# Patient Record
Sex: FEMALE | Race: WHITE | NOT HISPANIC OR LATINO | Employment: OTHER | URBAN - METROPOLITAN AREA
[De-identification: names, ages, dates, MRNs, and addresses within clinical notes are randomized per-mention and may not be internally consistent; named-entity substitution may affect disease eponyms.]

---

## 2017-09-09 ENCOUNTER — HOSPITAL ENCOUNTER (INPATIENT)
Facility: HOSPITAL | Age: 62
LOS: 6 days | Discharge: NURSING, REHAB OR LONG-TERM CARE | End: 2017-09-15
Attending: INTERNAL MEDICINE | Admitting: INTERNAL MEDICINE

## 2017-09-09 ENCOUNTER — APPOINTMENT (OUTPATIENT)
Dept: CT IMAGING | Facility: HOSPITAL | Age: 62
End: 2017-09-09

## 2017-09-09 DIAGNOSIS — R26.2 DIFFICULTY WALKING: Primary | ICD-10-CM

## 2017-09-09 PROBLEM — I62.9 INTRACRANIAL BLEED: Status: ACTIVE | Noted: 2017-09-09

## 2017-09-09 LAB
ANION GAP SERPL CALCULATED.3IONS-SCNC: 15.5 MMOL/L
BUN BLD-MCNC: 11 MG/DL (ref 8–23)
BUN/CREAT SERPL: 14.7 (ref 7–25)
CALCIUM SPEC-SCNC: 8.9 MG/DL (ref 8.6–10.5)
CHLORIDE SERPL-SCNC: 97 MMOL/L (ref 98–107)
CO2 SERPL-SCNC: 26.5 MMOL/L (ref 22–29)
CREAT BLD-MCNC: 0.75 MG/DL (ref 0.57–1)
DEPRECATED RDW RBC AUTO: 42.1 FL (ref 37–54)
ERYTHROCYTE [DISTWIDTH] IN BLOOD BY AUTOMATED COUNT: 12.6 % (ref 11.7–13)
GFR SERPL CREATININE-BSD FRML MDRD: 79 ML/MIN/1.73
GLUCOSE BLD-MCNC: 157 MG/DL (ref 65–99)
GLUCOSE BLDC GLUCOMTR-MCNC: 120 MG/DL (ref 70–130)
GLUCOSE BLDC GLUCOMTR-MCNC: 144 MG/DL (ref 70–130)
HCT VFR BLD AUTO: 43.6 % (ref 35.6–45.5)
HGB BLD-MCNC: 15.3 G/DL (ref 11.9–15.5)
INR PPP: 1.03 (ref 0.9–1.1)
MCH RBC QN AUTO: 31.9 PG (ref 26.9–32)
MCHC RBC AUTO-ENTMCNC: 35.1 G/DL (ref 32.4–36.3)
MCV RBC AUTO: 91 FL (ref 80.5–98.2)
PLATELET # BLD AUTO: 192 10*3/MM3 (ref 140–500)
PMV BLD AUTO: 12.4 FL (ref 6–12)
POTASSIUM BLD-SCNC: 2.5 MMOL/L (ref 3.5–5.2)
PROTHROMBIN TIME: 13.1 SECONDS (ref 11.7–14.2)
RBC # BLD AUTO: 4.79 10*6/MM3 (ref 3.9–5.2)
SODIUM BLD-SCNC: 139 MMOL/L (ref 136–145)
WBC NRBC COR # BLD: 11.19 10*3/MM3 (ref 4.5–10.7)

## 2017-09-09 PROCEDURE — 82962 GLUCOSE BLOOD TEST: CPT

## 2017-09-09 PROCEDURE — 85027 COMPLETE CBC AUTOMATED: CPT | Performed by: NEUROLOGICAL SURGERY

## 2017-09-09 PROCEDURE — 70450 CT HEAD/BRAIN W/O DYE: CPT

## 2017-09-09 PROCEDURE — 80048 BASIC METABOLIC PNL TOTAL CA: CPT | Performed by: INTERNAL MEDICINE

## 2017-09-09 PROCEDURE — 25010000003 POTASSIUM CHLORIDE 10 MEQ/100ML SOLUTION: Performed by: INTERNAL MEDICINE

## 2017-09-09 PROCEDURE — 85610 PROTHROMBIN TIME: CPT | Performed by: NEUROLOGICAL SURGERY

## 2017-09-09 RX ORDER — SODIUM CHLORIDE 0.9 % (FLUSH) 0.9 %
1-10 SYRINGE (ML) INJECTION AS NEEDED
Status: DISCONTINUED | OUTPATIENT
Start: 2017-09-09 | End: 2017-09-15 | Stop reason: HOSPADM

## 2017-09-09 RX ORDER — POTASSIUM CHLORIDE 750 MG/1
40 CAPSULE, EXTENDED RELEASE ORAL AS NEEDED
Status: DISCONTINUED | OUTPATIENT
Start: 2017-09-09 | End: 2017-09-15 | Stop reason: HOSPADM

## 2017-09-09 RX ORDER — POTASSIUM CHLORIDE 1.5 G/1.77G
40 POWDER, FOR SOLUTION ORAL AS NEEDED
Status: DISCONTINUED | OUTPATIENT
Start: 2017-09-09 | End: 2017-09-15 | Stop reason: HOSPADM

## 2017-09-09 RX ORDER — LABETALOL HYDROCHLORIDE 5 MG/ML
20 INJECTION, SOLUTION INTRAVENOUS ONCE
Status: COMPLETED | OUTPATIENT
Start: 2017-09-09 | End: 2017-09-09

## 2017-09-09 RX ORDER — LABETALOL HYDROCHLORIDE 5 MG/ML
INJECTION, SOLUTION INTRAVENOUS
Status: COMPLETED
Start: 2017-09-09 | End: 2017-09-09

## 2017-09-09 RX ORDER — LABETALOL HYDROCHLORIDE 5 MG/ML
20 INJECTION, SOLUTION INTRAVENOUS EVERY 4 HOURS PRN
Status: DISCONTINUED | OUTPATIENT
Start: 2017-09-09 | End: 2017-09-10

## 2017-09-09 RX ORDER — POTASSIUM CHLORIDE 7.45 MG/ML
10 INJECTION INTRAVENOUS
Status: DISCONTINUED | OUTPATIENT
Start: 2017-09-09 | End: 2017-09-15 | Stop reason: HOSPADM

## 2017-09-09 RX ADMIN — LABETALOL HYDROCHLORIDE 20 MG: 5 INJECTION, SOLUTION INTRAVENOUS at 21:51

## 2017-09-09 RX ADMIN — POTASSIUM CHLORIDE 10 MEQ: 7.46 INJECTION, SOLUTION INTRAVENOUS at 23:26

## 2017-09-09 RX ADMIN — LABETALOL HYDROCHLORIDE 20 MG: 5 INJECTION, SOLUTION INTRAVENOUS at 21:52

## 2017-09-09 RX ADMIN — NICARDIPINE HYDROCHLORIDE 15 MG/HR: 25 INJECTION INTRAVENOUS at 23:40

## 2017-09-09 RX ADMIN — NICARDIPINE HYDROCHLORIDE 10 MG/HR: 25 INJECTION INTRAVENOUS at 20:12

## 2017-09-09 RX ADMIN — NICARDIPINE HYDROCHLORIDE 15 MG/HR: 25 INJECTION INTRAVENOUS at 21:51

## 2017-09-09 RX ADMIN — LABETALOL HYDROCHLORIDE 20 MG: 5 INJECTION, SOLUTION INTRAVENOUS at 23:20

## 2017-09-10 ENCOUNTER — APPOINTMENT (OUTPATIENT)
Dept: CT IMAGING | Facility: HOSPITAL | Age: 62
End: 2017-09-10

## 2017-09-10 LAB
CHOLEST SERPL-MCNC: 127 MG/DL (ref 0–200)
GLUCOSE BLDC GLUCOMTR-MCNC: 103 MG/DL (ref 70–130)
GLUCOSE BLDC GLUCOMTR-MCNC: 132 MG/DL (ref 70–130)
GLUCOSE BLDC GLUCOMTR-MCNC: 136 MG/DL (ref 70–130)
HDLC SERPL-MCNC: 39 MG/DL (ref 40–60)
LDLC SERPL CALC-MCNC: 55 MG/DL (ref 0–100)
LDLC/HDLC SERPL: 1.4 {RATIO}
POTASSIUM BLD-SCNC: 2.8 MMOL/L (ref 3.5–5.2)
TRIGL SERPL-MCNC: 167 MG/DL (ref 0–150)
VLDLC SERPL-MCNC: 33.4 MG/DL (ref 5–40)

## 2017-09-10 PROCEDURE — 70450 CT HEAD/BRAIN W/O DYE: CPT

## 2017-09-10 PROCEDURE — 84132 ASSAY OF SERUM POTASSIUM: CPT | Performed by: INTERNAL MEDICINE

## 2017-09-10 PROCEDURE — 80061 LIPID PANEL: CPT | Performed by: INTERNAL MEDICINE

## 2017-09-10 PROCEDURE — 82962 GLUCOSE BLOOD TEST: CPT

## 2017-09-10 PROCEDURE — 99222 1ST HOSP IP/OBS MODERATE 55: CPT | Performed by: NEUROLOGICAL SURGERY

## 2017-09-10 RX ORDER — HYDROCODONE BITARTRATE AND ACETAMINOPHEN 10; 325 MG/1; MG/1
1 TABLET ORAL EVERY 6 HOURS PRN
COMMUNITY
End: 2017-09-15 | Stop reason: HOSPADM

## 2017-09-10 RX ORDER — LABETALOL 100 MG/1
100 TABLET, FILM COATED ORAL EVERY 12 HOURS SCHEDULED
Status: DISCONTINUED | OUTPATIENT
Start: 2017-09-10 | End: 2017-09-11

## 2017-09-10 RX ORDER — LABETALOL HYDROCHLORIDE 5 MG/ML
20 INJECTION, SOLUTION INTRAVENOUS EVERY 4 HOURS PRN
Status: DISCONTINUED | OUTPATIENT
Start: 2017-09-10 | End: 2017-09-15 | Stop reason: HOSPADM

## 2017-09-10 RX ORDER — SODIUM CHLORIDE 0.9 % (FLUSH) 0.9 %
1-10 SYRINGE (ML) INJECTION AS NEEDED
Status: DISCONTINUED | OUTPATIENT
Start: 2017-09-10 | End: 2017-09-12

## 2017-09-10 RX ORDER — AMLODIPINE BESYLATE 10 MG/1
10 TABLET ORAL
Status: DISCONTINUED | OUTPATIENT
Start: 2017-09-10 | End: 2017-09-15 | Stop reason: HOSPADM

## 2017-09-10 RX ORDER — GABAPENTIN 600 MG/1
600 TABLET ORAL 3 TIMES DAILY
COMMUNITY
End: 2017-09-15 | Stop reason: HOSPADM

## 2017-09-10 RX ORDER — NIFEDIPINE 60 MG/1
60 TABLET, EXTENDED RELEASE ORAL DAILY
COMMUNITY
End: 2017-09-15 | Stop reason: HOSPADM

## 2017-09-10 RX ORDER — ALPRAZOLAM 0.5 MG/1
0.5 TABLET ORAL 3 TIMES DAILY
COMMUNITY
End: 2017-09-15 | Stop reason: HOSPADM

## 2017-09-10 RX ORDER — POTASSIUM CHLORIDE 600 MG/1
20 TABLET, FILM COATED, EXTENDED RELEASE ORAL DAILY PRN
COMMUNITY
End: 2023-01-10

## 2017-09-10 RX ORDER — LABETALOL HYDROCHLORIDE 5 MG/ML
20 INJECTION, SOLUTION INTRAVENOUS EVERY 4 HOURS PRN
Status: DISCONTINUED | OUTPATIENT
Start: 2017-09-10 | End: 2017-09-10

## 2017-09-10 RX ORDER — LEVOTHYROXINE SODIUM 200 UG/1
200 CAPSULE ORAL DAILY
COMMUNITY
End: 2023-01-13

## 2017-09-10 RX ORDER — ATORVASTATIN CALCIUM 20 MG/1
20 TABLET, FILM COATED ORAL DAILY
COMMUNITY
End: 2021-08-11

## 2017-09-10 RX ORDER — HYDROCODONE BITARTRATE AND ACETAMINOPHEN 5; 325 MG/1; MG/1
1 TABLET ORAL EVERY 4 HOURS PRN
Status: DISCONTINUED | OUTPATIENT
Start: 2017-09-10 | End: 2017-09-15 | Stop reason: HOSPADM

## 2017-09-10 RX ADMIN — AMLODIPINE BESYLATE 10 MG: 10 TABLET ORAL at 13:35

## 2017-09-10 RX ADMIN — NICARDIPINE HYDROCHLORIDE 12.5 MG/HR: 25 INJECTION INTRAVENOUS at 02:27

## 2017-09-10 RX ADMIN — HYDROCODONE BITARTRATE AND ACETAMINOPHEN 1 TABLET: 5; 325 TABLET ORAL at 02:27

## 2017-09-10 RX ADMIN — HYDROCODONE BITARTRATE AND ACETAMINOPHEN 1 TABLET: 5; 325 TABLET ORAL at 07:07

## 2017-09-10 RX ADMIN — NICARDIPINE HYDROCHLORIDE 7.5 MG/HR: 25 INJECTION INTRAVENOUS at 07:06

## 2017-09-10 RX ADMIN — LABETALOL HYDROCHLORIDE 0.5 MG/MIN: 5 INJECTION, SOLUTION INTRAVENOUS at 01:18

## 2017-09-10 RX ADMIN — POTASSIUM CHLORIDE 40 MEQ: 750 CAPSULE, EXTENDED RELEASE ORAL at 10:51

## 2017-09-10 RX ADMIN — NICARDIPINE HYDROCHLORIDE 15 MG/HR: 25 INJECTION INTRAVENOUS at 00:58

## 2017-09-10 RX ADMIN — NICARDIPINE HYDROCHLORIDE 5 MG/HR: 25 INJECTION INTRAVENOUS at 21:28

## 2017-09-10 RX ADMIN — POTASSIUM CHLORIDE 40 MEQ: 750 CAPSULE, EXTENDED RELEASE ORAL at 19:18

## 2017-09-10 RX ADMIN — LABETALOL HCL 100 MG: 100 TABLET, FILM COATED ORAL at 20:21

## 2017-09-10 RX ADMIN — POTASSIUM CHLORIDE 40 MEQ: 1.5 POWDER, FOR SOLUTION ORAL at 04:32

## 2017-09-10 RX ADMIN — POTASSIUM CHLORIDE 40 MEQ: 1.5 POWDER, FOR SOLUTION ORAL at 23:44

## 2017-09-10 RX ADMIN — NICARDIPINE HYDROCHLORIDE 5 MG/HR: 25 INJECTION INTRAVENOUS at 11:47

## 2017-09-10 RX ADMIN — NICARDIPINE HYDROCHLORIDE 7.5 MG/HR: 25 INJECTION INTRAVENOUS at 05:56

## 2017-09-10 RX ADMIN — NICARDIPINE HYDROCHLORIDE 5 MG/HR: 25 INJECTION INTRAVENOUS at 16:56

## 2017-09-10 RX ADMIN — POTASSIUM CHLORIDE 40 MEQ: 1.5 POWDER, FOR SOLUTION ORAL at 00:02

## 2017-09-10 NOTE — CONSULTS
Referring Provider: janneth  Reason for Consultation: thalamic ich    Patient Care Team:  Rosenberg Acosta Reyes, MD as PCP - General (Family Medicine)    Chief complaint dysarthria and paresis    Subjective .     History of present illness:  63 yo female with acute onset of dysarthria and RUE paresis around 5 pm yesterday.  No ictal headache.  Has moderate stable headache.  No nausea or vomiting.  NO change to vision.  SPeech has improved since admission to [facility].  Slight progression of ICH on 2nd scan but stable on 3rd study.  No seizure.  No sensory complaints.  Patient is mildly confused so ROS is rudimentary and dubious.  No chest pain or SOB.    Review of Systems  As above, further ROS is unable to be obtained.    History  Past Surgical History:   Procedure Laterality Date   • TONSILLECTOMY     , No family history on file., Social History   Substance Use Topics   • Smoking status: Former Smoker     Quit date: 9/9/1997   • Smokeless tobacco: Not on file   • Alcohol use Yes      Comment: occaisonally   , No prescriptions prior to admission.   , Scheduled Meds:   , Continuous Infusions:    labetalol 0.5-2 mg/min Last Rate: Stopped (09/10/17 0430)   niCARdipine 5-15 mg/hr Last Rate: 7.5 mg/hr (09/10/17 0706)   , PRN Meds:  •  HYDROcodone-acetaminophen  •  labetalol  •  potassium chloride **OR** potassium chloride **OR** potassium chloride  •  sodium chloride and Allergies:  Review of patient's allergies indicates no known allergies.    Objective     Vital Signs   Temp:  [97.9 °F (36.6 °C)-98.6 °F (37 °C)] 98.1 °F (36.7 °C)  Heart Rate:  [64-89] 70  Resp:  [12] 12  BP: ()/() 114/66    Physical Exam:     General Appearance:    Alert, cooperative, in no acute distress, morbid obesity   Head:    Normocephalic, without obvious abnormality, atraumatic   Eyes:            Lids and lashes normal, conjunctivae and sclerae normal, no   icterus, no pallor, corneas clear, PERRLA   Ears:    Ears appear  intact with no abnormalities noted   Throat:   No oral lesions, no thrush, oral mucosa moist, poor dentition   Neck:   No adenopathy, supple, trachea midline, no thyromegaly, no   carotid bruit, no JVD       Lungs:     respirations regular, even and                  unlabored    Heart:    Regular rhythm and normal rate   Chest Wall:    No abnormalities observed   Abdomen:     No masses, no organomegaly, soft        non-tender, non-distended, no guarding, no rebound                tenderness   Rectal:     Deferred   Extremities:   Moves all extremities well, no edema, no cyanosis, no             redness   Pulses:   Pulses palpable and equal bilaterally   Skin:   No bleeding, bruising or rash   Lymph nodes:   No palpable adenopathy   Neurologic:   Mental status is awake and alert, disoriented to  place and time    Follows 3 step commands  CN: pupils reactive, EOMI, VF full to confrontation, unable to visualize fundi, v1-3 intact, Face symmetric, Hearing intact to finger rub, Palate raises Sym, Gag deferred, Shrug intact, Tongue protrudes in midline  Str: 5/5 deltoid/biceps/triceps/wrist extensors/iliopsoas/ quadriceps/hamstrings/dorsiflexors/extensor hallucis longus/plantarflexors on left, RUE 3/5 strength diffusely  Sensory: intact to light touch/pinprick/proprioception in all extremities  Gait: not able to ambulate  Reflexes: 2+ throughout UE and LE, no alcantar's or clonus           Results Review:   I reviewed the patient's new clinical results.  I reviewed the patient's new imaging results and agree with the interpretation.   Stable left thalamic ich.  Likely etiology is hypertension in this location.      LABS:  Lab Results   Component Value Date    CALCIUM 8.9 09/09/2017     Results from last 7 days  Lab Units 09/09/17  2151   SODIUM mmol/L 139   POTASSIUM mmol/L 2.5*   CHLORIDE mmol/L 97*   CO2 mmol/L 26.5   BUN mg/dL 11   CREATININE mg/dL 0.75   GLUCOSE mg/dL 157*   CALCIUM mg/dL 8.9   WBC 10*3/mm3 11.19*    HEMOGLOBIN g/dL 15.3   PLATELETS 10*3/mm3 192     No results found for: CKTOTAL, CKMB, CKMBINDEX, TROPONINI, TROPONINT  Estimated Creatinine Clearance: 117.5 mL/min (by C-G formula based on Cr of 0.75).    Assessment/Plan     Active Problems:    Intracranial bleed      ICH  Recommend implementation of hemorrhagic stroke clinical pathway.  I discussed the patients findings and my recommendations with patient and nursing staff   Patient may ttf once BP control is established.  Will require rehab and outpatient MRI of brain in 6 weeks to evaluate for underlying lesion.  Will sign off, call for ?'s    Quang Penaloza IV, MD  09/10/17  9:20 AM    Time: More than 50% of time spent in counseling and coordination of care:  Total face-to-face/floor time 40 min.  Time spent in counseling 25 min. Counseling included the following topics: stroke, histpory, reasons to seek further evaluation, need for [id number]

## 2017-09-10 NOTE — PROGRESS NOTES
LOS: 1 day   Patient Care Team:  Rosenberg Acosta Reyes, MD as PCP - General (Family Medicine)    Chief Complaint:  Intracranial hemorrhage    Subjective   Patient denies headache or nausea.  She is aware that she has some trouble getting out words and expressing herself.  She is also aware that she has problems moving her right side.  Interval History:     Chart reviewed and discussed with Dr. Penaloza    Review of Systems:   Patient has trouble answering       Objective     Vital Signs  Temp:  [97.9 °F (36.6 °C)-98.6 °F (37 °C)] 98.1 °F (36.7 °C)  Heart Rate:  [64-89] 70  Resp:  [12] 12  BP: ()/() 114/66     Ventilator/Non-Invasive Ventilation Settings     None                       Body mass index is 39.05 kg/(m^2).  I/O last 3 completed shifts:  In: 1978.9 [P.O.:550; I.V.:1328.9; IV Piggyback:100]  Out: 300 [Urine:300]           Physical Exam:  General Appearance: Well-developed morbidly obese white female resting comfortably in bed she does not appear in acute distress  Eyes: Conjunctiva are clear and anicteric pupils are equal reactive to light extraocular muscles are intact and visual fields are intact to confrontation  ENT: No facial symmetry oral mucous membranes are little dry no erythema or exudates Mallampati type II airway nasal septum midline  Neck: Trachea is midline no adenopathy or thyromegaly no jugular venous distention  Lungs: Clear nonlabored symmetric expansion  Cardiac: Regular rate and rhythm no murmur or gallop  Abdomen: Obese soft no palpable organomegaly or masses  : Not examined  Musc/Skel: Grossly normal  Skin: No jaundice no petechiae no rashes noted  Neuro: Patient is alert she's oriented to person she knows she is at a hospital in [place].  Asking her the date she gave me 19 1520 and could not correct.  She is clearly having expressive aphasia and she is aware.  She clearly has marked weakness on the right side she can lift her right arm weakly off the bed but  she cannot maintain it the same with her right leg she can lift the healed barely off the bed but cannot maintain dorsiflexion plantar flexion with the right is weaker than the left.  She has good strength on the left side.  Both upper and lower extremities her  is markedly reduced on the right side  Extremities/P Vascular: No clubbing cyanosis or edema she has palpable radial dorsalis pedis pulses  MSE: She seems depressed       Labs:  WBC No results found for: WBCS   HGB Hemoglobin   Date Value Ref Range Status   09/09/2017 15.3 11.9 - 15.5 g/dL Final      HCT Hematocrit   Date Value Ref Range Status   09/09/2017 43.6 35.6 - 45.5 % Final      Platlets No results found for: LABPLAT     PT/INR:    Protime   Date Value Ref Range Status   09/09/2017 13.1 11.7 - 14.2 Seconds Final   /  INR   Date Value Ref Range Status   09/09/2017 1.03 0.90 - 1.10 Final       Sodium Sodium   Date Value Ref Range Status   09/09/2017 139 136 - 145 mmol/L Final      Potassium Potassium   Date Value Ref Range Status   09/09/2017 2.5 (L) 3.5 - 5.2 mmol/L Final      Chloride Chloride   Date Value Ref Range Status   09/09/2017 97 (L) 98 - 107 mmol/L Final      Bicarbonate No results found for: PLASMABICARB   BUN BUN   Date Value Ref Range Status   09/09/2017 11 8 - 23 mg/dL Final      Creatinine Creatinine   Date Value Ref Range Status   09/09/2017 0.75 0.57 - 1.00 mg/dL Final      Calcium Calcium   Date Value Ref Range Status   09/09/2017 8.9 8.6 - 10.5 mg/dL Final      Magnesium No results found for: MG         pH No results found for: PHART   pO2 No results found for: PO2ART   pCO2 No results found for: HZU6PLA   HCO3 No results found for: REF1UNY       amLODIPine 10 mg Oral Q24H       labetalol 0.5-2 mg/min Last Rate: Stopped (09/10/17 8410)   niCARdipine 5-15 mg/hr Last Rate: 7.5 mg/hr (09/10/17 0706)       Diagnostics:  Imaging Results (last 24 hours)     Procedure Component Value Units Date/Time    CT Head Without Contrast  [[id number]] Collected:  09/09/17 2239     Updated:  09/09/17 2239    Narrative:       CT SCAN OF THE BRAIN WITHOUT CONTRAST.     TECHNIQUE: Multiple axial images of the brain were obtained from the  vertex to the base of the brain.     HISTORY:  Intracranial hemorrhage.     COMPARISON:  Today's examination, 5:58 PM.     FINDINGS:   Midline structures are within normal limits, there is no hydrocephalus.  Gray-white matter differentiation is maintained.      In the left thalamus extending into the left basal ganglia there is a  2.5 x 2.2 cm intra-axial hemorrhage which previously measured 1.9 x 1.2  cm. Mild surrounding vasogenic edema and mass effect.     Orbits are within normal limits. No significant mucosal disease of the  para-nasal sinuses. Mastoid air cells are well aerated.             Impression:       Acute hemorrhage of the left thalamus extending into the left basal  ganglia has increased in size from 1.9 x 1.2 cm to 2.5 x 2.2 cm.   Findings were called to patient's nurse Ronna, 10:36 PM.             CT Head Without Contrast [[id number]] Collected:  09/10/17 0950     Updated:  09/10/17 1020    Narrative:       CT SCAN OF THE BRAIN WITHOUT CONTRAST AT 8:49 AM     HISTORY: Follow-up of intraparenchymal hemorrhage.     FINDINGS:  The CT scan was performed through the brain without contrast  and compared to yesterday's exam and again demonstrates a well-defined  area of high density acute hemorrhage in the left basal ganglia  measuring 2.1 cm in AP diameter, 1.7 cm in transverse diameter, and 2.4  cm in height. There is no significant change. There is mild localized  mass effect. The remainder of the CT scan is unremarkable and no new  abnormality is seen.     Radiation dose reduction techniques were utilized, including automated  exposure control and exposure modulation based on body size.      This report was finalized on 9/10/2017 10:17 AM by Dr. Peter Torres MD.             CT scans  reviewed      Assessment/Plan     Patient Active Problem List   Diagnosis Code   • Intracranial bleed I62.9       1. Left basal ganglia intracranial hemorrhage.  Etiology not clear but likely hypertensive.  She will need follow-up scanning as noted by neurosurgery.  Thankfully it looks like her bleed has stabilized.  2. Right hemiparesis with expressive aphasia she is going to need probably rehabilitation start with PT, OT and speech therapy  3. Hypertensive emergency she is on a Cardene drip I will add oral amlodipine so we can see if we can wean her Cardene off.  Apparently she is on blood pressure medications at home but she does not know what her medications are  4. Obstructive sleep apnea her family has been instructed to bring in her home Pap machine if not will use an auto BiPAP here we should treat her sleep apnea this should help with blood pressure control as well  5. Morbid obesity obviously she needs long-term weight loss which will require diet and exercise I'm sure  6. Hypokalemia being replaced per protocol  7. COPD    Plan for disposition:    Laron Escobar MD  09/10/17  10:53 AM    Time: 40 minutes part of this time the staff was discussing with the patient and her daughter disease state and treatments and her future needs with regards to therapy etc.

## 2017-09-10 NOTE — H&P
Patient Care Team:  Rosenberg Acosta Reyes, MD as PCP - General (Family Medicine)    Chief complaint:  Aphasia and right-sided weakness.    History of present illness:  Patient is aphasic and does not recall what exactly happened.  Therefore, most of the information were obtained from the daughter.    This is a 62-year-old female patient, former smoker, with history of COPD, HTN and sleep apnea who lives by herself.  She was in her usual state of health until this afternoon, around 5 PM, when she noted difficulties fixing her daughter.  Her daughter said that she was receiving texts with letters.  So she called her and was not able to understand her speech.  Therefore, she realized that it's best to call EMS.  Patient was transferred to [facility].  She had an urgent CT of the head which showed 1.7 cm x 1.2 cm left thalamic hemorrhage with vasogenic edema.  There was no other records from [facility] other than the report of the CT of the brain.  Patient was then transferred to [facility] for further care.  It's unclear whether her symptoms improved or worsens during the transfer.  Her family were not present with her at [facility] and they just got here to [facility].  We do not have detailed records either.  She had an EKG over there which I personally reviewed and it's revealed left axis deviation.    According to the daughter, the patient takes her blood pressure medications regularly.  However her blood pressure remains elevated.  She has obstructive sleep apnea and uses CPAP regularly according to the family.  No history of seizure or incontinence.      Review of Systems:  Cannot obtain a detailed review of systems due to patient's aphasia.  According to the family, patient was in her usual state of health prior to this.    History  Past Medical History:   Diagnosis Date   • COPD (chronic obstructive pulmonary disease)    • Hypertension    • Sleep apnea       Past Surgical History:   Procedure Laterality Date   • TONSILLECTOMY       No family history on file.  Social History   Substance Use Topics   • Smoking status: Former Smoker     Quit date: 9/9/1997   • Smokeless tobacco: Not on file   • Alcohol use Yes      Comment: occaisonally     No prescriptions prior to admission.     Allergies:  Review of patient's allergies indicates no known allergies.    Vital Signs  Temp:  [98.3 °F (36.8 °C)] 98.3 °F (36.8 °C)  Heart Rate:  [82-86] 86  Resp:  [12] 12  BP: ()/(61-92) 171/84    Physical Exam:  Constitutional: Not in acute distress.  Eyes: Injected conjunctiva, EOMI.  ENMT: Anthony 3. No oral thrush.   Heart: RRR, no murmur  Lungs: Good and equal air entry bilaterally.  No crackles or wheezing      Abdomen: Obese. Soft. No tenderness or dullness.  Extremities: No cyanosis, clubbing or pitting edema. Moves all extremities.  Neuro: Conscious, alert, oriented x3.  Aphasia with on comprehensible speech at times.  Decreased sensitivity on the right face compared to the left.  Strength is 4+/ 5 in the right arm, otherwise 5/5.  Finger to nose test is normal bilaterally.  Psych: Appropriate mood and affect.    Integumentary: No rash  Lymphatic: No palpable cervical or supraclavicular lymph nodes.    Assessment and Plan:    1) Intracranial hemorrhage, left thalamus 1.7 x 1.2 cm with surrounding vasogenic edema:   Likely hypertensive bleed.  She had left axis deviation on her EKG consistent with left ventricular hypertrophy secondary to chronic uncontrolled hypertension.   - Admit to ICU   - Stat CT head   - BP control. Keep SBP<140   - Neuro Checks   - SCD proph    - Consult neurosurgery    2) Hypertensive emergency:    Was treated with nicardipine drip and Garcia.  Her blood pressure remains elevated.     - Add labetalol    3) Right hemiparesis and aphasia, 2ary to #1:   The weakness is minimal at this point.  She mainly has expressive aphasia and now.    4) PATRICK: Not  clear whether she was compliant with therapy.   - Will ask the family to bring her CPAP so we can examine it    5) COPD, no exacerbation:  - Start as needed DuoNeb    6) Morbid obesity            I discussed the patients findings and my recommendations with patient, family and nursing staff.     Andrew Eng MD  09/09/17  9:51 PM            EMR Dragon/Transcription disclaimer:   Much of this encounter note is an electronic transcription/translation of spoken language to printed text. The electronic translation of spoken language may permit erroneous, or at times, nonsensical words or phrases to be inadvertently transcribed; Although I have reviewed the note for such errors, some may still exist.

## 2017-09-10 NOTE — SIGNIFICANT NOTE
09/10/17 1307   Rehab Treatment   Discipline physical therapist   Rehab Evaluation   Evaluation Not Performed other (see comments)  (Pt on strict bed rest orders as of 0932 on 9/10/17. Spoke with JOHAN Nuno; PT will f/u tomorrow. )

## 2017-09-11 ENCOUNTER — TELEPHONE (OUTPATIENT)
Dept: NEUROSURGERY | Facility: CLINIC | Age: 62
End: 2017-09-11

## 2017-09-11 DIAGNOSIS — I61.9 NONTRAUMATIC INTRACEREBRAL HEMORRHAGE, UNSPECIFIED CEREBRAL LOCATION, UNSPECIFIED LATERALITY (HCC): Primary | ICD-10-CM

## 2017-09-11 LAB
ANION GAP SERPL CALCULATED.3IONS-SCNC: 12.2 MMOL/L
BUN BLD-MCNC: 10 MG/DL (ref 8–23)
BUN/CREAT SERPL: 12.3 (ref 7–25)
CALCIUM SPEC-SCNC: 8.4 MG/DL (ref 8.6–10.5)
CHLORIDE SERPL-SCNC: 104 MMOL/L (ref 98–107)
CO2 SERPL-SCNC: 25.8 MMOL/L (ref 22–29)
CREAT BLD-MCNC: 0.81 MG/DL (ref 0.57–1)
GFR SERPL CREATININE-BSD FRML MDRD: 72 ML/MIN/1.73
GLUCOSE BLD-MCNC: 121 MG/DL (ref 65–99)
GLUCOSE BLDC GLUCOMTR-MCNC: 108 MG/DL (ref 70–130)
GLUCOSE BLDC GLUCOMTR-MCNC: 151 MG/DL (ref 70–130)
GLUCOSE BLDC GLUCOMTR-MCNC: 215 MG/DL (ref 70–130)
HBA1C MFR BLD: 5.54 % (ref 4.8–5.6)
POTASSIUM BLD-SCNC: 3.5 MMOL/L (ref 3.5–5.2)
SODIUM BLD-SCNC: 142 MMOL/L (ref 136–145)

## 2017-09-11 PROCEDURE — 92610 EVALUATE SWALLOWING FUNCTION: CPT

## 2017-09-11 PROCEDURE — 97162 PT EVAL MOD COMPLEX 30 MIN: CPT

## 2017-09-11 PROCEDURE — 25010000003 POTASSIUM CHLORIDE 10 MEQ/100ML SOLUTION: Performed by: INTERNAL MEDICINE

## 2017-09-11 PROCEDURE — 82962 GLUCOSE BLOOD TEST: CPT

## 2017-09-11 PROCEDURE — 97166 OT EVAL MOD COMPLEX 45 MIN: CPT | Performed by: OCCUPATIONAL THERAPIST

## 2017-09-11 PROCEDURE — 80048 BASIC METABOLIC PNL TOTAL CA: CPT | Performed by: INTERNAL MEDICINE

## 2017-09-11 PROCEDURE — 83036 HEMOGLOBIN GLYCOSYLATED A1C: CPT | Performed by: NEUROLOGICAL SURGERY

## 2017-09-11 PROCEDURE — 97110 THERAPEUTIC EXERCISES: CPT

## 2017-09-11 RX ORDER — LABETALOL 300 MG/1
300 TABLET, FILM COATED ORAL EVERY 12 HOURS SCHEDULED
Status: DISCONTINUED | OUTPATIENT
Start: 2017-09-11 | End: 2017-09-15 | Stop reason: HOSPADM

## 2017-09-11 RX ORDER — LISINOPRIL 5 MG/1
5 TABLET ORAL
Status: DISCONTINUED | OUTPATIENT
Start: 2017-09-11 | End: 2017-09-12

## 2017-09-11 RX ADMIN — LABETALOL HCL 100 MG: 100 TABLET, FILM COATED ORAL at 08:34

## 2017-09-11 RX ADMIN — NICARDIPINE HYDROCHLORIDE 5 MG/HR: 25 INJECTION INTRAVENOUS at 11:31

## 2017-09-11 RX ADMIN — NICARDIPINE HYDROCHLORIDE 2.5 MG/HR: 25 INJECTION INTRAVENOUS at 04:55

## 2017-09-11 RX ADMIN — POTASSIUM CHLORIDE 40 MEQ: 1.5 POWDER, FOR SOLUTION ORAL at 03:46

## 2017-09-11 RX ADMIN — POTASSIUM CHLORIDE 40 MEQ: 750 CAPSULE, EXTENDED RELEASE ORAL at 22:39

## 2017-09-11 RX ADMIN — HYDROCODONE BITARTRATE AND ACETAMINOPHEN 1 TABLET: 5; 325 TABLET ORAL at 15:56

## 2017-09-11 RX ADMIN — LISINOPRIL 5 MG: 5 TABLET ORAL at 13:45

## 2017-09-11 RX ADMIN — HYDROCODONE BITARTRATE AND ACETAMINOPHEN 1 TABLET: 5; 325 TABLET ORAL at 21:20

## 2017-09-11 RX ADMIN — AMLODIPINE BESYLATE 10 MG: 10 TABLET ORAL at 08:34

## 2017-09-11 RX ADMIN — NICARDIPINE HYDROCHLORIDE 15 MG/HR: 25 INJECTION INTRAVENOUS at 17:47

## 2017-09-11 RX ADMIN — LABETALOL HYDROCHLORIDE 300 MG: 300 TABLET, FILM COATED ORAL at 21:00

## 2017-09-11 RX ADMIN — NICARDIPINE HYDROCHLORIDE 15 MG/HR: 25 INJECTION INTRAVENOUS at 19:50

## 2017-09-11 RX ADMIN — NICARDIPINE HYDROCHLORIDE 10 MG/HR: 25 INJECTION INTRAVENOUS at 15:56

## 2017-09-11 RX ADMIN — NICARDIPINE HYDROCHLORIDE 10 MG/HR: 25 INJECTION INTRAVENOUS at 14:32

## 2017-09-11 RX ADMIN — LABETALOL HYDROCHLORIDE 20 MG: 5 INJECTION, SOLUTION INTRAVENOUS at 16:50

## 2017-09-11 RX ADMIN — NICARDIPINE HYDROCHLORIDE 15 MG/HR: 25 INJECTION INTRAVENOUS at 21:30

## 2017-09-11 RX ADMIN — POTASSIUM CHLORIDE 10 MEQ: 7.46 INJECTION, SOLUTION INTRAVENOUS at 20:45

## 2017-09-11 RX ADMIN — HYDROCODONE BITARTRATE AND ACETAMINOPHEN 1 TABLET: 5; 325 TABLET ORAL at 11:32

## 2017-09-11 RX ADMIN — NICARDIPINE HYDROCHLORIDE 15 MG/HR: 25 INJECTION INTRAVENOUS at 23:37

## 2017-09-11 NOTE — THERAPY EVALUATION
Acute Care - Physical Therapy Initial Evaluation  [facility]     Patient Name: Marilin Martinez  : 1955  MRN: [id number]  Today's Date: 2017   Onset of Illness/Injury or Date of Surgery Date: 17  Date of Referral to PT: 17  Referring Physician: Tremaine      Admit Date: 2017     Visit Dx:    ICD-10-CM ICD-9-CM   1. Difficulty walking R26.2 719.7     Patient Active Problem List   Diagnosis   • Intracranial bleed     Past Medical History:   Diagnosis Date   • COPD (chronic obstructive pulmonary disease)    • Hypertension    • Sleep apnea      Past Surgical History:   Procedure Laterality Date   • TONSILLECTOMY            PT ASSESSMENT (last 72 hours)      PT Evaluation       17 1337 17 1219    Rehab Evaluation    Document Type evaluation  -PC     Subjective Information agree to therapy  -PC     Patient Effort, Rehab Treatment good  -PC     Symptoms Noted During/After Treatment none  -PC     General Information    Patient Profile Review yes  -PC     Onset of Illness/Injury or Date of Surgery Date 17  -PC     Referring Physician Tremaine  -PC     General Observations pt is in chair, no acute distress, awake and cooperative  -PC     Pertinent History Of Current Problem ICH, R side weakness and aphasia  -PC     Prior Level of Function independent:  -PC     Equipment Currently Used at Home  cane, straight;bipap/ cpap   uses CPAP from [facility]  -SI    Plans/Goals Discussed With patient;family  -PC     Living Environment    Lives With  alone  -SI    Living Arrangements  mobile home  -SI    Home Accessibility  stairs (1 railing present);stairs to enter home  -SI    Number of Stairs to Enter Home  4  -SI    Type of Financial/Environmental Concern  none  -SI    Transportation Available  family or friend will provide  -SI    Clinical Impression    Date of Referral to PT 17  -PC     Patient/Family Goals Statement did not state  -PC     Criteria for Skilled Therapeutic  Interventions Met yes;treatment indicated  -PC     Rehab Potential good, to achieve stated therapy goals  -PC     Pain Assessment    Pain Assessment No/denies pain  -PC     Cognitive Assessment/Intervention    Current Cognitive/Communication Assessment impaired  -PC     Orientation Status oriented to;person  -PC     Follows Commands/Answers Questions able to follow single-step instructions;100% of the time;needs cueing;needs increased time;needs repetition  -PC     Personal Safety decreased insight to deficits  -PC     Personal Safety Interventions gait belt;fall prevention program maintained  -PC     MMT (Manual Muscle Testing)    General MMT Assessment Detail L side 5/5, RUE 4/5, RLE hip 3-/5, knee ext 3+/5, DF 4/5  -PC     Bed Mobility, Assessment/Treatment    Bed Mobility, Comment in chair  -PC     Transfer Assessment/Treatment    Transfers, Sit-Stand Somers minimum assist (75% patient effort);moderate assist (50% patient effort);2 person assist required  -PC     Transfers, Stand-Sit Somers minimum assist (75% patient effort);moderate assist (50% patient effort);2 person assist required  -PC     Gait Assessment/Treatment    Gait, Somers Level moderate assist (50% patient effort);2 person assist required  -PC     Gait, Assistive Device rolling walker  -     Gait, Distance (Feet) --   40 ft x 2  -PC     Gait, Gait Deviations right:;decreased heel strike;step length decreased;limb motion velocity decreased;toe-to-floor clearance decreased  -     Gait, Safety Issues step length decreased;weight-shifting ability decreased  -     Gait, Impairments strength decreased;impaired balance;coordination impaired;motor control impaired  -     Positioning and Restraints    Pre-Treatment Position sitting in chair/recliner  -PC     Post Treatment Position chair  -PC     In Chair sitting;call light within reach;encouraged to call for assist  -PC       09/11/17 1132 09/10/17 1307    Rehab Evaluation     Document Type evaluation  -     Subjective Information agree to therapy  -     Evaluation Not Performed  other (see comments)   Pt on strict bed rest orders as of 0932 on 9/10/17. Spoke with JOHAN Nuno; PT will f/u tomorrow.   -DO    Symptoms Noted During/After Treatment none  -SH     Pain Assessment    Pain Assessment No/denies pain  -       User Key  (r) = Recorded By, (t) = Taken By, (c) = Cosigned By    Initials Name Provider Type    PC Marlena Forte, PT Physical Therapist    SH Lisa Baxter MS CCC-SLP Speech and Language Pathologist    TERESE Sinclair RN Case Manager    DO Milton Lopez, PT Physical Therapist          Physical Therapy Education     Title: PT OT SLP Therapies (Active)     Topic: Physical Therapy (Active)     Point: Mobility training (Active)    Learning Progress Summary    Learner Readiness Method Response Comment Documented by Status   Patient Acceptance D,E NR   09/11/17 1345 Active               Point: Home exercise program (Active)    Learning Progress Summary    Learner Readiness Method Response Comment Documented by Status   Patient Acceptance D,E NR   09/11/17 1345 Active               Point: Body mechanics (Active)    Learning Progress Summary    Learner Readiness Method Response Comment Documented by Status   Patient Acceptance D,E NR   09/11/17 1345 Active               Point: Precautions (Active)    Learning Progress Summary    Learner Readiness Method Response Comment Documented by Status   Patient Acceptance D,E NR   09/11/17 1345 Active                      User Key     Initials Effective Dates Name Provider Type [facility] 12/01/15 -  Marlena Forte, PT Physical Therapist PT                PT Recommendation and Plan  Anticipated Discharge Disposition: inpatient rehabilitation facility  Planned Therapy Interventions: gait training, bed mobility training, balance training, home exercise program, strengthening, transfer training  PT Frequency: daily  Plan of  Care Review  Plan Of Care Reviewed With: patient  Outcome Summary/Follow up Plan: pt presents with R side weakness, decreased motor planning and coordination R LE >RUE, impaired functional mobility/ She will benefit from PT to address, and rec rehab at D/C          IP PT Goals       09/11/17 1345          Bed Mobility PT LTG    Bed Mobility PT LTG, Date Established 09/11/17  -PC      Bed Mobility PT LTG, Time to Achieve 1 wk  -PC      Bed Mobility PT LTG, Activity Type supine to sit/sit to supine  -PC      Bed Mobility PT LTG, [place] Level contact guard assist  -PC      Transfer Training PT LTG    Transfer Training PT LTG, Date Established 09/11/17  -PC      Transfer Training PT LTG, Time to Achieve 1 wk  -PC      Transfer Training PT LTG, Activity Type sit to stand/stand to sit  -PC      Transfer Training PT LTG, [place] Level contact guard assist  -PC      Gait Training PT LTG    Gait Training Goal PT LTG, Date Established 09/11/17  -PC      Gait Training Goal PT LTG, Time to Achieve 1 wk  -PC      Gait Training Goal PT LTG, [place] Level minimum assist (75% patient effort);contact guard assist  -PC      Gait Training Goal PT LTG, Assist Device walker, rolling  -PC      Gait Training Goal PT LTG, Distance to Achieve 80 ft  -PC        User Key  (r) = Recorded By, (t) = Taken By, (c) = Cosigned By    Initials Name Provider Type    PC Marlena Forte, PT Physical Therapist                Outcome Measures       09/11/17 1300          How much help from another person do you currently need...    Turning from your back to your side while in flat bed without using bedrails? 2  -PC      Moving from lying on back to sitting on the side of a flat bed without bedrails? 2  -PC      Moving to and from a bed to a chair (including a wheelchair)? 2  -PC      Standing up from a chair using your arms (e.g., wheelchair, bedside chair)? 2  -PC      Climbing 3-5 steps with a railing? 1  -PC      To walk in hospital  room? 2  -PC      AM-PAC 6 Clicks Score 11  -PC      Functional Assessment    Outcome Measure Options AM-PAC 6 Clicks Basic Mobility (PT)  -PC        User Key  (r) = Recorded By, (t) = Taken By, (c) = Cosigned By    Initials Name Provider Type    PC Marlena Forte, PT Physical Therapist           Time Calculation:         PT Charges       09/11/17 1348          Time Calculation    Start Time 1312  -PC      Stop Time 1325  -PC      Time Calculation (min) 13 min  -PC      PT Received On 09/11/17  -PC      PT - Next Appointment 09/12/17  -PC      PT Goal Re-Cert Due Date 09/18/17  -PC        User Key  (r) = Recorded By, (t) = Taken By, (c) = Cosigned By    Initials Name Provider Type    PC Marlena Forte, PT Physical Therapist          Therapy Charges for Today     Code Description Service Date Service Provider Modifiers Qty    11797748929 HC PT EVAL MOD COMPLEXITY 2 9/11/2017 Marlena Forte, PT GP 1    52762330893 HC PT THER PROC EA 15 MIN 9/11/2017 Marlena Forte, PT GP 1    53342140232 HC PT THER SUPP EA 15 MIN 9/11/2017 Marlena Forte, PT GP 1          PT G-Codes  Outcome Measure Options: AM-PAC 6 Clicks Basic Mobility (PT)      Marlena Forte PT  9/11/2017

## 2017-09-11 NOTE — NURSING NOTE
Referral received through [facility] stroke order set.  Received call from [facility], stating that plans are for [facility].  Will sign off

## 2017-09-11 NOTE — TELEPHONE ENCOUNTER
----- Message from JORGE Alvarado sent at 9/11/2017  9:37 AM EDT -----  Please arrange follow up in 6 weeks with MRI brain, with and without, for follow up on ICH with MARIANNA. thanks

## 2017-09-11 NOTE — PROGRESS NOTES
LOS: 2 days   Patient Care Team:  Rosenberg Acosta Reyes, MD as PCP - General (Family Medicine)    Chief Complaint:  Intracranial hemorrhage    Subjective   Patient is sitting up in a chair she has no specific complaints today  Interval History:         Review of Systems:   Patient has trouble answering       Objective     Vital Signs  Temp:  [97.6 °F (36.4 °C)-99.1 °F (37.3 °C)] 98.6 °F (37 °C)  Heart Rate:  [60-85] 75  BP: (111-157)/() 148/88     Ventilator/Non-Invasive Ventilation Settings     None                       Body mass index is 39.05 kg/(m^2).  I/O last 3 completed shifts:  In: 3312.9 [P.O.:650; I.V.:2562.9; IV Piggyback:100]  Out: 1100 [Urine:1100]           Physical Exam:  General Appearance: Well-developed morbidly obese white female resting comfortably in bed she does not appear in acute distress  Eyes: Conjunctiva are clear and anicteric pupils are equal reactive to light extraocular muscles are intact and visual fields are intact to confrontation  ENT: No facial symmetry oral mucous membranes are little dry no erythema or exudates Mallampati type II airway nasal septum midline  Neck: Trachea is midline no adenopathy or thyromegaly no jugular venous distention  Lungs: Clear nonlabored symmetric expansion  Cardiac: Regular rate and rhythm no murmur or gallop  Abdomen: Obese soft no palpable organomegaly or masses  : Not examined  Musc/Skel: Grossly normal  Skin: No jaundice no petechiae no rashes noted  Neuro: Patient is alert she's oriented to person she knows she is at [facility].  She still cannot come up with the date she gives me 19-20-8.  She can't tell me the month or the correct time of day even roughly.  On object naming she only got 2 of 6 correct.  She has right pronator drift.  But she has almost no problem with finger to nose and her  strength is just slightly decreased on the right versus the left marked improvement from yesterday.  She does  heel-to-shin without difficulty bilaterally dorsiflexion plantar flexion and straight leg raise without difficulty bilaterally.  Patient has a clear expressive aphasia.    Extremities/P Vascular: No clubbing cyanosis or edema she has palpable radial dorsalis pedis pulses  MSE: She seems to be in a little better spirits today       Labs:  WBC No results found for: WBCS   HGB Hemoglobin   Date Value Ref Range Status   09/09/2017 15.3 11.9 - 15.5 g/dL Final      HCT Hematocrit   Date Value Ref Range Status   09/09/2017 43.6 35.6 - 45.5 % Final      Platlets No results found for: LABPLAT     PT/INR:    Protime   Date Value Ref Range Status   09/09/2017 13.1 11.7 - 14.2 Seconds Final   /  INR   Date Value Ref Range Status   09/09/2017 1.03 0.90 - 1.10 Final       Sodium Sodium   Date Value Ref Range Status   09/11/2017 142 136 - 145 mmol/L Final   09/09/2017 139 136 - 145 mmol/L Final      Potassium Potassium   Date Value Ref Range Status   09/11/2017 3.5 3.5 - 5.2 mmol/L Final   09/10/2017 2.8 (L) 3.5 - 5.2 mmol/L Final   09/09/2017 2.5 (L) 3.5 - 5.2 mmol/L Final      Chloride Chloride   Date Value Ref Range Status   09/11/2017 104 98 - 107 mmol/L Final   09/09/2017 97 (L) 98 - 107 mmol/L Final      Bicarbonate No results found for: PLASMABICARB   BUN BUN   Date Value Ref Range Status   09/11/2017 10 8 - 23 mg/dL Final   09/09/2017 11 8 - 23 mg/dL Final      Creatinine Creatinine   Date Value Ref Range Status   09/11/2017 0.81 0.57 - 1.00 mg/dL Final   09/09/2017 0.75 0.57 - 1.00 mg/dL Final      Calcium Calcium   Date Value Ref Range Status   09/11/2017 8.4 (L) 8.6 - 10.5 mg/dL Final   09/09/2017 8.9 8.6 - 10.5 mg/dL Final      Magnesium No results found for: MG         pH No results found for: PHART   pO2 No results found for: PO2ART   pCO2 No results found for: WVP4VUM   HCO3 No results found for: FDJ6TJD       amLODIPine 10 mg Oral Q24H   labetalol 100 mg Oral Q12H       labetalol 0.5-2 mg/min Last Rate: Stopped  (09/10/17 0430)   niCARdipine 5-15 mg/hr Last Rate: 5 mg/hr (09/11/17 0617)       Diagnostics:  Imaging Results (last 24 hours)     Procedure Component Value Units Date/Time    CT Head Without Contrast [[id number]] Collected:  09/09/17 2239     Updated:  09/10/17 2133    Narrative:       CT SCAN OF THE BRAIN WITHOUT CONTRAST.     TECHNIQUE: Multiple axial images of the brain were obtained from the  vertex to the base of the brain.     HISTORY:  Intracranial hemorrhage.     COMPARISON:  Today's examination, 5:58 PM.     FINDINGS:   Midline structures are within normal limits, there is no hydrocephalus.  Gray-white matter differentiation is maintained.      In the left thalamus extending into the left basal ganglia there is a  2.5 x 2.2 cm intra-axial hemorrhage which previously measured 1.9 x 1.2  cm. Mild surrounding vasogenic edema and mass effect.     Orbits are within normal limits. No significant mucosal disease of the  para-nasal sinuses. Mastoid air cells are well aerated.             Impression:       Acute hemorrhage of the left thalamus extending into the left basal  ganglia has increased in size from 1.9 x 1.2 cm to 2.5 x 2.2 cm.   Findings were called to patient's nurse Ronna, 10:36 PM.     This report was finalized on 9/10/2017 9:30 PM by Dr. La Jacobsen MD.             CT scans reviewed      Assessment/Plan     Patient Active Problem List   Diagnosis Code   • Intracranial bleed I62.9       1. Left basal ganglia intracranial hemorrhage.  Etiology not clear but likely hypertensive.  She will need follow-up scanningIn 6weeks as noted by neurosurgery.  Thankfully it looks like her bleed has stabilized.  2. Right hemiparesis with expressive aphasia she is improving thankfully she is to continue working with physical occupational and speech therapy.  3. Hypertensive emergency she is on a Cardene drip and blood pressures are still high with amlodipine and labetalol.  I will increase labetalol and add  lisinopril  4. Obstructive sleep apnea her family has been instructed to bring in her home Pap machine if not will use an auto BiPAP here we should treat her sleep apnea this should help with blood pressure control as well I did discuss this with the patient  5. Morbid obesity obviously she needs long-term weight loss which will require diet and exercise I'm sure did discuss this with the patient  6. Hypokalemia being replaced per protocol  7. COPD    Plan for disposition:    Laron Escobar MD  09/11/17  11:25 AM    Time:

## 2017-09-11 NOTE — THERAPY EVALUATION
Acute Care - Occupational Therapy Initial Evaluation  [facility]     Patient Name: Marilin Martinez  : 1955  MRN: [id number]  Today's Date: 2017  Onset of Illness/Injury or Date of Surgery Date: 17  Date of Referral to OT: 09/10/17  Referring Physician: Tremaine    Admit Date: 2017       ICD-10-CM ICD-9-CM   1. Difficulty walking R26.2 719.7     Patient Active Problem List   Diagnosis   • Intracranial bleed     Past Medical History:   Diagnosis Date   • COPD (chronic obstructive pulmonary disease)    • Hypertension    • Sleep apnea      Past Surgical History:   Procedure Laterality Date   • TONSILLECTOMY            OT ASSESSMENT FLOWSHEET (last 72 hours)      OT Evaluation       17 1430 17 1337 17 1219 17 1132 09/10/17 1307    Rehab Evaluation    Document Type evaluation  -SO evaluation  -PC  evaluation  -SH     Subjective Information no complaints;agree to therapy  -SO agree to therapy  -PC  agree to therapy  -SH     Evaluation Not Performed     other (see comments)   Pt on strict bed rest orders as of 932 on 9/10/17. Spoke with JOHAN Nuno; PT will f/u tomorrow.   -DO    Patient Effort, Rehab Treatment good  -SO good  -PC       Symptoms Noted During/After Treatment none  -SO none  -PC  none  -SH     General Information    Patient Profile Review yes  -SO yes  -PC       Onset of Illness/Injury or Date of Surgery Date  17  -PC       Referring Physician Tremaine  -SO Tremaine  -PC       General Observations Pt supine in bed, family present  -SO pt is in chair, no acute distress, awake and cooperative  -PC       Pertinent History Of Current Problem ICH  -SO ICH, R side weakness and aphasia  -PC       Precautions/Limitations fall precautions  -SO        Prior Level of Function independent:;ADL's  -SO independent:  -PC       Equipment Currently Used at Home cane, straight;bipap/ cpap  -SO  cane, straight;bipap/ cpap   uses CPAP from [employer] Pt  -SI      Plans/Goals  Discussed With patient and family;agreed upon  -SO patient;family  -PC       Living Environment    Lives With alone  -SO  alone  -SI      Living Arrangements mobile home  -SO  mobile home  -SI      Home Accessibility   stairs (1 railing present);stairs to enter home  -SI      Number of Stairs to Enter Home   4  -SI      Type of Financial/Environmental Concern   none  -SI      Transportation Available   family or friend will provide  -SI      Clinical Impression    Date of Referral to OT 09/10/17  -SO        Criteria for Skilled Therapeutic Interventions Met yes  -SO        Rehab Potential good, to achieve stated therapy goals  -SO        Therapy Frequency 3-5 times/wk  -SO        Anticipated Discharge Disposition inpatient rehabilitation facility  -SO        Functional Level Prior    Ambulation   0-->independent  -SI      Transferring   0-->independent  -SI      Toileting   0-->independent  -SI      Bathing   0-->independent  -SI      Dressing   0-->independent  -SI      Eating   0-->independent  -SI      Communication   0-->understands/communicates without difficulty  -SI      Swallowing   0-->swallows foods/liquids without difficulty  -SI      Pain Assessment    Pain Assessment No/denies pain  -SO No/denies pain  -PC  No/denies pain  -SH     Cognitive Assessment/Intervention    Current Cognitive/Communication Assessment impaired  -SO impaired  -PC       Orientation Status oriented to;person  -SO oriented to;person  -PC       Follows Commands/Answers Questions able to follow single-step instructions;needs cueing  -SO able to follow single-step instructions;100% of the time;needs cueing;needs increased time;needs repetition  -PC       Personal Safety decreased insight to deficits;decreased awareness, need for safety;decreased awareness, need for assist  -SO decreased insight to deficits  -PC       Personal Safety Interventions fall prevention program maintained;gait belt  -SO gait belt;fall prevention program  maintained  -PC       ROM (Range of Motion)    General ROM Detail BUE appear WFL  -SO        MMT (Manual Muscle Testing)    General MMT Assessment Detail RUE 4-/5, LUE 4+/5  -SO L side 5/5, RUE 4/5, RLE hip 3-/5, knee ext 3+/5, DF 4/5  -PC       Bed Mobility, Assessment/Treatment    Bed Mob, Supine to Sit, [place] minimum assist (75% patient effort);verbal cues required;nonverbal cues required (demo/gesture)  -SO        Bed Mob, Sit to Supine, [place] not tested  -SO        Bed Mobility, Comment Increased time to perform  -SO in chair  -PC       Transfer Assessment/Treatment    Transfers, Bed-Chair [place] minimum assist (75% patient effort)  -SO        Transfers, Sit-Stand [place] minimum assist (75% patient effort);verbal cues required;nonverbal cues required (demo/gesture)  -SO minimum assist (75% patient effort);moderate assist (50% patient effort);2 person assist required  -PC       Transfers, Stand-Sit [place] minimum assist (75% patient effort);verbal cues required;nonverbal cues required (demo/gesture)  -SO minimum assist (75% patient effort);moderate assist (50% patient effort);2 person assist required  -PC       Transfer, Safety Issues knees buckling  -SO        Transfer, Comment RLE buckling slightly  -SO        Functional Mobility    Functional Mobility- Comment Takes several side steps from bed to chair  -SO        Lower Body Dressing Assessment/Training    LB Dressing Assess/Train, Clothing Type donning:;slipper socks  -SO        LB Dressing Assess/Train, Position sitting;edge of bed  -SO        LB Dressing Assess/Train, [place] maximum assist (25% patient effort)  -SO        Motor Skills/Interventions    Additional Documentation Fine Motor Coordination Training (Group)  -SO        Fine Motor Coordination Training    Detail (Fine Motor Coordination Training) R hand coodination deficits, rapid alternating impaired  -SO        Positioning and Restraints    Pre-Treatment  Position in bed  -SO sitting in chair/recliner  -PC       Post Treatment Position chair  -SO chair  -PC       In Chair sitting;call light within reach;encouraged to call for assist;with family/caregiver  -SO sitting;call light within reach;encouraged to call for assist  -PC         User Key  (r) = Recorded By, (t) = Taken By, (c) = Cosigned By    Initials Name Effective Dates    SO Amara Mcdermott OTR 04/13/15 -     PC Marlena Forte, PT 12/01/15 -     SH Lisa Baxter, MS CCC-SLP 07/13/17 -     SI Vicky Sinclair, RN 02/18/16 -     DO Milton Lopez, PT 01/27/16 -            Occupational Therapy Education     Title: PT OT SLP Therapies (Active)     Topic: Occupational Therapy (Active)     Point: ADL training (Done)    Description: Instruct learner(s) on proper safety adaptation and remediation techniques during self care or transfers.   Instruct in proper use of assistive devices.    Learning Progress Summary    Learner Readiness Method Response Comment Documented by Status   Patient Acceptance E CODY Discussed OT POC and goals SO 09/11/17 1432 Done   Family Acceptance E VU Discussed OT POC and goals SO 09/11/17 1432 Done                      User Key     Initials Effective Dates Name Provider Type Discipline    SO 04/13/15 -  Amara Mcdermott, OTR Occupational Therapist OT                  OT Recommendation and Plan  Anticipated Discharge Disposition: inpatient rehabilitation facility  Therapy Frequency: 3-5 times/wk  Plan of Care Review  Plan Of Care Reviewed With: patient  Outcome Summary/Follow up Plan: Pt presents with RUE weakness, ROM and coordination deficits. Pt able to transfer to chair with min A but required max A for LBD. May benefit from OT to address deficits.          OT Goals       09/11/17 1517          Transfer Training OT LTG    Transfer Training OT LTG, Date Established 09/11/17  -SO      Transfer Training OT LTG, Time to Achieve 1 wk  -SO      Transfer Training OT LTG, Activity Type  sit to stand/stand to sit;toilet  -SO      Transfer Training OT LTG, [place] Level contact guard assist  -SO      Transfer Training OT LTG, Assist Device walker, rolling;commode, bedside  -SO      Coordination OT LTG    Coordination OT LTG, Date Established 09/11/17  -SO      Coordination OT LTG, Time to Achieve 1 wk  -SO      Coordination OT LTG, Activity Type FM written ex program;GM written ex program;FM task;GM task  -SO      Coordination OT LTG, [place] Level independent  -SO      Coordination OT LTG, Additional Goal With RUE  -SO      ADL OT LTG    ADL OT LTG, Date Established 09/11/17  -SO      ADL OT LTG, Time to Achieve 1 wk  -SO      ADL OT LTG, Activity Type ADL skills  -SO      ADL OT LTG, [place] Level contact guard;assistive device  -SO        User Key  (r) = Recorded By, (t) = Taken By, (c) = Cosigned By    Initials Name Provider Type    SO Amara Mcdermott, OTR Occupational Therapist                Outcome Measures       09/11/17 1500 09/11/17 1300       How much help from another person do you currently need...    Turning from your back to your side while in flat bed without using bedrails?  2  -PC     Moving from lying on back to sitting on the side of a flat bed without bedrails?  2  -PC     Moving to and from a bed to a chair (including a wheelchair)?  2  -PC     Standing up from a chair using your arms (e.g., wheelchair, bedside chair)?  2  -PC     Climbing 3-5 steps with a railing?  1  -PC     To walk in hospital room?  2  -PC     AM-PAC 6 Clicks Score  11  -PC     How much help from another is currently needed...    Putting on and taking off regular lower body clothing? 1  -SO      Bathing (including washing, rinsing, and drying) 2  -SO      Toileting (which includes using toilet bed pan or urinal) 2  -SO      Putting on and taking off regular upper body clothing 2  -SO      Taking care of personal grooming (such as brushing teeth) 3  -SO      Eating meals 3  -SO       Score 13  -SO      Functional Assessment    Outcome Measure Options AM-PAC 6 Clicks Daily Activity (OT)  -SO AM-PAC 6 Clicks Basic Mobility (PT)  -PC       User Key  (r) = Recorded By, (t) = Taken By, (c) = Cosigned By    Initials Name Provider Type    SO Amara Mcdermott OTR Occupational Therapist    PC Marlena Forte, PT Physical Therapist          Time Calculation:   OT Start Time: 0905  OT Stop Time: 0922  OT Time Calculation (min): 17 min    Therapy Charges for Today     Code Description Service Date Service Provider Modifiers Qty    [id number]  OT EVAL MOD COMPLEXITY 2 9/11/2017 JULISSA Sebastian GO 1               JULISSA Sebastian  9/11/2017

## 2017-09-11 NOTE — PLAN OF CARE
Problem: Patient Care Overview (Adult)  Goal: Plan of Care Review  Outcome: Ongoing (interventions implemented as appropriate)    09/11/17 1345   Coping/Psychosocial Response Interventions   Plan Of Care Reviewed With patient   Outcome Evaluation   Outcome Summary/Follow up Plan pt presents with R side weakness, decreased motor planning and coordination R LE >RUE, impaired functional mobility/ She will benefit from PT to address, and rec rehab at D/C         Problem: Inpatient Physical Therapy  Goal: Bed Mobility Goal LTG- PT  Outcome: Ongoing (interventions implemented as appropriate)    09/11/17 1345   Bed Mobility PT LTG   Bed Mobility PT LTG, Date Established 09/11/17   Bed Mobility PT LTG, Time to Achieve 1 wk   Bed Mobility PT LTG, Activity Type supine to sit/sit to supine   Bed Mobility PT LTG, [place] Level contact guard assist       Goal: Transfer Training Goal 1 LTG- PT  Outcome: Ongoing (interventions implemented as appropriate)    09/11/17 1345   Transfer Training PT LTG   Transfer Training PT LTG, Date Established 09/11/17   Transfer Training PT LTG, Time to Achieve 1 wk   Transfer Training PT LTG, Activity Type sit to stand/stand to sit   Transfer Training PT LTG, [place] Level contact guard assist       Goal: Gait Training Goal LTG- PT  Outcome: Ongoing (interventions implemented as appropriate)    09/11/17 1345   Gait Training PT LTG   Gait Training Goal PT LTG, Date Established 09/11/17   Gait Training Goal PT LTG, Time to Achieve 1 wk   Gait Training Goal PT LTG, [place] Level minimum assist (75% patient effort);contact guard assist   Gait Training Goal PT LTG, Assist Device walker, rolling   Gait Training Goal PT LTG, Distance to Achieve 80 ft

## 2017-09-11 NOTE — PLAN OF CARE
Problem: Patient Care Overview (Adult)  Goal: Plan of Care Review    09/11/17 1517   Coping/Psychosocial Response Interventions   Plan Of Care Reviewed With patient   Outcome Evaluation   Outcome Summary/Follow up Plan Pt presents with RUE weakness, ROM and coordination deficits. Pt able to transfer to chair with min A but required max A for LBD. May benefit from OT to address deficits.         Problem: Inpatient Occupational Therapy  Goal: Transfer Training Goal 1 LTG- OT    09/11/17 1517   Transfer Training OT LTG   Transfer Training OT LTG, Date Established 09/11/17   Transfer Training OT LTG, Time to Achieve 1 wk   Transfer Training OT LTG, Activity Type sit to stand/stand to sit;toilet   Transfer Training OT LTG, [place] Level contact guard assist   Transfer Training OT LTG, Assist Device walker, rolling;commode, bedside       Goal: Coordination Goal LTG- OT    09/11/17 1517   Coordination OT LTG   Coordination OT LTG, Date Established 09/11/17   Coordination OT LTG, Time to Achieve 1 wk   Coordination OT LTG, Activity Type FM written ex program;GM written ex program;FM task;GM task   Coordination OT LTG, [place] Level independent   Coordination OT LTG, Additional Goal With RUE       Goal: ADL Goal LTG- OT    09/11/17 1517   ADL OT LTG   ADL OT LTG, Date Established 09/11/17   ADL OT LTG, Time to Achieve 1 wk   ADL OT LTG, Activity Type ADL skills   ADL OT LTG, [place] Level contact guard;assistive device

## 2017-09-11 NOTE — PROGRESS NOTES
Discharge Planning Assessment  [facility]     Patient Name: Marilin Martinez  MRN: [id number]  Today's Date: 9/11/2017    Admit Date: 9/9/2017          Discharge Needs Assessment       09/11/17 1219    Living Environment    Lives With alone    Living Arrangements mobile home    Home Accessibility stairs (1 railing present);stairs to enter home    Number of Stairs to Enter Home 4    Type of Financial/Environmental Concern none    Transportation Available family or friend will provide    Living Environment    Provides Primary Care For no one, unable/limited ability to care for self    Quality Of Family Relationships supportive    Able to Return to Prior Living Arrangements no    Living Arrangement Comments Will go to rehab then home with pt's son    Discharge Needs Assessment    Concerns To Be Addressed discharge planning concerns    Readmission Within The Last 30 Days no previous admission in last 30 days    Anticipated Changes Related to Illness inability to care for self    Equipment Currently Used at Home cane, straight;bipap/ cpap   uses CPAP from [employer] Pt    Equipment Needed After Discharge --   undetermined    Discharge Facility/Level Of Care Needs acute rehab    Current Discharge Risk dependent with mobility/activities of daily living    Discharge Disposition still a patient            Discharge Plan       09/11/17 1227    Case Management/Social Work Plan    Additional Comments Spoke with pt and 3 adult children at bedside.  Introduced self and explained role.  CCP contact information provided.  Face sheet verified and IMM provided.  Updated preferred pharmacy.  Pt lives in [place] and would like a referral to [facility] in [place] for her rehab.  Call to Jennifer and gave referral.  Pt is normally independent at home and uses a cane rarely.  When rehab is completed, she will go to stay with her son Tarun who lives close by.  CCP will follow.      09/11/17 1221    Case Management/Social Work  Plan    Plan [facility] for acute rehab    Patient/Family In Agreement With Plan yes        Discharge Placement     Facility/Agency Request Status Selected? Address Phone Number Fax Number    [facility] [place] Pending - Request Sent     [address] [place] [address] [phone]        Vicky Sinclair RN 9/11/2017 12:24     left for Brie                           Demographic Summary       09/11/17 1217    Referral Information    Admission Type inpatient    Arrived From admitted as an inpatient;home or self-care    Contact Information    Comments permission granted to speak with pt's adult children Yahaira Mcneil, and Bita    Primary Care Physician Information    Name Rosenberg A Reyes MD            Functional Status       09/11/17 1219    Functional Status Prior    Ambulation 0-->independent    Transferring 0-->independent    Toileting 0-->independent    Bathing 0-->independent    Dressing 0-->independent    Eating 0-->independent    Communication 0-->understands/communicates without difficulty    Swallowing 0-->swallows foods/liquids without difficulty            Psychosocial     None            Abuse/Neglect     None            Legal     None            Substance Abuse     None            Patient Forms       09/11/17 1229    Patient Forms    Provider Choice List Delivered   Acute Rehab providers    Delivered to Patient    Method of delivery In person          Vicky Sinclair RN

## 2017-09-11 NOTE — TELEPHONE ENCOUNTER
Patient is schedule [facility] on 10/23/17 @ 11:30AM with an MRI of the brain prior. I called ICU and spoke to Leny and this appt will be added to the discharge. Letter/forms mailed.

## 2017-09-11 NOTE — DISCHARGE PLACEMENT REQUEST
"Marilin Martinez (62 y.o. Female)     Date of Birth Social Security Number Address Home Phone MRN    1955  [address]  [facility] [phone] [id number]    [facility] Status          None        Admission Date Admission Type Admitting Provider Attending Provider Department, Room/Bed    9/9/17 Urgent Howard Sears MD Esterle, Mark Edwin, MD [facility] INTENSIVE CARE, 383/1    Discharge Date Discharge Disposition Discharge Destination                      Attending Provider: Howard Sears MD     Allergies:  No Known Allergies    Isolation:  None   Infection:  None   Code Status:  FULL    Ht:  71\" (180.3 cm)   Wt:  279 lb 15.8 oz (127 kg)    Admission Cmt:  MEDICARE   Principal Problem:  None                Active Insurance as of 9/9/2017     Primary Coverage     Payor Plan Insurance Group Employer/Plan Group    ANTHEM MEDICARE REPLACEMENT ANTHEM MEDICARE ADVANTAGE [id number]     Payor Plan Address Payor Plan Phone Number Effective From Effective To    [address] [phone] 4/1/2017     [place] [address]       Subscriber Name Subscriber Birth Date Member ID       MARILIN MARTINEZ 1955 [id number]           Secondary Coverage     Payor Plan Insurance Group Employer/Plan Group    ANTHEM MEDICAID ANTHEM MEDICAID [id number]     Payor Plan Address Payor Plan Phone Number Effective From Effective To    [address] [address] [phone] 8/1/2016     [place] [address]       Subscriber Name Subscriber Birth Date Member ID       MARILIN MARTINEZ 1955 [id number]                 Emergency Contacts      (Rel.) Home Phone Work Phone Mobile Phone    Tarun Hoff (Significant Other) [phone] -- --              "

## 2017-09-11 NOTE — PLAN OF CARE
Problem: Patient Care Overview (Adult)  Goal: Plan of Care Review  Outcome: Ongoing (interventions implemented as appropriate)    09/11/17 0541   Coping/Psychosocial Response Interventions   Plan Of Care Reviewed With patient;daughter   Patient Care Overview   Progress improving   Outcome Evaluation   Outcome Summary/Follow up Plan pt's neuro status unchanged from previous shift,NIHS 5,able to wean cardene off,will monitor         Problem: Stroke (Hemorrhagic) (Adult)  Goal: Signs and Symptoms of Listed Potential Problems Will be Absent or Manageable (Stroke)  Outcome: Ongoing (interventions implemented as appropriate)    09/11/17 0541   Stroke (Hemorrhagic)   Problems Assessed (Stroke (Hemorrhagic)) acute neurologic deterioration;bladder/bowel dysfunction;communication impairment;venous thromboembolism   Problems Present (Stroke (Hemorrhagic)) acute neurologic deterioration;bladder/bowel dysfunction;communication impairment;venous thromboembolism         Problem: Fall Risk (Adult)  Goal: Identify Related Risk Factors and Signs and Symptoms  Outcome: Ongoing (interventions implemented as appropriate)    09/11/17 0541   Fall Risk   Fall Risk: Related Risk Factors bladder function altered;neuro disease/injury;environment unfamiliar   Fall Risk: Signs and Symptoms presence of risk factors       Goal: Absence of Falls  Outcome: Ongoing (interventions implemented as appropriate)    09/11/17 0541   Fall Risk (Adult)   Absence of Falls making progress toward outcome         Problem: Pressure Ulcer Risk (Edenilson Scale) (Adult,Obstetrics,Pediatric)  Goal: Identify Related Risk Factors and Signs and Symptoms  Outcome: Ongoing (interventions implemented as appropriate)    09/11/17 0541   Pressure Ulcer Risk (Edenilson Scale)   Related Risk Factors (Pressure Ulcer Risk (Edenilson Scale)) body weight extremes;mobility impaired       Goal: Skin Integrity  Outcome: Ongoing (interventions implemented as appropriate)    09/11/17 0541    Pressure Ulcer Risk (Edenilson Scale) (Adult,Obstetrics,Pediatric)   Skin Integrity making progress toward outcome         Problem: Pain, Acute (Adult)  Goal: Identify Related Risk Factors and Signs and Symptoms  Outcome: Ongoing (interventions implemented as appropriate)    09/11/17 0541   Pain, Acute   Related Risk Factors (Acute Pain) disease process   Signs and Symptoms (Acute Pain) BADLs/IADLs reluctance/inability to perform;verbalization of pain descriptors       Goal: Acceptable Pain Control/Comfort Level  Outcome: Ongoing (interventions implemented as appropriate)    09/11/17 0541   Pain, Acute (Adult)   Acceptable Pain Control/Comfort Level making progress toward outcome         Problem: Hypertensive Disease/Crisis (Arterial) (Adult)  Goal: Signs and Symptoms of Listed Potential Problems Will be Absent or Manageable (Hypertensive Disease/Crisis)  Outcome: Ongoing (interventions implemented as appropriate)    09/11/17 0541   Hypertensive Disease/Crisis (Arterial)   Problems Assessed (Hypertensive Disease/Crisis (Arterial)) neurologic deterioration   Problems Present (Hypertensive Disease/Crisis (Arterial)) neurologic deterioration;severe hypertension/hypertensive crisis;situational response

## 2017-09-11 NOTE — THERAPY EVALUATION
Acute Care - Speech Language Pathology   Swallow Initial Evaluation [facility]     Patient Name: Marilin Martinez  : 1955  MRN: [id number]  Today's Date: 2017               Admit Date: 2017    SPEECH-LANGUAGE PATHOLOGY EVALUATION - SWALLOW  Subjective: The patient was seen on this date for a Clinical Swallow evaluation.  Patient was alert and cooperative.    The patient's history is significant for L basal ganglia ICH. Pt initially with dysarthria. Now without dysarthria. OME remarkable for R facial numbness only. Pt passed SS on regular diet. Pt c/o anomia.    Objective: Textures given included thin liquid, puree consistency, mechanical soft consistency and regular consistency.  Assessment: Difficulties were noted with none of the above consistencies.  SLP Findings:  Patient presents with functional swallow, without esophageal component.   Recommendations: Diet Textures: thin liquid, regular consistency food.  Medications should be taken whole with thin liquids.   Recommended Strategies: Upright for PO. Oral care before breakfast, after all meals and PRN.  Other Recommended Evaluations: Speech-Language Evaluation    Dysphagia therapy is not recommended. Rationale: pt at baseline function.  Visit Dx:   No diagnosis found.  Patient Active Problem List   Diagnosis   • Intracranial bleed     Past Medical History:   Diagnosis Date   • COPD (chronic obstructive pulmonary disease)    • Hypertension    • Sleep apnea      Past Surgical History:   Procedure Laterality Date   • TONSILLECTOMY            SWALLOW EVALUATION (last 72 hours)      Swallow Evaluation       17 1132                Rehab Evaluation    Document Type evaluation  -SH        Subjective Information agree to therapy  -SH        Symptoms Noted During/After Treatment none  -SH        General Information    Patient Profile Review yes  -SH        Pertinent History Of Current Problem Presented with dysarthria, now only with numbness   -        Current Diet Limitations thin liquids;regular solid  -        Prior Level of Function- Swallowing no diet consistency restrictions  -        Plans/Goals Discussed With patient;family  -        Barriers to Rehab medically complex;none identified  -        Clinical Impression    Patient's Goals For Discharge patient did not state  -        SLP Swallowing Diagnosis other (see comments)   WF  -        Rehab Potential/Prognosis, Swallowing good, to achieve stated therapy goals  -        Criteria for Skilled Therapeutic Interventions Met not appropriate for intervention  -        FCM, Swallowing 7-->Level 7  -        Therapy Frequency PRN  -        Predicted Duration Therapy Interv (days) until discharge  -        SLP Diet Recommendation regular textures;thin liquids  -        Monitor For Signs Of Aspiration cough;elevated WBC count;gurgly voice;throat clearing;fever;upper respiratory infection  -        Anticipated Discharge Disposition other (see comments)   pending POC  -        Pain Assessment    Pain Assessment No/denies pain  -        Oral Motor Structure and Function    Oral Motor Anatomy and Physiology patient demonstrates anatomy and physiology that is WNL  -          User Key  (r) = Recorded By, (t) = Taken By, (c) = Cosigned By    Initials Name Effective Dates     Lisa Baxter MS [facility]-SLP 07/13/17 -         EDUCATION  The patient has been educated in the following areas:   Dysphagia (Swallowing Impairment).    SLP Recommendation and Plan  SLP Swallowing Diagnosis: other (see comments) (WFLs)  SLP Diet Recommendation: regular textures, thin liquids        Monitor For Signs Of Aspiration: cough, elevated WBC count, gurgly voice, throat clearing, fever, upper respiratory infection     Criteria for Skilled Therapeutic Interventions Met: not appropriate for intervention  Anticipated Discharge Disposition: other (see comments) (pending POC)  Rehab Potential/Prognosis,  Swallowing: good, to achieve stated therapy goals  Therapy Frequency: PRN             Plan of Care Review  Plan Of Care Reviewed With: patient  Progress: improving  Outcome Summary/Follow up Plan: Bedside swallow completed. SLP recs regular and thins. Will follow for language evaluation as indicated.          IP SLP Goals       09/11/17 1135          Safely Consume Diet    Safely Consume Diet- SLP, Date Established 09/11/17  -      Safely Consume Diet- SLP, Time to Achieve by discharge  -        User Key  (r) = Recorded By, (t) = Taken By, (c) = Cosigned By    Initials Name Provider Type    SUSANA Baxter MS CCC-SLP Speech and Language Pathologist             SLP Outcome Measures (last 72 hours)      SLP Outcome Measures       09/11/17 1100          SLP Outcome Measures    Outcome Measure Used? Adult NOMS  -      FCM Scores    FCM Chosen Swallowing  -      Swallowing FCM Score 7  -        User Key  (r) = Recorded By, (t) = Taken By, (c) = Cosigned By    Initials Name Effective Dates     Lisa Baxter MS CCC-SLP 07/13/17 -            Time Calculation:         Time Calculation- SLP       09/11/17 1136          Time Calculation- [facility]    SLP Start Time 1045  -      SLP Stop Time 1130  -      SLP Time Calculation (min) 45 min  -      SLP Received On 09/11/17  -        User Key  (r) = Recorded By, (t) = Taken By, (c) = Cosigned By    Initials Name Provider Type    SUSANA Baxter MS CCC-SLP Speech and Language Pathologist          Therapy Charges for Today     Code Description Service Date Service Provider Modifiers Qty    32129248169 HC ST EVAL ORAL PHARYNG SWALLOW 3 9/11/2017 Lisa Baxter MS CCC-SLP GN 1               Lisa Baxter MS CCC-ARASH  9/11/2017

## 2017-09-11 NOTE — PLAN OF CARE
Problem: Patient Care Overview (Adult)  Goal: Plan of Care Review    09/11/17 1135   Coping/Psychosocial Response Interventions   Plan Of Care Reviewed With patient   Patient Care Overview   Progress improving   Outcome Evaluation   Outcome Summary/Follow up Plan Bedside swallow completed. SLP recs regular and thins. Will follow for language evaluation as indicated.         Problem: Inpatient SLP  Goal: Dysphagia- Patient will safely consume diet as per recommendation with no signs/symptoms of aspiration    09/11/17 1135   Safely Consume Diet   Safely Consume Diet- SLP, Date Established 09/11/17   Safely Consume Diet- SLP, Time to Achieve by discharge

## 2017-09-12 LAB
GLUCOSE BLDC GLUCOMTR-MCNC: 107 MG/DL (ref 70–130)
GLUCOSE BLDC GLUCOMTR-MCNC: 110 MG/DL (ref 70–130)
GLUCOSE BLDC GLUCOMTR-MCNC: 112 MG/DL (ref 70–130)
GLUCOSE BLDC GLUCOMTR-MCNC: 117 MG/DL (ref 70–130)
GLUCOSE BLDC GLUCOMTR-MCNC: 150 MG/DL (ref 70–130)
GLUCOSE BLDC GLUCOMTR-MCNC: 181 MG/DL (ref 70–130)
POTASSIUM BLD-SCNC: 3.9 MMOL/L (ref 3.5–5.2)

## 2017-09-12 PROCEDURE — 97110 THERAPEUTIC EXERCISES: CPT

## 2017-09-12 PROCEDURE — 84132 ASSAY OF SERUM POTASSIUM: CPT | Performed by: INTERNAL MEDICINE

## 2017-09-12 PROCEDURE — 82962 GLUCOSE BLOOD TEST: CPT

## 2017-09-12 RX ORDER — LISINOPRIL 20 MG/1
20 TABLET ORAL
Status: DISCONTINUED | OUTPATIENT
Start: 2017-09-13 | End: 2017-09-15 | Stop reason: HOSPADM

## 2017-09-12 RX ORDER — GABAPENTIN 300 MG/1
600 CAPSULE ORAL EVERY 8 HOURS SCHEDULED
Status: DISCONTINUED | OUTPATIENT
Start: 2017-09-12 | End: 2017-09-15 | Stop reason: HOSPADM

## 2017-09-12 RX ORDER — LEVOTHYROXINE SODIUM 112 UG/1
112 TABLET ORAL
Status: DISCONTINUED | OUTPATIENT
Start: 2017-09-12 | End: 2017-09-15 | Stop reason: HOSPADM

## 2017-09-12 RX ADMIN — HYDROCODONE BITARTRATE AND ACETAMINOPHEN 1 TABLET: 5; 325 TABLET ORAL at 08:55

## 2017-09-12 RX ADMIN — NICARDIPINE HYDROCHLORIDE 15 MG/HR: 25 INJECTION INTRAVENOUS at 03:27

## 2017-09-12 RX ADMIN — HYDROCODONE BITARTRATE AND ACETAMINOPHEN 1 TABLET: 5; 325 TABLET ORAL at 18:59

## 2017-09-12 RX ADMIN — NICARDIPINE HYDROCHLORIDE 15 MG/HR: 25 INJECTION INTRAVENOUS at 08:56

## 2017-09-12 RX ADMIN — LABETALOL HYDROCHLORIDE 300 MG: 300 TABLET, FILM COATED ORAL at 08:55

## 2017-09-12 RX ADMIN — NICARDIPINE HYDROCHLORIDE 15 MG/HR: 25 INJECTION INTRAVENOUS at 01:33

## 2017-09-12 RX ADMIN — NICARDIPINE HYDROCHLORIDE 15 MG/HR: 25 INJECTION INTRAVENOUS at 05:16

## 2017-09-12 RX ADMIN — LEVOTHYROXINE SODIUM 112 MCG: 112 TABLET ORAL at 15:28

## 2017-09-12 RX ADMIN — AMLODIPINE BESYLATE 10 MG: 10 TABLET ORAL at 08:55

## 2017-09-12 RX ADMIN — NICARDIPINE HYDROCHLORIDE 15 MG/HR: 25 INJECTION INTRAVENOUS at 07:04

## 2017-09-12 RX ADMIN — LISINOPRIL 5 MG: 5 TABLET ORAL at 08:55

## 2017-09-12 RX ADMIN — GABAPENTIN 600 MG: 300 CAPSULE ORAL at 22:31

## 2017-09-12 RX ADMIN — GABAPENTIN 600 MG: 300 CAPSULE ORAL at 15:30

## 2017-09-12 RX ADMIN — HYDROCODONE BITARTRATE AND ACETAMINOPHEN 1 TABLET: 5; 325 TABLET ORAL at 14:47

## 2017-09-12 RX ADMIN — LISINOPRIL 15 MG: 10 TABLET ORAL at 15:27

## 2017-09-12 RX ADMIN — HYDROCODONE BITARTRATE AND ACETAMINOPHEN 1 TABLET: 5; 325 TABLET ORAL at 04:05

## 2017-09-12 RX ADMIN — POTASSIUM CHLORIDE 40 MEQ: 750 CAPSULE, EXTENDED RELEASE ORAL at 02:37

## 2017-09-12 NOTE — PLAN OF CARE
Problem: Patient Care Overview (Adult)  Goal: Plan of Care Review  Outcome: Ongoing (interventions implemented as appropriate)    09/12/17 1512   Coping/Psychosocial Response Interventions   Plan Of Care Reviewed With patient   Patient Care Overview   Progress improving   Outcome Evaluation   Outcome Summary/Follow up Plan Pt agreeable ot PT, continues to be limited by overall fatigue and generalized weakness. Pt tolerated ambulation into hallway, but required seated rest prior to returning back to room. Will continue to progress as tolerated.

## 2017-09-12 NOTE — PROGRESS NOTES
Continued Stay Note  [facility]     Patient Name: Marilin Martinez  MRN: [id number]  Today's Date: 9/12/2017    Admit Date: 9/9/2017          Discharge Plan       09/12/17 1719    Case Management/Social Work Plan    Plan [facility]    Additional Comments Per Dr. Escobar, pt will be ready for discharge tomorrow.  Call to Jennifer and left VM to start pre-cert.  Will f/u with her in am.  Dr. Escobar aware that pre-cert will be needed.      09/12/17 1509    Case Management/Social Work Plan    Plan [facility] in [place]    Patient/Family In Agreement With Plan yes    Additional Comments Call from Jennifer for [facility].  Pt is accepted.  Precert will be needed when appropriate to start.              Discharge Codes     None            Vicky Sinclair RN

## 2017-09-12 NOTE — SIGNIFICANT NOTE
09/12/17 1501   Rehab Treatment   Discipline occupational therapist   Treatment Not Performed other (see comments)  (Pt. with PT, OT will check on the pt. tomorrow )   Recommendation   OT - Next Appointment 09/13/17

## 2017-09-12 NOTE — PROGRESS NOTES
LOS: 3 days   Patient Care Team:  Rosenberg Acosta Reyes, MD as PCP - General (Family Medicine)    Chief Complaint:  Intracranial hemorrhage    Subjective   Patient is sitting up in a chair she has no specific complaints today  Interval History:         Review of Systems:   Patient has trouble answering       Objective     Vital Signs  Temp:  [97.8 °F (36.6 °C)-98.6 °F (37 °C)] 98.6 °F (37 °C)  Heart Rate:  [56-82] 56  Resp:  [12-17] 17  BP: ()/(50-81) 124/58     Ventilator/Non-Invasive Ventilation Settings     None                       Body mass index is 39.05 kg/(m^2).  I/O last 3 completed shifts:  In: 4496.3 [P.O.:250; I.V.:4162.3; IV Piggyback:84]  Out: 1400 [Urine:1400]  I/O this shift:  In: 56 [I.V.:56]  Out: 300 [Urine:300]        Physical Exam:  General Appearance: Well-developed morbidly obese white female resting comfortably in bed she does not appear in acute distress  Eyes: Conjunctiva are clear and anicteric pupils are equal reactive to light extraocular muscles are intact and visual fields are intact to confrontation  ENT: No facial symmetry oral mucous membranes are little dry no erythema or exudates Mallampati type II airway nasal septum midline  Neck: Trachea is midline no adenopathy or thyromegaly no jugular venous distention  Lungs: Clear nonlabored symmetric expansion  Cardiac: Regular rate and rhythm no murmur or gallop  Abdomen: Obese soft no palpable organomegaly or masses  : Not examined  Musc/Skel: Grossly normal  Skin: No jaundice no petechiae no rashes noted  Neuro: He does seem slightly better she was able to give me her name, that she was [facility] on that Vince Momin was present and she still fails on the date.  She has a very mild right pronator drift and mild weakness in  on the right side she has a little more difficulty with finger to nose on the right than the left.  And she is little weaker on straight leg raise right versus  left  Extremities/P Vascular: No clubbing cyanosis or edema she has palpable radial dorsalis pedis pulses  MSE: Seems to be in very good spirits today       Labs:  WBC No results found for: WBCS   HGB Hemoglobin   Date Value Ref Range Status   09/09/2017 15.3 11.9 - 15.5 g/dL Final      HCT Hematocrit   Date Value Ref Range Status   09/09/2017 43.6 35.6 - 45.5 % Final      Platlets No results found for: LABPLAT     PT/INR:    Protime   Date Value Ref Range Status   09/09/2017 13.1 11.7 - 14.2 Seconds Final   /  INR   Date Value Ref Range Status   09/09/2017 1.03 0.90 - 1.10 Final       Sodium Sodium   Date Value Ref Range Status   09/11/2017 142 136 - 145 mmol/L Final   09/09/2017 139 136 - 145 mmol/L Final      Potassium Potassium   Date Value Ref Range Status   09/12/2017 3.9 3.5 - 5.2 mmol/L Final   09/11/2017 3.5 3.5 - 5.2 mmol/L Final   09/10/2017 2.8 (L) 3.5 - 5.2 mmol/L Final   09/09/2017 2.5 (L) 3.5 - 5.2 mmol/L Final      Chloride Chloride   Date Value Ref Range Status   09/11/2017 104 98 - 107 mmol/L Final   09/09/2017 97 (L) 98 - 107 mmol/L Final      Bicarbonate No results found for: PLASMABICARB   BUN BUN   Date Value Ref Range Status   09/11/2017 10 8 - 23 mg/dL Final   09/09/2017 11 8 - 23 mg/dL Final      Creatinine Creatinine   Date Value Ref Range Status   09/11/2017 0.81 0.57 - 1.00 mg/dL Final   09/09/2017 0.75 0.57 - 1.00 mg/dL Final      Calcium Calcium   Date Value Ref Range Status   09/11/2017 8.4 (L) 8.6 - 10.5 mg/dL Final   09/09/2017 8.9 8.6 - 10.5 mg/dL Final      Magnesium No results found for: MG         pH No results found for: PHART   pO2 No results found for: PO2ART   pCO2 No results found for: [id number]   HCO3 No results found for: [id number]       amLODIPine 10 mg Oral Q24H   gabapentin 600 mg Oral Q8H   labetalol 300 mg Oral Q12H   levothyroxine 112 mcg Oral Q AM   [START ON 9/13/2017] lisinopril 20 mg Oral Q24H       labetalol 0.5-2 mg/min Last Rate: Stopped (09/10/17 0430)    niCARdipine 5-15 mg/hr Last Rate: Stopped (09/12/17 7815)       Diagnostics:  Imaging Results (last 24 hours)     ** No results found for the last 24 hours. **          CT scans reviewed      Assessment/Plan     Patient Active Problem List   Diagnosis Code   • Intracranial bleed I62.9       1. Left basal ganglia intracranial hemorrhage.  Etiology not clear but likely hypertensive.  She will need follow-up scanning In 6weeks as noted by neurosurgery.  Thankfully it looks like her bleed has stabilized.  2. Right hemiparesis with expressive aphasia she is improving thankfully she is to continue working with physical occupational and speech therapy.Will need rehabilitation  3. Hypertensive emergency Her blood pressure has been good off Cardene continue same  4. Obstructive sleep apnea To use her home Pap with sleep  5. Morbid obesity obviously she needs long-term weight loss which will require diet and exercise I'm sure did discuss this with the patient  6. Hypokalemia Replaced  7. COPD    Plan for disposition:Awaiting nursing home approval she is ready for discharge    Laron Escobar MD  09/12/17  4:41 PM    Time: Spent over 35 minutes on her care today

## 2017-09-12 NOTE — THERAPY TREATMENT NOTE
Acute Care - Physical Therapy Treatment Note  [facility]     Patient Name: Marilin Martinez  : 1955  MRN: [id number]  Today's Date: 2017  Onset of Illness/Injury or Date of Surgery Date: 17  Date of Referral to PT: 17  Referring Physician: Tremaine    Admit Date: 2017    Visit Dx:    ICD-10-CM ICD-9-CM   1. Difficulty walking R26.2 719.7     Patient Active Problem List   Diagnosis   • Intracranial bleed               Adult Rehabilitation Note       17 1448          Rehab Assessment/Intervention    Discipline physical therapist  -EJ      Document Type therapy note (daily note)  -EJ      Subjective Information agree to therapy;complains of;fatigue  -EJ      Patient Effort, Rehab Treatment adequate  -EJ      Symptoms Noted During/After Treatment fatigue  -EJ      Precautions/Limitations fall precautions  -EJ      Recorded by [EJ] Bree Sam, PT      Pain Assessment    Pain Assessment No/denies pain  -EJ      Recorded by [EJ] Bree Sam, PT      Bed Mobility, Assessment/Treatment    Bed Mob, Supine to Sit, [place] not tested  -EJ      Bed Mob, Sit to Supine, [place] minimum assist (75% patient effort);2 person assist required   more assist needed for B LE  -EJ      Recorded by [EJ] Bree Sam, PT      Transfer Assessment/Treatment    Transfers, Sit-Stand [place] verbal cues required;nonverbal cues required (demo/gesture);minimum assist (75% patient effort);2 person assist required  -EJ      Transfers, Stand-Sit [place] minimum assist (75% patient effort);2 person assist required  -EJ      Transfers, Sit-Stand-Sit, Assist Device rolling walker  -EJ      Recorded by [EJ] Bree Sam, PT      Gait Assessment/Treatment    Gait, [place] Level verbal cues required;contact guard assist;minimum assist (75% patient effort);2 person assist required  -EJ      Gait, Assistive Device rolling walker  -EJ      Gait, Distance (Feet) 40   x 2  -EJ       Gait, Gait Deviations ralf decreased;step length decreased  -EJ      Gait, Safety Issues step length decreased  -EJ      Gait, Impairments strength decreased  -EJ      Gait, Comment limited by overall fatigue  -EJ      Recorded by [EJ] Bree Sam, PT      Positioning and Restraints    Pre-Treatment Position sitting in chair/recliner  -EJ      Post Treatment Position bed  -EJ      In Bed notified nsg;supine;call light within reach;encouraged to call for assist  -EJ      Recorded by [EJ] Bree Sam, PT        User Key  (r) = Recorded By, (t) = Taken By, (c) = Cosigned By    Initials Name Effective Dates    EJ Bree Sam, PT 04/21/17 -                 IP PT Goals       09/11/17 1345          Bed Mobility PT LTG    Bed Mobility PT LTG, Date Established 09/11/17  -PC      Bed Mobility PT LTG, Time to Achieve 1 wk  -PC      Bed Mobility PT LTG, Activity Type supine to sit/sit to supine  -PC      Bed Mobility PT LTG, [place] Level contact guard assist  -PC      Transfer Training PT LTG    Transfer Training PT LTG, Date Established 09/11/17  -PC      Transfer Training PT LTG, Time to Achieve 1 wk  -PC      Transfer Training PT LTG, Activity Type sit to stand/stand to sit  -PC      Transfer Training PT LTG, [place] Level contact guard assist  -PC      Gait Training PT LTG    Gait Training Goal PT LTG, Date Established 09/11/17  -PC      Gait Training Goal PT LTG, Time to Achieve 1 wk  -PC      Gait Training Goal PT LTG, [place] Level minimum assist (75% patient effort);contact guard assist  -PC      Gait Training Goal PT LTG, Assist Device walker, rolling  -PC      Gait Training Goal PT LTG, Distance to Achieve 80 ft  -PC        User Key  (r) = Recorded By, (t) = Taken By, (c) = Cosigned By    Initials Name Provider Type    PC Marlena Forte PT Physical Therapist          Physical Therapy Education     Title: PT OT SLP Therapies (Active)     Topic: Physical Therapy (Active)      Point: Mobility training (Done)    Learning Progress Summary    Learner Readiness Method Response Comment Documented by Status   Patient Acceptance E VU  EJ 09/12/17 1511 Done    Acceptance D,E NR  PC 09/11/17 1345 Active               Point: Home exercise program (Active)    Learning Progress Summary    Learner Readiness Method Response Comment Documented by Status   Patient Acceptance D,E NR  PC 09/11/17 1345 Active               Point: Body mechanics (Active)    Learning Progress Summary    Learner Readiness Method Response Comment Documented by Status   Patient Acceptance D,E NR  PC 09/11/17 1345 Active               Point: Precautions (Active)    Learning Progress Summary    Learner Readiness Method Response Comment Documented by Status   Patient Acceptance D,E NR  PC 09/11/17 1345 Active                      User Key     Initials Effective Dates Name Provider Type Discipline     12/01/15 -  Marlena Forte, PT Physical Therapist PT     04/21/17 -  Bree Sam, PT Physical Therapist PT                    PT Recommendation and Plan  Anticipated Discharge Disposition: inpatient rehabilitation facility  Planned Therapy Interventions: gait training, bed mobility training, balance training, home exercise program, strengthening, transfer training  PT Frequency: daily  Plan of Care Review  Plan Of Care Reviewed With: patient  Progress: improving  Outcome Summary/Follow up Plan: Pt agreeable ot PT, continues to be limited by overall fatigue and generalized weakness. Pt tolerated ambulation into hallway, but required seated rest prior to returning back to room. Will continue to progress as tolerated.          Outcome Measures       09/12/17 1500 09/11/17 1500 09/11/17 1300    How much help from another person do you currently need...    Turning from your back to your side while in flat bed without using bedrails? 2  -EJ  2  -PC    Moving from lying on back to sitting on the side of a flat bed without bedrails?  2  -EJ  2  -PC    Moving to and from a bed to a chair (including a wheelchair)? 2  -EJ  2  -PC    Standing up from a chair using your arms (e.g., wheelchair, bedside chair)? 3  -EJ  2  -PC    Climbing 3-5 steps with a railing? 1  -EJ  1  -PC    To walk in hospital room? 3  -EJ  2  -PC    AM-PAC 6 Clicks Score 13  -EJ  11  -PC    How much help from another is currently needed...    Putting on and taking off regular lower body clothing?  1  -SO     Bathing (including washing, rinsing, and drying)  2  -SO     Toileting (which includes using toilet bed pan or urinal)  2  -SO     Putting on and taking off regular upper body clothing  2  -SO     Taking care of personal grooming (such as brushing teeth)  3  -SO     Eating meals  3  -SO     Score  13  -SO     Functional Assessment    Outcome Measure Options AM-PAC 6 Clicks Basic Mobility (PT)  -EJ AM-PAC 6 Clicks Daily Activity (OT)  -SO AM-PAC 6 Clicks Basic Mobility (PT)  -PC      User Key  (r) = Recorded By, (t) = Taken By, (c) = Cosigned By    Initials Name Provider Type    SO Amara Mcdermott, OTR Occupational Therapist    PC Marlena Forte, PT Physical Therapist    EJ Bree Sam PT Physical Therapist           Time Calculation:         PT Charges       09/12/17 1514          Time Calculation    Start Time 1448  -EJ      Stop Time 1508  -EJ      Time Calculation (min) 20 min  -EJ      PT Received On 09/12/17  -EJ      PT - Next Appointment 09/13/17  -EJ        User Key  (r) = Recorded By, (t) = Taken By, (c) = Cosigned By    Initials Name Provider Type    ROSS Sam, PT Physical Therapist          Therapy Charges for Today     Code Description Service Date Service Provider Modifiers Qty    62073207102 HC PT THER PROC EA 15 MIN 9/12/2017 Bree Sam, PT GP 1    91689147387 HC PT THER SUPP EA 15 MIN 9/12/2017 Bree Sam, PT GP 1          PT G-Codes  Outcome Measure Options: AM-PAC 6 Clicks Basic Mobility (PT)    Bree Sam,  PT  9/12/2017

## 2017-09-12 NOTE — PROGRESS NOTES
Continued Stay Note  [facility]     Patient Name: Marilin Martinez  MRN: [id number]  Today's Date: 9/12/2017    Admit Date: 9/9/2017          Discharge Plan       09/12/17 1509    Case Management/Social Work Plan    Plan [facility] in [place]    Patient/Family In Agreement With Plan yes    Additional Comments Call from [facility].  Pt is accepted.  Precert will be needed when appropriate to start.              Discharge Codes     None            Vicky Sinclair RN

## 2017-09-12 NOTE — PLAN OF CARE
Problem: Patient Care Overview (Adult)  Goal: Plan of Care Review  Outcome: Ongoing (interventions implemented as appropriate)    09/12/17 0303   Coping/Psychosocial Response Interventions   Plan Of Care Reviewed With patient   Patient Care Overview   Progress no change   Outcome Evaluation   Outcome Summary/Follow up Plan Pt continues to have very slight RUE weakness. Potassium being replaced, after a value of 3.5 resulted from the last lab draw. sleeping throughout shift very well. Pt has PTA synthroid medication. Plan to discuss with provider about continuing synthroid while inpatient. continue to monitor and provide supportive care.          Problem: Fall Risk (Adult)  Goal: Identify Related Risk Factors and Signs and Symptoms  Outcome: Ongoing (interventions implemented as appropriate)  Goal: Absence of Falls  Outcome: Ongoing (interventions implemented as appropriate)    Problem: Pressure Ulcer Risk (Edenilson Scale) (Adult,Obstetrics,Pediatric)  Goal: Identify Related Risk Factors and Signs and Symptoms  Outcome: Ongoing (interventions implemented as appropriate)  Goal: Skin Integrity  Outcome: Ongoing (interventions implemented as appropriate)    Problem: Pain, Acute (Adult)  Goal: Identify Related Risk Factors and Signs and Symptoms  Outcome: Ongoing (interventions implemented as appropriate)  Goal: Acceptable Pain Control/Comfort Level  Outcome: Ongoing (interventions implemented as appropriate)    Problem: Hypertensive Disease/Crisis (Arterial) (Adult)  Goal: Signs and Symptoms of Listed Potential Problems Will be Absent or Manageable (Hypertensive Disease/Crisis)  Outcome: Ongoing (interventions implemented as appropriate)

## 2017-09-13 LAB
ALBUMIN SERPL-MCNC: 3 G/DL (ref 3.5–5.2)
ALBUMIN/GLOB SERPL: 1 G/DL
ALP SERPL-CCNC: 42 U/L (ref 39–117)
ALT SERPL W P-5'-P-CCNC: 13 U/L (ref 1–33)
ANION GAP SERPL CALCULATED.3IONS-SCNC: 13.5 MMOL/L
AST SERPL-CCNC: 12 U/L (ref 1–32)
BILIRUB SERPL-MCNC: 0.3 MG/DL (ref 0.1–1.2)
BUN BLD-MCNC: 14 MG/DL (ref 8–23)
BUN/CREAT SERPL: 15.1 (ref 7–25)
CALCIUM SPEC-SCNC: 8.6 MG/DL (ref 8.6–10.5)
CHLORIDE SERPL-SCNC: 101 MMOL/L (ref 98–107)
CO2 SERPL-SCNC: 23.5 MMOL/L (ref 22–29)
CREAT BLD-MCNC: 0.93 MG/DL (ref 0.57–1)
DEPRECATED RDW RBC AUTO: 47.6 FL (ref 37–54)
ERYTHROCYTE [DISTWIDTH] IN BLOOD BY AUTOMATED COUNT: 13.3 % (ref 11.7–13)
GFR SERPL CREATININE-BSD FRML MDRD: 61 ML/MIN/1.73
GLOBULIN UR ELPH-MCNC: 3 GM/DL
GLUCOSE BLD-MCNC: 103 MG/DL (ref 65–99)
GLUCOSE BLDC GLUCOMTR-MCNC: 100 MG/DL (ref 70–130)
GLUCOSE BLDC GLUCOMTR-MCNC: 101 MG/DL (ref 70–130)
GLUCOSE BLDC GLUCOMTR-MCNC: 113 MG/DL (ref 70–130)
HCT VFR BLD AUTO: 38.2 % (ref 35.6–45.5)
HGB BLD-MCNC: 12.3 G/DL (ref 11.9–15.5)
MCH RBC QN AUTO: 31.5 PG (ref 26.9–32)
MCHC RBC AUTO-ENTMCNC: 32.2 G/DL (ref 32.4–36.3)
MCV RBC AUTO: 97.9 FL (ref 80.5–98.2)
PLATELET # BLD AUTO: 187 10*3/MM3 (ref 140–500)
PMV BLD AUTO: 12.3 FL (ref 6–12)
POTASSIUM BLD-SCNC: 3.9 MMOL/L (ref 3.5–5.2)
PROT SERPL-MCNC: 6 G/DL (ref 6–8.5)
RBC # BLD AUTO: 3.9 10*6/MM3 (ref 3.9–5.2)
SODIUM BLD-SCNC: 138 MMOL/L (ref 136–145)
WBC NRBC COR # BLD: 9.28 10*3/MM3 (ref 4.5–10.7)

## 2017-09-13 PROCEDURE — 97110 THERAPEUTIC EXERCISES: CPT

## 2017-09-13 PROCEDURE — 97112 NEUROMUSCULAR REEDUCATION: CPT | Performed by: OCCUPATIONAL THERAPIST

## 2017-09-13 PROCEDURE — 85027 COMPLETE CBC AUTOMATED: CPT | Performed by: INTERNAL MEDICINE

## 2017-09-13 PROCEDURE — 82962 GLUCOSE BLOOD TEST: CPT

## 2017-09-13 PROCEDURE — 80053 COMPREHEN METABOLIC PANEL: CPT | Performed by: INTERNAL MEDICINE

## 2017-09-13 RX ADMIN — LEVOTHYROXINE SODIUM 112 MCG: 112 TABLET ORAL at 08:11

## 2017-09-13 RX ADMIN — LABETALOL HYDROCHLORIDE 300 MG: 300 TABLET, FILM COATED ORAL at 20:35

## 2017-09-13 RX ADMIN — GABAPENTIN 600 MG: 300 CAPSULE ORAL at 13:33

## 2017-09-13 RX ADMIN — GABAPENTIN 600 MG: 300 CAPSULE ORAL at 06:23

## 2017-09-13 RX ADMIN — LISINOPRIL 20 MG: 20 TABLET ORAL at 08:11

## 2017-09-13 RX ADMIN — HYDROCODONE BITARTRATE AND ACETAMINOPHEN 1 TABLET: 5; 325 TABLET ORAL at 13:33

## 2017-09-13 RX ADMIN — GABAPENTIN 600 MG: 300 CAPSULE ORAL at 21:21

## 2017-09-13 RX ADMIN — HYDROCODONE BITARTRATE AND ACETAMINOPHEN 1 TABLET: 5; 325 TABLET ORAL at 20:35

## 2017-09-13 RX ADMIN — AMLODIPINE BESYLATE 10 MG: 10 TABLET ORAL at 08:11

## 2017-09-13 RX ADMIN — LABETALOL HYDROCHLORIDE 300 MG: 300 TABLET, FILM COATED ORAL at 08:11

## 2017-09-13 NOTE — PROGRESS NOTES
"           LOS: 4 days   Patient Care Team:  Rosenberg Acosta Reyes, MD as PCP - General (Family Medicine)    Chief Complaint:  Intracranial Hemorrhage.    Subjective: She seems very well today.  She was reading the newspaper when I walked into room. She stated that she was trying to \"work her brain\" and that it was very difficult.  She states that she is having trouble comprehending and remembering the words she read. She feels good and has been working with PT and OT.        Objective     Vital Signs  Temp:  [97.7 °F (36.5 °C)-98.6 °F (37 °C)] 97.7 °F (36.5 °C)  Heart Rate:  [56-80] 80  Resp:  [16] 16  BP: ()/(50-94) 157/68  Body mass index is 39.05 kg/(m^2).    Intake/Output Summary (Last 24 hours) at 09/13/17 0911  Last data filed at 09/13/17 0859   Gross per 24 hour   Intake              720 ml   Output                0 ml   Net              720 ml     I/O this shift:  In: 480 [P.O.:480]  Out: -     Physical Exam:  General Appearance: Well-developed morbidly obese white female resting comfortably up in chair at bedside. she does not appear in acute distress  Eyes: Conjunctiva are clear and anicteric pupils are equal reactive to light extraocular muscles are intact and visual fields are intact to confrontation  ENT: No facial symmetry oral mucous membranes are little dry no erythema or exudates Mallampati type II airway nasal septum midline  Neck: Trachea is midline no adenopathy or thyromegaly no jugular venous distention  Lungs: Clear nonlabored symmetric expansion  Cardiac: Regular rate and rhythm no murmur or gallop  Abdomen: Obese soft no palpable organomegaly or masses  : Not examined  Musc/Skel: Grossly normal  Skin: No jaundice no petechiae no rashes noted  Neuro: He does seem slightly better she was able to give me her name, that she was [facility] on that Vince Momin was present and she still fails on the date.  She has a very mild right pronator drift and mild weakness in  on " the right side she has a little more difficulty with finger to nose on the right than the left.  And she is little weaker on straight leg raise right versus left  Extremities/P Vascular: No clubbing cyanosis or edema she has palpable radial dorsalis pedis pulses  MSE: Seems to be in very good spirits today        Labs:  WBC No results found for: WBCS   HGB Hemoglobin   Date Value Ref Range Status   09/13/2017 12.3 11.9 - 15.5 g/dL Final      HCT Hematocrit   Date Value Ref Range Status   09/13/2017 38.2 35.6 - 45.5 % Final      Platlets No results found for: LABPLAT     PT/INR:  No results found for: PROTIME/No results found for: INR    Sodium Sodium   Date Value Ref Range Status   09/13/2017 138 136 - 145 mmol/L Final   09/11/2017 142 136 - 145 mmol/L Final      Potassium Potassium   Date Value Ref Range Status   09/13/2017 3.9 3.5 - 5.2 mmol/L Final   09/12/2017 3.9 3.5 - 5.2 mmol/L Final   09/11/2017 3.5 3.5 - 5.2 mmol/L Final   09/10/2017 2.8 (L) 3.5 - 5.2 mmol/L Final      Chloride Chloride   Date Value Ref Range Status   09/13/2017 101 98 - 107 mmol/L Final   09/11/2017 104 98 - 107 mmol/L Final      Bicarbonate No results found for: PLASMABICARB   BUN BUN   Date Value Ref Range Status   09/13/2017 14 8 - 23 mg/dL Final   09/11/2017 10 8 - 23 mg/dL Final      Creatinine Creatinine   Date Value Ref Range Status   09/13/2017 0.93 0.57 - 1.00 mg/dL Final   09/11/2017 0.81 0.57 - 1.00 mg/dL Final      Calcium Calcium   Date Value Ref Range Status   09/13/2017 8.6 8.6 - 10.5 mg/dL Final   09/11/2017 8.4 (L) 8.6 - 10.5 mg/dL Final      Magnesium No results found for: MG         pH No results found for: PHART   pO2 No results found for: PO2ART   pCO2 No results found for: HKU6VWS   HCO3 No results found for: HFN4WLV       amLODIPine 10 mg Oral Q24H   gabapentin 600 mg Oral Q8H   labetalol 300 mg Oral Q12H   levothyroxine 112 mcg Oral Q AM   lisinopril 20 mg Oral Q24H          Diagnostics:  Imaging Results (last 24  hours)     ** No results found for the last 24 hours. **                  Assessment/Plan     Patient Active Problem List   Diagnosis Code   • Intracranial bleed I62.9     1. Left basal ganglia intracranial hemorrhage.  Etiology not clear but likely secondary to hypertension.  She will need follow-up scanning In 6 weeks as noted by neurosurgery.  Thankfully it looks like her bleed has stabilized.  2. Right hemiparesis with expressive aphasia she is improving thankfully she is to continue working with physical occupational and speech therapy.Will need rehabilitation  3. Hypertensive emergency Her blood pressure has been good off Cardene continue same  4. Obstructive sleep apnea - continue PAP use with all sleep.  5. Morbid obesity obviously she needs long-term weight loss which will require diet and exercise I'm sure did discuss this with the patient  6. Hypokalemia - resolved.  7. COPD    Plan for disposition: Ready for discharge.  Awaiting Nursing Home placement/approval.    JORGE Disla  09/13/17  9:11 AM  Patient seen and examined the above reviewed and verified by myself.  Insurance is the hold up on discharge  Time:

## 2017-09-13 NOTE — THERAPY TREATMENT NOTE
Acute Care - Occupational Therapy Treatment Note  [facility]     Patient Name: Marilin Martinez  : 1955  MRN: [id number]  Today's Date: 2017  Onset of Illness/Injury or Date of Surgery Date: 17  Date of Referral to OT: 09/10/17  Referring Physician: Tremaine      Admit Date: 2017    Visit Dx:     ICD-10-CM ICD-9-CM   1. Difficulty walking R26.2 719.7     Patient Active Problem List   Diagnosis   • Intracranial bleed             Adult Rehabilitation Note       17 1225 17 1100 17 1448    Rehab Assessment/Intervention    Discipline occupational therapist  -SO physical therapy assistant  -CW physical therapist  -EJ    Document Type therapy note (daily note)  -SO therapy note (daily note)  -CW therapy note (daily note)  -EJ    Subjective Information no complaints;agree to therapy  -SO agree to therapy;complains of;weakness;fatigue  -CW agree to therapy;complains of;fatigue  -EJ    Patient Effort, Rehab Treatment good  -SO adequate  -CW adequate  -EJ    Symptoms Noted During/After Treatment none  -SO  fatigue  -EJ    Precautions/Limitations fall precautions  -SO fall precautions  -CW fall precautions  -EJ    Recorded by [SO] Amara Mcdermott, OTR [CW] Jeramie Rosas [EJ] Bree Sam, PT    Vital Signs    O2 Delivery Pre Treatment  room air  -CW     Recorded by  [CW] Jeramie Rosas     Pain Assessment    Pain Assessment No/denies pain  -SO No/denies pain  -CW No/denies pain  -EJ    Recorded by [SO] Amara Mcdermott, OTR [CW] Jeramie Rosas [EJ] Bree Sam, PT    Cognitive Assessment/Intervention    Current Cognitive/Communication Assessment impaired  -SO impaired  -CW     Orientation Status  oriented to;person;place  -CW     Follows Commands/Answers Questions needs cueing  -% of the time;able to follow single-step instructions;needs cueing  -CW     Personal Safety mild impairment  -SO mild impairment;decreased awareness, need for  assist;decreased awareness, need for safety;decreased insight to deficits  -CW     Personal Safety Interventions  fall prevention program maintained;gait belt;muscle strengthening facilitated;nonskid shoes/slippers when out of bed  -CW     Recorded by [SO] Amara Mcdermott, OTR [CW] Jeramie Rosas     Bed Mobility, Assessment/Treatment    Bed Mob, Supine to Sit, Barre   not tested  -EJ    Bed Mob, Sit to Supine, Barre  not tested  -CW minimum assist (75% patient effort);2 person assist required   more assist needed for B LE  -EJ    Bed Mobility, Comment Sitting up in chair  -SO in chair then in bathroom  -CW     Recorded by [SO] Amara Mcdermott, RONALDR [CW] Jeramie Rosas [EJ] Bree Sam, PT    Transfer Assessment/Treatment    Transfers, Sit-Stand Barre  minimum assist (75% patient effort);2 person assist required  -CW verbal cues required;nonverbal cues required (demo/gesture);minimum assist (75% patient effort);2 person assist required  -EJ    Transfers, Stand-Sit Barre  minimum assist (75% patient effort);2 person assist required  -CW minimum assist (75% patient effort);2 person assist required  -EJ    Transfers, Sit-Stand-Sit, Assist Device  rolling walker  -CW rolling walker  -EJ    Toilet Transfer, Barre  contact guard assist;2 person assist required  -CW     Toilet Transfer, Assistive Device  rolling walker  -CW     Recorded by  [CW] Jeramie Rosas [EJ] Bree Sam, PT    Gait Assessment/Treatment    Gait, Barre Level  minimum assist (75% patient effort);2 person assist required  -CW verbal cues required;contact guard assist;minimum assist (75% patient effort);2 person assist required  -EJ    Gait, Assistive Device  rolling walker  -CW rolling walker  -EJ    Gait, Distance (Feet)  60  -CW 40   x 2  -EJ    Gait, Gait Pattern Analysis  swing-to gait  -CW     Gait, Gait Deviations  ralf decreased;step length decreased  -CW ralf  decreased;step length decreased  -EJ    Gait, Safety Issues   step length decreased  -EJ    Gait, Impairments   strength decreased  -EJ    Gait, Comment   limited by overall fatigue  -EJ    Recorded by  [CW] Jeramie Rosas [EJ] Bree Sam, INNA    Therapy Exercises    Bilateral Lower Extremities  AROM:;10 reps;sitting;ankle pumps/circles;hip flexion;LAQ  -CW     Right Upper Extremity AAROM:;10 reps;sitting;elbow flexion/extension;pronation/supination;shoulder extension/flexion;hand pumps   Teaching SROM  -SO      RUE Resistance --   Green foam block 2 pt gross grasp 1x10  -SO      Recorded by [SO] Amara Mcdermott, OTR [CW] Jeramie Rosas     Fine Motor Coordination Training    Detail (Fine Motor Coordination Training) Encouraged rapid alternating with R hand to improve dexterity; attempted texting with RUE, unable to accurately use smart phone and required assist  -SO      Recorded by [SO] Amara Mcdermott OTR      Positioning and Restraints    Pre-Treatment Position sitting in chair/recliner  -SO sitting in chair/recliner  -CW sitting in chair/recliner  -EJ    Post Treatment Position chair  -SO bathroom  -CW bed  -EJ    In Bed   notified nsg;supine;call light within reach;encouraged to call for assist  -EJ    In Chair sitting;call light within reach;encouraged to call for assist;exit alarm on  -SO      Bathroom  notified nsg;sitting;call light within reach;encouraged to call for assist  -CW     Recorded by [SO] Amara Mcdermott, OTR [CW] Jeramie Rosas [EJ] Bree Sam, PT      User Key  (r) = Recorded By, (t) = Taken By, (c) = Cosigned By    Initials Name Effective Dates    SO Amara Mcdermott OTR 04/13/15 -     EJ Bree Sam, PT 04/21/17 -     CW Jeramie Rosas 12/13/16 -                 OT Goals       09/11/17 1517          Transfer Training OT LTG    Transfer Training OT LTG, Date Established 09/11/17  -SO      Transfer Training OT LTG, Time to  Achieve 1 wk  -SO      Transfer Training OT LTG, Activity Type sit to stand/stand to sit;toilet  -SO      Transfer Training OT LTG, [place] Level contact guard assist  -SO      Transfer Training OT LTG, Assist Device walker, rolling;commode, bedside  -SO      Coordination OT LTG    Coordination OT LTG, Date Established 09/11/17  -SO      Coordination OT LTG, Time to Achieve 1 wk  -SO      Coordination OT LTG, Activity Type FM written ex program;GM written ex program;FM task;GM task  -SO      Coordination OT LTG, [place] Level independent  -SO      Coordination OT LTG, Additional Goal With RUE  -SO      ADL OT LTG    ADL OT LTG, Date Established 09/11/17  -SO      ADL OT LTG, Time to Achieve 1 wk  -SO      ADL OT LTG, Activity Type ADL skills  -SO      ADL OT LTG, [place] Level contact guard;assistive device  -SO        User Key  (r) = Recorded By, (t) = Taken By, (c) = Cosigned By    Initials Name Provider Type    SO JULISSA Sebastian Occupational Therapist          Occupational Therapy Education     Title: PT OT SLP Therapies (Active)     Topic: Occupational Therapy (Active)     Point: ADL training (Done)    Description: Instruct learner(s) on proper safety adaptation and remediation techniques during self care or transfers.   Instruct in proper use of assistive devices.    Learning Progress Summary    Learner Readiness Method Response Comment Documented by Status   Patient Acceptance E CODY Discussed OT POC and goals SO 09/11/17 1432 Done   Family Acceptance E CODY Discussed OT POC and goals SO 09/11/17 1432 Done                      User Key     Initials Effective Dates Name Provider Type Discipline    SO 04/13/15 -  JULISSA Sebastian Occupational Therapist OT                  OT Recommendation and Plan  Anticipated Discharge Disposition: inpatient rehabilitation facility  Therapy Frequency: 3-5 times/wk  Plan of Care Review  Plan Of Care Reviewed With: patient  Progress:  improving  Outcome Summary/Follow up Plan: RUE AROM and strength improving, working on dexterity of R hand.        Outcome Measures       09/13/17 1200 09/13/17 1100 09/12/17 1500    How much help from another person do you currently need...    Turning from your back to your side while in flat bed without using bedrails?  2  -CW 2  -EJ    Moving from lying on back to sitting on the side of a flat bed without bedrails?  2  -CW 2  -EJ    Moving to and from a bed to a chair (including a wheelchair)?  3  -CW 2  -EJ    Standing up from a chair using your arms (e.g., wheelchair, bedside chair)?  3  -CW 3  -EJ    Climbing 3-5 steps with a railing?  1  -CW 1  -EJ    To walk in hospital room?  3  -CW 3  -EJ    AM-PAC 6 Clicks Score  14  -CW 13  -EJ    How much help from another is currently needed...    Putting on and taking off regular lower body clothing? 1  -SO      Bathing (including washing, rinsing, and drying) 2  -SO      Toileting (which includes using toilet bed pan or urinal) 2  -SO      Putting on and taking off regular upper body clothing 2  -SO      Taking care of personal grooming (such as brushing teeth) 3  -SO      Eating meals 3  -SO      Score 13  -SO      Functional Assessment    Outcome Measure Options AM-PAC 6 Clicks Daily Activity (OT)  -SO AM-PAC 6 Clicks Basic Mobility (PT)  -CW AM-PAC 6 Clicks Basic Mobility (PT)  -EJ      09/11/17 1500 09/11/17 1300       How much help from another person do you currently need...    Turning from your back to your side while in flat bed without using bedrails?  2  -PC     Moving from lying on back to sitting on the side of a flat bed without bedrails?  2  -PC     Moving to and from a bed to a chair (including a wheelchair)?  2  -PC     Standing up from a chair using your arms (e.g., wheelchair, bedside chair)?  2  -PC     Climbing 3-5 steps with a railing?  1  -PC     To walk in hospital room?  2  -PC     AM-PAC 6 Clicks Score  11  -PC     How much help from another  is currently needed...    Putting on and taking off regular lower body clothing? 1  -SO      Bathing (including washing, rinsing, and drying) 2  -SO      Toileting (which includes using toilet bed pan or urinal) 2  -SO      Putting on and taking off regular upper body clothing 2  -SO      Taking care of personal grooming (such as brushing teeth) 3  -SO      Eating meals 3  -SO      Score 13  -SO      Functional Assessment    Outcome Measure Options AM-PAC 6 Clicks Daily Activity (OT)  -SO AM-PAC 6 Clicks Basic Mobility (PT)  -PC       User Key  (r) = Recorded By, (t) = Taken By, (c) = Cosigned By    Initials Name Provider Type    SO JULISSA Sebastian Occupational Therapist    SOLOMON Forte, PT Physical Therapist    EJ Bree Sam, PT Physical Therapist    CW Jeramie Rosas Physical Therapy Assistant           Time Calculation:         Time Calculation- OT       09/13/17 1229          Time Calculation- OT    OT Start Time 0947  -SO      OT Stop Time 0959  -SO      OT Time Calculation (min) 12 min  -SO      OT Received On 09/13/17  -SO        User Key  (r) = Recorded By, (t) = Taken By, (c) = Cosigned By    Initials Name Provider Type    SO JULISSA Sebastian Occupational Therapist           Therapy Charges for Today     Code Description Service Date Service Provider Modifiers Qty    26253069598 HC OT NEUROMUSC RE EDUCATION EA 15 MIN 9/13/2017 JULISSA Sebastian GO 1               JULISSA Sebastian  9/13/2017

## 2017-09-13 NOTE — PLAN OF CARE
Problem: Patient Care Overview (Adult)  Goal: Plan of Care Review  Outcome: Ongoing (interventions implemented as appropriate)    09/13/17 1756   Coping/Psychosocial Response Interventions   Plan Of Care Reviewed With patient   Patient Care Overview   Progress improving   Outcome Evaluation   Outcome Summary/Follow up Plan Oriented, minimal right sided weakness remains. Up with assist and walker. Awaitinf precert for rehab.        Goal: Adult Individualization and Mutuality  Outcome: Ongoing (interventions implemented as appropriate)  Goal: Discharge Needs Assessment  Outcome: Ongoing (interventions implemented as appropriate)    Problem: Stroke (Hemorrhagic) (Adult)  Goal: Signs and Symptoms of Listed Potential Problems Will be Absent or Manageable (Stroke)  Outcome: Ongoing (interventions implemented as appropriate)    09/13/17 1756   Stroke (Hemorrhagic)   Problems Assessed (Stroke (Hemorrhagic)) all   Problems Present (Stroke (Hemorrhagic)) situational response         Problem: Fall Risk (Adult)  Goal: Identify Related Risk Factors and Signs and Symptoms  Outcome: Ongoing (interventions implemented as appropriate)    09/13/17 1756   Fall Risk   Fall Risk: Related Risk Factors neuro disease/injury   Fall Risk: Signs and Symptoms presence of risk factors       Goal: Absence of Falls  Outcome: Ongoing (interventions implemented as appropriate)    09/13/17 1756   Fall Risk (Adult)   Absence of Falls making progress toward outcome         Problem: Pressure Ulcer Risk (Edenilson Scale) (Adult,Obstetrics,Pediatric)  Goal: Identify Related Risk Factors and Signs and Symptoms  Outcome: Ongoing (interventions implemented as appropriate)    09/13/17 1756   Pressure Ulcer Risk (Edenilson Scale)   Related Risk Factors (Pressure Ulcer Risk (Edenilson Scale)) body weight extremes       Goal: Skin Integrity  Outcome: Ongoing (interventions implemented as appropriate)    09/13/17 1756   Pressure Ulcer Risk (Edenilson Scale)  (Adult,Obstetrics,Pediatric)   Skin Integrity making progress toward outcome         Problem: Pain, Acute (Adult)  Goal: Identify Related Risk Factors and Signs and Symptoms  Outcome: Ongoing (interventions implemented as appropriate)    09/13/17 1756   Pain, Acute   Related Risk Factors (Acute Pain) disease process   Signs and Symptoms (Acute Pain) verbalization of pain descriptors       Goal: Acceptable Pain Control/Comfort Level  Outcome: Ongoing (interventions implemented as appropriate)    09/13/17 1756   Pain, Acute (Adult)   Acceptable Pain Control/Comfort Level making progress toward outcome         Problem: Hypertensive Disease/Crisis (Arterial) (Adult)  Goal: Signs and Symptoms of Listed Potential Problems Will be Absent or Manageable (Hypertensive Disease/Crisis)  Outcome: Ongoing (interventions implemented as appropriate)    09/13/17 1756   Hypertensive Disease/Crisis (Arterial)   Problems Assessed (Hypertensive Disease/Crisis (Arterial)) all   Problems Present (Hypertensive Disease/Crisis (Arterial)) none

## 2017-09-13 NOTE — PROGRESS NOTES
Continued Stay Note  [facility]     Patient Name: Marilin Martinez  MRN: [id number]  Today's Date: 9/13/2017    Admit Date: 9/9/2017          Discharge Plan       09/13/17 0727    Case Management/Social Work Plan    Plan [facility]-  insurance precert pending     Additional Comments Transfer from ICU to  09/12/17 @ 2011.  Called Jennifer and spoke with her, advised of transfer and anticip dc today.  Jennifer to advise facility this morning to start precert.                    Expected Discharge Date and Time     Expected Discharge Date Expected Discharge Time    Sep 13-14, 2017             Chantelle Baker RN

## 2017-09-13 NOTE — THERAPY TREATMENT NOTE
Acute Care - Physical Therapy Treatment Note  [facility]     Patient Name: Marilin Martinez  : 1955  MRN: [id number]  Today's Date: 2017  Onset of Illness/Injury or Date of Surgery Date: 17  Date of Referral to PT: 17  Referring Physician: Tremaine    Admit Date: 2017    Visit Dx:    ICD-10-CM ICD-9-CM   1. Difficulty walking R26.2 719.7     Patient Active Problem List   Diagnosis   • Intracranial bleed               Adult Rehabilitation Note       17 1100 17 1448       Rehab Assessment/Intervention    Discipline physical therapy assistant  -CW physical therapist  -EJ     Document Type therapy note (daily note)  -CW therapy note (daily note)  -EJ     Subjective Information agree to therapy;complains of;weakness;fatigue  -CW agree to therapy;complains of;fatigue  -EJ     Patient Effort, Rehab Treatment adequate  -CW adequate  -EJ     Symptoms Noted During/After Treatment  fatigue  -EJ     Precautions/Limitations fall precautions  -CW fall precautions  -EJ     Recorded by [CW] Jeramie Rosas [EJ] Bree Sam, PT     Vital Signs    O2 Delivery Pre Treatment room air  -CW      Recorded by [CW] Jeramie Rosas      Pain Assessment    Pain Assessment No/denies pain  -CW No/denies pain  -EJ     Recorded by [CW] Jeramie Rosas [EJ] Bree Sam, PT     Cognitive Assessment/Intervention    Current Cognitive/Communication Assessment impaired  -CW      Orientation Status oriented to;person;place  -CW      Follows Commands/Answers Questions 100% of the time;able to follow single-step instructions;needs cueing  -CW      Personal Safety mild impairment;decreased awareness, need for assist;decreased awareness, need for safety;decreased insight to deficits  -CW      Personal Safety Interventions fall prevention program maintained;gait belt;muscle strengthening facilitated;nonskid shoes/slippers when out of bed  -CW      Recorded by [CW] Jeramie Rosas      Bed  Mobility, Assessment/Treatment    Bed Mob, Supine to Sit, [place]  not tested  -EJ     Bed Mob, Sit to Supine, [place] not tested  -CW minimum assist (75% patient effort);2 person assist required   more assist needed for B LE  -EJ     Bed Mobility, Comment in chair then in bathroom  -CW      Recorded by [CW] Jeramie Rosas [EJ] Bree Sam, PT     Transfer Assessment/Treatment    Transfers, Sit-Stand [place] minimum assist (75% patient effort);2 person assist required  -CW verbal cues required;nonverbal cues required (demo/gesture);minimum assist (75% patient effort);2 person assist required  -EJ     Transfers, Stand-Sit [place] minimum assist (75% patient effort);2 person assist required  -CW minimum assist (75% patient effort);2 person assist required  -EJ     Transfers, Sit-Stand-Sit, Assist Device rolling walker  -CW rolling walker  -EJ     Toilet Transfer, [place] contact guard assist;2 person assist required  -CW      Toilet Transfer, Assistive Device rolling walker  -CW      Recorded by [CW] Jeramie Rosas [EJ] Bree Sam, PT     Gait Assessment/Treatment    Gait, [place] Level minimum assist (75% patient effort);2 person assist required  -CW verbal cues required;contact guard assist;minimum assist (75% patient effort);2 person assist required  -EJ     Gait, Assistive Device rolling walker  -CW rolling walker  -EJ     Gait, Distance (Feet) 60  -CW 40   x 2  -EJ     Gait, Gait Pattern Analysis swing-to gait  -CW      Gait, Gait Deviations ralf decreased;step length decreased  -CW ralf decreased;step length decreased  -EJ     Gait, Safety Issues  step length decreased  -EJ     Gait, Impairments  strength decreased  -EJ     Gait, Comment  limited by overall fatigue  -EJ     Recorded by [CW] Jeramie Rosas [EJ] Bree Sam, PT     Therapy Exercises    Bilateral Lower Extremities AROM:;10 reps;sitting;ankle pumps/circles;hip flexion;LAQ  -CW       Recorded by [CW] Jeramie Rosas      Positioning and Restraints    Pre-Treatment Position sitting in chair/recliner  -CW sitting in chair/recliner  -EJ     Post Treatment Position bathroom  -CW bed  -EJ     In Bed  notified nsg;supine;call light within reach;encouraged to call for assist  -EJ     Bathroom notified nsg;sitting;call light within reach;encouraged to call for assist  -CW      Recorded by [CW] Jeramie Rosas [EJ] Bree Sam, PT       User Key  (r) = Recorded By, (t) = Taken By, (c) = Cosigned By    Initials Name Effective Dates    EJ Bree Sam, PT 04/21/17 -     CW Jeramie Rosas 12/13/16 -                 IP PT Goals       09/11/17 1345          Bed Mobility PT LTG    Bed Mobility PT LTG, Date Established 09/11/17  -PC      Bed Mobility PT LTG, Time to Achieve 1 wk  -PC      Bed Mobility PT LTG, Activity Type supine to sit/sit to supine  -PC      Bed Mobility PT LTG, [place] Level contact guard assist  -PC      Transfer Training PT LTG    Transfer Training PT LTG, Date Established 09/11/17  -PC      Transfer Training PT LTG, Time to Achieve 1 wk  -PC      Transfer Training PT LTG, Activity Type sit to stand/stand to sit  -PC      Transfer Training PT LTG, [place] Level contact guard assist  -PC      Gait Training PT LTG    Gait Training Goal PT LTG, Date Established 09/11/17  -PC      Gait Training Goal PT LTG, Time to Achieve 1 wk  -PC      Gait Training Goal PT LTG, [place] Level minimum assist (75% patient effort);contact guard assist  -PC      Gait Training Goal PT LTG, Assist Device walker, rolling  -PC      Gait Training Goal PT LTG, Distance to Achieve 80 ft  -PC        User Key  (r) = Recorded By, (t) = Taken By, (c) = Cosigned By    Initials Name Provider Type    SOLOMON Forte PT Physical Therapist          Physical Therapy Education     Title: PT OT SLP Therapies (Active)     Topic: Physical Therapy (Done)     Point: Mobility training (Done)     Learning Progress Summary    Learner Readiness Method Response Comment Documented by Status   Patient Acceptance E,TB,D DU,VU  CW 09/13/17 1104 Done    Acceptance E VU  EJ 09/12/17 1511 Done    Acceptance D,E NR  PC 09/11/17 1345 Active               Point: Home exercise program (Done)    Learning Progress Summary    Learner Readiness Method Response Comment Documented by Status   Patient Acceptance E,TB,D DU,VU  CW 09/13/17 1104 Done    Acceptance D,E NR  PC 09/11/17 1345 Active               Point: Body mechanics (Done)    Learning Progress Summary    Learner Readiness Method Response Comment Documented by Status   Patient Acceptance E,TB,D DU,VU  CW 09/13/17 1104 Done    Acceptance D,E NR  PC 09/11/17 1345 Active               Point: Precautions (Done)    Learning Progress Summary    Learner Readiness Method Response Comment Documented by Status   Patient Acceptance E,TB,D DU,VU  CW 09/13/17 1104 Done    Acceptance D,E NR  PC 09/11/17 1345 Active                      User Key     Initials Effective Dates Name Provider Type Discipline     12/01/15 -  Marlena Forte, PT Physical Therapist PT    EJ 04/21/17 -  Bree Sam, PT Physical Therapist PT     12/13/16 -  Jeramie Rosas Physical Therapy Assistant PT                    PT Recommendation and Plan  Anticipated Discharge Disposition: inpatient rehabilitation facility  Planned Therapy Interventions: gait training, bed mobility training, balance training, home exercise program, strengthening, transfer training  PT Frequency: daily  Plan of Care Review  Plan Of Care Reviewed With: patient  Progress: improving  Outcome Summary/Follow up Plan: Pt increased with strength and transfer to RWX          Outcome Measures       09/13/17 1100 09/12/17 1500 09/11/17 1500    How much help from another person do you currently need...    Turning from your back to your side while in flat bed without using bedrails? 2  -CW 2  -EJ     Moving from lying on back to  sitting on the side of a flat bed without bedrails? 2  -CW 2  -EJ     Moving to and from a bed to a chair (including a wheelchair)? 3  -CW 2  -EJ     Standing up from a chair using your arms (e.g., wheelchair, bedside chair)? 3  -CW 3  -EJ     Climbing 3-5 steps with a railing? 1  -CW 1  -EJ     To walk in hospital room? 3  -CW 3  -EJ     AM-PAC 6 Clicks Score 14  -CW 13  -EJ     How much help from another is currently needed...    Putting on and taking off regular lower body clothing?   1  -SO    Bathing (including washing, rinsing, and drying)   2  -SO    Toileting (which includes using toilet bed pan or urinal)   2  -SO    Putting on and taking off regular upper body clothing   2  -SO    Taking care of personal grooming (such as brushing teeth)   3  -SO    Eating meals   3  -SO    Score   13  -SO    Functional Assessment    Outcome Measure Options AM-PAC 6 Clicks Basic Mobility (PT)  -CW AM-PAC 6 Clicks Basic Mobility (PT)  -EJ AM-PAC 6 Clicks Daily Activity (OT)  -SO      09/11/17 1300          How much help from another person do you currently need...    Turning from your back to your side while in flat bed without using bedrails? 2  -PC      Moving from lying on back to sitting on the side of a flat bed without bedrails? 2  -PC      Moving to and from a bed to a chair (including a wheelchair)? 2  -PC      Standing up from a chair using your arms (e.g., wheelchair, bedside chair)? 2  -PC      Climbing 3-5 steps with a railing? 1  -PC      To walk in hospital room? 2  -PC      AM-PAC 6 Clicks Score 11  -PC      Functional Assessment    Outcome Measure Options AM-PAC 6 Clicks Basic Mobility (PT)  -PC        User Key  (r) = Recorded By, (t) = Taken By, (c) = Cosigned By    Initials Name Provider Type    SO Amara Mcdermott, OTR Occupational Therapist    PC Marlena Forte, PT Physical Therapist    EJ Bree Sam, PT Physical Therapist    CW Jeramie Rosas Physical Therapy Assistant           Time  Calculation:         PT Charges       09/13/17 1105          Time Calculation    Start Time 1050  -CW      Stop Time 1105  -CW      Time Calculation (min) 15 min  -CW      PT Received On 09/13/17  -CW      PT - Next Appointment 09/14/17  -CW        User Key  (r) = Recorded By, (t) = Taken By, (c) = Cosigned By    Initials Name Provider Type    CW Jeramie Rosas Physical Therapy Assistant          Therapy Charges for Today     Code Description Service Date Service Provider Modifiers Qty    30444761577 HC PT THER PROC EA 15 MIN 9/13/2017 Jeramie Rosas GP 1    26898085420 HC PT THER SUPP EA 15 MIN 9/13/2017 Jeramie Rosas GP 1          PT G-Codes  Outcome Measure Options: AM-PAC 6 Clicks Basic Mobility (PT)    Jeramie Rosas  9/13/2017

## 2017-09-13 NOTE — PLAN OF CARE
Problem: Patient Care Overview (Adult)  Goal: Plan of Care Review  Outcome: Ongoing (interventions implemented as appropriate)    09/13/17 0600   Patient Care Overview   Progress improving   Outcome Evaluation   Outcome Summary/Follow up Plan vitals stable. pt denies pain. will continue to monitor.          Problem: Stroke (Hemorrhagic) (Adult)  Goal: Signs and Symptoms of Listed Potential Problems Will be Absent or Manageable (Stroke)  Outcome: Ongoing (interventions implemented as appropriate)    Problem: Fall Risk (Adult)  Goal: Identify Related Risk Factors and Signs and Symptoms  Outcome: Ongoing (interventions implemented as appropriate)  Goal: Absence of Falls  Outcome: Ongoing (interventions implemented as appropriate)    Problem: Pain, Acute (Adult)  Goal: Identify Related Risk Factors and Signs and Symptoms  Outcome: Ongoing (interventions implemented as appropriate)  Goal: Acceptable Pain Control/Comfort Level  Outcome: Ongoing (interventions implemented as appropriate)    Problem: Hypertensive Disease/Crisis (Arterial) (Adult)  Goal: Signs and Symptoms of Listed Potential Problems Will be Absent or Manageable (Hypertensive Disease/Crisis)  Outcome: Ongoing (interventions implemented as appropriate)

## 2017-09-13 NOTE — PLAN OF CARE
Problem: Patient Care Overview (Adult)  Goal: Plan of Care Review    09/13/17 1228   Coping/Psychosocial Response Interventions   Plan Of Care Reviewed With patient   Patient Care Overview   Progress improving   Outcome Evaluation   Outcome Summary/Follow up Plan RUE AROM and strength improving, working on dexterity of R hand.

## 2017-09-14 PROCEDURE — 97110 THERAPEUTIC EXERCISES: CPT

## 2017-09-14 PROCEDURE — 97112 NEUROMUSCULAR REEDUCATION: CPT

## 2017-09-14 RX ADMIN — AMLODIPINE BESYLATE 10 MG: 10 TABLET ORAL at 09:27

## 2017-09-14 RX ADMIN — GABAPENTIN 600 MG: 300 CAPSULE ORAL at 16:38

## 2017-09-14 RX ADMIN — HYDROCODONE BITARTRATE AND ACETAMINOPHEN 1 TABLET: 5; 325 TABLET ORAL at 16:38

## 2017-09-14 RX ADMIN — GABAPENTIN 600 MG: 300 CAPSULE ORAL at 06:14

## 2017-09-14 RX ADMIN — LEVOTHYROXINE SODIUM 112 MCG: 112 TABLET ORAL at 06:14

## 2017-09-14 RX ADMIN — LISINOPRIL 20 MG: 20 TABLET ORAL at 09:27

## 2017-09-14 RX ADMIN — LABETALOL HYDROCHLORIDE 300 MG: 300 TABLET, FILM COATED ORAL at 22:03

## 2017-09-14 RX ADMIN — GABAPENTIN 600 MG: 300 CAPSULE ORAL at 22:03

## 2017-09-14 RX ADMIN — LABETALOL HYDROCHLORIDE 300 MG: 300 TABLET, FILM COATED ORAL at 09:27

## 2017-09-14 NOTE — PLAN OF CARE
Problem: Patient Care Overview (Adult)  Goal: Plan of Care Review  Outcome: Ongoing (interventions implemented as appropriate)    09/14/17 0554   Coping/Psychosocial Response Interventions   Plan Of Care Reviewed With patient   Patient Care Overview   Progress improving   Outcome Evaluation   Outcome Summary/Follow up Plan vitals stable. pt expressed pain. meds per orders. awaiting placement for discharge. will continue to monitor.          Problem: Stroke (Hemorrhagic) (Adult)  Goal: Signs and Symptoms of Listed Potential Problems Will be Absent or Manageable (Stroke)  Outcome: Ongoing (interventions implemented as appropriate)    Problem: Fall Risk (Adult)  Goal: Identify Related Risk Factors and Signs and Symptoms  Outcome: Ongoing (interventions implemented as appropriate)  Goal: Absence of Falls  Outcome: Ongoing (interventions implemented as appropriate)    Problem: Pain, Acute (Adult)  Goal: Identify Related Risk Factors and Signs and Symptoms  Outcome: Ongoing (interventions implemented as appropriate)  Goal: Acceptable Pain Control/Comfort Level  Outcome: Ongoing (interventions implemented as appropriate)

## 2017-09-14 NOTE — PLAN OF CARE
Problem: Patient Care Overview (Adult)  Goal: Plan of Care Review  Outcome: Ongoing (interventions implemented as appropriate)    09/14/17 1109   Coping/Psychosocial Response Interventions   Plan Of Care Reviewed With patient   Patient Care Overview   Progress improving   Outcome Evaluation   Outcome Summary/Follow up Plan Pt increased with bed mobility and transfer to X. Pt increased with amd distance

## 2017-09-14 NOTE — PLAN OF CARE
Problem: Patient Care Overview (Adult)  Goal: Plan of Care Review  Outcome: Ongoing (interventions implemented as appropriate)    09/14/17 0418   Coping/Psychosocial Response Interventions   Plan Of Care Reviewed With patient   Patient Care Overview   Progress progress towards functional goals is fair   Outcome Evaluation   Outcome Summary/Follow up Plan Pt completes R UE A/AROM with ability to hold in overhead position. Pt with max difficulty to hold and press remote control buttons as well as Max difficulty problem solving to read and change channels.

## 2017-09-14 NOTE — THERAPY TREATMENT NOTE
Acute Care - Physical Therapy Treatment Note  [facility]     Patient Name: Marilin Martinez  : 1955  MRN: [id number]  Today's Date: 2017  Onset of Illness/Injury or Date of Surgery Date: 17  Date of Referral to PT: 17  Referring Physician: Tremaine    Admit Date: 2017    Visit Dx:    ICD-10-CM ICD-9-CM   1. Difficulty walking R26.2 719.7     Patient Active Problem List   Diagnosis   • Intracranial bleed               Adult Rehabilitation Note       17 1100 17 1225 17 1100    Rehab Assessment/Intervention    Discipline physical therapy assistant  -CW occupational therapist  -SO physical therapy assistant  -CW    Document Type therapy note (daily note)  -CW therapy note (daily note)  -SO therapy note (daily note)  -CW    Subjective Information agree to therapy;complains of;weakness  -CW no complaints;agree to therapy  -SO agree to therapy;complains of;weakness;fatigue  -CW    Patient Effort, Rehab Treatment good  -CW good  -SO adequate  -CW    Symptoms Noted During/After Treatment  none  -SO     Precautions/Limitations fall precautions  -CW fall precautions  -SO fall precautions  -CW    Recorded by [CW] Jeramie Rosas [SO] Amara Mcdermott, OTR [CW] Jeramie Rosas    Vital Signs    O2 Delivery Pre Treatment   room air  -CW    O2 Delivery Post Treatment room air  -CW      Recorded by [CW] Jeramie Rosas  [CW] Jeramie Rosas    Pain Assessment    Pain Assessment No/denies pain  -CW No/denies pain  -SO No/denies pain  -CW    Recorded by [CW] Jeramie Rosas [SO] Amara Mcdermott, OTR [CW] Jeramie Rosas    Cognitive Assessment/Intervention    Current Cognitive/Communication Assessment functional  -CW impaired  -SO impaired  -CW    Orientation Status oriented x 4  -CW  oriented to;person;place  -CW    Follows Commands/Answers Questions 100% of the time;able to follow single-step instructions;needs cueing  -CW needs cueing  -% of the  time;able to follow single-step instructions;needs cueing  -CW    Personal Safety mild impairment;decreased awareness, need for safety;decreased insight to deficits  -CW mild impairment  -SO mild impairment;decreased awareness, need for assist;decreased awareness, need for safety;decreased insight to deficits  -CW    Personal Safety Interventions fall prevention program maintained;gait belt;muscle strengthening facilitated;nonskid shoes/slippers when out of bed  -CW  fall prevention program maintained;gait belt;muscle strengthening facilitated;nonskid shoes/slippers when out of bed  -CW    Recorded by [CW] Jeramie Rosas [SO] Amara Mcdermott, OTR [CW] Jeramie Rosas    Bed Mobility, Assessment/Treatment    Bed Mob, Supine to Sit, [place] supervision required  -CW      Bed Mob, Sit to Supine, [place] supervision required  -CW  not tested  -CW    Bed Mobility, Comment  Sitting up in chair  -SO in chair then in bathroom  -CW    Recorded by [CW] Jeramie Rosas [SO] Amara Mcdermott, OTR [CW] Jeramie Rosas    Transfer Assessment/Treatment    Transfers, Sit-Stand [place] contact guard assist  -CW  minimum assist (75% patient effort);2 person assist required  -CW    Transfers, Stand-Sit [place] contact guard assist  -CW  minimum assist (75% patient effort);2 person assist required  -CW    Transfers, Sit-Stand-Sit, Assist Device rolling walker  -CW  rolling walker  -CW    Toilet Transfer, [place]   contact guard assist;2 person assist required  -CW    Toilet Transfer, Assistive Device   rolling walker  -CW    Recorded by [CW] Jeramie Rosas  [CW] Jeramie Rosas    Gait Assessment/Treatment    Gait, [place] Level contact guard assist  -CW  minimum assist (75% patient effort);2 person assist required  -CW    Gait, Assistive Device rolling walker  -CW  rolling walker  -CW    Gait, Distance (Feet) 80  -CW  60  -CW    Gait, Gait Pattern Analysis   swing-to gait   -CW    Gait, Gait Deviations ralf decreased;step length decreased;stride length decreased;forward flexed posture  -CW  ralf decreased;step length decreased  -CW    Gait, Impairments strength decreased  -CW      Recorded by [CW] Jeramie Rosas  [CW] Jeramie Rosas    Therapy Exercises    Bilateral Lower Extremities   AROM:;10 reps;sitting;ankle pumps/circles;hip flexion;LAQ  -CW    Right Upper Extremity  AAROM:;10 reps;sitting;elbow flexion/extension;pronation/supination;shoulder extension/flexion;hand pumps   Teaching SROM  -SO     RUE Resistance  --   Green foam block 2 pt gross grasp 1x10  -SO     Recorded by  [SO] JULISSA Sebastian [CW] Jeramie Rosas    Fine Motor Coordination Training    Detail (Fine Motor Coordination Training)  Encouraged rapid alternating with R hand to improve dexterity; attempted texting with RUE, unable to accurately use smart phone and required assist  -SO     Recorded by  [SO] JULISSA Sebastian     Positioning and Restraints    Pre-Treatment Position in bed  -CW sitting in chair/recliner  -SO sitting in chair/recliner  -CW    Post Treatment Position bed  -CW chair  -SO bathroom  -CW    In Bed notified nsg;supine;call light within reach;encouraged to call for assist;exit alarm on  -CW      In Chair  sitting;call light within reach;encouraged to call for assist;exit alarm on  -SO     Bathroom   notified nsg;sitting;call light within reach;encouraged to call for assist  -CW    Recorded by [CW] Jeramie Rosas [SO] Amara Mcdermott, RONALDR [CW] Jeramie Rosas      09/12/17 1448          Rehab Assessment/Intervention    Discipline physical therapist  -EJ      Document Type therapy note (daily note)  -EJ      Subjective Information agree to therapy;complains of;fatigue  -EJ      Patient Effort, Rehab Treatment adequate  -EJ      Symptoms Noted During/After Treatment fatigue  -EJ      Precautions/Limitations fall precautions  -EJ      Recorded by  [EJ] Bree Sam, PT      Pain Assessment    Pain Assessment No/denies pain  -EJ      Recorded by [EJ] Bree Sam, PT      Bed Mobility, Assessment/Treatment    Bed Mob, Supine to Sit, [place] not tested  -EJ      Bed Mob, Sit to Supine, [place] minimum assist (75% patient effort);2 person assist required   more assist needed for B LE  -EJ      Recorded by [EJ] Bree Sam, PT      Transfer Assessment/Treatment    Transfers, Sit-Stand [place] verbal cues required;nonverbal cues required (demo/gesture);minimum assist (75% patient effort);2 person assist required  -EJ      Transfers, Stand-Sit [place] minimum assist (75% patient effort);2 person assist required  -EJ      Transfers, Sit-Stand-Sit, Assist Device rolling walker  -EJ      Recorded by [EJ] Bree Sam, PT      Gait Assessment/Treatment    Gait, [place] Level verbal cues required;contact guard assist;minimum assist (75% patient effort);2 person assist required  -EJ      Gait, Assistive Device rolling walker  -EJ      Gait, Distance (Feet) 40   x 2  -EJ      Gait, Gait Deviations ralf decreased;step length decreased  -EJ      Gait, Safety Issues step length decreased  -EJ      Gait, Impairments strength decreased  -EJ      Gait, Comment limited by overall fatigue  -EJ      Recorded by [EJ] Bree Sam, PT      Positioning and Restraints    Pre-Treatment Position sitting in chair/recliner  -EJ      Post Treatment Position bed  -EJ      In Bed notified nsg;supine;call light within reach;encouraged to call for assist  -EJ      Recorded by [EJ] Bree Sam, PT        User Key  (r) = Recorded By, (t) = Taken By, (c) = Cosigned By    Initials Name Effective Dates    SO Amara Mcdermott, OTR 04/13/15 -     EJ Bree Sam, PT 04/21/17 -     CW Jeramie Rosas 12/13/16 -                 IP PT Goals       09/11/17 1345          Bed Mobility PT LTG    Bed Mobility PT LTG, Date Established  09/11/17  -PC      Bed Mobility PT LTG, Time to Achieve 1 wk  -PC      Bed Mobility PT LTG, Activity Type supine to sit/sit to supine  -PC      Bed Mobility PT LTG, [place] Level contact guard assist  -PC      Transfer Training PT LTG    Transfer Training PT LTG, Date Established 09/11/17  -PC      Transfer Training PT LTG, Time to Achieve 1 wk  -PC      Transfer Training PT LTG, Activity Type sit to stand/stand to sit  -PC      Transfer Training PT LTG, [place] Level contact guard assist  -PC      Gait Training PT LTG    Gait Training Goal PT LTG, Date Established 09/11/17  -PC      Gait Training Goal PT LTG, Time to Achieve 1 wk  -PC      Gait Training Goal PT LTG, [place] Level minimum assist (75% patient effort);contact guard assist  -PC      Gait Training Goal PT LTG, Assist Device walker, rolling  -PC      Gait Training Goal PT LTG, Distance to Achieve 80 ft  -PC        User Key  (r) = Recorded By, (t) = Taken By, (c) = Cosigned By    Initials Name Provider Type    SOLOMON Forte PT Physical Therapist          Physical Therapy Education     Title: PT OT SLP Therapies (Active)     Topic: Physical Therapy (Done)     Point: Mobility training (Done)    Learning Progress Summary    Learner Readiness Method Response Comment Documented by Status   Patient Acceptance E,TB MARYLIN ROSS 09/14/17 1109 Done    Acceptance E,TB,D CODY COULTER  CW 09/13/17 1104 Done    Acceptance E VU  EJ 09/12/17 1511 Done    Acceptance D,E NR  PC 09/11/17 1345 Active               Point: Home exercise program (Done)    Learning Progress Summary    Learner Readiness Method Response Comment Documented by Status   Patient Acceptance E,TB MARYLIN ROSS   09/14/17 1109 Done    Acceptance E,TB,D CODY COULTER   09/13/17 1104 Done    Acceptance D,E NR  PC 09/11/17 1345 Active               Point: Body mechanics (Done)    Learning Progress Summary    Learner Readiness Method Response Comment Documented by Status   Patient Acceptance E,TB MARYLIN ROSS    09/14/17 1109 Done    Acceptance E,TB,D DUVU  CW 09/13/17 1104 Done    Acceptance D,E NR  PC 09/11/17 1345 Active               Point: Precautions (Done)    Learning Progress Summary    Learner Readiness Method Response Comment Documented by Status   Patient Acceptance E,TB VU,DU  CW 09/14/17 1109 Done    Acceptance E,TB,D DUVU  CW 09/13/17 1104 Done    Acceptance D,E NR  PC 09/11/17 1345 Active                      User Key     Initials Effective Dates Name Provider Type Discipline    PC 12/01/15 -  Marlena Forte, PT Physical Therapist PT    EJ 04/21/17 -  Bree Sam, PT Physical Therapist PT    CW 12/13/16 -  Jeramie Rosas Physical Therapy Assistant PT                    PT Recommendation and Plan  Anticipated Discharge Disposition: inpatient rehabilitation facility  Planned Therapy Interventions: gait training, bed mobility training, balance training, home exercise program, strengthening, transfer training  PT Frequency: daily  Plan of Care Review  Plan Of Care Reviewed With: patient  Progress: improving  Outcome Summary/Follow up Plan: Pt increased with bed mobility and transfer to [facility].  Pt increased with amd distance          Outcome Measures       09/14/17 1100 09/13/17 1200 09/13/17 1100    How much help from another person do you currently need...    Turning from your back to your side while in flat bed without using bedrails? 3  -CW  2  -CW    Moving from lying on back to sitting on the side of a flat bed without bedrails? 3  -CW  2  -CW    Moving to and from a bed to a chair (including a wheelchair)? 3  -CW  3  -CW    Standing up from a chair using your arms (e.g., wheelchair, bedside chair)? 3  -CW  3  -CW    Climbing 3-5 steps with a railing? 1  -CW  1  -CW    To walk in hospital room? 3  -CW  3  -CW    AM-PAC 6 Clicks Score 16  -CW  14  -CW    How much help from another is currently needed...    Putting on and taking off regular lower body clothing?  1  -SO     Bathing (including washing,  rinsing, and drying)  2  -SO     Toileting (which includes using toilet bed pan or urinal)  2  -SO     Putting on and taking off regular upper body clothing  2  -SO     Taking care of personal grooming (such as brushing teeth)  3  -SO     Eating meals  3  -SO     Score  13  -SO     Functional Assessment    Outcome Measure Options AM-PAC 6 Clicks Basic Mobility (PT)  -CW AM-PAC 6 Clicks Daily Activity (OT)  -SO AM-PAC 6 Clicks Basic Mobility (PT)  -CW      09/12/17 1500 09/11/17 1500 09/11/17 1300    How much help from another person do you currently need...    Turning from your back to your side while in flat bed without using bedrails? 2  -EJ  2  -PC    Moving from lying on back to sitting on the side of a flat bed without bedrails? 2  -EJ  2  -PC    Moving to and from a bed to a chair (including a wheelchair)? 2  -EJ  2  -PC    Standing up from a chair using your arms (e.g., wheelchair, bedside chair)? 3  -EJ  2  -PC    Climbing 3-5 steps with a railing? 1  -EJ  1  -PC    To walk in hospital room? 3  -EJ  2  -PC    AM-PAC 6 Clicks Score 13  -EJ  11  -PC    How much help from another is currently needed...    Putting on and taking off regular lower body clothing?  1  -SO     Bathing (including washing, rinsing, and drying)  2  -SO     Toileting (which includes using toilet bed pan or urinal)  2  -SO     Putting on and taking off regular upper body clothing  2  -SO     Taking care of personal grooming (such as brushing teeth)  3  -SO     Eating meals  3  -SO     Score  13  -SO     Functional Assessment    Outcome Measure Options AM-PAC 6 Clicks Basic Mobility (PT)  -EJ AM-PAC 6 Clicks Daily Activity (OT)  -SO AM-PAC 6 Clicks Basic Mobility (PT)  -PC      User Key  (r) = Recorded By, (t) = Taken By, (c) = Cosigned By    Initials Name Provider Type    SO Amara Mcdermott, OTR Occupational Therapist    PC Marlena Forte, PT Physical Therapist    EJ Bree Sam, PT Physical Therapist    CW Jeramie Rosas  Physical Therapy Assistant           Time Calculation:         PT Charges       09/14/17 1111          Time Calculation    Start Time 1054  -CW      Stop Time 1111  -CW      Time Calculation (min) 17 min  -CW      PT Received On 09/14/17  -CW      PT - Next Appointment 09/15/17  -CW        User Key  (r) = Recorded By, (t) = Taken By, (c) = Cosigned By    Initials Name Provider Type    CW Jeramie Rosas Physical Therapy Assistant          Therapy Charges for Today     Code Description Service Date Service Provider Modifiers Qty    09758205552 HC PT THER PROC EA 15 MIN 9/13/2017 Jeramie Rosas GP 1    83450387961 HC PT THER SUPP EA 15 MIN 9/13/2017 Jeramie Rosas GP 1    67951104707 HC PT THER PROC EA 15 MIN 9/14/2017 Jeramie Rosas GP 1          PT G-Codes  Outcome Measure Options: AM-PAC 6 Clicks Basic Mobility (PT)    Jeramie Rosas  9/14/2017

## 2017-09-14 NOTE — PROGRESS NOTES
Continued Stay Note  [facility]     Patient Name: Marilin Martinez  MRN: [id number]  Today's Date: 9/14/2017    Admit Date: 9/9/2017          Discharge Plan       09/14/17 1451    Case Management/Social Work Plan    Additional Comments continue to await insurance precert                   Expected Discharge Date and Time     Expected Discharge Date Expected Discharge Time    Sep 14-15, 2017             Chantelle Baker RN

## 2017-09-14 NOTE — THERAPY TREATMENT NOTE
Acute Care - Occupational Therapy Progress Note  [facility]     Patient Name: Marilin Martinez  : 1955  MRN: [id number]  Today's Date: 2017  Onset of Illness/Injury or Date of Surgery Date: 17  Date of Referral to OT: 09/10/17  Referring Physician: Tremaine      Admit Date: 2017    Visit Dx:     ICD-10-CM ICD-9-CM   1. Difficulty walking R26.2 719.7     Patient Active Problem List   Diagnosis   • Intracranial bleed             Adult Rehabilitation Note       17 1353 17 1100 17 1225    Rehab Assessment/Intervention    Discipline occupational therapist  -LE physical therapy assistant  -CW occupational therapist  -SO    Document Type therapy note (daily note)  -LE therapy note (daily note)  -CW therapy note (daily note)  -SO    Subjective Information agree to therapy  -LE agree to therapy;complains of;weakness  -CW no complaints;agree to therapy  -SO    Patient Effort, Rehab Treatment good  -LE good  -CW good  -SO    Symptoms Noted During/After Treatment --   fatigue from recent walk to bathroom  -LE  none  -SO    Precautions/Limitations fall precautions  -LE fall precautions  -CW fall precautions  -SO    Recorded by [LE] Mary López OTR [CW] Jeramie Rosas [SO] Amara Mcdermott, OTRYANNE    Vital Signs    Pre SpO2 (%) 99  -LE      O2 Delivery Pre Treatment room air  -LE      O2 Delivery Intra Treatment room air  -LE      O2 Delivery Post Treatment room air  -LE room air  -CW     Pre Patient Position Sitting  -LE      Intra Patient Position Sitting  -LE      Post Patient Position Sitting  -LE      Recorded by [LE] JULISSA Pinto [CW] Jeramie Rosas     Pain Assessment    Pain Assessment No/denies pain  -LE No/denies pain  -CW No/denies pain  -SO    Recorded by [LE] JULISSA Pinto [CW] Jeramie Rosas [SO] Amara Mcdermott, RONALDR    Vision Assessment/Intervention    Visual Impairment --   uses glasses to read TV channel list  -LE      Recorded by [LE] Mary  "JULISSA López      Cognitive Assessment/Intervention    Current Cognitive/Communication Assessment impaired  -LE functional  -CW impaired  -SO    Orientation Status oriented to;person   increase time and thought.  \"2019\"  -LE oriented x 4  -CW     Follows Commands/Answers Questions needs cueing;needs increased time;needs repetition  -% of the time;able to follow single-step instructions;needs cueing  -CW needs cueing  -SO    Personal Safety  mild impairment;decreased awareness, need for safety;decreased insight to deficits  -CW mild impairment  -SO    Personal Safety Interventions  fall prevention program maintained;gait belt;muscle strengthening facilitated;nonskid shoes/slippers when out of bed  -CW     Additional Documentation --   Max diff. using remote to change channels, volume   -LE      Recorded by [LE] JULISSA Pinto [CW] Jeramie Rosas [SO] Amara Mcdermott, RONALDR    ROM (Range of Motion)    General ROM upper extremity range of motion deficits identified  -LE      Recorded by [LE] JULISSA Pinto      Bed Mobility, Assessment/Treatment    Bed Mob, Supine to Sit, [place]  supervision required  -CW     Bed Mob, Sit to Supine, [place]  supervision required  -CW     Bed Mobility, Comment in chair  -LE  Sitting up in chair  -SO    Recorded by [LE] JULISSA Pinto [CW] Jeramie Rosas [SO] Amara Mcdermott OTR    Transfer Assessment/Treatment    Transfers, Sit-Stand [place]  contact guard assist  -CW     Transfers, Stand-Sit [place]  contact guard assist  -CW     Transfers, Sit-Stand-Sit, Assist Device  rolling walker  -CW     Recorded by  [CW] Jeramie Rosas     Gait Assessment/Treatment    Gait, [place] Level  contact guard assist  -CW     Gait, Assistive Device  rolling walker  -CW     Gait, Distance (Feet)  80  -CW     Gait, Gait Deviations  ralf decreased;step length decreased;stride length decreased;forward flexed posture  -CW     Gait, Impairments  " "strength decreased  -CW     Recorded by  [CW] Jeramie Rosas     Toileting Assessment/Training    Toileting Assess/Train, Comment states completed hygiene \"best I could\"  -LE      Recorded by [LE] JULISSA Pinto      Therapy Exercises    Right Upper Extremity AAROM:;AROM:;10 reps;sitting;elbow flexion/extension;shoulder extension/flexion   shld girdle stability w/ 5 sec hold w/ arm raised over head  -LE  AAROM:;10 reps;sitting;elbow flexion/extension;pronation/supination;shoulder extension/flexion;hand pumps   Teaching SROM  -SO    RUE Resistance   --   Green foam block 2 pt gross grasp 1x10  -SO    Recorded by [LE] JULISSA Pinto  [SO] JULISSA Sebastian    Fine Motor Coordination Training    Detail (Fine Motor Coordination Training)   Encouraged rapid alternating with R hand to improve dexterity; attempted texting with RUE, unable to accurately use smart phone and required assist  -SO    Recorded by   [SO] JULISSA Sebastian    Positioning and Restraints    Pre-Treatment Position sitting in chair/recliner  -LE in bed  -CW sitting in chair/recliner  -SO    Post Treatment Position chair  -LE bed  -CW chair  -SO    In Bed  notified nsg;supine;call light within reach;encouraged to call for assist;exit alarm on  -CW     In Chair reclined;call light within reach;encouraged to call for assist;exit alarm on  -LE  sitting;call light within reach;encouraged to call for assist;exit alarm on  -SO    Recorded by [LE] JULISSA Pinto [CW] Jeramie Rosas [SO] JULISSA Sebastian      09/13/17 1100 09/12/17 1448       Rehab Assessment/Intervention    Discipline physical therapy assistant  -CW physical therapist  -EJ     Document Type therapy note (daily note)  -CW therapy note (daily note)  -EJ     Subjective Information agree to therapy;complains of;weakness;fatigue  -CW agree to therapy;complains of;fatigue  -EJ     Patient Effort, Rehab Treatment adequate  -CW adequate  -EJ     Symptoms " Noted During/After Treatment  fatigue  -EJ     Precautions/Limitations fall precautions  -CW fall precautions  -EJ     Recorded by [CW] Jeramie Rosas [EJ] Bree Sam PT     Vital Signs    O2 Delivery Pre Treatment room air  -CW      Recorded by [CW] Jeramie Rosas      Pain Assessment    Pain Assessment No/denies pain  -CW No/denies pain  -EJ     Recorded by [CW] Jeramie Rosas [EJ] Bree Sam PT     Cognitive Assessment/Intervention    Current Cognitive/Communication Assessment impaired  -CW      Orientation Status oriented to;person;place  -CW      Follows Commands/Answers Questions 100% of the time;able to follow single-step instructions;needs cueing  -CW      Personal Safety mild impairment;decreased awareness, need for assist;decreased awareness, need for safety;decreased insight to deficits  -CW      Personal Safety Interventions fall prevention program maintained;gait belt;muscle strengthening facilitated;nonskid shoes/slippers when out of bed  -CW      Recorded by [CW] Jeramie Rosas      Bed Mobility, Assessment/Treatment    Bed Mob, Supine to Sit, [place]  not tested  -EJ     Bed Mob, Sit to Supine, [place] not tested  -CW minimum assist (75% patient effort);2 person assist required   more assist needed for B LE  -EJ     Bed Mobility, Comment in chair then in bathroom  -CW      Recorded by [CW] Jeramie Rosas [EJ] Bree Sam PT     Transfer Assessment/Treatment    Transfers, Sit-Stand [place] minimum assist (75% patient effort);2 person assist required  -CW verbal cues required;nonverbal cues required (demo/gesture);minimum assist (75% patient effort);2 person assist required  -EJ     Transfers, Stand-Sit [place] minimum assist (75% patient effort);2 person assist required  -CW minimum assist (75% patient effort);2 person assist required  -EJ     Transfers, Sit-Stand-Sit, Assist Device rolling walker  -CW rolling walker  -EJ     Toilet  Transfer, [place] contact guard assist;2 person assist required  -CW      Toilet Transfer, Assistive Device rolling walker  -CW      Recorded by [CW] Jeramie Rosas [EJ] Bree Sam, PT     Gait Assessment/Treatment    Gait, [place] Level minimum assist (75% patient effort);2 person assist required  -CW verbal cues required;contact guard assist;minimum assist (75% patient effort);2 person assist required  -EJ     Gait, Assistive Device rolling walker  -CW rolling walker  -EJ     Gait, Distance (Feet) 60  -CW 40   x 2  -EJ     Gait, Gait Pattern Analysis swing-to gait  -CW      Gait, Gait Deviations ralf decreased;step length decreased  -CW ralf decreased;step length decreased  -EJ     Gait, Safety Issues  step length decreased  -EJ     Gait, Impairments  strength decreased  -EJ     Gait, Comment  limited by overall fatigue  -EJ     Recorded by [CW] Jeramie Rosas [EJ] Bree Sam, INNA     Therapy Exercises    Bilateral Lower Extremities AROM:;10 reps;sitting;ankle pumps/circles;hip flexion;LAQ  -CW      Recorded by [CW] Jeramie Rosas      Positioning and Restraints    Pre-Treatment Position sitting in chair/recliner  -CW sitting in chair/recliner  -EJ     Post Treatment Position bathroom  -CW bed  -EJ     In Bed  notified nsg;supine;call light within reach;encouraged to call for assist  -EJ     Bathroom notified nsg;sitting;call light within reach;encouraged to call for assist  -CW      Recorded by [CW] Jeramie Rosas [EJ] Bree Sam, PT       User Key  (r) = Recorded By, (t) = Taken By, (c) = Cosigned By    Initials Name Effective Dates    JAMAICA López, OTR 04/13/15 -     SO Amara Mcdermott, OTR 04/13/15 -     EJ Bree Sam, PT 04/21/17 -     CW Jeramie Rosas 12/13/16 -                 OT Goals       09/11/17 1517          Transfer Training OT LTG    Transfer Training OT LTG, Date Established 09/11/17  -SO      Transfer Training OT LTG, Time to  Achieve 1 wk  -SO      Transfer Training OT LTG, Activity Type sit to stand/stand to sit;toilet  -SO      Transfer Training OT LTG, [place] Level contact guard assist  -SO      Transfer Training OT LTG, Assist Device walker, rolling;commode, bedside  -SO      Coordination OT LTG    Coordination OT LTG, Date Established 09/11/17  -SO      Coordination OT LTG, Time to Achieve 1 wk  -SO      Coordination OT LTG, Activity Type FM written ex program;GM written ex program;FM task;GM task  -SO      Coordination OT LTG, [place] Level independent  -SO      Coordination OT LTG, Additional Goal With RUE  -SO      ADL OT LTG    ADL OT LTG, Date Established 09/11/17  -SO      ADL OT LTG, Time to Achieve 1 wk  -SO      ADL OT LTG, Activity Type ADL skills  -SO      ADL OT LTG, [place] Level contact guard;assistive device  -SO        User Key  (r) = Recorded By, (t) = Taken By, (c) = Cosigned By    Initials Name Provider Type    SO JULISSA Sebastian Occupational Therapist          Occupational Therapy Education     Title: PT OT SLP Therapies (Active)     Topic: Occupational Therapy (Active)     Point: ADL training (Done)    Description: Instruct learner(s) on proper safety adaptation and remediation techniques during self care or transfers.   Instruct in proper use of assistive devices.    Learning Progress Summary    Learner Readiness Method Response Comment Documented by Status   Patient Acceptance E CODY Discussed OT POC and goals SO 09/11/17 1432 Done   Family Acceptance E CODY Discussed OT POC and goals SO 09/11/17 1432 Done                      User Key     Initials Effective Dates Name Provider Type Discipline    SO 04/13/15 -  JULISSA Sebastian Occupational Therapist OT                  OT Recommendation and Plan  Anticipated Discharge Disposition: inpatient rehabilitation facility  Therapy Frequency: 3-5 times/wk  Plan of Care Review  Plan Of Care Reviewed With: patient  Progress: progress  towards functional goals is fair  Outcome Summary/Follow up Plan: Pt completes R UE A/AROM with ability to hold in overhead position.  Pt with max difficulty to hold and press remote control buttons as well as Max difficulty problem solving to read and change channels.         Outcome Measures       09/14/17 1359 09/14/17 1300 09/14/17 1100    How much help from another person do you currently need...    Turning from your back to your side while in flat bed without using bedrails?   3  -CW    Moving from lying on back to sitting on the side of a flat bed without bedrails?   3  -CW    Moving to and from a bed to a chair (including a wheelchair)?   3  -CW    Standing up from a chair using your arms (e.g., wheelchair, bedside chair)?   3  -CW    Climbing 3-5 steps with a railing?   1  -CW    To walk in hospital room?   3  -CW    AM-PAC 6 Clicks Score   16  -CW    How much help from another is currently needed...    Putting on and taking off regular lower body clothing? 1  -LE      Bathing (including washing, rinsing, and drying) 2  -LE      Toileting (which includes using toilet bed pan or urinal) 2  -LE      Putting on and taking off regular upper body clothing 3  -LE      Taking care of personal grooming (such as brushing teeth) 3  -LE      Eating meals 3  -LE      Score 14  -LE      Functional Assessment    Outcome Measure Options  AM-PAC 6 Clicks Daily Activity (OT)  -LE AM-PAC 6 Clicks Basic Mobility (PT)  -CW      09/13/17 1200 09/13/17 1100 09/12/17 1500    How much help from another person do you currently need...    Turning from your back to your side while in flat bed without using bedrails?  2  -CW 2  -EJ    Moving from lying on back to sitting on the side of a flat bed without bedrails?  2  -CW 2  -EJ    Moving to and from a bed to a chair (including a wheelchair)?  3  -CW 2  -EJ    Standing up from a chair using your arms (e.g., wheelchair, bedside chair)?  3  -CW 3  -EJ    Climbing 3-5 steps with a  railing?  1  -CW 1  -EJ    To walk in hospital room?  3  -CW 3  -EJ    AM-PAC 6 Clicks Score  14  -CW 13  -EJ    How much help from another is currently needed...    Putting on and taking off regular lower body clothing? 1  -SO      Bathing (including washing, rinsing, and drying) 2  -SO      Toileting (which includes using toilet bed pan or urinal) 2  -SO      Putting on and taking off regular upper body clothing 2  -SO      Taking care of personal grooming (such as brushing teeth) 3  -SO      Eating meals 3  -SO      Score 13  -SO      Functional Assessment    Outcome Measure Options AM-PAC 6 Clicks Daily Activity (OT)  -SO AM-PAC 6 Clicks Basic Mobility (PT)  -CW AM-PAC 6 Clicks Basic Mobility (PT)  -EJ      09/11/17 1500          How much help from another is currently needed...    Putting on and taking off regular lower body clothing? 1  -SO      Bathing (including washing, rinsing, and drying) 2  -SO      Toileting (which includes using toilet bed pan or urinal) 2  -SO      Putting on and taking off regular upper body clothing 2  -SO      Taking care of personal grooming (such as brushing teeth) 3  -SO      Eating meals 3  -SO      Score 13  -SO      Functional Assessment    Outcome Measure Options AM-PAC 6 Clicks Daily Activity (OT)  -SO        User Key  (r) = Recorded By, (t) = Taken By, (c) = Cosigned By    Initials Name Provider Type    JAMAICA López OTR Occupational Therapist    SO Amara Mcdermott, OTR Occupational Therapist    ROSS Sam, PT Physical Therapist    CW Jeramie Rosas Physical Therapy Assistant           Time Calculation:         Time Calculation- OT       09/14/17 1400          Time Calculation- OT    OT Start Time 1332  -LE      OT Stop Time 1356  -LE      OT Time Calculation (min) 24 min  -LE      OT Received On 09/14/17  -LE        User Key  (r) = Recorded By, (t) = Taken By, (c) = Cosigned By    Initials Name Provider Type    JULISSA Tripp Occupational  Therapist           Therapy Charges for Today     Code Description Service Date Service Provider Modifiers Qty    [id number]  OT NEUROMUSC RE EDUCATION EA 15 MIN 9/14/2017 Mary López OTR GO 1    [id number]  OT THER PROC EA 15 MIN 9/14/2017 Mary López OTR GO 1               Mary López OTRYANNE  9/14/2017

## 2017-09-14 NOTE — PROGRESS NOTES
LOS: 5 days   Patient Care Team:  Rosenberg Acosta Reyes, MD as PCP - General (Family Medicine)    Chief Complaint:  Intracranial Hemorrhage.    Subjective: She notes no pain no shortness of breath really has no complaints today she doesn't note any significant change in her functional capacity from yesterday      Objective     Vital Signs  Temp:  [97.5 °F (36.4 °C)-98.5 °F (36.9 °C)] 98 °F (36.7 °C)  Heart Rate:  [53-67] 62  Resp:  [16] 16  BP: (110-146)/(56-86) 118/63  Body mass index is 39.05 kg/(m^2).    Intake/Output Summary (Last 24 hours) at 09/14/17 1553  Last data filed at 09/14/17 0615   Gross per 24 hour   Intake              540 ml   Output                0 ml   Net              540 ml          Physical Exam:  General Appearance: Well-developed morbidly obese white female resting comfortably up in chair at bedside. she does not appear in acute distress  Eyes: Conjunctiva are clear and anicteric pupils are equal reactive to light extraocular muscles are intact and visual fields are intact to confrontation  ENT: No facial symmetry oral mucous membranes are little dry no erythema or exudates Mallampati type II airway nasal septum midline  Neck: Trachea is midline no adenopathy or thyromegaly no jugular venous distention  Lungs: Clear nonlabored symmetric expansion  Cardiac: Regular rate and rhythm no murmur or gallop  Abdomen: Obese soft no palpable organomegaly or masses  : Not examined  Musc/Skel: Grossly normal  Skin: No jaundice no petechiae no rashes noted  Neuro: SHe was able to give me her name, that she was [facility] on that Vince Momin was present and she still fails on the date.  She has a very mild right pronator drift and mild weakness in  on the right side she has a little more difficulty with finger to nose on the right than the left.  And she is little weaker on straight leg raise right versus left  Extremities/P Vascular: No clubbing cyanosis or edema she has  palpable radial dorsalis pedis pulses  MSE: Seems to be in very good spirits today     Exam seems to be essentially unchanged   Labs:  WBC No results found for: WBCS   HGB Hemoglobin   Date Value Ref Range Status   09/13/2017 12.3 11.9 - 15.5 g/dL Final      HCT Hematocrit   Date Value Ref Range Status   09/13/2017 38.2 35.6 - 45.5 % Final      Platlets No results found for: LABPLAT     PT/INR:  No results found for: PROTIME/No results found for: INR    Sodium Sodium   Date Value Ref Range Status   09/13/2017 138 136 - 145 mmol/L Final      Potassium Potassium   Date Value Ref Range Status   09/13/2017 3.9 3.5 - 5.2 mmol/L Final   09/12/2017 3.9 3.5 - 5.2 mmol/L Final      Chloride Chloride   Date Value Ref Range Status   09/13/2017 101 98 - 107 mmol/L Final      Bicarbonate No results found for: PLASMABICARB   BUN BUN   Date Value Ref Range Status   09/13/2017 14 8 - 23 mg/dL Final      Creatinine Creatinine   Date Value Ref Range Status   09/13/2017 0.93 0.57 - 1.00 mg/dL Final      Calcium Calcium   Date Value Ref Range Status   09/13/2017 8.6 8.6 - 10.5 mg/dL Final      Magnesium No results found for: MG         pH No results found for: PHART   pO2 No results found for: PO2ART   pCO2 No results found for: PFD9FWC   HCO3 No results found for: [id number]       amLODIPine 10 mg Oral Q24H   gabapentin 600 mg Oral Q8H   labetalol 300 mg Oral Q12H   levothyroxine 112 mcg Oral Q AM   lisinopril 20 mg Oral Q24H          Diagnostics:  Imaging Results (last 24 hours)     ** No results found for the last 24 hours. **                  Assessment/Plan     Patient Active Problem List   Diagnosis Code   • Intracranial bleed I62.9     1. Left basal ganglia intracranial hemorrhage.  Etiology not clear but likely secondary to hypertension.  She will need follow-up scanning In 6 weeks as noted by neurosurgery.  Thankfully it looks like her bleed has stabilized.  2. Right hemiparesis with expressive aphasia she is improving thankfully  she is to continue working with physical occupational and speech therapy.Will need rehabilitation  3. Hypertensive emergency Her blood pressure has been good off Cardene continue same  4. Obstructive sleep apnea - continue PAP use with all sleep.  5. Morbid obesity obviously she needs long-term weight loss which will require diet and exercise I'm sure did discuss this with the patient  6. Hypokalemia - resolved.  7. COPD    Plan for disposition: Ready for discharge.  Awaiting Nursing Home placement/approval.    Laron Escobar MD  09/14/17  3:53 PM    Time:

## 2017-09-15 VITALS
OXYGEN SATURATION: 98 % | DIASTOLIC BLOOD PRESSURE: 57 MMHG | HEIGHT: 71 IN | BODY MASS INDEX: 39.2 KG/M2 | HEART RATE: 60 BPM | WEIGHT: 279.98 LBS | TEMPERATURE: 98.7 F | SYSTOLIC BLOOD PRESSURE: 131 MMHG | RESPIRATION RATE: 18 BRPM

## 2017-09-15 PROCEDURE — 97110 THERAPEUTIC EXERCISES: CPT

## 2017-09-15 RX ORDER — GABAPENTIN 300 MG/1
600 CAPSULE ORAL EVERY 8 HOURS SCHEDULED
Qty: 90 CAPSULE | Refills: 0 | Status: SHIPPED | OUTPATIENT
Start: 2017-09-15 | End: 2021-08-11

## 2017-09-15 RX ORDER — AMLODIPINE BESYLATE 10 MG/1
10 TABLET ORAL
Qty: 30 TABLET | Refills: 0 | Status: SHIPPED | OUTPATIENT
Start: 2017-09-15 | End: 2022-02-11

## 2017-09-15 RX ORDER — HYDROCODONE BITARTRATE AND ACETAMINOPHEN 5; 325 MG/1; MG/1
1 TABLET ORAL EVERY 4 HOURS PRN
Qty: 60 TABLET | Refills: 0 | Status: SHIPPED | OUTPATIENT
Start: 2017-09-15 | End: 2017-09-20

## 2017-09-15 RX ORDER — LABETALOL 300 MG/1
300 TABLET, FILM COATED ORAL EVERY 12 HOURS SCHEDULED
Qty: 60 TABLET | Refills: 0 | Status: SHIPPED | OUTPATIENT
Start: 2017-09-15 | End: 2021-08-11

## 2017-09-15 RX ORDER — LISINOPRIL 20 MG/1
20 TABLET ORAL
Qty: 30 TABLET | Refills: 0 | Status: SHIPPED | OUTPATIENT
Start: 2017-09-15 | End: 2021-08-11

## 2017-09-15 RX ADMIN — HYDROCODONE BITARTRATE AND ACETAMINOPHEN 1 TABLET: 5; 325 TABLET ORAL at 05:49

## 2017-09-15 RX ADMIN — LABETALOL HYDROCHLORIDE 300 MG: 300 TABLET, FILM COATED ORAL at 08:26

## 2017-09-15 RX ADMIN — GABAPENTIN 600 MG: 300 CAPSULE ORAL at 13:02

## 2017-09-15 RX ADMIN — HYDROCODONE BITARTRATE AND ACETAMINOPHEN 1 TABLET: 5; 325 TABLET ORAL at 19:34

## 2017-09-15 RX ADMIN — LEVOTHYROXINE SODIUM 112 MCG: 112 TABLET ORAL at 05:35

## 2017-09-15 RX ADMIN — AMLODIPINE BESYLATE 10 MG: 10 TABLET ORAL at 08:26

## 2017-09-15 RX ADMIN — GABAPENTIN 600 MG: 300 CAPSULE ORAL at 05:35

## 2017-09-15 RX ADMIN — HYDROCODONE BITARTRATE AND ACETAMINOPHEN 1 TABLET: 5; 325 TABLET ORAL at 13:02

## 2017-09-15 RX ADMIN — LISINOPRIL 20 MG: 20 TABLET ORAL at 08:26

## 2017-09-15 NOTE — DISCHARGE SUMMARY
Discharge summary    Diagnoses:  Left basal ganglia intracranial hemorrhage  Right hemiparesis with expressive aphasia  Hypertensive emergency  Obstructive sleep apnea  Morbid obesity  COPD  Hypokalemia    Hospital course:  Patient transferred from [facility] with intracranial hemorrhage to the neurosurgical service she was seen and evaluated by Dr. Penaloza.  She had no evidence of ongoing hemorrhage there was no definite aneurysms seen although blood does obscure the image and Dr. Penaloza has asked her to return to see him in about 6 weeks with repeat imaging planned.  This stated is most likely hypertension that was the etiology of her bleed she did have severe hypertension and took significant medications to get her blood pressure under control her blood pressure is now well-controlled we would like to keep her blood pressure under 140 systolic.  She has obstructive sleep apnea she had not been compliant with her Pap machine at home her family did bring in her Pap machine and she has been compliant in the hospital I have discussed at length with her to need to use her Pap machine and how it can increase the risk of stroke and hypertension.  She is morbidly obese and we had a long discussion about the weight loss and diet.  She has some COPD that really wasn't a major issue this hospitalization she had some hypokalemia that was treated and has been corrected.  She is transferring to the rehabilitation facility for further therapy she has some residual right hemiparesis and expressive aphasia.  At discharge from rehabilitation she should resume with her primary care physician

## 2017-09-15 NOTE — PROGRESS NOTES
LOS: 6 days   Patient Care Team:  Rosenberg Acosta Reyes, MD as PCP - General (Family Medicine)    Chief Complaint:  Intracranial hemorrhage    Subjective: She had been up in chair all morning, and has worked with therapy all morning as well.  She is back in bed and states that she just doesn't feel well and is exhausted and cold. She is anxious to leave hospital.  No s/s acute distress.   Since above following the patient back up and she's had little time to rest and she feels good now and she very much wants to go to rehabilitation today          Objective     Vital Signs  Temp:  [98 °F (36.7 °C)-98.7 °F (37.1 °C)] 98.5 °F (36.9 °C)  Heart Rate:  [61-65] 62  Resp:  [16-20] 18  BP: (118-141)/(59-66) 141/61  Body mass index is 39.05 kg/(m^2).  No intake or output data in the 24 hours ending 09/15/17 1318       Physical Exam:  General Appearance: Well-developed morbidly obese white female resting comfortably in bed. she does not appear in acute distress  Eyes: Conjunctiva are clear and anicteric pupils are equal reactive to light extraocular muscles are intact and visual fields are intact to confrontation  ENT: No facial symmetry oral mucous membranes are little dry no erythema or exudates Mallampati type II airway nasal septum midline  Neck: Trachea is midline no adenopathy or thyromegaly no jugular venous distention  Lungs: Clear nonlabored symmetric expansion  Cardiac: Regular rate and rhythm no murmur or gallop  Abdomen: Obese soft no palpable organomegaly or masses  : Not examined  Musc/Skel: Grossly normal  Skin: No jaundice no petechiae no rashes notedn that Vince Momin was present  Neuro: She was able to give me her name, that she was [facility].  She has a very mild right pronator drift and mild weakness in  on the right side she has a little more difficulty with finger to nose on the right than the left.  And she is little weaker on straight leg raise right versus left all  essentially stable for the last several days  Extremities/P Vascular: No clubbing cyanosis or edema she has palpable radial dorsalis pedis pulses  MSE: Seems to be in very good spirits today        Labs:  WBC No results found for: WBCS   HGB Hemoglobin   Date Value Ref Range Status   09/13/2017 12.3 11.9 - 15.5 g/dL Final      HCT Hematocrit   Date Value Ref Range Status   09/13/2017 38.2 35.6 - 45.5 % Final      Platlets No results found for: LABPLAT     PT/INR:  No results found for: PROTIME/No results found for: INR    Sodium Sodium   Date Value Ref Range Status   09/13/2017 138 136 - 145 mmol/L Final      Potassium Potassium   Date Value Ref Range Status   09/13/2017 3.9 3.5 - 5.2 mmol/L Final      Chloride Chloride   Date Value Ref Range Status   09/13/2017 101 98 - 107 mmol/L Final      Bicarbonate No results found for: PLASMABICARB   BUN BUN   Date Value Ref Range Status   09/13/2017 14 8 - 23 mg/dL Final      Creatinine Creatinine   Date Value Ref Range Status   09/13/2017 0.93 0.57 - 1.00 mg/dL Final      Calcium Calcium   Date Value Ref Range Status   09/13/2017 8.6 8.6 - 10.5 mg/dL Final      Magnesium No results found for: MG         pH No results found for: PHART   pO2 No results found for: PO2ART   pCO2 No results found for: CTX1DWL   HCO3 No results found for: YKP7VEQ       amLODIPine 10 mg Oral Q24H   gabapentin 600 mg Oral Q8H   labetalol 300 mg Oral Q12H   levothyroxine 112 mcg Oral Q AM   lisinopril 20 mg Oral Q24H          Diagnostics:  Imaging Results (last 24 hours)     ** No results found for the last 24 hours. **                Assessment/Plan     Patient Active Problem List   Diagnosis Code   • Intracranial bleed I62.9       1. Left basal ganglia intracranial hemorrhage.  Etiology not clear but likely secondary to hypertension.  She will need follow-up scanning In 6 weeks as noted by neurosurgery.  Thankfully it looks like her bleed has stabilized.  2. Right hemiparesis with expressive  aphasia she is improving thankfully she is to continue working with physical occupational and speech therapy.Will need rehabilitation  3. Hypertensive emergency Her blood pressure has been good off Cardene continue same  4. Obstructive sleep apnea - continue PAP use with all sleep.  5. Morbid obesity obviously she needs long-term weight loss which will require diet and exercise I'm sure did discuss this with the patient  6. Hypokalemia - resolved.  7. COPD    Plan for disposition: Discharge today to [facility].    JORGE Disla  09/15/17  1:18 PM  Seen and examined by me above verified appropriate modifications made SOSA Escobar Junior, M.D.  Time:

## 2017-09-15 NOTE — PLAN OF CARE
Problem: Patient Care Overview (Adult)  Goal: Plan of Care Review  Outcome: Ongoing (interventions implemented as appropriate)    09/15/17 0938   Coping/Psychosocial Response Interventions   Plan Of Care Reviewed With patient   Patient Care Overview   Progress improving   Outcome Evaluation   Outcome Summary/Follow up Plan Pt increasing with strength and balance with use of RWX but pt is very unsafe with any AD other than RWX

## 2017-09-15 NOTE — PROGRESS NOTES
Case Management Discharge Note    Final Note: acute bed at [facility]    Discharge Placement     Facility/Agency Request Status Selected? Address Phone Number Fax Number    [facility] [place] Accepted    Yes [address] [place] [address] [phone]                 Placed call to kendal Sunshine @ [phone] and updated.      [facility]    Discharge Codes: 02  Discharged/transferred to short-term acute care hospital

## 2017-09-15 NOTE — PROGRESS NOTES
[facility]    Physicians Statement of Medical Necessity for Ambulance Transportation    It is medically necessary for:    Patient Name: Marilin Martinez    Insurance Information:      To be transported by ambulance:    From (if nursing facility, specify level of care: skilled, longterm, etc): [facility]    To (specify level of care if nursing facility): [facility]    Date of Service: 9/15/17    For dialysis patients state date dialysis began: n/a    Diagnosis: Intracranial bleed    Past Medical/Surgical History:  Past Medical History:   Diagnosis Date   • COPD (chronic obstructive pulmonary disease)    • Hypertension    • Sleep apnea       Past Surgical History:   Procedure Laterality Date   • TONSILLECTOMY          Current Objective Medical Evidence(including physical exam finding to support reason for limitations):    Immobilization syndrome    Other: falls precautions, CG assist/ );2 person assist.      Physician Signature:           (RN,NP,PA,CAN, Discharge Planner) Date/Time: 9/15/17 @ 1015     Printed Name:    ___Chantelle Baker RN/CAITLIN__________________________    [facility]   Phone: [phone] Phone: [phone] Phone: [phone]   Fax: [phone] Fax: [phone] Fax: [phone]

## 2017-09-15 NOTE — THERAPY TREATMENT NOTE
Acute Care - Physical Therapy Treatment Note  [facility]     Patient Name: Marilin Martinez  : 1955  MRN: [id number]  Today's Date: 9/15/2017  Onset of Illness/Injury or Date of Surgery Date: 17  Date of Referral to PT: 17  Referring Physician: Tremaine    Admit Date: 2017    Visit Dx:    ICD-10-CM ICD-9-CM   1. Difficulty walking R26.2 719.7     Patient Active Problem List   Diagnosis   • Intracranial bleed               Adult Rehabilitation Note       09/15/17 0900 17 1353 17 1100    Rehab Assessment/Intervention    Discipline physical therapy assistant  -CW occupational therapist  -LE physical therapy assistant  -CW    Document Type therapy note (daily note)  -CW therapy note (daily note)  -LE therapy note (daily note)  -CW    Subjective Information no complaints;agree to therapy  -CW agree to therapy  -LE agree to therapy;complains of;weakness  -CW    Patient Effort, Rehab Treatment good  -CW good  -LE good  -CW    Symptoms Noted During/After Treatment  --   fatigue from recent walk to bathroom  -LE     Precautions/Limitations fall precautions  -CW fall precautions  -LE fall precautions  -CW    Recorded by [CW] Jeramie Rosas [LE] Mary López OTR [CW] Jeramie Rosas    Vital Signs    Pre SpO2 (%)  99  -LE     O2 Delivery Pre Treatment room air  -CW room air  -LE     O2 Delivery Intra Treatment  room air  -LE     O2 Delivery Post Treatment  room air  -LE room air  -CW    Pre Patient Position  Sitting  -LE     Intra Patient Position  Sitting  -LE     Post Patient Position  Sitting  -LE     Recorded by [CW] Jeramie Rosas [LE] JULISSA Pinto [CW] Jeramie Rosas    Pain Assessment    Pain Assessment No/denies pain  -CW No/denies pain  -LE No/denies pain  -CW    Recorded by [CW] Jeramie Rosas [LE] JULISSA Pinto [CW] Jeramie Rosas    Vision Assessment/Intervention    Visual Impairment  --   uses glasses to read TV channel list  -LE     Recorded by   "[LE] JULISSA Pinto     Cognitive Assessment/Intervention    Current Cognitive/Communication Assessment functional  -CW impaired  -LE functional  -CW    Orientation Status oriented x 4  -CW oriented to;person   increase time and thought.  \"2019\"  -LE oriented x 4  -CW    Follows Commands/Answers Questions 100% of the time  -CW needs cueing;needs increased time;needs repetition  -% of the time;able to follow single-step instructions;needs cueing  -CW    Personal Safety mild impairment;decreased awareness, need for assist;decreased awareness, need for safety;decreased insight to deficits  -CW  mild impairment;decreased awareness, need for safety;decreased insight to deficits  -CW    Personal Safety Interventions fall prevention program maintained;gait belt;muscle strengthening facilitated;nonskid shoes/slippers when out of bed  -CW  fall prevention program maintained;gait belt;muscle strengthening facilitated;nonskid shoes/slippers when out of bed  -CW    Additional Documentation  --   Max diff. using remote to change channels, volume   -LE     Recorded by [CW] Jeramie Rosas [[place]] JULISSA Pinto [CW] Jeramie Rosas    ROM (Range of Motion)    General ROM  upper extremity range of motion deficits identified  -LE     Recorded by  [LE] JULISSA Pinto     Bed Mobility, Assessment/Treatment    Bed Mob, Supine to Sit, [place] supervision required  -CW  supervision required  -CW    Bed Mob, Sit to Supine, [place] not tested  -CW  supervision required  -CW    Bed Mobility, Comment up to chair  -CW in chair  -LE     Recorded by [CW] Jeramie Rosas [LE] JULISSA Pinto [CW] Jeramie Rosas    Transfer Assessment/Treatment    Transfers, Sit-Stand [place] contact guard assist  -CW  contact guard assist  -CW    Transfers, Stand-Sit [place] contact guard assist  -CW  contact guard assist  -CW    Transfers, Sit-Stand-Sit, Assist Device rolling walker  -CW  rolling walker  -CW    " "Recorded by [CW] Jeramie Rosas  [CW] Jeramie Rosas    Gait Assessment/Treatment    Gait, [place] Level contact guard assist  -CW  contact guard assist  -CW    Gait, Assistive Device rolling walker  -CW  rolling walker  -CW    Gait, Distance (Feet) 80  -CW  80  -CW    Gait, Gait Deviations ralf decreased;step length decreased;stride length decreased  -CW  ralf decreased;step length decreased;stride length decreased;forward flexed posture  -CW    Gait, Impairments strength decreased  -CW  strength decreased  -CW    Recorded by [CW] Jeramie Rosas  [CW] Jeramie Rosas    Toileting Assessment/Training    Toileting Assess/Train, Comment  states completed hygiene \"best I could\"  -LE     Recorded by  [LE] JULISSA Pinto     Therapy Exercises    Bilateral Lower Extremities AROM:;10 reps;sitting;ankle pumps/circles;hip flexion;LAQ  -CW      Right Upper Extremity  AAROM:;AROM:;10 reps;sitting;elbow flexion/extension;shoulder extension/flexion   shld girdle stability w/ 5 sec hold w/ arm raised over head  -LE     Recorded by [CW] Jeramie Rosas [LE] JULISSA Pinto     Positioning and Restraints    Pre-Treatment Position in bed  -CW sitting in chair/recliner  -LE in bed  -CW    Post Treatment Position chair  -CW chair  -LE bed  -CW    In Bed   notified nsg;supine;call light within reach;encouraged to call for assist;exit alarm on  -CW    In Chair notified nsg;reclined;call light within reach;encouraged to call for assist;exit alarm on  -CW reclined;call light within reach;encouraged to call for assist;exit alarm on  -LE     Recorded by [CW] Jeramie Rosas [LE] JULISSA Pinto [CW] Jeramie Rosas      09/13/17 1225 09/13/17 1100 09/12/17 1448    Rehab Assessment/Intervention    Discipline occupational therapist  -SO physical therapy assistant  -CW physical therapist  -EJ    Document Type therapy note (daily note)  -SO therapy note (daily note)  -CW therapy note (daily note)  -EJ    " Subjective Information no complaints;agree to therapy  -SO agree to therapy;complains of;weakness;fatigue  -CW agree to therapy;complains of;fatigue  -EJ    Patient Effort, Rehab Treatment good  -SO adequate  -CW adequate  -EJ    Symptoms Noted During/After Treatment none  -SO  fatigue  -EJ    Precautions/Limitations fall precautions  -SO fall precautions  -CW fall precautions  -EJ    Recorded by [SO] Amara Mcdermott, OTR [CW] Jermaie Rosas [EJ] Bree Sam, PT    Vital Signs    O2 Delivery Pre Treatment  room air  -CW     Recorded by  [CW] Jeramie Rosas     Pain Assessment    Pain Assessment No/denies pain  -SO No/denies pain  -CW No/denies pain  -EJ    Recorded by [SO] Amara Mcdermott, OTR [CW] Jeramie Rosas [EJ] Bree Sam, INNA    Cognitive Assessment/Intervention    Current Cognitive/Communication Assessment impaired  -SO impaired  -CW     Orientation Status  oriented to;person;place  -CW     Follows Commands/Answers Questions needs cueing  -% of the time;able to follow single-step instructions;needs cueing  -CW     Personal Safety mild impairment  -SO mild impairment;decreased awareness, need for assist;decreased awareness, need for safety;decreased insight to deficits  -CW     Personal Safety Interventions  fall prevention program maintained;gait belt;muscle strengthening facilitated;nonskid shoes/slippers when out of bed  -CW     Recorded by [SO] Amara Mcdermott, OTR [CW] Jeramie Rosas     Bed Mobility, Assessment/Treatment    Bed Mob, Supine to Sit, [place]   not tested  -EJ    Bed Mob, Sit to Supine, [place]  not tested  -CW minimum assist (75% patient effort);2 person assist required   more assist needed for B LE  -EJ    Bed Mobility, Comment Sitting up in chair  -SO in chair then in bathroom  -CW     Recorded by [SO] Amara Mcdermott, OTR [CW] Jeramie Rosas [EJ] Bree Sam, INNA    Transfer Assessment/Treatment     Transfers, Sit-Stand [place]  minimum assist (75% patient effort);2 person assist required  -CW verbal cues required;nonverbal cues required (demo/gesture);minimum assist (75% patient effort);2 person assist required  -EJ    Transfers, Stand-Sit [place]  minimum assist (75% patient effort);2 person assist required  -CW minimum assist (75% patient effort);2 person assist required  -EJ    Transfers, Sit-Stand-Sit, Assist Device  rolling walker  -CW rolling walker  -EJ    Toilet Transfer, [place]  contact guard assist;2 person assist required  -CW     Toilet Transfer, Assistive Device  rolling walker  -CW     Recorded by  [CW] Jeramie Rosas [EJ] Bree Sam, PT    Gait Assessment/Treatment    Gait, [place] Level  minimum assist (75% patient effort);2 person assist required  -CW verbal cues required;contact guard assist;minimum assist (75% patient effort);2 person assist required  -EJ    Gait, Assistive Device  rolling walker  -CW rolling walker  -EJ    Gait, Distance (Feet)  60  -CW 40   x 2  -EJ    Gait, Gait Pattern Analysis  swing-to gait  -CW     Gait, Gait Deviations  ralf decreased;step length decreased  -CW ralf decreased;step length decreased  -EJ    Gait, Safety Issues   step length decreased  -EJ    Gait, Impairments   strength decreased  -EJ    Gait, Comment   limited by overall fatigue  -EJ    Recorded by  [CW] Jeramie Rosas [EJ] Bree Sam, PT    Therapy Exercises    Bilateral Lower Extremities  AROM:;10 reps;sitting;ankle pumps/circles;hip flexion;LAQ  -CW     Right Upper Extremity AAROM:;10 reps;sitting;elbow flexion/extension;pronation/supination;shoulder extension/flexion;hand pumps   Teaching SROM  -SO      RUE Resistance --   Green foam block 2 pt gross grasp 1x10  -SO      Recorded by [SO] JULISSA Sebastian [CW] Jeramie Rosas     Fine Motor Coordination Training    Detail (Fine Motor Coordination Training) Encouraged rapid alternating  with R hand to improve dexterity; attempted texting with RUE, unable to accurately use smart phone and required assist  -SO      Recorded by [SO] Amara Mcdermott, OTR      Positioning and Restraints    Pre-Treatment Position sitting in chair/recliner  -SO sitting in chair/recliner  -CW sitting in chair/recliner  -EJ    Post Treatment Position chair  -SO bathroom  -CW bed  -EJ    In Bed   notified nsg;supine;call light within reach;encouraged to call for assist  -EJ    In Chair sitting;call light within reach;encouraged to call for assist;exit alarm on  -SO      Bathroom  notified nsg;sitting;call light within reach;encouraged to call for assist  -CW     Recorded by [SO] Amara Mcdermott, OTR [CW] Jeramie Rosas [EJ] Bree Sam, PT      User Key  (r) = Recorded By, (t) = Taken By, (c) = Cosigned By    Initials Name Effective Dates    JAMAICA López, OTR 04/13/15 -     SO Amara Mcdermott, OTR 04/13/15 -     EJ Bree Sam, PT 04/21/17 -     CW Jeramie Rosas 12/13/16 -                 IP PT Goals       09/11/17 1345          Bed Mobility PT LTG    Bed Mobility PT LTG, Date Established 09/11/17  -PC      Bed Mobility PT LTG, Time to Achieve 1 wk  -PC      Bed Mobility PT LTG, Activity Type supine to sit/sit to supine  -PC      Bed Mobility PT LTG, [place] Level contact guard assist  -PC      Transfer Training PT LTG    Transfer Training PT LTG, Date Established 09/11/17  -PC      Transfer Training PT LTG, Time to Achieve 1 wk  -PC      Transfer Training PT LTG, Activity Type sit to stand/stand to sit  -PC      Transfer Training PT LTG, [place] Level contact guard assist  -PC      Gait Training PT LTG    Gait Training Goal PT LTG, Date Established 09/11/17  -PC      Gait Training Goal PT LTG, Time to Achieve 1 wk  -PC      Gait Training Goal PT LTG, [place] Level minimum assist (75% patient effort);contact guard assist  -PC      Gait Training Goal PT LTG, Assist  Device walker, rolling  -PC      Gait Training Goal PT LTG, Distance to Achieve 80 ft  -PC        User Key  (r) = Recorded By, (t) = Taken By, (c) = Cosigned By    Initials Name Provider Type    PC Marlena Forte, PT Physical Therapist          Physical Therapy Education     Title: PT OT SLP Therapies (Active)     Topic: Physical Therapy (Done)     Point: Mobility training (Done)    Learning Progress Summary    Learner Readiness Method Response Comment Documented by Status   Patient Acceptance E,TB DU,VU   09/15/17 0938 Done    Acceptance E,TB VU,DU   09/14/17 1109 Done    Acceptance E,TB,D DU,VU   09/13/17 1104 Done    Acceptance E VU  EJ 09/12/17 1511 Done    Acceptance D,E NR  PC 09/11/17 1345 Active               Point: Home exercise program (Done)    Learning Progress Summary    Learner Readiness Method Response Comment Documented by Status   Patient Acceptance E,TB DU,VU   09/15/17 0938 Done    Acceptance E,TB VU,DU   09/14/17 1109 Done    Acceptance E,TB,D DU,VU   09/13/17 1104 Done    Acceptance D,E NR  PC 09/11/17 1345 Active               Point: Body mechanics (Done)    Learning Progress Summary    Learner Readiness Method Response Comment Documented by Status   Patient Acceptance E,TB DU,VU   09/15/17 0938 Done    Acceptance E,TB VU,DU   09/14/17 1109 Done    Acceptance E,TB,D DU,VU   09/13/17 1104 Done    Acceptance D,E NR  PC 09/11/17 1345 Active               Point: Precautions (Done)    Learning Progress Summary    Learner Readiness Method Response Comment Documented by Status   Patient Acceptance E,TB DU,VU   09/15/17 0938 Done    Acceptance E,TB VU,DU   09/14/17 1109 Done    Acceptance E,TB,D DU,VU   09/13/17 1104 Done    Acceptance D,E NR  PC 09/11/17 1345 Active                      User Key     Initials Effective Dates Name Provider Type Discipline     12/01/15 -  Marlena Forte, PT Physical Therapist PT    EJ 04/21/17 -  Bree Sam, PT Physical Therapist PT    CW  12/13/16 -  Jeramie Rosas Physical Therapy Assistant PT                    PT Recommendation and Plan  Anticipated Discharge Disposition: inpatient rehabilitation facility  Planned Therapy Interventions: gait training, bed mobility training, balance training, home exercise program, strengthening, transfer training  PT Frequency: daily  Plan of Care Review  Plan Of Care Reviewed With: patient  Progress: improving  Outcome Summary/Follow up Plan: Pt increasing with strength and balance with use of RWX but pt is very unsafe with any AD other than RWX          Outcome Measures       09/15/17 0900 09/14/17 1359 09/14/17 1300    How much help from another person do you currently need...    Turning from your back to your side while in flat bed without using bedrails? 3  -CW      Moving from lying on back to sitting on the side of a flat bed without bedrails? 3  -CW      Moving to and from a bed to a chair (including a wheelchair)? 3  -CW      Standing up from a chair using your arms (e.g., wheelchair, bedside chair)? 3  -CW      Climbing 3-5 steps with a railing? 1  -CW      To walk in hospital room? 3  -CW      AM-PAC 6 Clicks Score 16  -CW      How much help from another is currently needed...    Putting on and taking off regular lower body clothing?  1  -LE     Bathing (including washing, rinsing, and drying)  2  -LE     Toileting (which includes using toilet bed pan or urinal)  2  -LE     Putting on and taking off regular upper body clothing  3  -LE     Taking care of personal grooming (such as brushing teeth)  3  -LE     Eating meals  3  -LE     Score  14  -LE     Functional Assessment    Outcome Measure Options AM-PAC 6 Clicks Basic Mobility (PT)  -CW  AM-PAC 6 Clicks Daily Activity (OT)  -LE      09/14/17 1100 09/13/17 1200 09/13/17 1100    How much help from another person do you currently need...    Turning from your back to your side while in flat bed without using bedrails? 3  -CW  2  -CW    Moving from  lying on back to sitting on the side of a flat bed without bedrails? 3  -CW  2  -CW    Moving to and from a bed to a chair (including a wheelchair)? 3  -CW  3  -CW    Standing up from a chair using your arms (e.g., wheelchair, bedside chair)? 3  -CW  3  -CW    Climbing 3-5 steps with a railing? 1  -CW  1  -CW    To walk in hospital room? 3  -CW  3  -CW    AM-PAC 6 Clicks Score 16  -CW  14  -CW    How much help from another is currently needed...    Putting on and taking off regular lower body clothing?  1  -SO     Bathing (including washing, rinsing, and drying)  2  -SO     Toileting (which includes using toilet bed pan or urinal)  2  -SO     Putting on and taking off regular upper body clothing  2  -SO     Taking care of personal grooming (such as brushing teeth)  3  -SO     Eating meals  3  -SO     Score  13  -SO     Functional Assessment    Outcome Measure Options AM-PAC 6 Clicks Basic Mobility (PT)  -CW AM-PAC 6 Clicks Daily Activity (OT)  -SO AM-PAC 6 Clicks Basic Mobility (PT)  -CW      09/12/17 1500          How much help from another person do you currently need...    Turning from your back to your side while in flat bed without using bedrails? 2  -EJ      Moving from lying on back to sitting on the side of a flat bed without bedrails? 2  -EJ      Moving to and from a bed to a chair (including a wheelchair)? 2  -EJ      Standing up from a chair using your arms (e.g., wheelchair, bedside chair)? 3  -EJ      Climbing 3-5 steps with a railing? 1  -EJ      To walk in hospital room? 3  -EJ      AM-PAC 6 Clicks Score 13  -EJ      Functional Assessment    Outcome Measure Options AM-PAC 6 Clicks Basic Mobility (PT)  -EJ        User Key  (r) = Recorded By, (t) = Taken By, (c) = Cosigned By    Initials Name Provider Type    JAAMICA López, OTR Occupational Therapist    SO Amara Mcdermott, OTR Occupational Therapist    EJ Bree Sam, PT Physical Therapist    CW Jeramie Rosas Physical Therapy  Assistant           Time Calculation:         PT Charges       09/15/17 0939          Time Calculation    Start Time 0922  -CW      Stop Time 0939  -CW      Time Calculation (min) 17 min  -CW      PT Received On 09/15/17  -CW      PT - Next Appointment 09/16/17  -CW        User Key  (r) = Recorded By, (t) = Taken By, (c) = Cosigned By    Initials Name Provider Type    CW Jeramie Rosas Physical Therapy Assistant          Therapy Charges for Today     Code Description Service Date Service Provider Modifiers Qty    32834012372 HC PT THER PROC EA 15 MIN 9/14/2017 Jeramie Rosas GP 1    72452135970 HC PT THER PROC EA 15 MIN 9/15/2017 Jeramie Rosas GP 1    89930764297 HC PT THER SUPP EA 15 MIN 9/15/2017 Jeramie Rosas GP 1          PT G-Codes  Outcome Measure Options: AM-PAC 6 Clicks Basic Mobility (PT)    Jeramie Rosas  9/15/2017

## 2017-09-15 NOTE — PROGRESS NOTES
Continued Stay Note  [facility]     Patient Name: Marilin Martinez  MRN: [id number]  Today's Date: 9/15/2017    Admit Date: 9/9/2017          Discharge Plan       09/15/17 0825    Case Management/Social Work Plan    Plan [facility]    Patient/Family In Agreement With Plan yes    Additional Comments Per Jennifer with [facility], insurance precert has been obtained and patient can transfer there today.  RN to update attending.                     Expected Discharge Date and Time     Expected Discharge Date Expected Discharge Time    Sep 15, 2017             Chantelle Baker RN

## 2017-09-15 NOTE — PLAN OF CARE
Problem: Patient Care Overview (Adult)  Goal: Plan of Care Review  Outcome: Ongoing (interventions implemented as appropriate)    09/15/17 0417   Coping/Psychosocial Response Interventions   Plan Of Care Reviewed With patient   Outcome Evaluation   Outcome Summary/Follow up Plan Pt VSS. Stroke Scale done q shift was 4, right arm and leg weakness, aphasic and lethargic at times, able to answer questions appropriately. Pt on RA during day but cpap at HS and when sleeping. Pt up with assistx1 and cane. No c/o pain or nausea during this shift so far. Will continue to monitor pt closely and follow MD orders.          Problem: Stroke (Hemorrhagic) (Adult)  Goal: Signs and Symptoms of Listed Potential Problems Will be Absent or Manageable (Stroke)  Outcome: Ongoing (interventions implemented as appropriate)    09/15/17 0417   Stroke (Hemorrhagic)   Problems Assessed (Stroke (Hemorrhagic)) all   Problems Present (Stroke (Hemorrhagic)) muscle tone abnormal;cognitive impairment;situational response         09/15/17 0417   Stroke (Hemorrhagic)   Problems Assessed (Stroke (Hemorrhagic)) all   Problems Present (Stroke (Hemorrhagic)) muscle tone abnormal;cognitive impairment;situational response         Problem: Fall Risk (Adult)  Goal: Identify Related Risk Factors and Signs and Symptoms  Outcome: Ongoing (interventions implemented as appropriate)    09/15/17 0417   Fall Risk   Fall Risk: Related Risk Factors confusion/agitation;fatigue/slow reaction;gait/mobility problems;history of falls;inadequate lighting;sensory deficits;sleep pattern alteration;environment unfamiliar   Fall Risk: Signs and Symptoms presence of risk factors       Goal: Absence of Falls  Outcome: Ongoing (interventions implemented as appropriate)    09/15/17 0417   Fall Risk (Adult)   Absence of Falls making progress toward outcome         Problem: Pressure Ulcer Risk (Edenilson Scale) (Adult,Obstetrics,Pediatric)  Goal: Identify Related Risk Factors and Signs  and Symptoms  Outcome: Ongoing (interventions implemented as appropriate)    09/15/17 0417   Pressure Ulcer Risk (Edenilson Scale)   Related Risk Factors (Pressure Ulcer Risk (Edenilson Scale)) body weight extremes;cognitive impairment;mobility impaired       Goal: Skin Integrity  Outcome: Ongoing (interventions implemented as appropriate)    09/15/17 0417   Pressure Ulcer Risk (Edenilson Scale) (Adult,Obstetrics,Pediatric)   Skin Integrity making progress toward outcome         Problem: Pain, Acute (Adult)  Goal: Identify Related Risk Factors and Signs and Symptoms  Outcome: Ongoing (interventions implemented as appropriate)    09/15/17 0417   Pain, Acute   Related Risk Factors (Acute Pain) disease process;psychosocial factor   Signs and Symptoms (Acute Pain) fatigue/weakness;impaired thought process/concentration;sleep pattern alteration;verbalization of pain descriptors       Goal: Acceptable Pain Control/Comfort Level  Outcome: Ongoing (interventions implemented as appropriate)    09/15/17 0417   Pain, Acute (Adult)   Acceptable Pain Control/Comfort Level making progress toward outcome         Problem: Hypertensive Disease/Crisis (Arterial) (Adult)  Goal: Signs and Symptoms of Listed Potential Problems Will be Absent or Manageable (Hypertensive Disease/Crisis)  Outcome: Ongoing (interventions implemented as appropriate)    09/15/17 0417   Hypertensive Disease/Crisis (Arterial)   Problems Assessed (Hypertensive Disease/Crisis (Arterial)) all   Problems Present (Hypertensive Disease/Crisis (Arterial)) situational response

## 2017-09-15 NOTE — PROGRESS NOTES
Continued Stay Note  [facility]     Patient Name: Marilin Martinez  MRN: [id number]  Today's Date: 9/15/2017    Admit Date: 9/9/2017          Discharge Plan     Contacted Dr Escobar and advised of d/c plan- insurance approved and ready to transfer today to acute rehab.                       Expected Discharge Date and Time     Expected Discharge Date Expected Discharge Time    Sep 15, 2017             Chantelle Baker RN

## 2017-09-21 NOTE — TELEPHONE ENCOUNTER
MRI had not yet been scheduled. Patient was discharged to [facility] in [facility]. Contacted Vielka there [phone] to see if they prefer patient have MRI at [facility] vs [facility] on day of appt. They prefer she have MRI here.    I rescheduled appt to 10-25 at 11:30. I was able to get MRI scheduled [facility] same day, arrive 9:15 for 9:45 appt. This information was faxed to Vielka to place in patient's chart for when she is discharged. Fax [phone]

## 2017-10-24 ENCOUNTER — TELEPHONE (OUTPATIENT)
Dept: NEUROSURGERY | Facility: CLINIC | Age: 62
End: 2017-10-24

## 2017-10-24 NOTE — TELEPHONE ENCOUNTER
----- Message from Paola Wu sent at 10/24/2017  9:36 AM EDT -----  Patient called she is going to ask her PCP- Dr. Reyes in [facility] to schedule her with a different neurosurgeon in [facility] or closer to home. She said that [place] is too far for her to come. I advised her that she will continue to get messages and calls from us to FU until she either comes in for an appt or lets us know where she gets scheduled. She verbalized understanding. She states she has an appt with her PCP today.       Her office appt and MRI have been cancelled for 10/25

## 2017-10-25 ENCOUNTER — APPOINTMENT (OUTPATIENT)
Dept: MRI IMAGING | Facility: HOSPITAL | Age: 62
End: 2017-10-25

## 2020-06-12 ENCOUNTER — OFFICE VISIT CONVERTED (OUTPATIENT)
Dept: PODIATRY | Facility: CLINIC | Age: 65
End: 2020-06-12
Attending: PODIATRIST

## 2020-06-12 ENCOUNTER — CONVERSION ENCOUNTER (OUTPATIENT)
Dept: PODIATRY | Facility: CLINIC | Age: 65
End: 2020-06-12

## 2020-07-09 ENCOUNTER — CONVERSION ENCOUNTER (OUTPATIENT)
Dept: ORTHOPEDIC SURGERY | Facility: CLINIC | Age: 65
End: 2020-07-09

## 2020-07-09 ENCOUNTER — OFFICE VISIT CONVERTED (OUTPATIENT)
Dept: ORTHOPEDIC SURGERY | Facility: CLINIC | Age: 65
End: 2020-07-09
Attending: ORTHOPAEDIC SURGERY

## 2020-07-17 ENCOUNTER — HOSPITAL ENCOUNTER (OUTPATIENT)
Dept: PHYSICAL THERAPY | Facility: CLINIC | Age: 65
Setting detail: RECURRING SERIES
Discharge: HOME | End: 2020-10-14
Attending: ORTHOPAEDIC SURGERY

## 2020-08-12 ENCOUNTER — HOSPITAL ENCOUNTER (OUTPATIENT)
Dept: GENERAL RADIOLOGY | Facility: HOSPITAL | Age: 65
Discharge: HOME | End: 2020-08-12
Attending: INTERNAL MEDICINE

## 2020-08-27 ENCOUNTER — PROCEDURE VISIT CONVERTED (OUTPATIENT)
Dept: PODIATRY | Facility: CLINIC | Age: 65
End: 2020-08-27
Attending: PODIATRIST

## 2020-08-27 ENCOUNTER — CONVERSION ENCOUNTER (OUTPATIENT)
Dept: PODIATRY | Facility: CLINIC | Age: 65
End: 2020-08-27

## 2020-11-10 ENCOUNTER — OFFICE VISIT CONVERTED (OUTPATIENT)
Dept: ORTHOPEDIC SURGERY | Facility: CLINIC | Age: 65
End: 2020-11-10
Attending: ORTHOPAEDIC SURGERY

## 2021-01-26 ENCOUNTER — OFFICE VISIT CONVERTED (OUTPATIENT)
Dept: ORTHOPEDIC SURGERY | Facility: CLINIC | Age: 66
End: 2021-01-26
Attending: PHYSICIAN ASSISTANT

## 2021-02-02 ENCOUNTER — OFFICE VISIT CONVERTED (OUTPATIENT)
Dept: ORTHOPEDIC SURGERY | Facility: CLINIC | Age: 66
End: 2021-02-02
Attending: PHYSICIAN ASSISTANT

## 2021-02-02 ENCOUNTER — CONVERSION ENCOUNTER (OUTPATIENT)
Dept: ORTHOPEDIC SURGERY | Facility: CLINIC | Age: 66
End: 2021-02-02

## 2021-02-09 ENCOUNTER — CONVERSION ENCOUNTER (OUTPATIENT)
Dept: ORTHOPEDIC SURGERY | Facility: CLINIC | Age: 66
End: 2021-02-09

## 2021-02-09 ENCOUNTER — OFFICE VISIT CONVERTED (OUTPATIENT)
Dept: ORTHOPEDIC SURGERY | Facility: CLINIC | Age: 66
End: 2021-02-09
Attending: PHYSICIAN ASSISTANT

## 2021-02-23 ENCOUNTER — OFFICE VISIT CONVERTED (OUTPATIENT)
Dept: ORTHOPEDIC SURGERY | Facility: CLINIC | Age: 66
End: 2021-02-23
Attending: PHYSICIAN ASSISTANT

## 2021-02-23 ENCOUNTER — CONVERSION ENCOUNTER (OUTPATIENT)
Dept: ORTHOPEDIC SURGERY | Facility: CLINIC | Age: 66
End: 2021-02-23

## 2021-03-09 ENCOUNTER — CONVERSION ENCOUNTER (OUTPATIENT)
Dept: ORTHOPEDIC SURGERY | Facility: CLINIC | Age: 66
End: 2021-03-09

## 2021-03-09 ENCOUNTER — OFFICE VISIT CONVERTED (OUTPATIENT)
Dept: ORTHOPEDIC SURGERY | Facility: CLINIC | Age: 66
End: 2021-03-09
Attending: PHYSICIAN ASSISTANT

## 2021-03-16 ENCOUNTER — OFFICE VISIT CONVERTED (OUTPATIENT)
Dept: ORTHOPEDIC SURGERY | Facility: CLINIC | Age: 66
End: 2021-03-16
Attending: ORTHOPAEDIC SURGERY

## 2021-03-18 ENCOUNTER — HOSPITAL ENCOUNTER (OUTPATIENT)
Dept: CARDIOLOGY | Facility: HOSPITAL | Age: 66
Discharge: HOME | End: 2021-03-18
Attending: SURGERY

## 2021-05-05 PROBLEM — E03.9 HYPOTHYROID: Status: ACTIVE | Noted: 2021-05-05

## 2021-05-05 PROBLEM — I63.9 STROKE (HCC): Status: ACTIVE | Noted: 2021-05-05

## 2021-05-05 PROBLEM — I10 ESSENTIAL HYPERTENSION: Status: ACTIVE | Noted: 2021-05-05

## 2021-05-05 PROBLEM — R63.5 WEIGHT GAIN: Status: ACTIVE | Noted: 2021-05-05

## 2021-05-05 PROBLEM — G47.30 SLEEP APNEA: Status: ACTIVE | Noted: 2021-05-05

## 2021-05-05 PROBLEM — M25.473 ANKLE SWELLING: Status: ACTIVE | Noted: 2021-05-05

## 2021-05-05 PROBLEM — J30.89 ENVIRONMENTAL AND SEASONAL ALLERGIES: Status: ACTIVE | Noted: 2021-05-05

## 2021-05-05 PROBLEM — M25.50 MULTIPLE JOINT PAIN: Status: ACTIVE | Noted: 2021-05-05

## 2021-05-05 PROBLEM — E11.69 DIABETES MELLITUS TYPE 2 IN OBESE: Status: ACTIVE | Noted: 2021-05-05

## 2021-05-05 PROBLEM — E66.9 DIABETES MELLITUS TYPE 2 IN OBESE: Status: ACTIVE | Noted: 2021-05-05

## 2021-05-10 NOTE — H&P
History and Physical      Patient Name: Marilin Martinez   Patient ID: [id number]   Sex: Female   YOB: 1955    Primary Care Provider: Rosenberg Reyes   Referring Provider: Rosenberg Reyes    Visit Date: July 9, 2020    Provider: Preeti Frazier MD   Location: [facility]   Location Address: [address] [place] [address]  [id number]   Location Phone: [phone]          Chief Complaint  · Bilateral Knee Pain, Right > Left.       History Of Present Illness  Marilin Martinez is a 65 year old /White female who presents for evaluation of her bilateral knee. She has been having knee pain for at least the last 3 years. She had a stroke that affected her right side, so she also has some mild weakness on that side. She has been having increasing pain. Both knees are symptomatic, but the right knee hurts more.       Past Medical History  Arthritis; Diabetes; Foot ulcer; Hyperlipidemia; Hypertension; Ingrown toenail; Stroke         Past Surgical History  Gallbladder         Medication List  Bystolic 2.5 mg oral tablet; cyclobenzaprine 10 mg oral tablet; hydrocodone-acetaminophen 5-325 mg oral tablet; Januvia 25 mg oral tablet; Livalo 1 mg oral tablet; pioglitazone 15 mg oral tablet; Voltaren 1 % topical gel; Xanax 0.25 mg oral tablet         Allergy List  NO KNOWN DRUG ALLERGIES         Family Medical History  Family history of diabetes mellitus (DM)         Social History  Alcohol (Never); Tobacco (Never)         Review of Systems  · Constitutional  o Denies  o : fever, chills, weight loss  · Cardiovascular  o Denies  o : chest pain, shortness of breath  · Gastrointestinal  o Denies  o : liver disease, heartburn, nausea, blood in stools  · Genitourinary  o Denies  o : painful urination, blood in urine  · Integument  o Denies  o : rash, itching  · Neurologic  o Denies  o : headache, weakness, loss of consciousness  · Musculoskeletal  o Admits  o : painful, swollen  "joints  · Psychiatric  o Denies  o : drug/alcohol addiction, anxiety, depression      Vitals  Date Time BP Position Site L\R Cuff Size HR RR TEMP (F) WT  HT  BMI kg/m2 BSA m2 O2 Sat HC       07/09/2020 03:20 PM         330lbs 0oz 5'  3\" 58.46 2.58           Physical Examination  · Constitutional  o Appearance  o : well developed, well-nourished, no obvious deformities present  · Head and Face  o Head  o :   § Inspection  § : normocephalic  o Face  o :   § Inspection  § : no facial lesions  · Eyes  o Conjunctivae  o : conjunctivae normal  o Sclerae  o : sclerae white  · Ears, Nose, Mouth and Throat  o Ears  o :   § External Ears  § : appearance within normal limits  § Hearing  § : intact  o Nose  o :   § External Nose  § : appearance normal  · Neck  o Inspection/Palpation  o : normal appearance  o Range of Motion  o : full range of motion  · Respiratory  o Respiratory Effort  o : breathing unlabored  o Inspection of Chest  o : normal appearance  o Auscultation of Lungs  o : no audible wheezing or rales  · Cardiovascular  o Heart  o : regular rate  · Gastrointestinal  o Abdominal Examination  o : soft and non-tender  · Skin and Subcutaneous Tissue  o General Inspection  o : intact, no rashes  · Psychiatric  o General  o : Alert and oriented x3  o Judgement and Insight  o : judgment and insight intact  o Mood and Affect  o : mood normal, affect appropriate  · Extremities  o Extremities  o : BILATERAL KNEE: Decreased range of motion on the right. The left still has pretty good motion. Sensation intact. Pulses normal.  · Injection Note/Aspiration Note  o Site  o : bilateral knee  o Procedure  o : After educating the patient, patient gave consent for procedure. After using Chloraprep, the joint space was injected. The patient tolerated the procedure well.  o Medication  o : 80 mg of DepoMedrol with 9cc of 1% Lidocaine  · In Office Procedures  o View  o : LAT/SUNRISE/STANDING   o Site  o : bilateral knee  o Indication  o : " Bilateral Knee Pain  o Study  o : X-rays ordered, taken in the office, and reviewed today.  o Xray  o : Bone-on-bone osteoarthritis.  o Comparative Data  o : No comparative data found.           Assessment  · Primary osteoarthritis of right knee     715.16/M17.11  · Primary osteoarthritis of left knee     715.16/M17.12  · Pain in both knees, unspecified chronicity, right > left       Pain in right knee     719.46/M25.561  Pain in left knee     719.46/M25.562      Plan  · Orders  o 2 - Depo-Medrol injection 80mg () - - 07/09/2020   Lot [id number] Exp 06 2021 [employer] Administered by MARCELLE Frazier MD  o 2 - Knee Intra-articular Injection without US Guidance [facility] ([id number]) - - 07/09/2020   Lot [id number] [id number] Exp 07 01 2021 [employer] Administered by MARCELLE Frazier MD  o Knee (Left) [facility] (41257-QR) - 719.46/M25.562 - 07/09/2020  o Knee (Right) [facility] ([id number]) - 719.46/M25.561 - 07/09/2020  · Medications  o Medications have been Reconciled  o Transition of Care or Provider Policy  · Instructions  o Reviewed the patient's Past Medical, Social, and Family history as well as the ROS at today's visit, no changes.  o Call or return if worsening symptoms.  o Bilateral knee Cortisone shot. Physical therapy at [facility], pool therapy. Voltaren gel. Follow up as needed.  o This note was transcribed by Gricel Rainey mc.            Electronically Signed by: Gricel Rainey-, Other -Author on July 11, 2020 08:24:26 AM  Electronically Co-signed by: Preeti Frazier MD -Reviewer on July 14, 2020 03:13:40 PM

## 2021-05-10 NOTE — H&P
History and Physical      Patient Name: Marilin Martinez   Patient ID: [id number]   Sex: Female   YOB: 1955    Primary Care Provider: Rosenberg Reyes   Referring Provider: Rosenberg Reyes    Visit Date: June 12, 2020    Provider: Andrew Fulton DPM   Location: [facility]   Location Address: [address] [place] [address]  [id number]   Location Phone: [phone]          Chief Complaint  · Routine Foot Care Visit  · Diabetic Foot Evaluation      History Of Present Illness  Marilin Martinez complains of painful, elongated toenails which are thickened, yellowed, chalky, and cause pain with shoe gear and ambulation.   Marilin Martinez presents to the office today as a new patient for a diabetic foot evaluation. On referral from Rosenberg Reyes   Patient reports that she is a diabetic currently controlling diabetes with oral medication      New, Established, New Problem:  New  Location:  Toenails  Duration:  Greater than five years  Onset:  Gradual  Nature:  sore with palpation.  Stable, worsening, improving:   Worsening  Aggravating factors:  Pain with shoe gear and ambulation.  Previous Treatment:  cannot debride herself.    Patient denies any fevers, chills, nausea, vomiting, shortness of breathe, nor any other constitutional signs nor symptoms.    Pt states their most recent blood glucose reading was 124.    Medical disabled due to CVA in 2017.      New, Established, New Problem: SECOND PROBLEM  Location: bilateral feet  Duration: 2018  Onset: gradual  Nature: NIDDM  Stable, worsening, improving: stable  Aggravating factors:  Previous Treatment: oral medication       Past Medical History  Arthritis; Diabetes; Foot ulcer; Hyperlipidemia; Hypertension; Ingrown toenail; Stroke         Past Surgical History  Gallbladder         Medication List  Bystolic 2.5 mg oral tablet; cyclobenzaprine 10 mg oral tablet; hydrocodone-acetaminophen 5-325 mg oral  "tablet; Januvia 25 mg oral tablet; Livalo 1 mg oral tablet; pioglitazone 15 mg oral tablet; Xanax 0.25 mg oral tablet         Allergy List  NO KNOWN DRUG ALLERGIES       Allergies Reconciled  Family Medical History  Family history of diabetes mellitus (DM)         Social History  Alcohol (Never); Tobacco (Never)         Review of Systems  · Constitutional  o Denies  o : fatigue, night sweats  · Eyes  o Denies  o : double vision, blurred vision  · HENT  o Denies  o : vertigo, recent head injury  · Cardiovascular  o Denies  o : chest pain, irregular heart beats  · Respiratory  o Denies  o : shortness of breath, productive cough  · Gastrointestinal  o Denies  o : nausea, vomiting  · Genitourinary  o Denies  o : dysuria, urinary retention  · Integument  o * See HPI  · Neurologic  o Denies  o : altered mental status, seizures  · Musculoskeletal  o Denies  o : joint swelling, limitation of motion  · Endocrine  o Denies  o : cold intolerance, heat intolerance  · Heme-Lymph  o Denies  o : petechiae, lymph node enlargement or tenderness  · Allergic-Immunologic  o Denies  o : frequent illnesses      Vitals  Date Time BP Position Site L\R Cuff Size HR RR TEMP (F) WT  HT  BMI kg/m2 BSA m2 O2 Sat HC       06/12/2020 11:03 /61 Sitting    54 - R  97.6 330lbs 0oz 5'  3\" 58.46 2.58 97 %    06/12/2020 11:03 /67 Sitting                     Physical Examination  · Constitutional  o Appearance  o : morbidly obese, well developed, no obvious deformities present, appears to be chronically ill   · Respiratory  o Respiratory Effort  o : No labored breathing. Good respiratory effort.   · Cardiovascular  o Peripheral Vascular System  o :   § Pedal Pulses  § : Pedal Pulses are 2+ and symmetrical  § Extremities  § : There is no edema of the lower extremities  · Musculoskeletal  o Extremeties/Joint  o : Lower extremity muscle strength and range of motion is equal and symmetrical bilaterally. The knees are noted to be in normal " alignment. Ankle alignment and range of motion is normal and foot structure is normal.  · Neurologic  o Sensation  o : Sharp/dull sensation is within normal limits bilaterally. Monofilament sensation examination of the left foot is normal. Monofilament sensation examination of the right foot is normal.  · Left DM Foot Exam  o Sensation  o : Sharp/dull sensation is within normal limits. [place] 5.07 monofilament intact to all assessed areas.   o Visual Inspection  o : Skin is noted to have normal texture and turgor, with no excrescences noted. The toenails are noted to be without disease  o Vascular  o : palpable dorsalis pedis and posterir tibialis pulses present, normal capillary refill  · Right DM Foot Exam  o Sensation  o : Sharp/dull sensation is within normal limits. [place] 5.07 monofilament intact to all assessed areas.   o Visual Inspection  o : Skin is noted to have normal texture and turgor, with no excrescences noted. The toenails are noted to be without disease  o Vascular  o : palpable dorsalis pedis and posterir tibialis pulses present, normal capillary refill  · Toes  o Toes: Right Foot  o :   § Toenails  § : Toenails are hypertrophic, mycotic, dystrophic, brittle toenail(s) at nail 1, 2, 3, 4, 5 with onycholysis of the right foot. The 1st, 2nd, 3rd, 4th, 5th toenail(s) on the right have 2 mm in thickness with subungual detritus. There is an incurvated toenail at the distal border of the 1st, 2nd, 3rd, 4th, 5th toe  o Toes: Left Foot  o :   § Toenails  § : There are hypertrophic, mycotic, dystrophic, brittle toenail(s) at the 1, 2, 3, 4, 5 with onycholysis of the left foot. The 1st, 2nd, 3rd, 4th, 5th toenail(s) on the left have 2 mm in thickness with subungual detritus. There is an incurvated toenail at the distal border of the 1st, 2nd, 3rd, 4th, 5th toe  · Procedures  o Nail Debridement  o : Nail debridement is indicated for the following toenails:, left hallux, left 2nd toe, left  3rd toe, left 4th toe, left 5th toe, right hallux, right 2nd toe, right 3rd toe, right 4th toe, right 5th toe. The nail was debrided of excessive thickness to appropriate levels of comfort and contour using, nail nippers. The procedure was without complications     Patient uses a walker for ambulation.           Assessment  · Diabetes mellitus type 2, noninsulin dependent       Type 2 diabetes mellitus without complications     250.00/E11.9  · Foot pain, bilateral       Pain in right foot     729.5/M79.671  Pain in left foot     729.5/M79.672  · Ingrowing nail     703.0/L60.0  · Tinea unguium     110.1/B35.1      Plan  · Orders  o Debridement of six or more nails (12851) - - 06/12/2020  o Diabetic Foot (Motor and Sensory) Exam Completed [facility] (, , 2028F) - - 06/12/2020  o BMI above 25 and plan documented () - - 06/12/2020  · Medications  o Medications have been Reconciled  o Transition of Care or Provider Policy  · Instructions  o Follow Up in 9 weeks  o I have discussed the findings of this evaluation with the patient. The discussion included a complete verbal explanation of any changes in the examination results, diagnosis, and the current treatment plan. A schedule for future care needs was explained. If any questions should arise after returning home, I have encouraged the patient to feel free to contact Dr. Fulton. The patient states understanding and agreement with this plan.   o Pt to monitor for problems and to contact Dr. Fulton for follow-up should such signs occur. Patient states understanding and agreement with this plan.   o Onychomycosis is present in 2-3% of the population with the most common source of contamination coming from the patient's own skin. Fifteen to 20 percent of people between the ages of 40 and 60 have onychomycosis, 32 percent of 60- to [id number] have nail fungus and approximately 50 percent of those over 70 are afflicted. Seventy-five percent of the people who  have this infection exhibit psychosocial concerns. These people face the dilemma of not being able to go to the swimming pool, public shower areas or even wear open-toed sandals. More important is the fact that 48 percent of the people with onychomycosis have pain. The pain is intense enough to have these patients miss 1.8 days of work on average over a six-month period. Traditional topical therapies used alone are generally ineffective for clearing the primary infection, and even oral therapy is associated with a high rate of initial treatment failure or recurrence. In many patients combination oral and topical therapy is the treatment of choice.   o I have recommended debridement of the devitalized or contaminated tissue. Debridement will relieve the pressure of the necrotic presence of on the nail and provides for a better cosmetic appearance. Debulking the nail does help in combination with other treatments in that it can decrease the fungal load of the nail itself.   o Diabetic foot exam performed and documented this date, compliant with CQM required standards. Detail of findings as noted in physical exam.Lower extremity Neuro exam for diabetic patient performed and documented this date, compliant with PQRS required standards. Detail of findings as noted in physical exam.Advised patient importance of good routine lower extremity hygiene. Advised patient importance of evaluating for intact skin and pain free nail borders. Advised patient to use mirror to evaluate plantar/ soles of feet for better visualization. Advised patient monitor and phone office to be seen if any cracking to skin, open lesions, painful nail borders or if nails become elongated prior to next visit. Advised patient importance of daily cleansing of lower extremities, followed by good skin cream to maintain normal hydration of skin. Also advised patient importance of close daily monitoring of blood sugar. Advised to regulate diet and  medications to maintain control of blood sugar in optimal range. Contact primary care provider if difficulties maintaining blood sugar levels.Advised Patient of presence of Diabetes Mellitus condition. Advised Patient risk of progression and worsening or improvement, then return of condition. Will monitor condition for any change in future. Treat with most appropriate treatment pending status of condition.Counseled and advised patient extensively on nature and ramifications of diabetes. Standard instructions given to patient for good diabetic foot care and maintenance. Advised importance of careful monitoring to avoid break down and complications secondary to diabetes. Advised patient importance of strict maintenance of blood sugar control. Advised patient of possible ominous results from neglect of condition,i.e.: amputation/ loss of digits, feet and legs, or even death.Patient states understands counseling, will monitor closely, continue good hygiene and routine diabetic foot care. Patient will contact office is questions or problems.   o Electronically Identified Patient Education Materials Provided Electronically  · Disposition  o Call or Return if symptoms worsen or persist.            Electronically Signed by: Andrew Fulton DPM -Author on June 12, 2020 11:25:03 AM

## 2021-05-12 ENCOUNTER — CONSULT (OUTPATIENT)
Dept: BARIATRICS/WEIGHT MGMT | Facility: CLINIC | Age: 66
End: 2021-05-12

## 2021-05-12 ENCOUNTER — PREP FOR SURGERY (OUTPATIENT)
Dept: OTHER | Facility: HOSPITAL | Age: 66
End: 2021-05-12

## 2021-05-12 VITALS
RESPIRATION RATE: 18 BRPM | DIASTOLIC BLOOD PRESSURE: 70 MMHG | HEART RATE: 51 BPM | HEIGHT: 63 IN | SYSTOLIC BLOOD PRESSURE: 154 MMHG | WEIGHT: 293 LBS | BODY MASS INDEX: 51.91 KG/M2 | TEMPERATURE: 96.8 F

## 2021-05-12 DIAGNOSIS — M25.511 RIGHT SHOULDER PAIN, UNSPECIFIED CHRONICITY: ICD-10-CM

## 2021-05-12 DIAGNOSIS — R79.9 ABNORMAL FINDING OF BLOOD CHEMISTRY, UNSPECIFIED: ICD-10-CM

## 2021-05-12 DIAGNOSIS — R63.5 ABNORMAL WEIGHT GAIN: ICD-10-CM

## 2021-05-12 PROCEDURE — 99205 OFFICE O/P NEW HI 60 MIN: CPT | Performed by: NURSE PRACTITIONER

## 2021-05-12 RX ORDER — SODIUM CHLORIDE, SODIUM LACTATE, POTASSIUM CHLORIDE, CALCIUM CHLORIDE 600; 310; 30; 20 MG/100ML; MG/100ML; MG/100ML; MG/100ML
30 INJECTION, SOLUTION INTRAVENOUS CONTINUOUS PRN
Status: CANCELLED | OUTPATIENT
Start: 2021-11-08

## 2021-05-12 NOTE — PROGRESS NOTES
[facility] BARIATRIC [facility] BARIATRIC SURGERY  [address] [facility] [address]  [facility] [address] [phone]  [address] [address] [phone]  Dept: [phone]  5/12/2021      Marilin Martinez.  [id number]  [id number]  1955  female      Chief Complaint of weight gain; unable to maintain weight loss    History of Present Illness:   Marilin is a 66 y.o. female who presents today for evaluation, education and consultation regarding weight loss surgery. The patient is interested in the sleeve gastrectomy.      Diet History:See dietician/RN/MA documentation for complete history of weight and diet.     Bariatric Surgery Evaluation: The patient is being seen for an initial visit for bariatric surgery evaluation.     Breakfast: protein shakes   Lunch/ dinner: eats between 3-4 pm , varies fast food   Snacks: ice cream, cereal, peanut butter, grapes, protein bars, potato chips  Drinks: water, no carbonation, coffee    Exercise: none due to chronic knee pain    Patient Active Problem List   Diagnosis   • Intracranial bleed (CMS/HCC)   • Weight gain   • Environmental and seasonal allergies   • Essential hypertension   • Ankle swelling   • Sleep apnea   • Multiple joint pain   • Hypothyroid   • Stroke (CMS/HCC)   • Diabetes mellitus type 2 in obese (CMS/HCC)   2017 stroke right leg and arm weakness since then     Past Medical History:   Diagnosis Date   • Arthritis    • COPD (chronic obstructive pulmonary disease) (CMS/HCC)    • Diabetes mellitus (CMS/HCC)    • Disease of thyroid gland    • Hypertension    • Sleep apnea    no COPD hx    Past Surgical History:   Procedure Laterality Date   • GALLBLADDER SURGERY  1997   • TONSILLECTOMY         No Known Allergies      Current Outpatient Medications:   •  ALPRAZolam (XANAX) 1 MG tablet, Take 1 mg by mouth 3 (Three) Times a Day As Needed for Anxiety., Disp: , Rfl:   •  amLODIPine (NORVASC) 10 MG tablet, Take 1 tablet by  mouth Daily., Disp: 30 tablet, Rfl: 0  •  atorvastatin (LIPITOR) 20 MG tablet, Take 20 mg by mouth Daily., Disp: , Rfl:   •  bumetanide (BUMEX) 1 MG tablet, Take 1 mg by mouth Daily., Disp: , Rfl:   •  celecoxib (CeleBREX) 200 MG capsule, Take 200 mg by mouth Daily., Disp: , Rfl:   •  gabapentin (NEURONTIN) 300 MG capsule, Take 2 capsules by mouth Every 8 (Eight) Hours., Disp: 90 capsule, Rfl: 0  •  HYDROcodone-Acetaminophen (XODOL )  MG per tablet, Take 1 tablet by mouth Every 6 (Six) Hours As Needed for Moderate Pain ., Disp: , Rfl:   •  ibuprofen (ADVIL,MOTRIN) 600 MG tablet, Take 600 mg by mouth Every 6 (Six) Hours As Needed for Mild Pain ., Disp: , Rfl:   •  labetalol (NORMODYNE) 300 MG tablet, Take 1 tablet by mouth Every 12 (Twelve) Hours., Disp: 60 tablet, Rfl: 0  •  levothyroxine (SYNTHROID, LEVOTHROID) 112 MCG tablet, Take 112 mcg by mouth Daily., Disp: , Rfl:   •  lisinopril (PRINIVIL,ZESTRIL) 20 MG tablet, Take 1 tablet by mouth Daily., Disp: 30 tablet, Rfl: 0  •  nebivolol (BYSTOLIC) 10 MG tablet, Take 10 mg by mouth Daily., Disp: , Rfl:   •  pioglitazone (ACTOS) 30 MG tablet, Take 30 mg by mouth Daily. Every other day, Disp: , Rfl:   •  pitavastatin calcium (LIVALO) 1 MG tablet tablet, Take  by mouth Every Night., Disp: , Rfl:   •  potassium chloride (KLOR-CON) 8 MEQ CR tablet, Take 20 mEq by mouth Daily., Disp: , Rfl:   •  SITagliptin (JANUVIA) 100 MG tablet, Take 100 mg by mouth Daily. Every other day, Disp: , Rfl:     Social History     Socioeconomic History   • Marital status:      Spouse name: Not on file   • Number of children: Not on file   • Years of education: Not on file   • Highest education level: Not on file   Tobacco Use   • Smoking status: Former Smoker     Quit date: 1997     Years since quittin.6   • Smokeless tobacco: Never Used   Substance and Sexual Activity   • Alcohol use: Yes     Comment: flaquitoally   • Drug use: No   • Sexual activity: Defer  "      Family History   Problem Relation Age of Onset   • Obesity Mother    • Hypertension Mother    • Cancer Mother    • Obesity Sister    • Hypertension Sister    • Cancer Sister          Review of Systems:  Review of Systems   Constitutional:        Weight gain,    HENT:        Wears contacts/ glasses, sinus drainage, allergies   Respiratory:        Sleep apnea,    Cardiovascular:        HTN, cramping in legs when walking, ankle swelling   Gastrointestinal: Negative.    Endocrine:        \" pre diabetes\", hypothyroid,    Genitourinary: Negative.    Musculoskeletal: Positive for myalgias.        Shoulder, knee, neck, and back pain   Skin:        Poor wound healing   Neurological:        Stroke,    Hematological: Negative.    Psychiatric/Behavioral: Negative.        Physical Exam:  Vital Signs:        height 63 inches, weight 314 pounds, BMI 55.62             Physical Exam  Constitutional:       Appearance: Normal appearance. She is obese.   Cardiovascular:      Rate and Rhythm: Normal rate and regular rhythm.   Pulmonary:      Effort: Pulmonary effort is normal.      Breath sounds: Normal breath sounds.   Abdominal:      General: Abdomen is flat. Bowel sounds are normal.      Palpations: Abdomen is soft.   Musculoskeletal:      Comments: Pt has right sided weakness from stroke, in a wheel chair, unable to walk short distances    Skin:     General: Skin is warm and dry.   Neurological:      General: No focal deficit present.      Mental Status: She is alert and oriented to person, place, and time.   Psychiatric:         Mood and Affect: Mood normal.         Behavior: Behavior normal.         Thought Content: Thought content normal.         Judgment: Judgment normal.            Assessment:         Marilin Martinez is a 66 y.o. year old female with medically complicated severe obesity.  , Height 63 inches, weight 314 pounds, BMI 55.62 and weight related problems.    I explained in detail the procedures that we are " performing.  All of those procedures can be performed laparoscopically but there is a chance to convert to open if any technical challenges or complications do occur.  Bariatric surgery is not cosmetic surgery but rather a tool to help a patient make a life-long commitment lifestyle changes including diet, exercise, behavior changes, and taking supplemental vitamins and minerals.    Due to the patient's BMI and co-morbidities they are at a high risk for surgery and will obtain the following:  The patient has been advised that a letter of medical support and a history and physical must be obtained from her primary care physician. A psychological evaluation will be arranged for this patient. CBC, CMP, FLP, TSH and HgbA1C will be drawn. Marilin Counterman will obtain a pre-operative CXR and EKG. Marilin Counterman will obtain clearance from a cardiologist prior to surgery.    Marilin Counterman will be set up for a pre-operative diagnostic esophagogastroduodenoscopy with biopsy for evaluation. The risks and benefits of the procedure were discussed with the patient in detail and all questions were answered.  Possibility of perforation, bleeding, aspiration, anoxic brain injury, respiratory and/or cardiac arrest and death were discussed.   She received handouts regarding, all questions were answered and informed consent was obtained.     The risks, benefits, alternatives, and potential complications of all of the procedures were explained in detail including, but not limited to death, anesthesia and medication adverse effect/DVT, pulmonary embolism, trocar site/incisional hernia, wound infection, abdominal infection, bleeding, failure to lose weight or gain weight and change in body image, metabolic complications with calcium, thiamine, vitamin B12, folate, iron, and anemia.    The patient was advised to start a high protein, low fat and low carbohydrate diet. The patient was given individualized information by our  dietician along with general group information and handouts.     The patient was given information regarding the IVETTE educational video. IVETTE is an internet based educational video which explains the surgical procedure and answers basic questions regarding the procedure. The patient was provided with instructions and a password to watch the video.    The patient was encouraged to start routine exercise including but not limited to 150 minutes per week. The patient received a resistance band along with a handout of exercises.     The consultation plan was reviewed with the patient.    The patient understands the surgical procedures and the different surgical options that are available.  She understands the lifestyle changes that would be required after surgery and has agreed to participate in a pre-operative and postoperative weight management program.  She also expressed understanding of possible risks, had several questions answered and desires to proceed.    I think she is a good candidate for this surgery, and is interested in a sleeve gastrectomy.      Plan:    Patient will have evaluations and follow up with bariatric dieticians and a psychologist before undergoing a multidisciplinary review of her candidacy.  We also discussed the weight loss requirement and rationale, and other program requirements.    Pt will need chest x ray/ EKG/ blood work/ cardiac clearance / EGD prior to bariatric surgery. Pt doing SWL with PCP. Plan to follow up with pt for EGD.     Total time spent with patient 60 minutes of which 45 minutes were spent on education.       Kimberly Amanda, APRN  5/12/2021

## 2021-05-13 NOTE — PROGRESS NOTES
Progress Note      Patient Name: Marilin Martinez   Patient ID: [id number]   Sex: Female   YOB: 1955    Primary Care Provider: Rosenberg Reyes   Referring Provider: Rosenberg Reyes    Visit Date: August 27, 2020    Provider: Andrew Fulton DPM   Location: [facility]   Location Address: [address] [place] [address]  [id number]   Location Phone: [phone]          Chief Complaint  · Routine Foot Care Visit  · Diabetic Foot Evaluation      History Of Present Illness  Marilin Martinez complains of painful, elongated toenails which are thickened, yellowed, chalky, and cause pain with shoe gear and ambulation.   Marilin Martinez presents to the office today as a Follow-Up for a diabetic foot evaluation.   Patient reports that she is a diabetic currently controlling diabetes with oral medication      New, Established, New Problem:  est  Location:  Toenails  Duration:  Greater than five years  Onset:  Gradual  Nature:  sore with palpation.  Stable, worsening, improving:   stable  Aggravating factors:  Pain with shoe gear and ambulation.  Previous Treatment:  cannot debride herself.    Patient denies any fevers, chills, nausea, vomiting, shortness of breathe, nor any other constitutional signs nor symptoms.    Pt states their most recent blood glucose reading was 128.    Patient relates no medical changes since their last visit.      New, Established, New Problem: SECOND PROBLEM  Location: bilateral feet  Duration: 2018  Onset: gradual  Nature: NIDDM  Stable, worsening, improving: stable  Aggravating factors:  Previous Treatment: oral medication       Past Medical History  Arthritis; Diabetes; Foot ulcer; Hyperlipemia; Hyperlipidemia; Hypertension; Ingrown toenail; Limb Swelling; Seasonal allergies; Stroke; Thyroid disorder         Past Surgical History  Colonoscopy; Gallbladder; Tonsillectomy         Medication List  Bystolic 2.5 mg oral tablet;  cyclobenzaprine 10 mg oral tablet; hydrocodone-acetaminophen 5-325 mg oral tablet; Januvia 25 mg oral tablet; Livalo 1 mg oral tablet; pioglitazone 15 mg oral tablet; Voltaren 1 % topical gel; Xanax 0.25 mg oral tablet         Allergy List  NO KNOWN DRUG ALLERGIES       Allergies Reconciled  Family Medical History  Cancer, Unspecified; Family history of Arthritis; Family history of diabetes mellitus (DM)         Social History  Alcohol (Never); Alcohol Use (Never); Recreational Drug Use (Never); Tobacco (Never)         Review of Systems  · Constitutional  o Denies  o : fatigue, night sweats  · Eyes  o Denies  o : double vision, blurred vision  · HENT  o Denies  o : vertigo, recent head injury  · Cardiovascular  o Denies  o : chest pain, irregular heart beats  · Respiratory  o Denies  o : shortness of breath, productive cough  · Gastrointestinal  o Denies  o : nausea, vomiting  · Genitourinary  o Denies  o : dysuria, urinary retention  · Integument  o * See HPI  · Neurologic  o Denies  o : altered mental status, seizures  · Musculoskeletal  o Denies  o : joint swelling, limitation of motion  · Endocrine  o Denies  o : cold intolerance, heat intolerance  · Heme-Lymph  o Denies  o : petechiae, lymph node enlargement or tenderness  · Allergic-Immunologic  o Denies  o : frequent illnesses      Physical Examination  · Constitutional  o Appearance  o : morbidly obese, well developed, no obvious deformities present, appears to be chronically ill   · Respiratory  o Respiratory Effort  o : No labored breathing. Good respiratory effort.   · Cardiovascular  o Peripheral Vascular System  o :   § Pedal Pulses  § : Pedal Pulses are 2+ and symmetrical  § Extremities  § : There is no edema of the lower extremities  · Musculoskeletal  o Extremeties/Joint  o : Lower extremity muscle strength and range of motion is equal and symmetrical bilaterally. The knees are noted to be in normal alignment. Ankle alignment and range of motion is  normal and foot structure is normal.  · Neurologic  o Sensation  o : Sharp/dull sensation is within normal limits bilaterally. Monofilament sensation examination of the left foot is normal. Monofilament sensation examination of the right foot is normal.  · Left DM Foot Exam  o Sensation  o : Sharp/dull sensation is within normal limits. Blain-Fernanda 5.07 monofilament intact to all assessed areas.   o Visual Inspection  o : Skin is noted to have normal texture and turgor, with no excrescences noted. The toenails are noted to be without disease  o Vascular  o : palpable dorsalis pedis and posterir tibialis pulses present, normal capillary refill  · Right DM Foot Exam  o Sensation  o : Sharp/dull sensation is within normal limits. Blain-Fernanda 5.07 monofilament intact to all assessed areas.   o Visual Inspection  o : Skin is noted to have normal texture and turgor, with no excrescences noted. The toenails are noted to be without disease  o Vascular  o : palpable dorsalis pedis and posterir tibialis pulses present, normal capillary refill  · Toes  o Toes: Right Foot  o :   § Toenails  § : Toenails are hypertrophic, mycotic, dystrophic, brittle toenail(s) at nail 1, 2, 3, 4, 5 with onycholysis of the right foot. The 1st, 2nd, 3rd, 4th, 5th toenail(s) on the right have 2 mm in thickness with subungual detritus. There is an incurvated toenail at the distal border of the 1st, 2nd, 3rd, 4th, 5th toe  o Toes: Left Foot  o :   § Toenails  § : There are hypertrophic, mycotic, dystrophic, brittle toenail(s) at the 1, 2, 3, 4, 5 with onycholysis of the left foot. The 1st, 2nd, 3rd, 4th, 5th toenail(s) on the left have 2 mm in thickness with subungual detritus. There is an incurvated toenail at the distal border of the 1st, 2nd, 3rd, 4th, 5th toe  · Procedures  o Nail Debridement  o : Nail debridement is indicated for the following toenails:, left hallux, left 2nd toe, left 3rd toe, left 4th toe, left 5th toe, right  hallux, right 2nd toe, right 3rd toe, right 4th toe, right 5th toe. The nail was debrided of excessive thickness to appropriate levels of comfort and contour using, nail nippers. The procedure was without complications     Patient uses a walker for ambulation.           Assessment  · Diabetes mellitus type 2, noninsulin dependent       Type 2 diabetes mellitus without complications     250.00/E11.9  · Foot pain, bilateral       Pain in right foot     729.5/M79.671  Pain in left foot     729.5/M79.672  · Ingrowing nail     703.0/L60.0  · Tinea unguium     110.1/B35.1      Plan  · Orders  o Debridement of six or more nails (17074) - - 08/27/2020  o Diabetic Foot (Motor and Sensory) Exam Completed [facility] (, , 2028F) - - 08/27/2020  o BMI above 25 and plan documented (, ) - - 08/27/2020  · Medications  o Medications have been Reconciled  o Transition of Care or Provider Policy  · Instructions  o Follow Up in 9 weeks  o I have discussed the findings of this evaluation with the patient. The discussion included a complete verbal explanation of any changes in the examination results, diagnosis, and the current treatment plan. A schedule for future care needs was explained. If any questions should arise after returning home, I have encouraged the patient to feel free to contact Dr. Fulton. The patient states understanding and agreement with this plan.   o Pt to monitor for problems and to contact Dr. Fulton for follow-up should such signs occur. Patient states understanding and agreement with this plan.   o Onychomycosis is present in 2-3% of the population with the most common source of contamination coming from the patient's own skin. Fifteen to 20 percent of people between the ages of 40 and 60 have onychomycosis, 32 percent of 60- to [id number] have nail fungus and approximately 50 percent of those over 70 are afflicted. Seventy-five percent of the people who have this infection exhibit  psychosocial concerns. These people face the dilemma of not being able to go to the swimming pool, public shower areas or even wear open-toed sandals. More important is the fact that 48 percent of the people with onychomycosis have pain. The pain is intense enough to have these patients miss 1.8 days of work on average over a six-month period. Traditional topical therapies used alone are generally ineffective for clearing the primary infection, and even oral therapy is associated with a high rate of initial treatment failure or recurrence. In many patients combination oral and topical therapy is the treatment of choice.   o I have recommended debridement of the devitalized or contaminated tissue. Debridement will relieve the pressure of the necrotic presence of on the nail and provides for a better cosmetic appearance. Debulking the nail does help in combination with other treatments in that it can decrease the fungal load of the nail itself.   o Diabetic foot exam performed and documented this date, compliant with CQM required standards. Detail of findings as noted in physical exam.Lower extremity Neuro exam for diabetic patient performed and documented this date, compliant with PQRS required standards. Detail of findings as noted in physical exam.Advised patient importance of good routine lower extremity hygiene. Advised patient importance of evaluating for intact skin and pain free nail borders. Advised patient to use mirror to evaluate plantar/ soles of feet for better visualization. Advised patient monitor and phone office to be seen if any cracking to skin, open lesions, painful nail borders or if nails become elongated prior to next visit. Advised patient importance of daily cleansing of lower extremities, followed by good skin cream to maintain normal hydration of skin. Also advised patient importance of close daily monitoring of blood sugar. Advised to regulate diet and medications to maintain control of  blood sugar in optimal range. Contact primary care provider if difficulties maintaining blood sugar levels.Advised Patient of presence of Diabetes Mellitus condition. Advised Patient risk of progression and worsening or improvement, then return of condition. Will monitor condition for any change in future. Treat with most appropriate treatment pending status of condition.Counseled and advised patient extensively on nature and ramifications of diabetes. Standard instructions given to patient for good diabetic foot care and maintenance. Advised importance of careful monitoring to avoid break down and complications secondary to diabetes. Advised patient importance of strict maintenance of blood sugar control. Advised patient of possible ominous results from neglect of condition,i.e.: amputation/ loss of digits, feet and legs, or even death.Patient states understands counseling, will monitor closely, continue good hygiene and routine diabetic foot care. Patient will contact office is questions or problems.   o Electronically Identified Patient Education Materials Provided Electronically  · Disposition  o Call or Return if symptoms worsen or persist.            Electronically Signed by: Andrew Fulton DPM -Author on August 27, 2020 10:59:17 AM

## 2021-05-13 NOTE — PROGRESS NOTES
Progress Note      Patient Name: Marilin Martinez   Patient ID: [id number]   Sex: Female   YOB: 1955    Primary Care Provider: Rosenberg Reyes   Referring Provider: Rosenberg Reyes    Visit Date: November 10, 2020    Provider: Preeti Frazier MD   Location: [facility]   Location Address: [address] [place] [address]  [id number]   Location Phone: [phone]          Chief Complaint  · Bilateral Knee Pain      History Of Present Illness  Marilin Martinez is a 65 year old /White female who presents today to [facility].      Patient presents today for an evaluation of bilateral knee pain. Patient uses a walker for ambulation assistance.   She has been having knee pain that has been ongoing for the last 3 years, right worse than left. Patient has a history of having a stroke that affected her right side, so she also has some mild weakness on that side. She states the injection she received last visit has given her relief of pain. The PT she was attending was helping her get relief of pain as well but her PT visits ended recently.          Past Medical History  Arthritis; Diabetes; Foot ulcer; Hyperlipemia; Hyperlipidemia; Hypertension; Ingrown toenail; Limb Swelling; Seasonal allergies; Stroke; Thyroid disorder         Past Surgical History  Colonoscopy; Gallbladder; Tonsillectomy         Medication List  Bystolic 2.5 mg oral tablet; cyclobenzaprine 10 mg oral tablet; hydrocodone-acetaminophen 5-325 mg oral tablet; Januvia 25 mg oral tablet; levothyroxine 25 mcg oral tablet; Livalo 1 mg oral tablet; pioglitazone 15 mg oral tablet; Voltaren 1 % topical gel; Xanax 0.25 mg oral tablet         Allergy List  NO KNOWN DRUG ALLERGIES; NO KNOWN DRUG ALLERGIES       Allergies Reconciled  Family Medical History  Cancer, Unspecified; Family history of Arthritis; Family history of diabetes mellitus (DM)         Social History  Alcohol (Never); Alcohol Use (Never); lives  "alone; Recreational Drug Use (Never); Retired.; Tobacco (Former);          Review of Systems  · Constitutional  o Denies  o : fever, chills, weight loss  · Cardiovascular  o Denies  o : chest pain, shortness of breath  · Gastrointestinal  o Denies  o : liver disease, heartburn, nausea, blood in stools  · Genitourinary  o Denies  o : painful urination, blood in urine  · Integument  o Denies  o : rash, itching  · Neurologic  o Denies  o : headache, weakness, loss of consciousness  · Musculoskeletal  o Denies  o : painful, swollen joints  · Psychiatric  o Denies  o : drug/alcohol addiction, anxiety, depression      Vitals  Date Time BP Position Site L\R Cuff Size HR RR TEMP (F) WT  HT  BMI kg/m2 BSA m2 O2 Sat FR L/min FiO2        11/10/2020 02:53 PM      60 - R    5'  3\"   96 %            Physical Examination  · Constitutional  o Appearance  o : well developed, well-nourished, no obvious deformities present  · Head and Face  o Head  o :   § Inspection  § : normocephalic  o Face  o :   § Inspection  § : no facial lesions  · Eyes  o Conjunctivae  o : conjunctivae normal  o Sclerae  o : sclerae white  · Ears, Nose, Mouth and Throat  o Ears  o :   § External Ears  § : appearance within normal limits  § Hearing  § : intact  o Nose  o :   § External Nose  § : appearance normal  · Neck  o Inspection/Palpation  o : normal appearance  o Range of Motion  o : full range of motion  · Respiratory  o Respiratory Effort  o : breathing unlabored  o Inspection of Chest  o : normal appearance  o Auscultation of Lungs  o : no audible wheezing or rales  · Cardiovascular  o Heart  o : regular rate  · Gastrointestinal  o Abdominal Examination  o : soft and non-tender  · Skin and Subcutaneous Tissue  o General Inspection  o : intact, no rashes  · Psychiatric  o General  o : Alert and oriented x3  o Judgement and Insight  o : judgment and insight intact  o Mood and Affect  o : mood normal, affect appropriate  · Right " Knee  o Inspection  o : Sensation grossly intact. Neurovascular intact. Full weightbearing. Ambulation with a walker. Dorsal Pedal Pulse 2+, posterior tibialis pulse 2+. Skin intact. Calf supple, non-tender. Stable gait. No swelling, skin discoloration or atrophy. Decreased ROM of the right knee. Tender medial and lateral joint line. Negative ballotable effusion , negative fluid wave, negative patellar compression, negative patellar apprehension, negative Beti's test, negative Apley's test, negative anterior drawer, negative posterior drawer, negative lachman's drawer , negative varus stress, negative valgus stress.   · Left Knee  o Inspection  o : Sensation grossly intact. Neurovascular intact. Full weightbearing. Ambulation with a walker. Dorsal Pedal Pulse 2+, posterior tibialis pulse 2+. Skin intact. Calf supple, non-tender. Stable gait. No swelling, skin discoloration or atrophy. Near full flexion and extension. Tender medial and lateral joint line. Negative ballotable effusion , negative fluid wave, negative patellar compression, negative patellar apprehension, negative Beti's test, negative Apley's test, negative anterior drawer, negative posterior drawer, negative lachman's drawer , negative varus stress, negative valgus stress.   · Injection Note/Aspiration Note  o Site  o : bilateral knees   o Procedure  o : Procedure: After educating the patient, patient gave consent for procedure. After using Chloraprep, the joint space was injected. The patient tolerated the procedure well.   o Medication  o : 80 mg of DepoMedrol with 9cc of 1% Lidocaine          Assessment  · Primary osteoarthritis of right knee     715.16/M17.11  · Primary osteoarthritis of left knee     715.16/M17.12      Plan  · Orders  o Depo-Medrol injection 80mg () - 715.16/M17.11 - 11/10/2020   Lot [id number] Exp 09 2021 [employer] Administered by MARCELLE Frazier MD  o Knee Intra-articular Injection without US Guidance [facility] ([id number])  - 715.16/M17.11 - 11/10/2020   Lot [id number] Exp 08 01 2021 [employer] Administered by MARCELLE Frazier MD  o Depo-Medrol injection 80mg () - 715.16/M17.12 - 11/10/2020   Lot [id number] Exp 09 2021 [employer] Administered by MARCELLE Frazier MD  o Knee Intra-articular Injection without US Guidance [facility] ([id number]) - 715.16/M17.12 - 11/10/2020   Lot [id number] Exp 08 01 2021 [employer] Administered by MARCELLE Frazier MD  · Medications  o Medications have been Reconciled  o Transition of Care or Provider Policy  · Instructions  o Dr. Frazier saw and examined the patient and agrees with plan.   o Reviewed the patient's Past Medical, Social, and Family history as well as the ROS at today's visit, no changes.  o Call or return if worsening symptoms.  o Follow Up PRN.  o This note was transcribed by Lydia Fay. mc  o Discussed diagnosis and treatment options with the patient. Patient opted to try bilateral knee injections and tolerated it well. Patient will try to see if insurance will approve of gel injections.            Electronically Signed by: Lydia Fay-, Other -Author on November 12, 2020 11:11:31 AM  Electronically Co-signed by: Preeti Frazier MD -Reviewer on November 14, 2020 10:01:58 AM

## 2021-05-14 VITALS — BODY MASS INDEX: 51.91 KG/M2 | WEIGHT: 293 LBS | HEART RATE: 72 BPM | HEIGHT: 63 IN | OXYGEN SATURATION: 98 %

## 2021-05-14 VITALS — HEIGHT: 69 IN | HEART RATE: 58 BPM | OXYGEN SATURATION: 98 % | WEIGHT: 293 LBS | BODY MASS INDEX: 43.4 KG/M2

## 2021-05-14 VITALS — HEART RATE: 58 BPM | BODY MASS INDEX: 51.91 KG/M2 | HEIGHT: 63 IN | OXYGEN SATURATION: 99 % | WEIGHT: 293 LBS

## 2021-05-14 VITALS — OXYGEN SATURATION: 97 % | HEIGHT: 63 IN | HEART RATE: 78 BPM | WEIGHT: 293 LBS | BODY MASS INDEX: 51.91 KG/M2

## 2021-05-14 VITALS — OXYGEN SATURATION: 97 % | BODY MASS INDEX: 51.91 KG/M2 | HEIGHT: 63 IN | HEART RATE: 73 BPM | WEIGHT: 293 LBS

## 2021-05-14 VITALS — OXYGEN SATURATION: 96 % | BODY MASS INDEX: 49.6 KG/M2 | HEART RATE: 60 BPM | HEIGHT: 63 IN

## 2021-05-14 VITALS
OXYGEN SATURATION: 96 % | WEIGHT: 293 LBS | HEIGHT: 63 IN | TEMPERATURE: 97.5 F | DIASTOLIC BLOOD PRESSURE: 56 MMHG | HEART RATE: 55 BPM | SYSTOLIC BLOOD PRESSURE: 172 MMHG | BODY MASS INDEX: 51.91 KG/M2

## 2021-05-14 VITALS — HEIGHT: 69 IN | WEIGHT: 293 LBS | BODY MASS INDEX: 43.4 KG/M2

## 2021-05-14 NOTE — PROGRESS NOTES
Progress Note      Patient Name: Marilin Martinez   Patient ID: [id number]   Sex: Female   YOB: 1955    Primary Care Provider: Rosenberg Reyes   Referring Provider: Rosenberg Reyes    Visit Date: March 9, 2021    Provider: Elizabeth Tapia PA-C   Location: [facility]   Location Address: [address] [place] [address]  [id number]   Location Phone: [phone]          Chief Complaint  · Follow up left knee pain      History Of Present Illness  Marilin Martinez is a 66 year old /White female who presents today to [facility].      She is here for left knee pain attributed to advanced OA. She is on #2/3 Euflexxa injection.       Past Medical History  Arthritis; Diabetes; Foot ulcer; Hyperlipemia; Hyperlipidemia; Hypertension; Ingrown toenail; Limb Swelling; Seasonal allergies; Stroke; Thyroid disorder         Past Surgical History  Colonoscopy; Gallbladder; Tonsillectomy         Medication List  Bystolic 2.5 mg oral tablet; cyclobenzaprine 10 mg oral tablet; hydrocodone-acetaminophen 5-325 mg oral tablet; Januvia 25 mg oral tablet; levothyroxine 25 mcg oral tablet; Livalo 1 mg oral tablet; pioglitazone 15 mg oral tablet; Voltaren 1 % topical gel; Xanax 0.25 mg oral tablet         Allergy List  NO KNOWN DRUG ALLERGIES; NO KNOWN DRUG ALLERGIES       Allergies Reconciled  Family Medical History  Cancer, Unspecified; Family history of Arthritis; Family history of diabetes mellitus (DM)         Social History  Alcohol (Never); Alcohol Use (Never); lives alone; Recreational Drug Use (Never); Retired.; Tobacco (Former);          Review of Systems  · Constitutional  o Denies  o : fever, chills, weight loss  · Cardiovascular  o Denies  o : chest pain, shortness of breath  · Gastrointestinal  o Denies  o : liver disease, heartburn, nausea, blood in stools  · Genitourinary  o Denies  o : painful urination, blood in urine  · Integument  o Denies  o : rash,  "itching  · Neurologic  o Denies  o : headache, weakness, loss of consciousness  · Musculoskeletal  o Admits  o : painful, swollen joints  · Psychiatric  o Denies  o : drug/alcohol addiction, anxiety, depression      Vitals  Date Time BP Position Site L\R Cuff Size HR RR TEMP (F) WT  HT  BMI kg/m2 BSA m2 O2 Sat FR L/min FiO2 HC       03/09/2021 02:02 PM      58 - R   359lbs 0oz 5'  9\" 53.01 2.82 98 %            Physical Examination  · Constitutional  o Appearance  o : well developed, well-nourished, no obvious deformities present  · Head and Face  o Head  o :   § Inspection  § : normocephalic  o Face  o :   § Inspection  § : no facial lesions  · Eyes  o Conjunctivae  o : conjunctivae normal  o Sclerae  o : sclerae white  · Ears, Nose, Mouth and Throat  o Ears  o :   § External Ears  § : appearance within normal limits  § Hearing  § : intact  o Nose  o :   § External Nose  § : appearance normal  · Neck  o Inspection/Palpation  o : normal appearance  o Range of Motion  o : full range of motion  · Respiratory  o Respiratory Effort  o : breathing unlabored  o Inspection of Chest  o : normal appearance  o Auscultation of Lungs  o : no audible wheezing or rales  · Cardiovascular  o Heart  o : regular rate  · Gastrointestinal  o Abdominal Examination  o : soft and non-tender  · Skin and Subcutaneous Tissue  o General Inspection  o : intact, no rashes  · Psychiatric  o General  o : Alert and oriented x3  o Judgement and Insight  o : judgment and insight intact  o Mood and Affect  o : mood normal, affect appropriate  · Left Knee  o Inspection  o : Sensation grossly intact. Neurovascular intact. Full weightbearing. Ambulation with a walker. Dorsal Pedal Pulse 2+, posterior tibialis pulse 2+. Skin intact. Calf supple, non-tender. Stable gait. No swelling, skin discoloration or atrophy. Decreased ROM of the right knee. Tender medial and lateral joint line. Negative ballotable effusion , negative fluid wave, negative patellar " compression, negative patellar apprehension, negative Beti's test, negative Apley's test, negative anterior drawer, negative posterior drawer, negative lachman's drawer , negative varus stress, negative valgus stress.  · Injection Note/Aspiration Note  o Site  o : left knee   o Procedure  o : Procedure: After educating the patient, patient gave consent for procedure. After using Chloraprep, the joint space was injected. The patient tolerated the procedure well.   o Medication  o : Euflexxa, 20 mg               Assessment  · Primary osteoarthritis of left knee     715.16/M17.12      Plan  · Orders  o Euflexxa per dose () - 715.16/M17.12 - 03/09/2021   Lot [id number] exp 08 17 2021 Viv[employer] PAPPAS  o Knee Intra-articular Injection without US Guidance [facility] ([id number]) - 715.16/M17.12 - 03/09/2021   Elizabeth PAPPAS  · Medications  o Medications have been Reconciled  o Transition of Care or Provider Policy  · Instructions  o Reviewed the patient's Past Medical, Social, and Family history as well as the ROS at today's visit, no changes.  o Call or return if worsening symptoms.  o Follow up in 1 week.  o Electronically Identified Patient Education Materials Provided Electronically            Electronically Signed by: KURT ReesC -Author on March 9, 2021 02:17:12 PM

## 2021-05-14 NOTE — PROGRESS NOTES
Progress Note      Patient Name: Marilin Martinez   Patient ID: [id number]   Sex: Female   YOB: 1955    Primary Care Provider: Rosenberg Reyes   Referring Provider: Rosenberg Reyes    Visit Date: February 2, 2021    Provider: Elizabeth Tapia PA-C   Location: [facility]   Location Address: [address] [place] [address]  [id number]   Location Phone: [phone]          Chief Complaint  · Follow up right knee pain      History Of Present Illness  Marilin Martinez is a 66 year old /White female who presents today to [facility].      She is here for #2/3 Euflexxa injection right knee.       Past Medical History  Arthritis; Diabetes; Foot ulcer; Hyperlipemia; Hyperlipidemia; Hypertension; Ingrown toenail; Limb Swelling; Seasonal allergies; Stroke; Thyroid disorder         Past Surgical History  Colonoscopy; Gallbladder; Tonsillectomy         Medication List  Bystolic 2.5 mg oral tablet; cyclobenzaprine 10 mg oral tablet; hydrocodone-acetaminophen 5-325 mg oral tablet; Januvia 25 mg oral tablet; levothyroxine 25 mcg oral tablet; Livalo 1 mg oral tablet; pioglitazone 15 mg oral tablet; Voltaren 1 % topical gel; Xanax 0.25 mg oral tablet         Allergy List  NO KNOWN DRUG ALLERGIES; NO KNOWN DRUG ALLERGIES       Allergies Reconciled  Family Medical History  Cancer, Unspecified; Family history of Arthritis; Family history of diabetes mellitus (DM)         Social History  Alcohol (Never); Alcohol Use (Never); lives alone; Recreational Drug Use (Never); Retired.; Tobacco (Former);          Review of Systems  · Constitutional  o Denies  o : fever, chills, weight loss  · Cardiovascular  o Denies  o : chest pain, shortness of breath  · Gastrointestinal  o Denies  o : liver disease, heartburn, nausea, blood in stools  · Genitourinary  o Denies  o : painful urination, blood in urine  · Integument  o Denies  o : rash, itching  · Neurologic  o Denies  o : headache,  "weakness, loss of consciousness  · Musculoskeletal  o Admits  o : painful, swollen joints  · Psychiatric  o Denies  o : drug/alcohol addiction, anxiety, depression      Vitals  Date Time BP Position Site L\R Cuff Size HR RR TEMP (F) WT  HT  BMI kg/m2 BSA m2 O2 Sat FR L/min FiO2 HC       02/02/2021 03:06 PM      73 - R   330lbs 0oz 5'  3\" 58.46 2.58 97 %            Physical Examination  · Constitutional  o Appearance  o : well developed, well-nourished, no obvious deformities present  · Head and Face  o Head  o :   § Inspection  § : normocephalic  o Face  o :   § Inspection  § : no facial lesions  · Eyes  o Conjunctivae  o : conjunctivae normal  o Sclerae  o : sclerae white  · Ears, Nose, Mouth and Throat  o Ears  o :   § External Ears  § : appearance within normal limits  § Hearing  § : intact  o Nose  o :   § External Nose  § : appearance normal  · Neck  o Inspection/Palpation  o : normal appearance  o Range of Motion  o : full range of motion  · Respiratory  o Respiratory Effort  o : breathing unlabored  o Inspection of Chest  o : normal appearance  o Auscultation of Lungs  o : no audible wheezing or rales  · Cardiovascular  o Heart  o : regular rate  · Gastrointestinal  o Abdominal Examination  o : soft and non-tender  · Skin and Subcutaneous Tissue  o General Inspection  o : intact, no rashes  · Psychiatric  o General  o : Alert and oriented x3  o Judgement and Insight  o : judgment and insight intact  o Mood and Affect  o : mood normal, affect appropriate  · Right Knee  o Inspection  o : Sensation grossly intact. Neurovascular intact. Full weightbearing. Ambulation with a walker. Dorsal Pedal Pulse 2+, posterior tibialis pulse 2+. Skin intact. Calf supple, non-tender. Stable gait. No swelling, skin discoloration or atrophy. Decreased ROM of the right knee. Tender medial and lateral joint line. Negative ballotable effusion , negative fluid wave, negative patellar compression, negative patellar apprehension, " negative Beti's test, negative Apley's test, negative anterior drawer, negative posterior drawer, negative lachman's drawer , negative varus stress, negative valgus stress.   · Injection Note/Aspiration Note  o Site  o : right knee  o Procedure  o : Procedure: After educating the patient, patient gave consent for procedure. After using Chloraprep, the joint space was injected. The patient tolerated the procedure well.   o Medication  o : Euflexxa, 20 mg           Assessment  · Primary osteoarthritis of right knee     715.16/M17.11      Plan  · Orders  o Euflexxa per dose (Patient supplies med) () - 715.16/M17.11 - 02/02/2021   Lot [id number] exp 09 28 2021 Viviane [employer]APPAS  o Knee Intra-articular Injection without US Guidance [facility] ([id number]) - 715.16/M17.11 - 02/02/2021   Elizabeth PAPPAS  · Medications  o Medications have been Reconciled  o Transition of Care or Provider Policy  · Instructions  o Reviewed the patient's Past Medical, Social, and Family history as well as the ROS at today's visit, no changes.  o Call or return if worsening symptoms.  o Follow up PRN.   o Electronically Identified Patient Education Materials Provided Electronically            Electronically Signed by: ELYSE Rees-C -Author on February 2, 2021 03:42:08 PM

## 2021-05-14 NOTE — PROGRESS NOTES
Progress Note      Patient Name: Marilin Martinez   Patient ID: [id number]   Sex: Female   YOB: 1955    Primary Care Provider: Rosenberg Reyes   Referring Provider: Rosenberg Reyes    Visit Date: January 26, 2021    Provider: Elizabeth Tapia PA-C   Location: [facility]   Location Address: [address] [place] [address]  [id number]   Location Phone: [phone]          Chief Complaint  · right knee pain       History Of Present Illness  Marilin Martinez is a 66 year old /White female who presents today to [facility].      She is here for right knee Euflexxa #1/3.       Past Medical History  Arthritis; Diabetes; Foot ulcer; Hyperlipemia; Hyperlipidemia; Hypertension; Ingrown toenail; Limb Swelling; Seasonal allergies; Stroke; Thyroid disorder         Past Surgical History  Colonoscopy; Gallbladder; Tonsillectomy         Medication List  Bystolic 2.5 mg oral tablet; cyclobenzaprine 10 mg oral tablet; hydrocodone-acetaminophen 5-325 mg oral tablet; Januvia 25 mg oral tablet; levothyroxine 25 mcg oral tablet; Livalo 1 mg oral tablet; pioglitazone 15 mg oral tablet; Voltaren 1 % topical gel; Xanax 0.25 mg oral tablet         Allergy List  NO KNOWN DRUG ALLERGIES; NO KNOWN DRUG ALLERGIES       Allergies Reconciled  Family Medical History  Cancer, Unspecified; Family history of Arthritis; Family history of diabetes mellitus (DM)         Social History  Alcohol (Never); Alcohol Use (Never); lives alone; Recreational Drug Use (Never); Retired.; Tobacco (Former);          Review of Systems  · Constitutional  o Denies  o : fever, chills, weight loss  · Cardiovascular  o Denies  o : chest pain, shortness of breath  · Gastrointestinal  o Denies  o : liver disease, heartburn, nausea, blood in stools  · Genitourinary  o Denies  o : painful urination, blood in urine  · Integument  o Denies  o : rash, itching  · Neurologic  o Denies  o : headache, weakness, loss of  "consciousness  · Musculoskeletal  o Admits  o : painful, swollen joints  · Psychiatric  o Denies  o : drug/alcohol addiction, anxiety, depression      Vitals  Date Time BP Position Site L\R Cuff Size HR RR TEMP (F) WT  HT  BMI kg/m2 BSA m2 O2 Sat FR L/min FiO2 HC       01/26/2021 11:19 AM      72 - R   325lbs 0oz 5'  3\" 57.57 2.56 98 %            Physical Examination  · Constitutional  o Appearance  o : well developed, well-nourished, no obvious deformities present  · Head and Face  o Head  o :   § Inspection  § : normocephalic  o Face  o :   § Inspection  § : no facial lesions  · Eyes  o Conjunctivae  o : conjunctivae normal  o Sclerae  o : sclerae white  · Ears, Nose, Mouth and Throat  o Ears  o :   § External Ears  § : appearance within normal limits  § Hearing  § : intact  o Nose  o :   § External Nose  § : appearance normal  · Neck  o Inspection/Palpation  o : normal appearance  o Range of Motion  o : full range of motion  · Respiratory  o Respiratory Effort  o : breathing unlabored  o Inspection of Chest  o : normal appearance  o Auscultation of Lungs  o : no audible wheezing or rales  · Cardiovascular  o Heart  o : regular rate  · Gastrointestinal  o Abdominal Examination  o : soft and non-tender  · Skin and Subcutaneous Tissue  o General Inspection  o : intact, no rashes  · Psychiatric  o General  o : Alert and oriented x3  o Judgement and Insight  o : judgment and insight intact  o Mood and Affect  o : mood normal, affect appropriate  · Right Knee  o Inspection  o : Sensation grossly intact. Neurovascular intact. Full weightbearing. Ambulation with a walker. Dorsal Pedal Pulse 2+, posterior tibialis pulse 2+. Skin intact. Calf supple, non-tender. Stable gait. No swelling, skin discoloration or atrophy. Decreased ROM of the right knee. Tender medial and lateral joint line. Negative ballotable effusion , negative fluid wave, negative patellar compression, negative patellar apprehension, negative Beti's " test, negative Apley's test, negative anterior drawer, negative posterior drawer, negative lachman's drawer , negative varus stress, negative valgus stress.   · Injection Note/Aspiration Note  o Site  o : right knee  o Procedure  o : Procedure: After educating the patient, patient gave consent for procedure. After using Chloraprep, the joint space was injected. The patient tolerated the procedure well.   o Medication  o : Euflexxa, 20 mg           Assessment  · Primary osteoarthritis of left knee     715.16/M17.12  · Right knee pain, unspecified chronicity     719.46/M25.561      Plan  · Orders  o Euflexxa per dose (Patient supplies med) () - 715.16/M17.12 - 01/26/2021   LOT [id number] 09 28 2021 [employer] Administered by Elizabeth FERGUSON  o Knee Intra-articular Injection without US Guidance [facility] [id number]) - 715.16/M17.12 - 01/26/2021  · Medications  o Medications have been Reconciled  o Transition of Care or Provider Policy  · Instructions  o Reviewed the patient's Past Medical, Social, and Family history as well as the ROS at today's visit, no changes.  o Call or return if worsening symptoms.  o Follow up in 1 week.  o Electronically Identified Patient Education Materials Provided Electronically            Electronically Signed by: ELYSE Rees-C -Author on January 26, 2021 01:06:31 PM  Electronically Co-signed by: Preeti Frazier MD -Reviewer on January 26, 2021 09:49:01 PM

## 2021-05-14 NOTE — PROGRESS NOTES
Progress Note      Patient Name: Marilin Martinez   Patient ID: [id number]   Sex: Female   YOB: 1955    Primary Care Provider: Rosenberg Reyes   Referring Provider: Rosenberg Reyes    Visit Date: February 23, 2021    Provider: Elizabeth Tapia PA-C   Location: [facility]   Location Address: [address] [place] [address]  [id number]   Location Phone: [phone]          Chief Complaint  · left knee pain      History Of Present Illness  Marilin Martinez is a 66 year old /White female who presents today to [facility].      She is here for left knee Euflexxa injection #1/3. She has severe arthritis, but is not a surgical candidate due to her BMI. She states she has made efforts to lose weight to no avail, but now has so much knee pain that she cannot stand to exercise.       Past Medical History  Arthritis; Diabetes; Foot ulcer; Hyperlipemia; Hyperlipidemia; Hypertension; Ingrown toenail; Limb Swelling; Seasonal allergies; Stroke; Thyroid disorder         Past Surgical History  Colonoscopy; Gallbladder; Tonsillectomy         Medication List  Bystolic 2.5 mg oral tablet; cyclobenzaprine 10 mg oral tablet; hydrocodone-acetaminophen 5-325 mg oral tablet; Januvia 25 mg oral tablet; levothyroxine 25 mcg oral tablet; Livalo 1 mg oral tablet; pioglitazone 15 mg oral tablet; Voltaren 1 % topical gel; Xanax 0.25 mg oral tablet         Allergy List  NO KNOWN DRUG ALLERGIES; NO KNOWN DRUG ALLERGIES       Allergies Reconciled  Family Medical History  Cancer, Unspecified; Family history of Arthritis; Family history of diabetes mellitus (DM)         Social History  Alcohol (Never); Alcohol Use (Never); lives alone; Recreational Drug Use (Never); Retired.; Tobacco (Former);          Review of Systems  · Constitutional  o Denies  o : fever, chills, weight loss  · Cardiovascular  o Denies  o : chest pain, shortness of breath  · Gastrointestinal  o Denies  o : liver disease,  "heartburn, nausea, blood in stools  · Genitourinary  o Denies  o : painful urination, blood in urine  · Integument  o Denies  o : rash, itching  · Neurologic  o Denies  o : headache, weakness, loss of consciousness  · Musculoskeletal  o Admits  o : painful, swollen joints  · Psychiatric  o Denies  o : drug/alcohol addiction, anxiety, depression      Vitals  Date Time BP Position Site L\R Cuff Size HR RR TEMP (F) WT  HT  BMI kg/m2 BSA m2 O2 Sat FR L/min FiO2        02/23/2021 02:07 PM      58 - R   359lbs 6oz 5'  3\" 63.66 2.69 99 %            Physical Examination  · Constitutional  o Appearance  o : well developed, well-nourished, no obvious deformities present  · Head and Face  o Head  o :   § Inspection  § : normocephalic  o Face  o :   § Inspection  § : no facial lesions  · Eyes  o Conjunctivae  o : conjunctivae normal  o Sclerae  o : sclerae white  · Ears, Nose, Mouth and Throat  o Ears  o :   § External Ears  § : appearance within normal limits  § Hearing  § : intact  o Nose  o :   § External Nose  § : appearance normal  · Neck  o Inspection/Palpation  o : normal appearance  o Range of Motion  o : full range of motion  · Respiratory  o Respiratory Effort  o : breathing unlabored  o Inspection of Chest  o : normal appearance  o Auscultation of Lungs  o : no audible wheezing or rales  · Cardiovascular  o Heart  o : regular rate  · Gastrointestinal  o Abdominal Examination  o : soft and non-tender  · Skin and Subcutaneous Tissue  o General Inspection  o : intact, no rashes  · Psychiatric  o General  o : Alert and oriented x3  o Judgement and Insight  o : judgment and insight intact  o Mood and Affect  o : mood normal, affect appropriate  · Left Knee  o Inspection  o : Sensation grossly intact. Neurovascular intact. Full weightbearing. Ambulation with a walker. Dorsal Pedal Pulse 2+, posterior tibialis pulse 2+. Skin intact. Calf supple, non-tender. Stable gait. No swelling, skin discoloration or atrophy. " Decreased ROM of the right knee. Tender medial and lateral joint line. Negative ballotable effusion , negative fluid wave, negative patellar compression, negative patellar apprehension, negative Beti's test, negative Apley's test, negative anterior drawer, negative posterior drawer, negative lachman's drawer , negative varus stress, negative valgus stress.   · Injection Note/Aspiration Note  o Site  o : left knee  o Procedure  o : Procedure: After educating the patient, patient gave consent for procedure. After using Chloraprep, the joint space was injected. The patient tolerated the procedure well.  o Medication  o : Euflexxa, 20 mg          Assessment  · Primary osteoarthritis of left knee     715.16/M17.12  · Pain: Knee     719.46/M25.569      Plan  · Orders  o Knee Intra-articular Injection without US Guidance [facility] ([id number]) - 719.46/M25.569 - 02/23/2021  o Euflexxa per dose () - - 02/23/2021   Lot [id number] Exp 08 17 2021 [employer] Administered by Elizabeth PAPPAS  · Medications  o Medications have been Reconciled  o Transition of Care or Provider Policy  · Instructions  o Reviewed the patient's Past Medical, Social, and Family history as well as the ROS at today's visit, no changes.  o Call or return if worsening symptoms.  o Proceed with left knee Euflexxa injection. She will be referred to [facility] for weight loss options. Follow up one week for #2/3 Euflexxa. Transdermal compound cream prescribed.   o Electronically Identified Patient Education Materials Provided Electronically            Electronically Signed by: ELYSE Rees-C -Author on February 23, 2021 02:37:40 PM

## 2021-05-14 NOTE — PROGRESS NOTES
Progress Note      Patient Name: Marilin Martinez   Patient ID: [id number]   Sex: Female   YOB: 1955    Primary Care Provider: Rosenberg Reyes   Referring Provider: Rosenberg Reyes    Visit Date: February 9, 2021    Provider: Elizabeth Tapia PA-C   Location: [facility]   Location Address: [address] [place] [address]  [id number]   Location Phone: [phone]          Chief Complaint  · right knee pain      History Of Present Illness  Marilin Martinez is a 66 year old /White female who presents today to [facility].      She is here for right knee Euflexxa injection #3/3.       Past Medical History  Arthritis; Diabetes; Foot ulcer; Hyperlipemia; Hyperlipidemia; Hypertension; Ingrown toenail; Limb Swelling; Seasonal allergies; Stroke; Thyroid disorder         Past Surgical History  Colonoscopy; Gallbladder; Tonsillectomy         Medication List  Bystolic 2.5 mg oral tablet; cyclobenzaprine 10 mg oral tablet; hydrocodone-acetaminophen 5-325 mg oral tablet; Januvia 25 mg oral tablet; levothyroxine 25 mcg oral tablet; Livalo 1 mg oral tablet; pioglitazone 15 mg oral tablet; Voltaren 1 % topical gel; Xanax 0.25 mg oral tablet         Allergy List  NO KNOWN DRUG ALLERGIES; NO KNOWN DRUG ALLERGIES       Allergies Reconciled  Family Medical History  Cancer, Unspecified; Family history of Arthritis; Family history of diabetes mellitus (DM)         Social History  Alcohol (Never); Alcohol Use (Never); lives alone; Recreational Drug Use (Never); Retired.; Tobacco (Former);          Review of Systems  · Constitutional  o Denies  o : fever, chills, weight loss  · Cardiovascular  o Denies  o : chest pain, shortness of breath  · Gastrointestinal  o Denies  o : liver disease, heartburn, nausea, blood in stools  · Genitourinary  o Denies  o : painful urination, blood in urine  · Integument  o Denies  o : rash, itching  · Neurologic  o Denies  o : headache, weakness, loss  "of consciousness  · Musculoskeletal  o Admits  o : painful, swollen joints  · Psychiatric  o Denies  o : drug/alcohol addiction, anxiety, depression      Vitals  Date Time BP Position Site L\R Cuff Size HR RR TEMP (F) WT  HT  BMI kg/m2 BSA m2 O2 Sat FR L/min FiO2 HC       02/09/2021 11:09 AM      78 - R   331lbs 16oz 5'  3\" 58.81 2.59 97 %            Physical Examination  · Constitutional  o Appearance  o : well developed, well-nourished, no obvious deformities present  · Head and Face  o Head  o :   § Inspection  § : normocephalic  o Face  o :   § Inspection  § : no facial lesions  · Eyes  o Conjunctivae  o : conjunctivae normal  o Sclerae  o : sclerae white  · Ears, Nose, Mouth and Throat  o Ears  o :   § External Ears  § : appearance within normal limits  § Hearing  § : intact  o Nose  o :   § External Nose  § : appearance normal  · Neck  o Inspection/Palpation  o : normal appearance  o Range of Motion  o : full range of motion  · Respiratory  o Respiratory Effort  o : breathing unlabored  o Inspection of Chest  o : normal appearance  o Auscultation of Lungs  o : no audible wheezing or rales  · Cardiovascular  o Heart  o : regular rate  · Gastrointestinal  o Abdominal Examination  o : soft and non-tender  · Skin and Subcutaneous Tissue  o General Inspection  o : intact, no rashes  · Psychiatric  o General  o : Alert and oriented x3  o Judgement and Insight  o : judgment and insight intact  o Mood and Affect  o : mood normal, affect appropriate  · Right Knee  o Inspection  o : Tender joint line. ROM decreased. Ambulates with walker.   · Injection Note/Aspiration Note  o Site  o : right knee  o Procedure  o : Procedure: After educating the patient, patient gave consent for procedure. After using Chloraprep, the joint space was injected. The patient tolerated the procedure well.   o Medication  o : Euflexxa, 20 mg           Assessment  · Primary osteoarthritis of right knee     715.16/M17.11  · Right knee pain, " unspecified chronicity     719.46/M25.561      Plan  · Orders  o Euflexxa per dose () - 715.16/M17.11 - 02/09/2021   Lot [id number] Exp 09 28 2021 [employer] Administered by Elizabeth Tapia PA-C  o Knee Intra-articular Injection without US Guidance [facility] ([id number]) - 715.16/M17.11 - 02/09/2021   Administered by Elizabeth Tapia PA-C  · Medications  o Medications have been Reconciled  o Transition of Care or Provider Policy  · Instructions  o Reviewed the patient's Past Medical, Social, and Family history as well as the ROS at today's visit, no changes.  o Call or return if worsening symptoms.  o Follow up for left knee Euflexxa series.   o Electronically Identified Patient Education Materials Provided Electronically            Electronically Signed by: Elizabeth Tapia PA-C -Author on February 9, 2021 12:52:32 PM  Electronically Co-signed by: Preeti Frazier MD -Reviewer on February 9, 2021 05:59:02 PM

## 2021-05-14 NOTE — PROGRESS NOTES
Progress Note      Patient Name: Marilin Martinez   Patient ID: [id number]   Sex: Female   YOB: 1955    Primary Care Provider: Rosenberg Reyes   Referring Provider: Rosenberg Reyes    Visit Date: March 16, 2021    Provider: Preeti Frazier MD   Location: [facility]   Location Address: [address] [place] [address]  [id number]   Location Phone: [phone]          Chief Complaint  · Left Knee Osteoarthritis      History Of Present Illness  Marilin Martinez is a 66 year old /White female who presents today to [facility].      Patient presents today for a follow-up of left knee pain, left knee osteoarthritis. She has advanced osteoarthritis of the left knee pain. Patient presents today using a walker for ambulation assistance. She is here to receive her 3rd left knee Euflexxa injection.       Past Medical History  Arthritis; Diabetes; Foot ulcer; Hyperlipemia; Hyperlipidemia; Hypertension; Ingrown toenail; Limb Swelling; Seasonal allergies; Stroke; Thyroid disorder         Past Surgical History  Colonoscopy; Gallbladder; Tonsillectomy         Medication List  Bystolic 2.5 mg oral tablet; cyclobenzaprine 10 mg oral tablet; hydrocodone-acetaminophen 5-325 mg oral tablet; Januvia 25 mg oral tablet; levothyroxine 25 mcg oral tablet; Livalo 1 mg oral tablet; pioglitazone 15 mg oral tablet; Voltaren 1 % topical gel; Xanax 0.25 mg oral tablet         Allergy List  NO KNOWN DRUG ALLERGIES         Family Medical History  Cancer, Unspecified; Family history of Arthritis; Family history of diabetes mellitus (DM)         Social History  Alcohol (Never); Alcohol Use (Never); lives alone; Recreational Drug Use (Never); Retired.; Tobacco (Former);          Review of Systems  · Constitutional  o Denies  o : fever, chills, weight loss  · Cardiovascular  o Denies  o : chest pain, shortness of breath  · Gastrointestinal  o Denies  o : liver disease, heartburn, nausea, blood  "in stools  · Genitourinary  o Denies  o : painful urination, blood in urine  · Integument  o Denies  o : rash, itching  · Neurologic  o Denies  o : headache, weakness, loss of consciousness  · Musculoskeletal  o Denies  o : painful, swollen joints  · Psychiatric  o Denies  o : drug/alcohol addiction, anxiety, depression      Vitals  Date Time BP Position Site L\R Cuff Size HR RR TEMP (F) WT  HT  BMI kg/m2 BSA m2 O2 Sat FR L/min FiO2        03/16/2021 02:35 PM         329lbs 1oz 5'  9\" 48.59 2.7             Physical Examination  · Constitutional  o Appearance  o : well developed, well-nourished, no obvious deformities present  · Head and Face  o Head  o :   § Inspection  § : normocephalic  o Face  o :   § Inspection  § : no facial lesions  · Eyes  o Conjunctivae  o : conjunctivae normal  o Sclerae  o : sclerae white  · Ears, Nose, Mouth and Throat  o Ears  o :   § External Ears  § : appearance within normal limits  § Hearing  § : intact  o Nose  o :   § External Nose  § : appearance normal  · Neck  o Inspection/Palpation  o : normal appearance  o Range of Motion  o : full range of motion  · Respiratory  o Respiratory Effort  o : breathing unlabored  o Inspection of Chest  o : normal appearance  o Auscultation of Lungs  o : no audible wheezing or rales  · Cardiovascular  o Heart  o : regular rate  · Gastrointestinal  o Abdominal Examination  o : soft and non-tender  · Skin and Subcutaneous Tissue  o General Inspection  o : intact, no rashes  · Psychiatric  o General  o : Alert and oriented x3  o Judgement and Insight  o : judgment and insight intact  o Mood and Affect  o : mood normal, affect appropriate  · Extremities  o Extremities  o : Bilateral Knee- Sensation grossly intact. Neurovascular intact. Full weightbearing. Ambulation with a walker. Dorsal Pedal Pulse 2+, posterior tibialis pulse 2+. Skin intact. Calf supple, non-tender. Stable gait. No swelling, skin discoloration or atrophy. Decreased ROM of the " right knee. Tender medial and lateral joint line. Negative ballotable effusion , negative fluid wave, negative patellar compression, negative patellar apprehension, negative Beti's test, negative Apley's test, negative anterior drawer, negative posterior drawer, negative lachman's drawer , negative varus stress, negative valgus stress. Varus instability to the right knee.   · Injection Note/Aspiration Note  o Site  o : left knee   o Procedure  o : Procedure: After educating the patient, patient gave consent for procedure. After using Chloraprep, the joint space was injected. The patient tolerated the procedure well.   o Medication  o : Euflexxa, 20 mg           Assessment  · Primary osteoarthritis of right knee     715.16/M17.11  · Primary osteoarthritis of left knee     715.16/M17.12      Plan  · Orders  o Euflexxa per dose () - 715.16/M17.12 - 03/16/2021   Lot [id number] Exp 08 17 2021 [employer] Administered by MARCELLE Frazier MD  o Knee Intra-articular Injection without US Guidance [facility] ([id number]) - 715.16/M17.12 - 03/16/2021  · Medications  o Medications have been Reconciled  o Transition of Care or Provider Policy  · Instructions  o Dr. Frazier saw and examined the patient and agrees with plan.   o Reviewed the patient's Past Medical, Social, and Family history as well as the ROS at today's visit, no changes.  o Call or return if worsening symptoms.  o Follow Up PRN.  o The above service was scribed by Lydia Fay on my behalf and I attest to the accuracy of the note. mc   o Patient received her 3rd Euflexxa injection and tolerated it well.   o Reasoning for custom knee brace is Disproportionate thigh to calf ratio.   o Electronically Identified Patient Education Materials Provided Electronically            Electronically Signed by: Tasha Borja MA -Author on April 14, 2021 08:35:23 AM  Electronically Co-signed by: Preeti Frazier MD -Reviewer on April 14, 2021 09:38:43 AM

## 2021-05-15 VITALS
SYSTOLIC BLOOD PRESSURE: 154 MMHG | TEMPERATURE: 97.6 F | BODY MASS INDEX: 51.91 KG/M2 | DIASTOLIC BLOOD PRESSURE: 61 MMHG | HEART RATE: 54 BPM | WEIGHT: 293 LBS | HEIGHT: 63 IN | OXYGEN SATURATION: 97 %

## 2021-05-15 VITALS — HEIGHT: 63 IN | WEIGHT: 293 LBS | BODY MASS INDEX: 51.91 KG/M2

## 2021-06-09 ENCOUNTER — TREATMENT (OUTPATIENT)
Dept: PHYSICAL THERAPY | Facility: CLINIC | Age: 66
End: 2021-06-09

## 2021-06-09 DIAGNOSIS — R29.898 WEAKNESS OF BOTH LEGS: Primary | ICD-10-CM

## 2021-06-09 DIAGNOSIS — M25.562 CHRONIC PAIN OF BOTH KNEES: ICD-10-CM

## 2021-06-09 DIAGNOSIS — M25.561 CHRONIC PAIN OF BOTH KNEES: ICD-10-CM

## 2021-06-09 DIAGNOSIS — G89.29 CHRONIC PAIN OF BOTH KNEES: ICD-10-CM

## 2021-06-09 DIAGNOSIS — M17.10 ARTHRITIS OF KNEE: ICD-10-CM

## 2021-06-09 PROCEDURE — 97113 AQUATIC THERAPY/EXERCISES: CPT | Performed by: PHYSICAL THERAPIST

## 2021-06-09 NOTE — PROGRESS NOTES
Re-Assessment / Re-Certification      Patient: Marilin Martinez   : 1955  Diagnosis/ICD-10 Code:  Weakness of both legs [R29.898]  Referring practitioner: No ref. provider found  Date of Initial Visit: Type: THERAPY  Noted: 2021  Today's Date: 2021  Patient seen for 13 sessions      Subjective:     Subjective Questionnaire: LEFS: 15/80= 81%  Clinical Progress: improved  Home Program Compliance: Yes  Treatment has included: therapeutic exercise, neuromuscular re-education and aquatic therapy    Subjective Evaluation    History of Present Illness    Subjective comment: Pt reports that physical therapy is helping her as she is has improved mobility in the pool. She also states that it helps with her general mobility and flexibility after doing aquatic therapy. Pt reports that she wants to continue as it is helping her with improving her function such as ambulating faster and her knee is able to bend better than before. Pain  Current pain ratin    Treatments  Previous treatment: physical therapy  Patient Goals  Patient goals for therapy: increased strength, decreased pain and improved balance         Objective          Active Range of Motion     Additional Active Range of Motion Details  Knee   Active Range  Passive Range     Left Right Left Right  Flexion   (98) (90) (*) (*)  Extension  (lacks 5) (lack 5) (*) (*)    Ambulation     Comments   Pt ambulates using a rolling walker with a widen stance. She has significantly reduced gait speed and reduced weight bearing through bilateral LE.     Functional Assessment     Comments  LE Functional Scale  LEFS: 15/80= 81%    Optimal  Results:  Instructions:  Please select the level of difficulty you have for each activity below.      Able to do without      Able to do with      Able to do with         Able to do with      Unable to do      N/A    any difficulty         little difficulty         moderate difficulty    much difficulty    9.  Standing 1  (*)           2  (x)               3  ()                        4  (*)                     5  (*)              9  (*)  10. Walking- 1  (*)          2  (*)               3  (x)                        4  (*)                     5  (*)              9  (*)  short distances  11. Walking 1  (*)          2  (*)               3  (*)                        4  (*)                     5  (x)              9  (*)  long distances    Total Score: (*) (all items) (10) (3 items) (*)  (1 item)    % Limitation   All Items  3 Items  1 Item    (*) 0%  (Score of 22)   (Score of 3)   (Score of 1)  (*) 1-19%   (Score of 23-40)  (Score of 4-5)   (Score of 2)  (*) 20-39%  (Score of 41-58)  (Score of 6-7)   (Score of 2)  (*) 40-59%  (Score of 59-76)  (Score of 8-9)   (Score of 3)  (x) 60-79%  (Score of 77-94)  (Score of 10-11)  (Score of 3)  (*) 80-99%  (Score of )  (Score of 12-14)  (Score of 4)  (*) 100%  (Score of 110)   (Score of 15)   (Score of 5)     General Comments     Hip Comments   Hip               Left Right    Flexion             (4-)/5 (3+)/5    Extension  (*)/5 (*)/5    Abduction  (4-)/5 (4-)/5    Adduction  (4-)/5 (4-)/5         Assessment & Plan     Assessment  Impairments: abnormal gait, abnormal or restricted ROM, activity intolerance, impaired balance, impaired physical strength, pain with function and weight-bearing intolerance    Goals  Plan Goals: KNEE PROBLEMS:  1. The patient has limited ROM of the B knee.   LTG 1: 6 weeks:  The patient will demonstrate knee ROM to 95 degrees of ROM for the B knee in order to allow patient to better tolerate squatting and sitting.    STATUS:  Not met   STG 1a: 3 weeks:  The patient will demonstrate knee ROM to 90 degrees of ROM for the B knee.    STATUS: met   TREATMENT: Manual therapy, therapeutic exercise, home exercise instruction, and modalities as needed to include:  moist heat, electrical stimulation, ultrasound, and ice.    2. The patient has limited strength of the B  knee.   LTG 2: 6 weeks: The patient will demonstrate 4/5 strength for B knee flexion and extension in order to allow patient improved joint stability    STATUS:  not met, progressing   STG 2a: 3 weeks: The patient will demonstrate 4-/5 strength for B knee flexion and extension    STATUS:  met    TREATMENT: Manual therapy, therapeutic exercise, home exercise instruction, aquatic therapy, and modalities as needed to include:  moist heat, electrical stimulation, ultrasound, and ice.     3. The patient has gait dysfunction.   LTG 3: 6 weeks:  The patient will ambulate with a walker, independently, for community distances with less knee pain (less than 4/10) to the B lower extremity in order to improve mobility and allow patient to perform activities such as grocery shopping with greater ease.    STATUS:  not met, modified     TREATMENT: Gait training, aquatic therapy, therapeutic exercise, and home exercise instruction.      Plan  Therapy options: will be seen for skilled physical therapy services  Planned therapy interventions: manual therapy, strengthening and therapeutic activities  Other planned therapy interventions: Aquatic therapy  Duration in visits: 20  Treatment plan discussed with: patient      Progress toward previous goals: Partially Met      Recommendations: Continue as planned  Timeframe: 2 months  Prognosis to achieve goals: good    PT Signature: Patt Payan, PT      Based upon review of the patient's progress and continued therapy plan, it is my medical opinion that Marilin Martinez should continue physical therapy treatment at [facility]  [address] [facility] [address]  [place] [address] [phone].    Signature: __________________________________      Aquatic:          30     mins  20633;  Manual Therapy:    0     mins  10377;  Therapeutic Exercise:    0     mins  31760;     Neuromuscular Shaquille:    0    mins  56146;    Therapeutic Activity:     0     mins   66439;     Gait Trainin     mins  27863;     Ultrasound:     0     mins  91045;    Electrical Stimulation:    0     mins  01750 ( );  Dry Needling     0     mins self-pay    Timed Treatment:   30   mins   Total Treatment:     30   mins

## 2021-06-11 ENCOUNTER — TRANSCRIBE ORDERS (OUTPATIENT)
Dept: PHYSICAL THERAPY | Facility: CLINIC | Age: 66
End: 2021-06-11

## 2021-06-11 DIAGNOSIS — M25.561 RIGHT KNEE PAIN, UNSPECIFIED CHRONICITY: Primary | ICD-10-CM

## 2021-06-16 ENCOUNTER — TREATMENT (OUTPATIENT)
Dept: PHYSICAL THERAPY | Facility: CLINIC | Age: 66
End: 2021-06-16

## 2021-06-16 DIAGNOSIS — M17.10 ARTHRITIS OF KNEE: ICD-10-CM

## 2021-06-16 DIAGNOSIS — M25.561 CHRONIC PAIN OF BOTH KNEES: ICD-10-CM

## 2021-06-16 DIAGNOSIS — R29.898 WEAKNESS OF BOTH LEGS: Primary | ICD-10-CM

## 2021-06-16 DIAGNOSIS — G89.29 CHRONIC PAIN OF BOTH KNEES: ICD-10-CM

## 2021-06-16 DIAGNOSIS — M25.562 CHRONIC PAIN OF BOTH KNEES: ICD-10-CM

## 2021-06-16 PROCEDURE — 97113 AQUATIC THERAPY/EXERCISES: CPT | Performed by: PHYSICAL THERAPIST

## 2021-06-16 NOTE — PROGRESS NOTES
Physical Therapy Daily Treatment Note      Patient: Marilin Martinez   : 1955  Referring practitioner: JORGE Corrigan  Date of Initial Visit: Type: THERAPY  Noted: 2021  Today's Date: 2021  Patient seen for 2 sessions           Subjective Evaluation    History of Present Illness    Subjective comment: No new subjective reports.Pain  Current pain ratin (4-5)          Objective   See Exercise, Manual, and Modality Logs for complete treatment.     Assessment & Plan     Assessment  Impairments: impaired balance and pain with function  Assessment details: Pt tolerated session well overall. Continue to progress exercises as tolerated.     Plan  Therapy options: will be seen for skilled physical therapy services  Plan details: Continue with POC.        Visit Diagnoses:    ICD-10-CM ICD-9-CM   1. Weakness of both legs  R29.898 729.89   2. Chronic pain of both knees  M25.561 719.46    M25.562 338.29    G89.29    3. Arthritis of knee  M17.10 716.96       Progress per Plan of Care           Timed:  Aquatic therapy: 29 minutes,  91556  Manual Therapy:    0     mins  51920;  Therapeutic Exercise:    0     mins  37348;     Neuromuscular Shaquille:    0    mins  06433;    Therapeutic Activity:     0     mins  11097;     Gait Trainin     mins  74083;     Ultrasound:     0     mins  93089;    Electrical Stimulation:    0     mins  88479 ( );    Untimed:  Electrical Stimulation:    0     mins  02222 ( );  Mechanical Traction:    0     mins  66878;     Timed Treatment:   24   mins   Total Treatment:     24   mins  Patt Payan, PT  Physical Therapist

## 2021-06-21 ENCOUNTER — TREATMENT (OUTPATIENT)
Dept: PHYSICAL THERAPY | Facility: CLINIC | Age: 66
End: 2021-06-21

## 2021-06-21 DIAGNOSIS — R29.898 WEAKNESS OF BOTH LEGS: Primary | ICD-10-CM

## 2021-06-21 DIAGNOSIS — M25.562 CHRONIC PAIN OF BOTH KNEES: ICD-10-CM

## 2021-06-21 DIAGNOSIS — M17.10 ARTHRITIS OF KNEE: ICD-10-CM

## 2021-06-21 DIAGNOSIS — M25.561 CHRONIC PAIN OF BOTH KNEES: ICD-10-CM

## 2021-06-21 DIAGNOSIS — G89.29 CHRONIC PAIN OF BOTH KNEES: ICD-10-CM

## 2021-06-21 PROCEDURE — 97113 AQUATIC THERAPY/EXERCISES: CPT | Performed by: PHYSICAL THERAPIST

## 2021-06-21 NOTE — PROGRESS NOTES
Physical Therapy Daily Treatment Note      Patient: Marilin Martinez   : 1955  Referring practitioner: JORGE Corrigan  Date of Initial Visit: Type: THERAPY  Noted: 2021  Today's Date: 2021  Patient seen for 3 sessions           Subjective Evaluation    History of Present Illness    Subjective comment: No new subjective reports.       Objective          Ambulation     Observational Gait   Decreased walking speed and stride length.       See Exercise, Manual, and Modality Logs for complete treatment.     Assessment & Plan     Assessment  Impairments: abnormal coordination, abnormal gait and pain with function  Assessment details: Pt continues to tolerate exercise well in the pool. She is not yet appropriate for land based exercises due to significant knee pain, joint degeneration, weight, and limited functional mobility.        Visit Diagnoses:    ICD-10-CM ICD-9-CM   1. Weakness of both legs  R29.898 729.89   2. Chronic pain of both knees  M25.561 719.46    M25.562 338.29    G89.29    3. Arthritis of knee  M17.10 716.96       Progress per Plan of Care           Timed:  Aquatic:   25     mins  99453;  Manual Therapy:    0     mins  43868;  Therapeutic Exercise:    0     mins  68078;     Neuromuscular Shaquille:    0    mins  54971;    Therapeutic Activity:     0     mins  11390;     Gait Trainin     mins  02415;     Ultrasound:     0     mins  38177;    Electrical Stimulation:    0     mins  62361 ( );    Untimed:  Electrical Stimulation:    0     mins  17553 ( );  Mechanical Traction:    0     mins  47864;     Timed Treatment:   25   mins   Total Treatment:     25   mins  Patt Payan PT  Physical Therapist

## 2021-06-23 ENCOUNTER — TREATMENT (OUTPATIENT)
Dept: PHYSICAL THERAPY | Facility: CLINIC | Age: 66
End: 2021-06-23

## 2021-06-23 DIAGNOSIS — G89.29 CHRONIC PAIN OF BOTH KNEES: ICD-10-CM

## 2021-06-23 DIAGNOSIS — M25.562 CHRONIC PAIN OF BOTH KNEES: ICD-10-CM

## 2021-06-23 DIAGNOSIS — R29.898 WEAKNESS OF BOTH LEGS: Primary | ICD-10-CM

## 2021-06-23 DIAGNOSIS — M25.561 CHRONIC PAIN OF BOTH KNEES: ICD-10-CM

## 2021-06-23 DIAGNOSIS — M17.10 ARTHRITIS OF KNEE: ICD-10-CM

## 2021-06-23 PROCEDURE — 97113 AQUATIC THERAPY/EXERCISES: CPT | Performed by: PHYSICAL THERAPIST

## 2021-06-23 NOTE — PROGRESS NOTES
Physical Therapy Daily Treatment Note      Patient: Marilin Martinez   : 1955  Referring practitioner: JORGE Corrigan  Date of Initial Visit: Type: THERAPY  Noted: 2021  Today's Date: 2021  Patient seen for 4 sessions           Subjective Evaluation    History of Present Illness    Subjective comment: Pt reports that therapy is helping her speed and general walking. Lifting her legs with activities such as getting in and out of the car and walking up the stairs is challenging.       Objective          Ambulation   Weight-Bearing Status   Assistive device used: wheeled walker    Observational Gait   Decreased walking speed.       See Exercise, Manual, and Modality Logs for complete treatment.     Assessment & Plan     Assessment  Assessment details: Pt tolerated therapy well without breaks. The number of reps for the Mini squat exercise was increased.     Plan  Plan details: Continue plan of care in aquatic therapy.        Visit Diagnoses:    ICD-10-CM ICD-9-CM   1. Weakness of both legs  R29.898 729.89   2. Chronic pain of both knees  M25.561 719.46    M25.562 338.29    G89.29    3. Arthritis of knee  M17.10 716.96       Progress per Plan of Care           Timed:  Aquatic therapy:    25    mins  25001;  Manual Therapy:    0     mins  76767;  Therapeutic Exercise:    0     mins  10289;     Neuromuscular Shaquille:    0    mins  99537;    Therapeutic Activity:     0     mins  81721;     Gait Trainin     mins  60944;     Ultrasound:     0     mins  63440;    Electrical Stimulation:    0     mins  00207 ( );    Untimed:  Electrical Stimulation:    0     mins  69460 ( );  Mechanical Traction:    0     mins  61419;     Timed Treatment:   25   mins   Total Treatment:     25   mins  Patt Payan, PT  Physical Therapist

## 2021-06-28 ENCOUNTER — TREATMENT (OUTPATIENT)
Dept: PHYSICAL THERAPY | Facility: CLINIC | Age: 66
End: 2021-06-28

## 2021-06-28 DIAGNOSIS — M25.561 CHRONIC PAIN OF BOTH KNEES: ICD-10-CM

## 2021-06-28 DIAGNOSIS — M25.562 CHRONIC PAIN OF BOTH KNEES: ICD-10-CM

## 2021-06-28 DIAGNOSIS — G89.29 CHRONIC PAIN OF BOTH KNEES: ICD-10-CM

## 2021-06-28 DIAGNOSIS — M17.10 ARTHRITIS OF KNEE: ICD-10-CM

## 2021-06-28 DIAGNOSIS — R29.898 WEAKNESS OF BOTH LEGS: Primary | ICD-10-CM

## 2021-06-28 PROCEDURE — 97113 AQUATIC THERAPY/EXERCISES: CPT | Performed by: PHYSICAL THERAPIST

## 2021-06-28 NOTE — PROGRESS NOTES
Re-Assessment / Re-Certification      Patient: Marilin Martinez   : 1955  Diagnosis/ICD-10 Code:  Weakness of both legs [R29.898]  Referring practitioner: JORGE Corrigan  Date of Initial Visit: Type: THERAPY  Noted: 2021  Today's Date: 2021  Patient seen for 5 sessions      Subjective:   Marilin Martinez reports: Pt reports that she is able to ambulate a little further and for a longer duration. She thinks that the aquatic therapy is helping her overall and she feels more flexible. She reports 4-5/10 pain today.  Subjective Questionnaire: LEFS: 15/80 = 81% limitation  Clinical Progress: no significant change since last re-evaluation 3 weeks ago, but patient is reporting progress regarding flexibility, function, and strength.  Home Program Compliance: Yes  Treatment has included: therapeutic exercise, neuromuscular re-education, manual therapy and therapeutic activity    Subjective   Objective          Active Range of Motion     Additional Active Range of Motion Details  Additional Active Range of Motion Details  Knee                Active Range                                                    Left                  Right    Flexion                        (98)                   (90)       Extension                    (lacks 5)          (lack 5)               Strength/Myotome Testing     Left Knee   Flexion: 4  Extension: 4    Right Knee   Flexion: 4  Extension: 4      Assessment & Plan     Assessment  Assessment details: Impairments: abnormal gait, abnormal or restricted ROM, activity intolerance, impaired balance, impaired physical strength, pain with function and weight-bearing intolerance. Pt continues to have limitations in her knee strength, ROM, and functional mobility. She is tolerating aquatic therapy and is reporting relief in her pain and better tolerance for her ability activities.    Goals  Plan Goals: Goals  Plan Goals: KNEE PROBLEMS:  1. The patient has limited ROM of the  B knee.              LTG 1: 6 weeks:  The patient will demonstrate knee ROM to 95 degrees of ROM for the B knee in order to allow patient to better tolerate squatting and sitting.                          STATUS:  Not met              STG 1a: 3 weeks:  The patient will demonstrate knee ROM to 90 degrees of ROM for the B knee.                          STATUS: met              TREATMENT: Manual therapy, therapeutic exercise, home exercise instruction, and modalities as needed to include:  moist heat, electrical stimulation, ultrasound, and ice.    2. The patient has limited strength of the B knee.              LTG 2: 6 weeks: The patient will demonstrate 4/5 strength for B knee flexion and extension in order to allow patient improved joint stability                          STATUS:  not met, progressing              STG 2a: 3 weeks: The patient will demonstrate 4-/5 strength for B knee flexion and extension                          STATUS:  met               TREATMENT: Manual therapy, therapeutic exercise, home exercise instruction, aquatic therapy, and modalities as needed to include:  moist heat, electrical stimulation, ultrasound, and ice.                3. The patient has gait dysfunction.              LTG 3: 6 weeks:  The patient will ambulate with a walker, independently, for community distances with less knee pain (less than 4/10) to the B lower extremity in order to improve mobility and allow patient to perform activities such as grocery shopping with greater ease.                          STATUS:  not met, modified                TREATMENT: Gait training, aquatic therapy      Progress toward previous goals: Partially Met        Recommendations: Continue as planned  Timeframe: 1 month  Prognosis to achieve goals: fair    PT Signature: Patt Payan, INNA      Based upon review of the patient's progress and continued therapy plan, it is my medical opinion that Marilin Martinez should continue physical  therapy treatment at [facility]  [address]  [place] [address] [phone].    Signature: __________________________________  Carole Le    Aquatic therapy:   26     mins  05249;  Manual Therapy:    0     mins  52354;  Therapeutic Exercise:    0     mins  31880;     Neuromuscular Shaquille:    0    mins  46296;    Therapeutic Activity:     0     mins  51408;     Gait Trainin     mins  10786;     Ultrasound:     0     mins  24697;    Electrical Stimulation:    0     mins  79807 ( );  Dry Needling     0     mins self-pay    Timed Treatment:   26   mins   Total Treatment:     26   mins

## 2021-07-07 ENCOUNTER — TREATMENT (OUTPATIENT)
Dept: PHYSICAL THERAPY | Facility: CLINIC | Age: 66
End: 2021-07-07

## 2021-07-07 DIAGNOSIS — M25.561 CHRONIC PAIN OF BOTH KNEES: ICD-10-CM

## 2021-07-07 DIAGNOSIS — R29.898 WEAKNESS OF BOTH LEGS: Primary | ICD-10-CM

## 2021-07-07 DIAGNOSIS — M17.10 ARTHRITIS OF KNEE: ICD-10-CM

## 2021-07-07 DIAGNOSIS — G89.29 CHRONIC PAIN OF BOTH KNEES: ICD-10-CM

## 2021-07-07 DIAGNOSIS — M25.562 CHRONIC PAIN OF BOTH KNEES: ICD-10-CM

## 2021-07-07 PROCEDURE — 97113 AQUATIC THERAPY/EXERCISES: CPT | Performed by: PHYSICAL THERAPIST

## 2021-07-07 NOTE — PROGRESS NOTES
Physical Therapy Daily Treatment Note      Patient: Marilin Martinez   : 1955  Referring practitioner: JORGE Corrigan  Date of Initial Visit: Type: THERAPY  Noted: 2021  Today's Date: 2021  Patient seen for 6 sessions            Subjective Questionnaire:       Subjective Evaluation    History of Present Illness    Subjective comment: Pt reported 5/10 in B knees.       Objective           General Comments     Knee Comments  Pt ambulates with RW secondary to B knee weakness.  Pt reports she can move around better and a little faster than before starting aquatics therapy.     See Exercise, Manual, and Modality Logs for complete treatment.       Assessment & Plan     Assessment  Assessment details: Pt reported ahe can perform aquatics there ex and has difficulty doing it at home secondary to B knee pain.          Visit Diagnoses:    ICD-10-CM ICD-9-CM   1. Weakness of both legs  R29.898 729.89   2. Chronic pain of both knees  M25.561 719.46    M25.562 338.29    G89.29    3. Arthritis of knee  M17.10 716.96       Progress per Plan of Care and Progress strengthening /stabilization /functional activity           Timed:  Manual Therapy:         mins  39723;  Therapeutic Exercise:         mins  34046;    Aquatics:                     __26mins  58212;   Neuromuscular Shaquille:        mins  05545;    Therapeutic Activity:          mins  43018;     Gait Training:           mins  58226;     Ultrasound:          mins  21060;    Electrical Stimulation:         mins  61900 ( );    Untimed:  Electrical Stimulation:         mins  08733 ( );  Mechanical Traction:         mins  09607;     Timed Treatment:   26   mins   Total Treatment:     26   mins  Jayne Laguerre PTA  Physical Therapist

## 2021-07-26 ENCOUNTER — OFFICE VISIT (OUTPATIENT)
Dept: PODIATRY | Facility: CLINIC | Age: 66
End: 2021-07-26

## 2021-07-26 VITALS
SYSTOLIC BLOOD PRESSURE: 155 MMHG | DIASTOLIC BLOOD PRESSURE: 62 MMHG | WEIGHT: 293 LBS | BODY MASS INDEX: 51.91 KG/M2 | TEMPERATURE: 97.6 F | HEIGHT: 63 IN | OXYGEN SATURATION: 97 % | HEART RATE: 64 BPM

## 2021-07-26 DIAGNOSIS — E11.8 DIABETIC FOOT (HCC): ICD-10-CM

## 2021-07-26 DIAGNOSIS — B35.1 ONYCHOMYCOSIS: ICD-10-CM

## 2021-07-26 DIAGNOSIS — M79.671 FOOT PAIN, BILATERAL: Primary | ICD-10-CM

## 2021-07-26 DIAGNOSIS — L60.0 ONYCHOCRYPTOSIS: ICD-10-CM

## 2021-07-26 DIAGNOSIS — E11.9 NON-INSULIN DEPENDENT TYPE 2 DIABETES MELLITUS (HCC): ICD-10-CM

## 2021-07-26 DIAGNOSIS — M79.672 FOOT PAIN, BILATERAL: Primary | ICD-10-CM

## 2021-07-26 PROCEDURE — 99213 OFFICE O/P EST LOW 20 MIN: CPT | Performed by: PODIATRIST

## 2021-07-26 PROCEDURE — G8404 LOW EXTEMITY NEUR EXAM DOCUM: HCPCS | Performed by: PODIATRIST

## 2021-07-26 PROCEDURE — 11721 DEBRIDE NAIL 6 OR MORE: CPT | Performed by: PODIATRIST

## 2021-07-26 RX ORDER — ROPINIROLE 0.25 MG/1
TABLET, FILM COATED ORAL
COMMUNITY
Start: 2021-05-25 | End: 2021-09-29 | Stop reason: ALTCHOICE

## 2021-07-26 RX ORDER — LATANOPROST 50 UG/ML
1 SOLUTION/ DROPS OPHTHALMIC NIGHTLY
Status: ON HOLD | COMMUNITY
Start: 2021-07-16 | End: 2023-01-31 | Stop reason: ALTCHOICE

## 2021-07-26 RX ORDER — FLUTICASONE PROPIONATE 50 MCG
1 SPRAY, SUSPENSION (ML) NASAL DAILY PRN
COMMUNITY
Start: 2021-06-23 | End: 2023-01-10

## 2021-07-26 RX ORDER — SEMAGLUTIDE 1.34 MG/ML
INJECTION, SOLUTION SUBCUTANEOUS
Status: ON HOLD | COMMUNITY
Start: 2021-05-25 | End: 2022-02-15 | Stop reason: SDUPTHER

## 2021-07-26 RX ORDER — CYCLOBENZAPRINE HCL 10 MG
TABLET ORAL
COMMUNITY
End: 2023-01-13

## 2021-07-26 NOTE — PROGRESS NOTES
[facility] - PODIATRY    Today's Date: 07/26/21    Patient Name: Marilin Martinez  MRN: [id number]  CSN: [id number]  PCP: Reyes, Rosenberg Acosta, MD  Referring Provider: No ref. provider found    SUBJECTIVE     Chief Complaint   Patient presents with   • Left Foot - Follow-up, Diabetes Mellitus, Nail Problem   • Right Foot - Follow-up, Diabetes Mellitus, Nail Problem     HPI: Marilin Martinez, a 66 y.o.female, comes to clinic.    New, Established, New Problem:  established     Location:  Toenails    Duration:   Greater than five years    Onset:  Gradual    Nature:  sore with palpation.    Stable, worsening, improving:   Worsening    Aggravating factors:  Pain with shoe gear and ambulation.    Previous Treatment:  debridement  __________________________________    New, Established, New Problem:  Established    Location:  bilateral feet    Duration:    Onset:  gradual    Nature:   NIDDM     Stable, worsening, improving:  stable    Patient reported last blood glucose: Patient states they are unsure of the most recent blood glucose reading.  __________________________________    Patient reports the following medical changes since their last visit: None.    No other pedal complaints at this time.    Patient denies any fevers, chills, nausea, vomiting, shortness of breathe, nor any other constitutional signs nor symptoms.       Last PCP Visit:  Reyes, Rosenberg Acosta, MD, 3 June 2021    Past Medical History:   Diagnosis Date   • Arthritis    • COPD (chronic obstructive pulmonary disease) (CMS/HCC)    • Diabetes mellitus (CMS/HCC)    • Disease of thyroid gland    • Foot ulcer (CMS/HCC)    • Hyperlipemia    • Hyperlipidemia    • Hypertension    • Ingrown toenail    • Limb swelling    • Seasonal allergies    • Sleep apnea    • 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CMS/HCC)    • Thyroid disorder      Past Surgical History:   Procedure Laterality Date   • COLONOSCOPY     • GALLBLADDER SURGERY  1997   • TONSILLECTOMY        Family History   Problem Relation Age of Onset   • Obesity Mother    • Hypertension Mother    • Cancer Mother    • Arthritis Mother    • Diabetes Mother    • Obesity Sister    • Hypertension Sister    • Cancer Sister    • Arthritis Sister    • Diabetes Father    • Diabetes Daughter      Social History     Socioeconomic History   • Marital status:      Spouse name: Not on file   • Number of children: Not on file   • Years of education: Not on file   • Highest education level: Not on file   Tobacco Use   • Smoking status: Former Smoker     Quit date: 1997     Years since quittin.8   • Smokeless tobacco: Never Used   Vaping Use   • Vaping Use: Never used   Substance and Sexual Activity   • Alcohol use: Yes     Comment: occaisonally   • Drug use: No   • Sexual activity: Defer     No Known Allergies  Current Outpatient Medications   Medication Sig Dispense Refill   • ALPRAZolam (XANAX) 1 MG tablet Take 1 mg by mouth 3 (Three) Times a Day As Needed for Anxiety.     • amLODIPine (NORVASC) 10 MG tablet Take 1 tablet by mouth Daily. 30 tablet 0   • atorvastatin (LIPITOR) 20 MG tablet Take 20 mg by mouth Daily.     • bumetanide (BUMEX) 1 MG tablet Take 1 mg by mouth Daily.     • celecoxib (CeleBREX) 200 MG capsule Take 200 mg by mouth Daily.     • cyclobenzaprine (FLEXERIL) 10 MG tablet cyclobenzaprine 10 mg oral tablet take 1 tablet (10 mg) by oral route 2 times per day as needed   Active     • fluticasone (FLONASE) 50 MCG/ACT nasal spray INSTILL ONE SPRAY IN EACH NOSTRIL ONCE DAILY     • gabapentin (NEURONTIN) 300 MG capsule Take 2 capsules by mouth Every 8 (Eight) Hours. 90 capsule 0   • HYDROcodone-Acetaminophen (XODOL )  MG per tablet Take 1 tablet by mouth Every 6 (Six) Hours As Needed for Moderate Pain .     • latanoprost (XALATAN) 0.005 % ophthalmic solution      • levothyroxine (SYNTHROID, LEVOTHROID) 112 MCG tablet Take 112 mcg by mouth Daily.     • nebivolol (BYSTOLIC) 10 MG tablet  Take 10 mg by mouth Daily.     • Ozempic, 0.25 or 0.5 MG/DOSE, 2 MG/1.5ML solution pen-injector INJECT 0.5MG SUBQ ONCE WEEKLY     • pitavastatin calcium (LIVALO) 1 MG tablet tablet Take  by mouth Every Night.     • potassium chloride (KLOR-CON) 8 MEQ CR tablet Take 20 mEq by mouth Daily.     • rOPINIRole (REQUIP) 0.25 MG tablet TAKE 1 TABLET BY MOUTH AT BEDTIME FOR 2 WEEKS TAKE 1ST     • SITagliptin (JANUVIA) 100 MG tablet Take 100 mg by mouth Daily. Every other day     • ibuprofen (ADVIL,MOTRIN) 600 MG tablet Take 600 mg by mouth Every 6 (Six) Hours As Needed for Mild Pain .     • labetalol (NORMODYNE) 300 MG tablet Take 1 tablet by mouth Every 12 (Twelve) Hours. 60 tablet 0   • lisinopril (PRINIVIL,ZESTRIL) 20 MG tablet Take 1 tablet by mouth Daily. 30 tablet 0   • pioglitazone (ACTOS) 30 MG tablet Take 30 mg by mouth Daily. Every other day       No current facility-administered medications for this visit.     Review of Systems   Constitutional: Negative.    Skin:        Painful toenails.   All other systems reviewed and are negative.      OBJECTIVE     Vitals:    07/26/21 1056   BP: 155/62   Pulse: 64   Temp: 97.6 °F (36.4 °C)   SpO2: 97%       Lab Results   Component Value Date    HGBA1C 5.54 09/11/2017       Lab Results   Component Value Date    GLUCOSE 103 (H) 09/13/2017    CALCIUM 8.6 09/13/2017     09/13/2017    K 3.9 09/13/2017    CO2 23.5 09/13/2017     09/13/2017    BUN 14 09/13/2017    CREATININE 0.93 09/13/2017    EGFRIFNONA 61 09/13/2017    BCR 15.1 09/13/2017    ANIONGAP 13.5 09/13/2017       Patient seen in no apparent distress.      PHYSICAL EXAM:     Foot/Ankle Exam:       General:   Diabetic Foot Exam Performed    Appearance: appears stated age and healthy and obesity    Orientation: AAOx3    Affect: appropriate    Gait: unimpaired    Shoe Gear:  Sandals    VASCULAR      Right Foot Vascularity   Normal vascular exam    Dorsalis pedis:  2+  Posterior tibial:  2+  Skin Temperature: warm     Edema Grading:  None  CFT:  < 3 seconds  Pedal Hair Growth:  Present  Varicosities: none       Left Foot Vascularity   Normal vascular exam    Dorsalis pedis:  2+  Posterior tibial:  2+  Skin Temperature: warm    Edema Grading:  None  CFT:  < 3 seconds  Pedal Hair Growth:  Present  Varicosities: none        NEUROLOGIC     Right Foot Neurologic   Normal sensation    Light touch sensation:  Normal  Vibratory sensation:  Normal  Hot/Cold sensation: normal    Protective Sensation using [place] Monofilament:  10     Left Foot Neurologic   Normal sensation    Light touch sensation:  Normal  Vibratory sensation:  Normal  Hot/cold sensation: normal    Protective Sensation using [place] Monofilament:  10     MUSCLE STRENGTH     Right Foot Muscle Strength   Normal strength    Foot dorsiflexion:  5  Foot plantar flexion:  5  Foot inversion:  5  Foot eversion:  5     Left Foot Muscle Strength   Normal strength    Foot dorsiflexion:  5  Foot plantar flexion:  5  Foot inversion:  5  Foot eversion:  5     RANGE OF MOTION      Right Foot Range of Motion   Foot and ankle ROM within normal limits       Left Foot Range of Motion   Foot and ankle ROM within normal limits       DERMATOLOGIC     Right Foot Dermatologic   Skin: skin intact    Nails: onychomycosis, abnormally thick, subungual debris, dystrophic nails and ingrown toenail    Nails comment:  Toenails 1, 2, 3, 4, and 5     Left Foot Dermatologic   Skin: skin intact    Nails: onychomycosis, abnormally thick, subungual debris, dystrophic nails and ingrown toenail    Nails comment:  Toenails 1, 2, 3, 4, and 5      Diabetic Foot Exam Performed    ASSESSMENT/PLAN     Diagnoses and all orders for this visit:    1. Foot pain, bilateral (Primary)    2. Diabetic foot (CMS/[facility])    3. Onychocryptosis    4. Onychomycosis    5. Non-insulin dependent type 2 diabetes mellitus (CMS/[facility])        Comprehensive lower extremity examination and evaluation was  performed.    Discussed findings and treatment plan including risks, benefits, and treatment options with patient in detail. Patient agreed with treatment plan.    Toenails 1 through 5 bilaterally were debrided in thickness and length and then smoothed with a Dremel Tool.  Tolerated the procedure well without complications.    Diabetic foot exam performed and documented this date, compliant with CQM required standards. Detail of findings as noted in physical exam.  Lower extremity Neurologic exam for diabetic patient performed and documented this date, compliant with PQRS required standards. Detail of findings as noted in physical exam.  Advised patient importance of good routine lower extremity hygiene. Advised patient importance of evaluating for intact skin and pain free nail borders.  Advised patient to use mirror to evaluate plantar/ soles of feet for better visualization. Advised patient monitor and phone office to be seen if any cracking to skin, open lesions, painful nail borders or if nails become elongated prior to next visit. Advised patient importance of daily cleansing of lower extremities, followed by good skin cream to maintain normal hydration of skin. Also advised patient importance of close daily monitoring of blood sugar. Advised to regulate diet and medications to maintain control of blood sugar in optimal range. Contact primary care provider if difficulties maintaining blood sugar levels.  Advised Patient of presence of Diabetes Mellitus condition.  Advised Patient risk of progression and worsening or improvement, then return of condition.  Will monitor condition for any change in future. Treat with most appropriate treatment pending status of condition.  Counseled and advised patient extensively on nature and ramifications of diabetes. Standard instructions given to patient for good diabetic foot care and maintenance. Advised importance of careful monitoring to avoid break down and  complications secondary to diabetes. Advised patient importance of strict maintenance of blood sugar control. Advised patient of possible ominous results from neglect of condition, i.e.: amputation/ loss of digits, feet and legs, or even death.  Patient states understands counseling, will monitor closely, continue good hygiene and routine diabetic foot care. Patient will contact office is questions or problems.      An After Visit Summary was printed and given to the patient at discharge, including (if requested) any available informative/educational handouts regarding diagnosis, treatment, or medications. All questions were answered to patient/family satisfaction. Should symptoms fail to improve or worsen they agree to call or return to clinic or to go to the Emergency Department. Discussed the importance of following up with any needed screening tests/labs/specialist appointments and any requested follow-up recommended by me today. Importance of maintaining follow-up discussed and patient accepts that missed appointments can delay diagnosis and potentially lead to worsening of conditions.    Return in about 9 weeks (around 9/27/2021) for Toenail Care., or sooner if acute issues arise.    This document has been electronically signed by Andrew Fulton DPM on July 26, 2021 11:23 EDT

## 2021-08-05 NOTE — PROGRESS NOTES
Chief Complaint  Establish Care      History of Present Illness  Marilin Martinez presents to [facility]  Patient is a 66-year-old female with diabetes, hypertension, and dyslipidemia who is being evaluated for gastric bypass surgery.  She has limited ambulatory ability does report some dyspnea on exertion but not able to ambulate up stairs.  No anginal chest discomfort she has had chronic stable lower extremity edema issues.  Denies PND orthopnea    Past Medical History:   Diagnosis Date   • Arthritis    • COPD (chronic obstructive pulmonary disease) (CMS/[facility])    • Diabetes mellitus (CMS/[facility])    • Disease of thyroid gland    • Dyspnea on exertion 8/11/2021   • Foot ulcer (CMS/[facility])    • Hyperlipemia    • Hyperlipidemia    • Hypertension    • Ingrown toenail    • Limb swelling    • Seasonal allergies    • Sleep apnea    • 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CMS/[facility])    • Thyroid disorder          Current Outpatient Medications:   •  ALPRAZolam (XANAX) 1 MG tablet, Take 1 mg by mouth 3 (Three) Times a Day As Needed for Anxiety., Disp: , Rfl:   •  bumetanide (BUMEX) 1 MG tablet, Take 1 mg by mouth Daily., Disp: , Rfl:   •  celecoxib (CeleBREX) 200 MG capsule, Take 200 mg by mouth Daily., Disp: , Rfl:   •  cyclobenzaprine (FLEXERIL) 10 MG tablet, cyclobenzaprine 10 mg oral tablet take 1 tablet (10 mg) by oral route 2 times per day as needed   Active, Disp: , Rfl:   •  Diclofenac Sodium (VOLTAREN) 1 % gel gel, APPLY 4 GRAMS TO AFFECTED AREA FOUR TIMES DAILY, Disp: , Rfl:   •  fluticasone (FLONASE) 50 MCG/ACT nasal spray, INSTILL ONE SPRAY IN EACH NOSTRIL ONCE DAILY, Disp: , Rfl:   •  gabapentin (NEURONTIN) 800 MG tablet, , Disp: , Rfl:   •  HYDROcodone-Acetaminophen (XODOL )  MG per tablet, Take 1 tablet by mouth Every 6 (Six) Hours As Needed for Moderate Pain ., Disp: , Rfl:   •  ibuprofen (ADVIL,MOTRIN) 600 MG tablet, Take 600 mg by mouth Every 6 (Six) Hours As Needed for Mild Pain ., Disp: ,  Rfl:   •  latanoprost (XALATAN) 0.005 % ophthalmic solution, , Disp: , Rfl:   •  Levothyroxine Sodium 200 MCG capsule, Take 200 mcg by mouth Daily., Disp: , Rfl:   •  Myrbetriq 25 MG tablet sustained-release 24 hour 24 hr tablet, Take 25 mg by mouth Daily., Disp: , Rfl:   •  nebivolol (BYSTOLIC) 10 MG tablet, Take 10 mg by mouth Daily., Disp: , Rfl:   •  Ozempic, 0.25 or 0.5 MG/DOSE, 2 MG/1.5ML solution pen-injector, INJECT 0.5MG SUBQ ONCE WEEKLY, Disp: , Rfl:   •  pitavastatin calcium (LIVALO) 1 MG tablet tablet, Take  by mouth Every Night., Disp: , Rfl:   •  potassium chloride (KLOR-CON) 8 MEQ CR tablet, Take 20 mEq by mouth Daily., Disp: , Rfl:   •  rOPINIRole (REQUIP) 0.25 MG tablet, TAKE 1 TABLET BY MOUTH AT BEDTIME FOR 2 WEEKS TAKE 1ST, Disp: , Rfl:   •  SITagliptin (JANUVIA) 100 MG tablet, Take 100 mg by mouth Daily. Every other day, Disp: , Rfl:   •  amLODIPine (NORVASC) 10 MG tablet, Take 1 tablet by mouth Daily., Disp: 30 tablet, Rfl: 0  •  lisinopril-hydrochlorothiazide (PRINZIDE,ZESTORETIC) 20-12.5 MG per tablet, Take 1 tablet by mouth Daily., Disp: 90 tablet, Rfl: 3  •  pioglitazone (ACTOS) 30 MG tablet, Take 30 mg by mouth Daily. Every other day, Disp: , Rfl:     Medications Discontinued During This Encounter   Medication Reason   • gabapentin (NEURONTIN) 300 MG capsule *Re-Entry   • labetalol (NORMODYNE) 300 MG tablet    • lisinopril (PRINIVIL,ZESTRIL) 20 MG tablet    • atorvastatin (LIPITOR) 20 MG tablet      No Known Allergies     Social History     Tobacco Use   • Smoking status: Former Smoker     Quit date: 1997     Years since quittin.9   • Smokeless tobacco: Never Used   Vaping Use   • Vaping Use: Never used   Substance Use Topics   • Alcohol use: Yes     Comment: occaisonally   • Drug use: No       Family History   Problem Relation Age of Onset   • Obesity Mother    • Hypertension Mother    • Cancer Mother    • Arthritis Mother    • Diabetes Mother    • Obesity Sister    • Hypertension  "Sister    • Cancer Sister    • Arthritis Sister    • Diabetes Father    • Diabetes Daughter         Objective     BP (!) 152/110   Pulse 63   Ht 160 cm (63\")   Wt 134 kg (295 lb)   BMI 52.26 kg/m²       Physical Exam  Constitutional:       General: He is awake. He is not in acute distress.     Appearance: Normal appearance.   Neck:      Vascular: No carotid bruit, hepatojugular reflux or JVD.   Cardiovascular:      Rate and Rhythm: Normal rate and regular rhythm.      Chest Wall: PMI is not displaced.      Heart sounds: Normal heart sounds, S1 normal and S2 normal. No murmur heard.   No friction rub. No gallop. No S3 or S4 sounds.    Pulmonary:      Effort: Pulmonary effort is normal.      Breath sounds: Normal breath sounds. No wheezing, rhonchi or rales.   Ext.      Bilateral +1-2 lower extremity   skin:     General: Skin is warm and dry.      Coloration: Skin is not cyanotic.      Findings: No petechiae or rash.   Neurological:      Mental Status: He is alert.   Psychiatric:         Behavior: Behavior is cooperative.       Result Review :     No results found for: PROBNP       No results found for: TSH   No results found for: FREET4   No results found for: DDIMERQUANT  No results found for: MG   No results found for: DIGOXIN   No results found for: TROPONINT   No results found for: POCTROP(              ECG 12 Lead    Date/Time: 8/11/2021 3:23 PM  Performed by: Quang Love MD  Authorized by: Quang Love MD   Rhythm: sinus rhythm  Conduction: 1st degree AV block  Other findings: left ventricular hypertrophy              No results found for this or any previous visit.               Diagnoses and all orders for this visit:    1. Essential hypertension (Primary)  Assessment & Plan:  Uncontrolled recommend addition of HCTZ 12.5 to her lisinopril repeat renal panel in 2 weeks.  Counseled patient also low-sodium diet.  Patient though does seem to be confused on medications several overlapping meds noted on her " chart recommended that she bring in her blood pressure medications for review    Orders:  -     Basic Metabolic Panel; Future    2. Sleep apnea, unspecified type    3. Cerebrovascular accident (CVA), unspecified mechanism (CMS/HCC)  Assessment & Plan:  Recommend aspirin 81 daily which can be held 7 days prior to surgery      4. Dyspnea on exertion  Assessment & Plan:  Patient with limiting dyspnea on exertion symptoms for ambulatory ability and multiple risk factors for CAD would recommend a noninvasive nuclear stress test and echocardiogram to evaluate for both CAD and CHF prior to proceeding with surgery.    Orders:  -     Basic Metabolic Panel; Future  -     Stress Test With Myocardial Perfusion Two Day; Future  -     Adult Transthoracic Echo Complete W/ Cont if Necessary Per Protocol; Future    Other orders  -     lisinopril-hydrochlorothiazide (PRINZIDE,ZESTORETIC) 20-12.5 MG per tablet; Take 1 tablet by mouth Daily.  Dispense: 90 tablet; Refill: 3  -     ECG 12 Lead          Follow Up     Return in about 3 months (around 11/11/2021).          Patient was given instructions and counseling regarding her condition or for health maintenance advice. Please see specific information pulled into the AVS if appropriate.

## 2021-08-09 ENCOUNTER — TELEPHONE (OUTPATIENT)
Dept: BARIATRICS/WEIGHT MGMT | Facility: CLINIC | Age: 66
End: 2021-08-09

## 2021-08-09 NOTE — TELEPHONE ENCOUNTER
LM for patient following up with external cardiology referral for cardiac clearance. Asked the patient to call our office back with an update.

## 2021-08-11 ENCOUNTER — OFFICE VISIT (OUTPATIENT)
Dept: CARDIOLOGY | Facility: CLINIC | Age: 66
End: 2021-08-11

## 2021-08-11 VITALS
HEIGHT: 63 IN | HEART RATE: 63 BPM | SYSTOLIC BLOOD PRESSURE: 152 MMHG | WEIGHT: 293 LBS | BODY MASS INDEX: 51.91 KG/M2 | DIASTOLIC BLOOD PRESSURE: 110 MMHG

## 2021-08-11 DIAGNOSIS — R06.09 DYSPNEA ON EXERTION: ICD-10-CM

## 2021-08-11 DIAGNOSIS — I63.9 CEREBROVASCULAR ACCIDENT (CVA), UNSPECIFIED MECHANISM (HCC): ICD-10-CM

## 2021-08-11 DIAGNOSIS — G47.30 SLEEP APNEA, UNSPECIFIED TYPE: ICD-10-CM

## 2021-08-11 DIAGNOSIS — I10 ESSENTIAL HYPERTENSION: Primary | ICD-10-CM

## 2021-08-11 PROBLEM — E78.5 DYSLIPIDEMIA: Status: ACTIVE | Noted: 2021-08-11

## 2021-08-11 PROCEDURE — 93000 ELECTROCARDIOGRAM COMPLETE: CPT | Performed by: INTERNAL MEDICINE

## 2021-08-11 PROCEDURE — 99204 OFFICE O/P NEW MOD 45 MIN: CPT | Performed by: INTERNAL MEDICINE

## 2021-08-11 RX ORDER — GABAPENTIN 800 MG/1
800 TABLET ORAL 3 TIMES DAILY
COMMUNITY
Start: 2021-07-26

## 2021-08-11 RX ORDER — MIRABEGRON 25 MG/1
25 TABLET, FILM COATED, EXTENDED RELEASE ORAL NIGHTLY
COMMUNITY
Start: 2021-07-30

## 2021-08-11 RX ORDER — LISINOPRIL AND HYDROCHLOROTHIAZIDE 20; 12.5 MG/1; MG/1
1 TABLET ORAL DAILY
Qty: 90 TABLET | Refills: 3 | Status: SHIPPED | OUTPATIENT
Start: 2021-08-11 | End: 2023-01-10

## 2021-08-11 NOTE — ASSESSMENT & PLAN NOTE
Uncontrolled recommend addition of HCTZ 12.5 to her lisinopril repeat renal panel in 2 weeks.  Counseled patient also low-sodium diet.  Patient though does seem to be confused on medications several overlapping meds noted on her chart recommended that she bring in her blood pressure medications for review

## 2021-08-11 NOTE — ASSESSMENT & PLAN NOTE
Patient with limiting dyspnea on exertion symptoms for ambulatory ability and multiple risk factors for CAD would recommend a noninvasive nuclear stress test and echocardiogram to evaluate for both CAD and CHF prior to proceeding with surgery.

## 2021-08-27 ENCOUNTER — TELEPHONE (OUTPATIENT)
Dept: CARDIOLOGY | Facility: CLINIC | Age: 66
End: 2021-08-27

## 2021-08-27 NOTE — TELEPHONE ENCOUNTER
----- Message from JORGE Carney sent at 8/23/2021  2:42 PM EDT -----  Notify pt echo result:  Estimated left ventricular EF = 55% Left ventricular systolic function is normal. Trace pericardial effusion present. No valvular disease noted.   Continue current meds. Keep November follow up.

## 2021-08-30 ENCOUNTER — TELEPHONE (OUTPATIENT)
Dept: CARDIOLOGY | Facility: CLINIC | Age: 66
End: 2021-08-30

## 2021-08-30 NOTE — TELEPHONE ENCOUNTER
Spoke with patient regarding overdue lab results.  She states she didn't know she was supposed to have any labs drawn.  She states she had some done at her PCP office Dr [place]burg Reyes.  Requested those results be faxed to our office.

## 2021-09-01 ENCOUNTER — HOSPITAL ENCOUNTER (OUTPATIENT)
Dept: NUCLEAR MEDICINE | Facility: HOSPITAL | Age: 66
Discharge: HOME | End: 2021-09-01

## 2021-09-01 DIAGNOSIS — R06.09 DYSPNEA ON EXERTION: ICD-10-CM

## 2021-09-01 PROCEDURE — 93016 CV STRESS TEST SUPVJ ONLY: CPT | Performed by: NURSE PRACTITIONER

## 2021-09-01 PROCEDURE — 25010000002 REGADENOSON 0.4 MG/5ML SOLUTION

## 2021-09-01 PROCEDURE — 93018 CV STRESS TEST I&R ONLY: CPT | Performed by: INTERNAL MEDICINE

## 2021-09-01 PROCEDURE — 0 TECHNETIUM TETROFOSMIN KIT: Performed by: INTERNAL MEDICINE

## 2021-09-01 PROCEDURE — 78452 HT MUSCLE IMAGE SPECT MULT: CPT

## 2021-09-01 PROCEDURE — 78452 HT MUSCLE IMAGE SPECT MULT: CPT | Performed by: INTERNAL MEDICINE

## 2021-09-01 PROCEDURE — 93017 CV STRESS TEST TRACING ONLY: CPT

## 2021-09-01 PROCEDURE — A9502 TC99M TETROFOSMIN: HCPCS | Performed by: INTERNAL MEDICINE

## 2021-09-01 RX ADMIN — TETROFOSMIN 1 DOSE: 1.38 INJECTION, POWDER, LYOPHILIZED, FOR SOLUTION INTRAVENOUS at 10:41

## 2021-09-01 RX ADMIN — REGADENOSON 0.4 MG: 0.08 INJECTION, SOLUTION INTRAVENOUS at 10:41

## 2021-09-02 ENCOUNTER — HOSPITAL ENCOUNTER (OUTPATIENT)
Dept: NUCLEAR MEDICINE | Facility: HOSPITAL | Age: 66
Discharge: HOME | End: 2021-09-02

## 2021-09-02 PROCEDURE — 0 TECHNETIUM TETROFOSMIN KIT: Performed by: INTERNAL MEDICINE

## 2021-09-02 PROCEDURE — A9502 TC99M TETROFOSMIN: HCPCS | Performed by: INTERNAL MEDICINE

## 2021-09-02 RX ADMIN — TETROFOSMIN 1 DOSE: 1.38 INJECTION, POWDER, LYOPHILIZED, FOR SOLUTION INTRAVENOUS at 09:05

## 2021-09-03 LAB
BH CV IMMEDIATE POST RECOVERY TECH DATA SYMPTOMS: NORMAL
BH CV IMMEDIATE POST TECH DATA BLOOD PRESSURE: NORMAL MMHG
BH CV IMMEDIATE POST TECH DATA HEART RATE: 64 BPM
BH CV IMMEDIATE POST TECH DATA OXYGEN SATS: 99 %
BH CV REST NUCLEAR ISOTOPE DOSE: 33.4 MCI
BH CV SIX MINUTE RECOVERY TECH DATA BLOOD PRESSURE: NORMAL
BH CV SIX MINUTE RECOVERY TECH DATA HEART RATE: 60 BPM
BH CV SIX MINUTE RECOVERY TECH DATA OXYGEN SATURATION: 98 %
BH CV SIX MINUTE RECOVERY TECH DATA SYMPTOMS: NORMAL
BH CV STRESS BP STAGE 1: NORMAL
BH CV STRESS COMMENTS STAGE 1: NORMAL
BH CV STRESS DOSE REGADENOSON STAGE 1: 0.4
BH CV STRESS DURATION MIN STAGE 1: 0
BH CV STRESS DURATION SEC STAGE 1: 10
BH CV STRESS HR STAGE 1: 60
BH CV STRESS NUCLEAR ISOTOPE DOSE: 35.7 MCI
BH CV STRESS O2 STAGE 1: 98
BH CV STRESS PROTOCOL 1: NORMAL
BH CV STRESS RECOVERY BP: NORMAL MMHG
BH CV STRESS RECOVERY HR: 60 BPM
BH CV STRESS RECOVERY O2: 98 %
BH CV STRESS STAGE 1: 1
BH CV THREE MINUTE POST TECH DATA BLOOD PRESSURE: NORMAL MMHG
BH CV THREE MINUTE POST TECH DATA HEART RATE: 63 BPM
BH CV THREE MINUTE POST TECH DATA OXYGEN SATURATION: 99 %
BH CV THREE MINUTE RECOVERY TECH DATA SYMPTOM: NORMAL
LV EF NUC BP: 50 %
MAXIMAL PREDICTED HEART RATE: 156 BPM
PERCENT MAX PREDICTED HR: 41.67 %
STRESS BASELINE BP: NORMAL MMHG
STRESS BASELINE HR: 55 BPM
STRESS O2 SAT REST: 98 %
STRESS PERCENT HR: 49 %
STRESS POST O2 SAT PEAK: 99 %
STRESS POST PEAK BP: NORMAL MMHG
STRESS POST PEAK HR: 65 BPM
STRESS TARGET HR: 133 BPM

## 2021-09-07 ENCOUNTER — TELEPHONE (OUTPATIENT)
Dept: CARDIOLOGY | Facility: CLINIC | Age: 66
End: 2021-09-07

## 2021-09-07 NOTE — TELEPHONE ENCOUNTER
----- Message from JORGE Carney sent at 9/3/2021 11:56 PM EDT -----  Notify pt stress test negative, keep November follow up

## 2021-09-09 ENCOUNTER — OFFICE VISIT (OUTPATIENT)
Dept: ORTHOPEDIC SURGERY | Facility: CLINIC | Age: 66
End: 2021-09-09

## 2021-09-09 VITALS — HEIGHT: 63 IN | WEIGHT: 287 LBS | OXYGEN SATURATION: 96 % | HEART RATE: 66 BPM | BODY MASS INDEX: 50.85 KG/M2

## 2021-09-09 DIAGNOSIS — M17.0 BILATERAL PRIMARY OSTEOARTHRITIS OF KNEE: Primary | ICD-10-CM

## 2021-09-09 PROCEDURE — 99214 OFFICE O/P EST MOD 30 MIN: CPT | Performed by: ORTHOPAEDIC SURGERY

## 2021-09-09 NOTE — PROGRESS NOTES
"Chief Complaint  Pain of the Left Knee and Pain of the Right Knee     Subjective      Marilin Martinez presents to [facility] for an evaluation of bilateral knees. Patient comes in using a walker for ambulation assistance. Patient had advancing degenerative changes of bilateral knees that she has treated conservatively. She has treated her knees conservatively with medication and injections. She states the last injections she has received gave her little relief. She states that her right knee is worse than the left. She states every step she takes is painful. Patient had a stress test done because she is going to have surgery for a gastric sleeve.     No Known Allergies     Social History     Socioeconomic History   • Marital status:      Spouse name: Not on file   • Number of children: Not on file   • Years of education: Not on file   • Highest education level: Not on file   Tobacco Use   • Smoking status: Former Smoker     Quit date: 1997     Years since quittin.0   • Smokeless tobacco: Never Used   Vaping Use   • Vaping Use: Never used   Substance and Sexual Activity   • Alcohol use: Yes     Comment: occaisonally   • Drug use: No   • Sexual activity: Defer        Review of Systems     Objective   Vital Signs:   Pulse 66   Ht 160 cm (63\")   Wt 130 kg (287 lb)   SpO2 96%   BMI 50.84 kg/m²       Physical Exam  Constitutional:       Appearance: Normal appearance. Patient is well-developed and normal weight.   HENT:      Head: Normocephalic.      Right Ear: Hearing and external ear normal.      Left Ear: Hearing and external ear normal.      Nose: Nose normal.   Eyes:      Conjunctiva/sclera: Conjunctivae normal.   Cardiovascular:      Rate and Rhythm: Normal rate.   Pulmonary:      Effort: Pulmonary effort is normal.      Breath sounds: No wheezing or rales.   Abdominal:      Palpations: Abdomen is soft.      Tenderness: There is no abdominal tenderness. "   Musculoskeletal:      Cervical back: Normal range of motion.   Skin:     Findings: No rash.   Neurological:      Mental Status: Patient is alert and oriented to person, place, and time.   Psychiatric:         Mood and Affect: Mood and affect normal.         Judgment: Judgment normal.       Ortho Exam      RIGHT KNEE: Tender medial and lateral joint line. Good strength to hamstrings, quadriceps, dorsiflexors and plantar flexors. Sensation grossly intact. Neurovascular intact. Calf supple, non-tender. Skin intact. Positive crepitus. -2 degrees of extension. Flexion to 100 degrees. Ambulation assistance with a walker. No swelling or skin discoloration. Antalgic gait.     LEFT KNEE: Good strength to hamstrings, quadriceps, dorsiflexors and plantar flexors. Sensation grossly intact. Neurovascular intact. Dorsal Pedal Pulse 2+, posterior tibialis pulse 2+. Calf supple, non-tender. Full extension. Flexion to 105 degrees. Tender medial and lateral joint line. Stable to varus/valgus stress. Negative Lachman. Positive crepitus. Antalgic gait.       Procedures      Imaging Results (Most Recent)     Procedure Component Value Units Date/Time    XR Knee 3 View Bilateral [[id number]] Resulted: 09/09/21 1035     Updated: 09/09/21 1035    Narrative:      X-Ray Report:  Bilateral knee(s) X-Ray  Indication: Evaluation of bilateral knees   AP, Lateral and Standing view(s)  Findings: bone-on-bone osteoarthritis of bilateral knees.   Prior studies available for comparison: yes            Result Review :       X-Ray Report:  Bilateral knee(s) X-Ray  Indication: Evaluation of bilateral knees   AP, Lateral and Standing view(s)  Findings: bone-on-bone osteoarthritis of bilateral knees.   Prior studies available for comparison: yes          Assessment and Plan     DX: Bilateral knee osteoarthritis     Patient is a candidate for a total knee replacement however patient is planning to get a gastric sleeve. She wishes to proceed with gastric  sleeve first. She will continue conservative management at this time. She is told she can't have steroid injections if she is proceeding with a gastric sleeve surgery soon. If she is able to receive it before surgery, she will call us back.     Educated on risk of elevated BMI related to procedure.  Discussed options for weight loss/decreasing BMI prior to procedure including dietician consult, weight loss options and exercise program., Discussed surgery., Risks/benefits discussed with patient including, but not limited to: infection, bleeding, neurovascular damage, malunion, nonunion, aesthetic deformity, need for further surgery, and death., Discussed with patient the implant type being used during surgery and patient understands and desires to proceed. and Call or return if worsening symptoms.    Follow Up     PRN.       Patient was given instructions and counseling regarding her condition or for health maintenance advice. Please see specific information pulled into the AVS if appropriate.     Scribed for Preeti Frazier MD by Lydia Fay.  09/09/21   08:32 EDT

## 2021-09-15 ENCOUNTER — TELEPHONE (OUTPATIENT)
Dept: CARDIOLOGY | Facility: CLINIC | Age: 66
End: 2021-09-15

## 2021-09-15 NOTE — TELEPHONE ENCOUNTER
Cardiac Clearance and Medication Directive Request:    Procedure: Endoscopy and gastric sleeve       Medication: aspirin     Hx: HTN, CVA,     Testin21, SPECT: normal  21, Echo: normal          Can patient be cleared and hold medication?

## 2021-09-16 ENCOUNTER — TELEPHONE (OUTPATIENT)
Dept: BARIATRICS/WEIGHT MGMT | Facility: CLINIC | Age: 66
End: 2021-09-16

## 2021-09-27 ENCOUNTER — TELEPHONE (OUTPATIENT)
Dept: CARDIOLOGY | Facility: CLINIC | Age: 66
End: 2021-09-27

## 2021-09-27 ENCOUNTER — HOSPITAL ENCOUNTER (OUTPATIENT)
Dept: GENERAL RADIOLOGY | Facility: HOSPITAL | Age: 66
Discharge: HOME | End: 2021-09-27
Admitting: NURSE PRACTITIONER

## 2021-09-27 PROCEDURE — 71046 X-RAY EXAM CHEST 2 VIEWS: CPT

## 2021-09-29 ENCOUNTER — OFFICE VISIT (OUTPATIENT)
Dept: PODIATRY | Facility: CLINIC | Age: 66
End: 2021-09-29

## 2021-09-29 VITALS
OXYGEN SATURATION: 98 % | BODY MASS INDEX: 51.38 KG/M2 | WEIGHT: 290 LBS | SYSTOLIC BLOOD PRESSURE: 171 MMHG | HEART RATE: 67 BPM | HEIGHT: 63 IN | DIASTOLIC BLOOD PRESSURE: 80 MMHG | TEMPERATURE: 97.1 F

## 2021-09-29 DIAGNOSIS — L60.0 ONYCHOCRYPTOSIS: ICD-10-CM

## 2021-09-29 DIAGNOSIS — M79.671 FOOT PAIN, BILATERAL: Primary | ICD-10-CM

## 2021-09-29 DIAGNOSIS — E11.9 NON-INSULIN DEPENDENT TYPE 2 DIABETES MELLITUS (HCC): ICD-10-CM

## 2021-09-29 DIAGNOSIS — M79.672 FOOT PAIN, BILATERAL: Primary | ICD-10-CM

## 2021-09-29 DIAGNOSIS — B35.1 ONYCHOMYCOSIS: ICD-10-CM

## 2021-09-29 DIAGNOSIS — E11.8 DIABETIC FOOT (HCC): ICD-10-CM

## 2021-09-29 PROCEDURE — 11721 DEBRIDE NAIL 6 OR MORE: CPT | Performed by: PODIATRIST

## 2021-09-29 RX ORDER — ROPINIROLE 0.5 MG/1
0.5 TABLET, FILM COATED ORAL NIGHTLY
COMMUNITY
Start: 2021-09-04

## 2021-09-29 RX ORDER — HYDROCODONE BITARTRATE AND ACETAMINOPHEN 10; 325 MG/1; MG/1
1 TABLET ORAL EVERY 8 HOURS PRN
Status: ON HOLD | COMMUNITY
Start: 2021-09-25 | End: 2023-02-06 | Stop reason: SDUPTHER

## 2021-09-29 NOTE — PROGRESS NOTES
[facility] - PODIATRY    Today's Date: 09/29/21    Patient Name: Marilin Martinez  MRN: [id number]  CSN: [id number]  PCP: Reyes, Rosenberg Acosta, MD  Referring Provider: No ref. provider found    SUBJECTIVE     Chief Complaint   Patient presents with   • Left Foot - Nail Problem   • Right Foot - Nail Problem     HPI: Marilin Martinez, a 66 y.o.female, comes to clinic.    New, Established, New Problem:  established     Location:  Toenails    Duration:   Greater than five years    Onset:  Gradual    Nature:  sore with palpation.    Stable, worsening, improving:   Worsening    Aggravating factors:  Pain with shoe gear and ambulation.    Previous Treatment:  debridement  __________________________________    New, Established, New Problem:  Established    Location:  bilateral feet    Duration:    Onset:  gradual    Nature:   NIDDM     Stable, worsening, improving:  stable    Patient reported last blood glucose: 139  __________________________________    Patient reports the following medical changes since their last visit: Being considered for gastric surgery for weight loss    No other pedal complaints at this time.    Patient denies any fevers, chills, nausea, vomiting, shortness of breathe, nor any other constitutional signs nor symptoms.       Last PCP Visit:  Reyes, Rosenberg Acosta, MD, mid August 2021    Past Medical History:   Diagnosis Date   • Arthritis    • COPD (chronic obstructive pulmonary disease) (CMS/HCC)    • Diabetes mellitus (CMS/HCC)    • Disease of thyroid gland    • Dyspnea on exertion 8/11/2021   • Foot ulcer (CMS/HCC)    • Hyperlipemia    • Hyperlipidemia    • Hypertension    • Ingrown toenail    • Limb swelling    • Seasonal allergies    • Sleep apnea    • 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CMS/HCC)    • Thyroid disorder      Past Surgical History:   Procedure Laterality Date   • COLONOSCOPY     • GALLBLADDER SURGERY  1997   • TONSILLECTOMY       Family History   Problem Relation Age of  Onset   • Obesity Mother    • Hypertension Mother    • Cancer Mother    • Arthritis Mother    • Diabetes Mother    • Obesity Sister    • Hypertension Sister    • Cancer Sister    • Arthritis Sister    • Diabetes Father    • Diabetes Daughter      Social History     Socioeconomic History   • Marital status:      Spouse name: Not on file   • Number of children: Not on file   • Years of education: Not on file   • Highest education level: Not on file   Tobacco Use   • Smoking status: Former Smoker     Quit date: 1997     Years since quittin.0   • Smokeless tobacco: Never Used   Vaping Use   • Vaping Use: Never used   Substance and Sexual Activity   • Alcohol use: Yes     Comment: occaisonally   • Drug use: No   • Sexual activity: Defer     No Known Allergies  Current Outpatient Medications   Medication Sig Dispense Refill   • ALPRAZolam (XANAX) 1 MG tablet Take 1 mg by mouth 3 (Three) Times a Day As Needed for Anxiety.     • bumetanide (BUMEX) 1 MG tablet Take 1 mg by mouth Daily As Needed.     • celecoxib (CeleBREX) 200 MG capsule Take 200 mg by mouth Daily.     • cyclobenzaprine (FLEXERIL) 10 MG tablet cyclobenzaprine 10 mg oral tablet take 1 tablet (10 mg) by oral route 2 times per day as needed   Active     • Diclofenac Sodium (VOLTAREN) 1 % gel gel APPLY 4 GRAMS TO AFFECTED AREA FOUR TIMES DAILY     • fluticasone (FLONASE) 50 MCG/ACT nasal spray 1 spray into the nostril(s) as directed by provider Daily As Needed.     • gabapentin (NEURONTIN) 800 MG tablet Take 800 mg by mouth 2 (Two) Times a Day.     • HYDROcodone-acetaminophen (NORCO)  MG per tablet 1 tablet Every 8 (Eight) Hours As Needed.     • ibuprofen (ADVIL,MOTRIN) 600 MG tablet Take 600 mg by mouth Every 6 (Six) Hours As Needed for Mild Pain .     • latanoprost (XALATAN) 0.005 % ophthalmic solution Administer 1 drop to both eyes Every Night.     • Levothyroxine Sodium 200 MCG capsule Take 200 mcg by mouth Daily.     •  lisinopril-hydrochlorothiazide (PRINZIDE,ZESTORETIC) 20-12.5 MG per tablet Take 1 tablet by mouth Daily. 90 tablet 3   • Myrbetriq 25 MG tablet sustained-release 24 hour 24 hr tablet Take 25 mg by mouth Daily.     • nebivolol (BYSTOLIC) 10 MG tablet Take 10 mg by mouth Daily.     • Ozempic, 0.25 or 0.5 MG/DOSE, 2 MG/1.5ML solution pen-injector INJECT 0.5MG SUBQ ONCE WEEKLY     • pioglitazone (ACTOS) 30 MG tablet Take 30 mg by mouth Daily. Every other day     • pitavastatin calcium (LIVALO) 1 MG tablet tablet Take 1 mg by mouth Daily.     • potassium chloride (KLOR-CON) 8 MEQ CR tablet Take 20 mEq by mouth Daily.     • rOPINIRole (REQUIP) 0.5 MG tablet Take 0.5 mg by mouth Daily.     • SITagliptin (JANUVIA) 100 MG tablet Take 100 mg by mouth Daily.     • amLODIPine (NORVASC) 10 MG tablet Take 1 tablet by mouth Daily. 30 tablet 0     No current facility-administered medications for this visit.     Review of Systems   Constitutional: Negative.    Skin:        Painful toenails.   All other systems reviewed and are negative.      OBJECTIVE     Vitals:    09/29/21 0933   BP: 171/80   Pulse: 67   Temp: 97.1 °F (36.2 °C)   SpO2: 98%       Lab Results   Component Value Date    HGBA1C 5.54 09/11/2017       Lab Results   Component Value Date    GLUCOSE 103 (H) 09/13/2017    CALCIUM 8.6 09/13/2017     09/13/2017    K 3.9 09/13/2017    CO2 23.5 09/13/2017     09/13/2017    BUN 14 09/13/2017    CREATININE 0.93 09/13/2017    EGFRIFNONA 61 09/13/2017    BCR 15.1 09/13/2017    ANIONGAP 13.5 09/13/2017       Patient seen in no apparent distress.      PHYSICAL EXAM:     Foot/Ankle Exam:       General:   Appearance: obesity    Orientation: AAOx3    Affect: appropriate    Assistance: walker    Shoe Gear:  Sandals    VASCULAR      Right Foot Vascularity   Normal vascular exam    Dorsalis pedis:  2+  Posterior tibial:  2+  Skin Temperature: warm    Edema Grading:  None  CFT:  < 3 seconds  Pedal Hair Growth:   Present  Varicosities: none       Left Foot Vascularity   Normal vascular exam    Dorsalis pedis:  2+  Posterior tibial:  2+  Skin Temperature: warm    Edema Grading:  None  CFT:  < 3 seconds  Pedal Hair Growth:  Present  Varicosities: none        NEUROLOGIC     Right Foot Neurologic   Normal sensation    Light touch sensation:  Normal  Vibratory sensation:  Normal  Hot/Cold sensation: normal    Protective Sensation using Arlee-Fernanda Monofilament:  10     Left Foot Neurologic   Normal sensation    Light touch sensation:  Normal  Vibratory sensation:  Normal  Hot/cold sensation: normal    Protective Sensation using Arlee-Fernanda Monofilament:  10     MUSCLE STRENGTH     Right Foot Muscle Strength   Normal strength    Foot dorsiflexion:  5  Foot plantar flexion:  5  Foot inversion:  5  Foot eversion:  5     Left Foot Muscle Strength   Normal strength    Foot dorsiflexion:  5  Foot plantar flexion:  5  Foot inversion:  5  Foot eversion:  5     RANGE OF MOTION      Right Foot Range of Motion   Foot and ankle ROM within normal limits       Left Foot Range of Motion   Foot and ankle ROM within normal limits       DERMATOLOGIC     Right Foot Dermatologic   Skin: skin intact    Nails: onychomycosis, abnormally thick, subungual debris, dystrophic nails and ingrown toenail    Nails comment:  Toenails 1, 2, 3, 4, and 5     Left Foot Dermatologic   Skin: skin intact    Nails: onychomycosis, abnormally thick, subungual debris, dystrophic nails and ingrown toenail    Nails comment:  Toenails 1, 2, 3, 4, and 5      Diabetic Foot Exam Performed    ASSESSMENT/PLAN     Diagnoses and all orders for this visit:    1. Foot pain, bilateral (Primary)    2. Onychomycosis    3. Onychocryptosis    4. Diabetic foot (HCC)    5. Non-insulin dependent type 2 diabetes mellitus (HCC)        Comprehensive lower extremity examination and evaluation was performed.    Discussed findings and treatment plan including risks, benefits, and  treatment options with patient in detail. Patient agreed with treatment plan.    Toenails 1 through 5 bilaterally were debrided in thickness and length and then smoothed with a Dremel Tool.  Tolerated the procedure well without complications.    Diabetic foot exam performed and documented this date, compliant with CQM required standards. Detail of findings as noted in physical exam.  Lower extremity Neurologic exam for diabetic patient performed and documented this date, compliant with PQRS required standards. Detail of findings as noted in physical exam.  Advised patient importance of good routine lower extremity hygiene. Advised patient importance of evaluating for intact skin and pain free nail borders.  Advised patient to use mirror to evaluate plantar/ soles of feet for better visualization. Advised patient monitor and phone office to be seen if any cracking to skin, open lesions, painful nail borders or if nails become elongated prior to next visit. Advised patient importance of daily cleansing of lower extremities, followed by good skin cream to maintain normal hydration of skin. Also advised patient importance of close daily monitoring of blood sugar. Advised to regulate diet and medications to maintain control of blood sugar in optimal range. Contact primary care provider if difficulties maintaining blood sugar levels.  Advised Patient of presence of Diabetes Mellitus condition.  Advised Patient risk of progression and worsening or improvement, then return of condition.  Will monitor condition for any change in future. Treat with most appropriate treatment pending status of condition.  Counseled and advised patient extensively on nature and ramifications of diabetes. Standard instructions given to patient for good diabetic foot care and maintenance. Advised importance of careful monitoring to avoid break down and complications secondary to diabetes. Advised patient importance of strict maintenance of blood  sugar control. Advised patient of possible ominous results from neglect of condition, i.e.: amputation/ loss of digits, feet and legs, or even death.  Patient states understands counseling, will monitor closely, continue good hygiene and routine diabetic foot care. Patient will contact office is questions or problems.      An After Visit Summary was printed and given to the patient at discharge, including (if requested) any available informative/educational handouts regarding diagnosis, treatment, or medications. All questions were answered to patient/family satisfaction. Should symptoms fail to improve or worsen they agree to call or return to clinic or to go to the Emergency Department. Discussed the importance of following up with any needed screening tests/labs/specialist appointments and any requested follow-up recommended by me today. Importance of maintaining follow-up discussed and patient accepts that missed appointments can delay diagnosis and potentially lead to worsening of conditions.    Return in about 9 weeks (around 12/1/2021) for Toenail Care., or sooner if acute issues arise.    This document has been electronically signed by Andrew Fulton DPM on September 29, 2021 09:55 EDT

## 2021-10-20 DIAGNOSIS — R63.5 ABNORMAL WEIGHT GAIN: ICD-10-CM

## 2021-10-20 DIAGNOSIS — R79.9 ABNORMAL FINDING OF BLOOD CHEMISTRY, UNSPECIFIED: ICD-10-CM

## 2021-10-22 ENCOUNTER — OFFICE VISIT (OUTPATIENT)
Dept: ORTHOPEDIC SURGERY | Facility: CLINIC | Age: 66
End: 2021-10-22

## 2021-10-22 VITALS — WEIGHT: 287 LBS | OXYGEN SATURATION: 97 % | HEIGHT: 63 IN | BODY MASS INDEX: 50.85 KG/M2 | HEART RATE: 91 BPM

## 2021-10-22 DIAGNOSIS — M17.0 BILATERAL PRIMARY OSTEOARTHRITIS OF KNEE: Primary | ICD-10-CM

## 2021-10-22 PROCEDURE — 99213 OFFICE O/P EST LOW 20 MIN: CPT | Performed by: ORTHOPAEDIC SURGERY

## 2021-10-22 PROCEDURE — 20610 DRAIN/INJ JOINT/BURSA W/O US: CPT | Performed by: ORTHOPAEDIC SURGERY

## 2021-10-22 RX ADMIN — TRIAMCINOLONE ACETONIDE 40 MG: 40 INJECTION, SUSPENSION INTRA-ARTICULAR; INTRAMUSCULAR at 09:48

## 2021-10-22 RX ADMIN — LIDOCAINE HYDROCHLORIDE 9 ML: 10 INJECTION, SOLUTION INFILTRATION; PERINEURAL at 09:48

## 2021-10-22 NOTE — PROGRESS NOTES
"Chief Complaint  Follow-up of the Left Knee and Follow-up of the Right Knee     Subjective      Marilin Martinez presents to [facility] for a follow-up of bilateral knee pain. Patient has a history of bilateral knee osteoarthritis. Patient is scheduled for gastric surgery. She got clearance from her surgeons to received bilateral knee injections as she is far enough out to receive them. She is present today using a walker today. Today her right knee is worse than the left.     No Known Allergies     Social History     Socioeconomic History   • Marital status:    Tobacco Use   • Smoking status: Former Smoker     Quit date: 1997     Years since quittin.1   • Smokeless tobacco: Never Used   Vaping Use   • Vaping Use: Never used   Substance and Sexual Activity   • Alcohol use: Yes     Comment: occaisonally   • Drug use: No   • Sexual activity: Defer        Review of Systems     Objective   Vital Signs:   Pulse 91   Ht 160 cm (63\")   Wt 130 kg (287 lb)   SpO2 97%   BMI 50.84 kg/m²       Physical Exam  Constitutional:       Appearance: Normal appearance. Patient is well-developed and normal weight.   HENT:      Head: Normocephalic.      Right Ear: Hearing and external ear normal.      Left Ear: Hearing and external ear normal.      Nose: Nose normal.   Eyes:      Conjunctiva/sclera: Conjunctivae normal.   Cardiovascular:      Rate and Rhythm: Normal rate.   Pulmonary:      Effort: Pulmonary effort is normal.      Breath sounds: No wheezing or rales.   Abdominal:      Palpations: Abdomen is soft.      Tenderness: There is no abdominal tenderness.   Musculoskeletal:      Cervical back: Normal range of motion.   Skin:     Findings: No rash.   Neurological:      Mental Status: Patient is alert and oriented to person, place, and time.   Psychiatric:         Mood and Affect: Mood and affect normal.         Judgment: Judgment normal.       Ortho Exam      RIGHT KNEE: Flexion to " 100 degrees. -5 degrees of extension. Tender medial and lateral joint line. Skin intact. Ambulation with a walker. Sensation grossly intact. Neurovascular intact.  Good strength to hamstrings, quadriceps, dorsiflexors and plantar flexors. Stable to varus/valgus stress. Negative lachman. Skin intact. Calf supple, non-tender, no signs of DVT. No swelling, skin discoloration or atrophy.     LEFT KNEE: -2 degrees of extension. Flexion to 100 degrees. Tender medial and lateral joint line. Ambulation with walker. Good strength to hamstrings, quadriceps, dorsiflexors and plantar flexors. Stable to varus/valgus stress. Negative lachman. Dorsal Pedal Pulse 2+, posterior tibialis pulse 2+. Calf supple, non-tender, no signs of DVT. Sensation grossly intact. Neurovascular intact.        Large Joint Arthrocentesis: R knee  Date/Time: 10/22/2021 9:48 AM  Consent given by: patient  Site marked: site marked  Timeout: Immediately prior to procedure a time out was called to verify the correct patient, procedure, equipment, support staff and site/side marked as required   Supporting Documentation  Indications: pain   Procedure Details  Location: knee - R knee  Needle gauge: 21G.  Medications administered: 9 mL lidocaine 1 %; 40 mg triamcinolone acetonide 40 MG/ML  Patient tolerance: patient tolerated the procedure well with no immediate complications            Imaging Results (Most Recent)     None           Result Review :              Assessment and Plan     DX: Bilateral knee osteoarthritis     Patient is diabetic but her sugar levels are under control. She was given a right knee steroid injection and tolerated this well. She will follow-up to get a left knee steroid injection.     Call or return if worsening symptoms.    Follow Up     10-14 days for left knee injection.       Patient was given instructions and counseling regarding her condition or for health maintenance advice. Please see specific information pulled into the AVS  if appropriate.     Scribed for Preeti Frazier MD by Lydia Fay.  10/22/21   09:36 EDT        I have personally performed the services described in this document as scribed by the above individual and it is both accurate and complete. Preeti Frazier MD 10/23/21

## 2021-10-23 RX ORDER — LIDOCAINE HYDROCHLORIDE 10 MG/ML
9 INJECTION, SOLUTION INFILTRATION; PERINEURAL
Status: COMPLETED | OUTPATIENT
Start: 2021-10-22 | End: 2021-10-22

## 2021-10-23 RX ORDER — TRIAMCINOLONE ACETONIDE 40 MG/ML
40 INJECTION, SUSPENSION INTRA-ARTICULAR; INTRAMUSCULAR
Status: COMPLETED | OUTPATIENT
Start: 2021-10-22 | End: 2021-10-22

## 2021-11-01 ENCOUNTER — DOCUMENTATION (OUTPATIENT)
Dept: PHYSICAL THERAPY | Facility: CLINIC | Age: 66
End: 2021-11-01

## 2021-11-01 DIAGNOSIS — M17.10 ARTHRITIS OF KNEE: ICD-10-CM

## 2021-11-01 DIAGNOSIS — G89.29 CHRONIC PAIN OF BOTH KNEES: ICD-10-CM

## 2021-11-01 DIAGNOSIS — M25.561 CHRONIC PAIN OF BOTH KNEES: ICD-10-CM

## 2021-11-01 DIAGNOSIS — M25.562 CHRONIC PAIN OF BOTH KNEES: ICD-10-CM

## 2021-11-01 DIAGNOSIS — R29.898 WEAKNESS OF BOTH LEGS: Primary | ICD-10-CM

## 2021-11-01 NOTE — PROGRESS NOTES
Discharge Summary  Discharge Summary from Physical Therapy Report    Patient Information  Marilin Martinez  1955    Comment: Pt is being discharge as insurance denies to pay for additional visits.    Goals: Partially Met    Visit Diagnoses:    ICD-10-CM ICD-9-CM   1. Weakness of both legs  R29.898 729.89   2. Chronic pain of both knees  M25.561 719.46    M25.562 338.29    G89.29    3. Arthritis of knee  M17.10 716.96       Discharge Plan: Continue with current home exercise program as instructed    Date of Discharge 10/2021        Patt Payan, PT  Physical Therapist    Electronically Signed 11/1/2021    [place] License: [id number]

## 2021-11-02 ENCOUNTER — OFFICE VISIT (OUTPATIENT)
Dept: ORTHOPEDIC SURGERY | Facility: CLINIC | Age: 66
End: 2021-11-02

## 2021-11-02 VITALS — HEIGHT: 63 IN | OXYGEN SATURATION: 99 % | WEIGHT: 287 LBS | BODY MASS INDEX: 50.85 KG/M2 | HEART RATE: 63 BPM

## 2021-11-02 DIAGNOSIS — M17.12 PRIMARY OSTEOARTHRITIS OF LEFT KNEE: Primary | ICD-10-CM

## 2021-11-02 PROCEDURE — 20610 DRAIN/INJ JOINT/BURSA W/O US: CPT | Performed by: ORTHOPAEDIC SURGERY

## 2021-11-02 RX ADMIN — TRIAMCINOLONE ACETONIDE 40 MG: 40 INJECTION, SUSPENSION INTRA-ARTICULAR; INTRAMUSCULAR at 10:56

## 2021-11-02 RX ADMIN — LIDOCAINE HYDROCHLORIDE 9 ML: 10 INJECTION, SOLUTION INFILTRATION; PERINEURAL at 10:56

## 2021-11-02 NOTE — PROGRESS NOTES
"Chief Complaint  Follow-up of the Left Knee     Subjective      Marilin Martinez presents to [facility] for a follow-up of left knee.  Patient has a history of bilateral knee osteoarthritis. Patient is scheduled for gastric surgery. She got clearance from her surgeons to received bilateral knee injections as she is far enough out to receive them. She is using a walker for ambulation assistance. 2 weeks ago the right knee received an injection that has been giving her relief.     No Known Allergies     Social History     Socioeconomic History   • Marital status:    Tobacco Use   • Smoking status: Former Smoker     Quit date: 1997     Years since quittin.1   • Smokeless tobacco: Never Used   Vaping Use   • Vaping Use: Never used   Substance and Sexual Activity   • Alcohol use: Yes     Comment: occaisonally   • Drug use: No   • Sexual activity: Defer        Review of Systems     Objective   Vital Signs:   Pulse 63   Ht 160 cm (63\")   Wt 130 kg (287 lb)   SpO2 99%   BMI 50.84 kg/m²       Physical Exam  Constitutional:       Appearance: Normal appearance. Patient is well-developed and normal weight.   HENT:      Head: Normocephalic.      Right Ear: Hearing and external ear normal.      Left Ear: Hearing and external ear normal.      Nose: Nose normal.   Eyes:      Conjunctiva/sclera: Conjunctivae normal.   Cardiovascular:      Rate and Rhythm: Normal rate.   Pulmonary:      Effort: Pulmonary effort is normal.      Breath sounds: No wheezing or rales.   Abdominal:      Palpations: Abdomen is soft.      Tenderness: There is no abdominal tenderness.   Musculoskeletal:      Cervical back: Normal range of motion.   Skin:     Findings: No rash.   Neurological:      Mental Status: Patient is alert and oriented to person, place, and time.   Psychiatric:         Mood and Affect: Mood and affect normal.         Judgment: Judgment normal.       Ortho Exam      LEFT KNEE: -2 " degrees of extension. Flexion to 100 degrees. Tender medial and lateral joint line. Ambulation with walker. Good strength to hamstrings, quadriceps, dorsiflexors and plantar flexors. Stable to varus/valgus stress. Negative lachman. Dorsal Pedal Pulse 2+, posterior tibialis pulse 2+. Calf supple, non-tender, no signs of DVT. Sensation grossly intact. Neurovascular intact. Antalgic gait.       Large Joint Arthrocentesis: L knee  Date/Time: 11/2/2021 10:56 AM  Consent given by: patient  Site marked: site marked  Timeout: Immediately prior to procedure a time out was called to verify the correct patient, procedure, equipment, support staff and site/side marked as required   Supporting Documentation  Indications: pain   Procedure Details  Location: knee - L knee  Needle gauge: 21G.  Medications administered: 9 mL lidocaine 1 %; 40 mg triamcinolone acetonide 40 MG/ML  Patient tolerance: patient tolerated the procedure well with no immediate complications            Imaging Results (Most Recent)     None           Result Review :         No results found.        Assessment and Plan     DX: Left knee osteoarthritis     Patient given a left knee steroid injection and tolerated this well.     Call or return if worsening symptoms.    Follow Up     PRN.       Patient was given instructions and counseling regarding her condition or for health maintenance advice. Please see specific information pulled into the AVS if appropriate.     Scribed for Preeti Frazier MD by Lydia Fay.  11/02/21   10:21 EDT        I have personally performed the services described in this document as scribed by the above individual and it is both accurate and complete. Preeti Frazier MD 11/03/21

## 2021-11-02 NOTE — H&P (VIEW-ONLY)
"Chief Complaint  Follow-up of the Left Knee     Subjective      Marilin Martinez presents to [facility] for a follow-up of left knee.  Patient has a history of bilateral knee osteoarthritis. Patient is scheduled for gastric surgery. She got clearance from her surgeons to received bilateral knee injections as she is far enough out to receive them. She is using a walker for ambulation assistance. 2 weeks ago the right knee received an injection that has been giving her relief.     No Known Allergies     Social History     Socioeconomic History   • Marital status:    Tobacco Use   • Smoking status: Former Smoker     Quit date: 1997     Years since quittin.1   • Smokeless tobacco: Never Used   Vaping Use   • Vaping Use: Never used   Substance and Sexual Activity   • Alcohol use: Yes     Comment: occaisonally   • Drug use: No   • Sexual activity: Defer        Review of Systems     Objective   Vital Signs:   Pulse 63   Ht 160 cm (63\")   Wt 130 kg (287 lb)   SpO2 99%   BMI 50.84 kg/m²       Physical Exam  Constitutional:       Appearance: Normal appearance. Patient is well-developed and normal weight.   HENT:      Head: Normocephalic.      Right Ear: Hearing and external ear normal.      Left Ear: Hearing and external ear normal.      Nose: Nose normal.   Eyes:      Conjunctiva/sclera: Conjunctivae normal.   Cardiovascular:      Rate and Rhythm: Normal rate.   Pulmonary:      Effort: Pulmonary effort is normal.      Breath sounds: No wheezing or rales.   Abdominal:      Palpations: Abdomen is soft.      Tenderness: There is no abdominal tenderness.   Musculoskeletal:      Cervical back: Normal range of motion.   Skin:     Findings: No rash.   Neurological:      Mental Status: Patient is alert and oriented to person, place, and time.   Psychiatric:         Mood and Affect: Mood and affect normal.         Judgment: Judgment normal.       Ortho Exam      LEFT KNEE: -2 " degrees of extension. Flexion to 100 degrees. Tender medial and lateral joint line. Ambulation with walker. Good strength to hamstrings, quadriceps, dorsiflexors and plantar flexors. Stable to varus/valgus stress. Negative lachman. Dorsal Pedal Pulse 2+, posterior tibialis pulse 2+. Calf supple, non-tender, no signs of DVT. Sensation grossly intact. Neurovascular intact. Antalgic gait.       Large Joint Arthrocentesis: L knee  Date/Time: 11/2/2021 10:56 AM  Consent given by: patient  Site marked: site marked  Timeout: Immediately prior to procedure a time out was called to verify the correct patient, procedure, equipment, support staff and site/side marked as required   Supporting Documentation  Indications: pain   Procedure Details  Location: knee - L knee  Needle gauge: 21G.  Medications administered: 9 mL lidocaine 1 %; 40 mg triamcinolone acetonide 40 MG/ML  Patient tolerance: patient tolerated the procedure well with no immediate complications            Imaging Results (Most Recent)     None           Result Review :         No results found.        Assessment and Plan     DX: Left knee osteoarthritis     Patient given a left knee steroid injection and tolerated this well.     Call or return if worsening symptoms.    Follow Up     PRN.       Patient was given instructions and counseling regarding her condition or for health maintenance advice. Please see specific information pulled into the AVS if appropriate.     Scribed for Preeti Frazier MD by Lydia Fay.  11/02/21   10:21 EDT        I have personally performed the services described in this document as scribed by the above individual and it is both accurate and complete. Preeti Frazier MD 11/03/21

## 2021-11-03 RX ORDER — TRIAMCINOLONE ACETONIDE 40 MG/ML
40 INJECTION, SUSPENSION INTRA-ARTICULAR; INTRAMUSCULAR
Status: COMPLETED | OUTPATIENT
Start: 2021-11-02 | End: 2021-11-02

## 2021-11-03 RX ORDER — LIDOCAINE HYDROCHLORIDE 10 MG/ML
9 INJECTION, SOLUTION INFILTRATION; PERINEURAL
Status: COMPLETED | OUTPATIENT
Start: 2021-11-02 | End: 2021-11-02

## 2021-11-08 ENCOUNTER — LAB (OUTPATIENT)
Dept: LAB | Facility: HOSPITAL | Age: 66
End: 2021-11-08

## 2021-11-08 ENCOUNTER — HOSPITAL ENCOUNTER (OUTPATIENT)
Facility: HOSPITAL | Age: 66
Setting detail: HOSPITAL OUTPATIENT SURGERY
Discharge: HOME | End: 2021-11-08
Attending: SURGERY | Admitting: SURGERY

## 2021-11-08 ENCOUNTER — ANESTHESIA EVENT (OUTPATIENT)
Dept: GASTROENTEROLOGY | Facility: HOSPITAL | Age: 66
End: 2021-11-08

## 2021-11-08 ENCOUNTER — ANESTHESIA (OUTPATIENT)
Dept: GASTROENTEROLOGY | Facility: HOSPITAL | Age: 66
End: 2021-11-08

## 2021-11-08 VITALS
TEMPERATURE: 97.8 F | OXYGEN SATURATION: 100 % | HEART RATE: 57 BPM | RESPIRATION RATE: 18 BRPM | SYSTOLIC BLOOD PRESSURE: 184 MMHG | WEIGHT: 269.7 LBS | BODY MASS INDEX: 47.79 KG/M2 | HEIGHT: 63 IN | DIASTOLIC BLOOD PRESSURE: 95 MMHG

## 2021-11-08 DIAGNOSIS — I10 ESSENTIAL HYPERTENSION: ICD-10-CM

## 2021-11-08 DIAGNOSIS — R06.09 DYSPNEA ON EXERTION: ICD-10-CM

## 2021-11-08 LAB
25(OH)D3 SERPL-MCNC: 41.2 NG/ML (ref 30–100)
ALBUMIN SERPL-MCNC: 4.3 G/DL (ref 3.5–5.2)
ALBUMIN/GLOB SERPL: 1.2 G/DL
ALP SERPL-CCNC: 52 U/L (ref 39–117)
ALT SERPL W P-5'-P-CCNC: 13 U/L (ref 1–33)
ANION GAP SERPL CALCULATED.3IONS-SCNC: 8.3 MMOL/L (ref 5–15)
AST SERPL-CCNC: 14 U/L (ref 1–32)
BASOPHILS # BLD AUTO: 0.04 10*3/MM3 (ref 0–0.2)
BASOPHILS NFR BLD AUTO: 0.4 % (ref 0–1.5)
BILIRUB SERPL-MCNC: 0.7 MG/DL (ref 0–1.2)
BUN SERPL-MCNC: 20 MG/DL (ref 8–23)
BUN/CREAT SERPL: 17.2 (ref 7–25)
CALCIUM SPEC-SCNC: 9.9 MG/DL (ref 8.6–10.5)
CHLORIDE SERPL-SCNC: 103 MMOL/L (ref 98–107)
CHOLEST SERPL-MCNC: 179 MG/DL (ref 0–200)
CO2 SERPL-SCNC: 27.7 MMOL/L (ref 22–29)
CREAT SERPL-MCNC: 1.16 MG/DL (ref 0.57–1)
DEPRECATED RDW RBC AUTO: 43 FL (ref 37–54)
EOSINOPHIL # BLD AUTO: 0.03 10*3/MM3 (ref 0–0.4)
EOSINOPHIL NFR BLD AUTO: 0.3 % (ref 0.3–6.2)
ERYTHROCYTE [DISTWIDTH] IN BLOOD BY AUTOMATED COUNT: 12.3 % (ref 12.3–15.4)
GFR SERPL CREATININE-BSD FRML MDRD: 47 ML/MIN/1.73
GLOBULIN UR ELPH-MCNC: 3.6 GM/DL
GLUCOSE BLDC GLUCOMTR-MCNC: 106 MG/DL (ref 70–130)
GLUCOSE BLDC GLUCOMTR-MCNC: 69 MG/DL (ref 70–130)
GLUCOSE BLDC GLUCOMTR-MCNC: 80 MG/DL (ref 70–130)
GLUCOSE SERPL-MCNC: 73 MG/DL (ref 65–99)
HBA1C MFR BLD: 5.17 % (ref 4.8–5.6)
HCT VFR BLD AUTO: 46.7 % (ref 34–46.6)
HDLC SERPL-MCNC: 40 MG/DL (ref 40–60)
HGB BLD-MCNC: 15.3 G/DL (ref 12–15.9)
LDLC SERPL CALC-MCNC: 128 MG/DL (ref 0–100)
LDLC/HDLC SERPL: 3.18 {RATIO}
LYMPHOCYTES # BLD AUTO: 1.99 10*3/MM3 (ref 0.7–3.1)
LYMPHOCYTES NFR BLD AUTO: 21.3 % (ref 19.6–45.3)
MCH RBC QN AUTO: 31.2 PG (ref 26.6–33)
MCHC RBC AUTO-ENTMCNC: 32.8 G/DL (ref 31.5–35.7)
MCV RBC AUTO: 95.3 FL (ref 79–97)
MONOCYTES # BLD AUTO: 0.7 10*3/MM3 (ref 0.1–0.9)
MONOCYTES NFR BLD AUTO: 7.5 % (ref 5–12)
NEUTROPHILS NFR BLD AUTO: 6.55 10*3/MM3 (ref 1.7–7)
NEUTROPHILS NFR BLD AUTO: 70.2 % (ref 42.7–76)
PLATELET # BLD AUTO: 208 10*3/MM3 (ref 140–450)
PMV BLD AUTO: 13.4 FL (ref 6–12)
POTASSIUM SERPL-SCNC: 4.2 MMOL/L (ref 3.5–5.2)
PROT SERPL-MCNC: 7.9 G/DL (ref 6–8.5)
RBC # BLD AUTO: 4.9 10*6/MM3 (ref 3.77–5.28)
SODIUM SERPL-SCNC: 139 MMOL/L (ref 136–145)
TRIGL SERPL-MCNC: 59 MG/DL (ref 0–150)
TSH SERPL DL<=0.05 MIU/L-ACNC: 4.85 UIU/ML (ref 0.27–4.2)
VLDLC SERPL-MCNC: 11 MG/DL (ref 5–40)
WBC # BLD AUTO: 9.34 10*3/MM3 (ref 3.4–10.8)

## 2021-11-08 PROCEDURE — 82962 GLUCOSE BLOOD TEST: CPT

## 2021-11-08 PROCEDURE — 36415 COLL VENOUS BLD VENIPUNCTURE: CPT

## 2021-11-08 PROCEDURE — 84443 ASSAY THYROID STIM HORMONE: CPT | Performed by: NURSE PRACTITIONER

## 2021-11-08 PROCEDURE — 88305 TISSUE EXAM BY PATHOLOGIST: CPT | Performed by: SURGERY

## 2021-11-08 PROCEDURE — 83036 HEMOGLOBIN GLYCOSYLATED A1C: CPT | Performed by: NURSE PRACTITIONER

## 2021-11-08 PROCEDURE — 25010000002 PROPOFOL 10 MG/ML EMULSION: Performed by: ANESTHESIOLOGY

## 2021-11-08 PROCEDURE — 88342 IMHCHEM/IMCYTCHM 1ST ANTB: CPT | Performed by: SURGERY

## 2021-11-08 PROCEDURE — 80053 COMPREHEN METABOLIC PANEL: CPT | Performed by: NURSE PRACTITIONER

## 2021-11-08 PROCEDURE — 80061 LIPID PANEL: CPT | Performed by: NURSE PRACTITIONER

## 2021-11-08 PROCEDURE — 85025 COMPLETE CBC W/AUTO DIFF WBC: CPT | Performed by: NURSE PRACTITIONER

## 2021-11-08 PROCEDURE — 43239 EGD BIOPSY SINGLE/MULTIPLE: CPT | Performed by: SURGERY

## 2021-11-08 PROCEDURE — 84425 ASSAY OF VITAMIN B-1: CPT | Performed by: NURSE PRACTITIONER

## 2021-11-08 PROCEDURE — 82306 VITAMIN D 25 HYDROXY: CPT | Performed by: NURSE PRACTITIONER

## 2021-11-08 RX ORDER — DEXTROSE MONOHYDRATE 25 G/50ML
25 INJECTION, SOLUTION INTRAVENOUS ONCE
Status: COMPLETED | OUTPATIENT
Start: 2021-11-08 | End: 2021-11-08

## 2021-11-08 RX ORDER — SODIUM CHLORIDE, SODIUM LACTATE, POTASSIUM CHLORIDE, CALCIUM CHLORIDE 600; 310; 30; 20 MG/100ML; MG/100ML; MG/100ML; MG/100ML
30 INJECTION, SOLUTION INTRAVENOUS CONTINUOUS PRN
Status: DISCONTINUED | OUTPATIENT
Start: 2021-11-08 | End: 2021-11-08 | Stop reason: HOSPADM

## 2021-11-08 RX ORDER — PROPOFOL 10 MG/ML
VIAL (ML) INTRAVENOUS CONTINUOUS PRN
Status: DISCONTINUED | OUTPATIENT
Start: 2021-11-08 | End: 2021-11-08 | Stop reason: SURG

## 2021-11-08 RX ORDER — LIDOCAINE HYDROCHLORIDE 20 MG/ML
INJECTION, SOLUTION INFILTRATION; PERINEURAL AS NEEDED
Status: DISCONTINUED | OUTPATIENT
Start: 2021-11-08 | End: 2021-11-08 | Stop reason: SURG

## 2021-11-08 RX ORDER — PROPOFOL 10 MG/ML
VIAL (ML) INTRAVENOUS AS NEEDED
Status: DISCONTINUED | OUTPATIENT
Start: 2021-11-08 | End: 2021-11-08 | Stop reason: SURG

## 2021-11-08 RX ADMIN — LIDOCAINE HYDROCHLORIDE 60 MG: 20 INJECTION, SOLUTION INFILTRATION; PERINEURAL at 12:41

## 2021-11-08 RX ADMIN — Medication 160 MCG/KG/MIN: at 12:41

## 2021-11-08 RX ADMIN — PROPOFOL 100 MG: 10 INJECTION, EMULSION INTRAVENOUS at 12:41

## 2021-11-08 RX ADMIN — SODIUM CHLORIDE, POTASSIUM CHLORIDE, SODIUM LACTATE AND CALCIUM CHLORIDE 30 ML/HR: 600; 310; 30; 20 INJECTION, SOLUTION INTRAVENOUS at 11:57

## 2021-11-08 RX ADMIN — DEXTROSE MONOHYDRATE 25 ML: 25 INJECTION, SOLUTION INTRAVENOUS at 12:12

## 2021-11-08 NOTE — OP NOTE
Surgeon:  Mansi Kyle MD  Preoperative Diagnosis: Screening for bariatric surgery    Postoperative Diagnosis: moderate size hiatal hernia    Procedure Performed: Esophagogastroduodenoscopy with biopsy of the antrum to check for H. pylori    Indications: The patient is interested in bariatric surgery for weight loss.  This is a screening EGD.      Specimen: biopsy of gastric antrum for H. Pylori    EBL: none    Procedure:     The procedure, indications, preparation and potential complications were explained to the patient, who indicated understanding and signed the corresponding consent forms.  The patient was identified, taken to the endoscopy suite, and placed on the left side down decubitus position.  The patient underwent a MAC anesthesia and was appropriately monitored through the case by the anesthesia personnel with continuous pulse oximetry, blood pressure, and cardiac monitoring.  A bite block was placed.  After adequate IV sedation and using a transoral technique a lubed flexible endoscope was placed in the hypopharynx and advanced to the second portion of the duodenum without difficulty. The scope was then withdrawn back into the antrum of the stomach.  Cold forcep biopsies of the antrum were taken to rule out Helicobacter pylori.  The scope was retroflexed noting the body, fundus and cardia.  The scope was then withdrawn back into the esophagus after decompressing the stomach.  The Z line was noted and GE junction measured from the incisors.  The scope was then completely withdrawn.  The patient tolerated the procedure well and left the endoscopy suite in stable condition.  The findings are listed below.      Moderate hiatal hernia, 6 cm from z line to hiatus impression

## 2021-11-08 NOTE — ANESTHESIA PREPROCEDURE EVALUATION
" Anesthesia Evaluation     Patient summary reviewed and Nursing notes reviewed   no history of anesthetic complications:  NPO Solid Status: > 8 hours  NPO Liquid Status: > 2 hours           Airway   Mallampati: III  TM distance: >3 FB  Neck ROM: full  No difficulty expected  Dental    (+) poor dentition        Pulmonary - normal exam   (+) a smoker Former, COPD, shortness of breath, sleep apnea,   Cardiovascular - normal exam  Exercise tolerance: poor (<4 METS)    ECG reviewed  Patient on routine beta blocker and Beta blocker given within 24 hours of surgery    (+) hypertension, hyperlipidemia,       Neuro/Psych  (+) CVA (2017),     GI/Hepatic/Renal/Endo    (+) obesity, morbid obesity,  diabetes mellitus type 2,     Musculoskeletal     Abdominal   (+) obese,    Substance History      OB/GYN          Other   arthritis,          Phys Exam Other: Extremely poor dentition and loose teeth on bottom, multiple broken teeth              Anesthesia Plan    ASA 3     MAC   (I have reviewed the patient's history with the patient and the chart, including all pertinent laboratory results and imaging. I have explained the risks of anesthesia including but not limited to dental damage, corneal abrasion, nerve injury, MI, stroke, and death.    Ht 160 cm (63\")   Wt 122 kg (269 lb 11.2 oz)   BMI 47.78 kg/m²   )  intravenous induction     Anesthetic plan, all risks, benefits, and alternatives have been provided, discussed and informed consent has been obtained with: patient.      "

## 2021-11-08 NOTE — ANESTHESIA POSTPROCEDURE EVALUATION
Patient: Marilin Martinez    Procedure Summary     Date: 11/08/21 Room / Location:  [facility]    Anesthesia Start: 1237 Anesthesia Stop: 1257    Procedure: ESOPHAGOGASTRODUODENOSCOPY WITH COLD BIOPSY (N/A Esophagus) Diagnosis:       Body mass index (BMI) of 60.0-69.9 in adult (HCC)      (Body mass index (BMI) of 60.0-69.9 in adult (CMS/HCC) [Z68.44])    Surgeons: Mansi Kyle MD Provider: Katelin Amador MD    Anesthesia Type: MAC ASA Status: 3          Anesthesia Type: MAC    Vitals  Vitals Value Taken Time   /95 11/08/21 1315   Temp     Pulse 57 11/08/21 1315   Resp 18 11/08/21 1315   SpO2 100 % 11/08/21 1315           Post Anesthesia Care and Evaluation    Patient location during evaluation: bedside  Patient participation: complete - patient participated  Level of consciousness: awake and alert  Pain management: adequate  Airway patency: patent  Anesthetic complications: No anesthetic complications  PONV Status: controlled  Cardiovascular status: acceptable  Respiratory status: acceptable  Hydration status: acceptable

## 2021-11-10 LAB
LAB AP CASE REPORT: NORMAL
LAB AP DIAGNOSIS COMMENT: NORMAL
PATH REPORT.ADDENDUM SPEC: NORMAL
PATH REPORT.FINAL DX SPEC: NORMAL
PATH REPORT.GROSS SPEC: NORMAL

## 2021-11-10 PROCEDURE — 87077 CULTURE AEROBIC IDENTIFY: CPT | Performed by: FAMILY MEDICINE

## 2021-11-10 PROCEDURE — 87186 SC STD MICRODIL/AGAR DIL: CPT | Performed by: FAMILY MEDICINE

## 2021-11-10 PROCEDURE — 87086 URINE CULTURE/COLONY COUNT: CPT | Performed by: FAMILY MEDICINE

## 2021-11-13 ENCOUNTER — TELEPHONE (OUTPATIENT)
Dept: URGENT CARE | Facility: CLINIC | Age: 66
End: 2021-11-13

## 2021-11-14 LAB — VIT B1 BLD-SCNC: 131.6 NMOL/L (ref 66.5–200)

## 2021-11-15 ENCOUNTER — TELEPHONE (OUTPATIENT)
Dept: BARIATRICS/WEIGHT MGMT | Facility: CLINIC | Age: 66
End: 2021-11-15

## 2021-11-15 NOTE — TELEPHONE ENCOUNTER
Inform about results LAB  ----- Message from JORGE Bobby sent at 11/15/2021  9:46 AM EST -----  B1 normal

## 2022-01-10 ENCOUNTER — OFFICE VISIT (OUTPATIENT)
Dept: ORTHOPEDIC SURGERY | Facility: CLINIC | Age: 67
End: 2022-01-10

## 2022-01-10 VITALS — HEART RATE: 60 BPM | OXYGEN SATURATION: 97 % | BODY MASS INDEX: 48.73 KG/M2 | HEIGHT: 63 IN | WEIGHT: 275 LBS

## 2022-01-10 DIAGNOSIS — M17.0 BILATERAL PRIMARY OSTEOARTHRITIS OF KNEE: Primary | ICD-10-CM

## 2022-01-10 PROCEDURE — 20610 DRAIN/INJ JOINT/BURSA W/O US: CPT | Performed by: ORTHOPAEDIC SURGERY

## 2022-01-10 RX ORDER — TRIAMCINOLONE ACETONIDE 40 MG/ML
40 INJECTION, SUSPENSION INTRA-ARTICULAR; INTRAMUSCULAR
Status: COMPLETED | OUTPATIENT
Start: 2022-01-10 | End: 2022-01-10

## 2022-01-10 RX ORDER — BUPIVACAINE HYDROCHLORIDE 2.5 MG/ML
5 INJECTION, SOLUTION INFILTRATION; PERINEURAL
Status: COMPLETED | OUTPATIENT
Start: 2022-01-10 | End: 2022-01-10

## 2022-01-10 RX ORDER — LEVOTHYROXINE SODIUM 0.2 MG/1
200 TABLET ORAL DAILY
COMMUNITY
Start: 2021-10-22 | End: 2022-02-11

## 2022-01-10 RX ORDER — CIPROFLOXACIN 500 MG/1
TABLET, FILM COATED ORAL
COMMUNITY
Start: 2021-11-17 | End: 2022-02-03

## 2022-01-10 RX ADMIN — BUPIVACAINE HYDROCHLORIDE 5 ML: 2.5 INJECTION, SOLUTION INFILTRATION; PERINEURAL at 15:43

## 2022-01-10 RX ADMIN — TRIAMCINOLONE ACETONIDE 40 MG: 40 INJECTION, SUSPENSION INTRA-ARTICULAR; INTRAMUSCULAR at 15:43

## 2022-01-10 NOTE — PROGRESS NOTES
"Chief Complaint  Follow-up and Pain of the Left Knee and Follow-up and Pain of the Right Knee     Subjective      Marilin Martinez presents to [facility] for a follow-up of bilateral knees. Patient has a history of bilateral knee osteoarthritis that has been treated conservatively. She has been having increasing pain in bilateral knees for the last 2-3 weeks. She denies any recent injury or trauma.     No Known Allergies     Social History     Socioeconomic History   • Marital status:    Tobacco Use   • Smoking status: Former Smoker     Quit date: 1997     Years since quittin.3   • Smokeless tobacco: Never Used   Vaping Use   • Vaping Use: Never used   Substance and Sexual Activity   • Alcohol use: Not Currently   • Drug use: No   • Sexual activity: Defer        Review of Systems     Objective   Vital Signs:   Pulse 60   Ht 160 cm (63\")   Wt 125 kg (275 lb)   SpO2 97%   BMI 48.71 kg/m²       Physical Exam  Constitutional:       Appearance: Normal appearance. Patient is well-developed and normal weight.   HENT:      Head: Normocephalic.      Right Ear: Hearing and external ear normal.      Left Ear: Hearing and external ear normal.      Nose: Nose normal.   Eyes:      Conjunctiva/sclera: Conjunctivae normal.   Cardiovascular:      Rate and Rhythm: Normal rate.   Pulmonary:      Effort: Pulmonary effort is normal.      Breath sounds: No wheezing or rales.   Abdominal:      Palpations: Abdomen is soft.      Tenderness: There is no abdominal tenderness.   Musculoskeletal:      Cervical back: Normal range of motion.   Skin:     Findings: No rash.   Neurological:      Mental Status: Patient  is alert and oriented to person, place, and time.   Psychiatric:         Mood and Affect: Mood and affect normal.         Judgment: Judgment normal.       Ortho Exam      LEFT KNEE: Antalgic gait. -2 degrees of extension. Flexion to 100 degrees. Calf supple, non-tender, no signs of " DVT. Dorsal Pedal Pulse 2+, posterior tibialis pulse 2+. Sensation grossly intact. Neurovascular intact.  Tender joint lines.     RIGHT KNEE: Calf supple, non-tender, no signs of DVT. Dorsal Pedal Pulse 2+, posterior tibialis pulse 2+. -5 degrees of extension. Flexion to 100 degrees. Sensation grossly intact. Neurovascular intact.  Tender joint lines. Antalgic gait. Ambulation with a walker.       Large Joint Arthrocentesis: R knee  Date/Time: 1/10/2022 3:43 PM  Consent given by: patient  Site marked: site marked  Timeout: Immediately prior to procedure a time out was called to verify the correct patient, procedure, equipment, support staff and site/side marked as required   Supporting Documentation  Indications: pain   Procedure Details  Location: knee - R knee  Needle size: 22 G  Medications administered: 5 mL bupivacaine 0.25 %; 40 mg triamcinolone acetonide 40 MG/ML  Patient tolerance: patient tolerated the procedure well with no immediate complications              Imaging Results (Most Recent)     None           Result Review :         No results found.           Assessment and Plan     DX: Bilateral knee osteoarthritis     Discussed treatment plans with the patient. She was given a right knee injection and tolerated this well. Patient would benefit from visco approval, we will work on this.     Call or return if worsening symptoms.    Follow Up     1 week for left knee injection.       Patient was given instructions and counseling regarding her condition or for health maintenance advice. Please see specific information pulled into the AVS if appropriate.     Scribed for Murray Calvillo MD by Lydia Fay.  01/10/22   15:16 EST    I have personally performed the services described in this document as scribed by the above individual and it is both accurate and complete. Murray Calvillo MD 01/10/22

## 2022-01-11 ENCOUNTER — TELEPHONE (OUTPATIENT)
Dept: BARIATRICS/WEIGHT MGMT | Facility: CLINIC | Age: 67
End: 2022-01-11

## 2022-01-11 NOTE — TELEPHONE ENCOUNTER
Patient called and left a voicemail, I returned the patient call and she wanted to know if she could get cortisone in her knee before surgery. I talked to one of the provider and they said no it will not be geart thing to do. She need to wait a month after her surgery to get it done.

## 2022-01-13 ENCOUNTER — TELEPHONE (OUTPATIENT)
Dept: ORTHOPEDIC SURGERY | Facility: CLINIC | Age: 67
End: 2022-01-13

## 2022-01-13 NOTE — TELEPHONE ENCOUNTER
Caller: MICHELL HAWKINS     Relationship to patient: SELF    Best call back number:     Additional notes:PLEASE CANCEL APPT FOR 1/17.  SHE WAS TOLD TO NOT GET INJECTIONS TIL AFTER HER SURGERY.  SHE DID ASK COULD WE LOOK INTO GETTING GEL INJECTIONS APPROVED AFTER HER February SURGERY

## 2022-01-13 NOTE — TELEPHONE ENCOUNTER
Faxed Riri Conway PA form with clinical notes for bilateral knee Euflexxa injection authorization. Received fax confirmation.

## 2022-01-18 ENCOUNTER — PREP FOR SURGERY (OUTPATIENT)
Dept: OTHER | Facility: HOSPITAL | Age: 67
End: 2022-01-18

## 2022-01-18 DIAGNOSIS — E66.01 OBESITY, CLASS III, BMI 40-49.9 (MORBID OBESITY): Primary | ICD-10-CM

## 2022-01-18 DIAGNOSIS — K44.9 HIATAL HERNIA: ICD-10-CM

## 2022-01-18 PROBLEM — E66.813 OBESITY, CLASS III, BMI 40-49.9 (MORBID OBESITY): Status: ACTIVE | Noted: 2022-01-18

## 2022-01-18 RX ORDER — SODIUM CHLORIDE, SODIUM LACTATE, POTASSIUM CHLORIDE, CALCIUM CHLORIDE 600; 310; 30; 20 MG/100ML; MG/100ML; MG/100ML; MG/100ML
100 INJECTION, SOLUTION INTRAVENOUS CONTINUOUS
Status: CANCELLED | OUTPATIENT
Start: 2022-02-14

## 2022-01-18 RX ORDER — PROMETHAZINE HYDROCHLORIDE 25 MG/1
25 SUPPOSITORY RECTAL ONCE
Status: CANCELLED | OUTPATIENT
Start: 2022-02-14

## 2022-01-18 RX ORDER — CEFAZOLIN SODIUM IN 0.9 % NACL 3 G/100 ML
3 INTRAVENOUS SOLUTION, PIGGYBACK (ML) INTRAVENOUS
Status: CANCELLED | OUTPATIENT
Start: 2022-02-14

## 2022-01-18 RX ORDER — SODIUM CHLORIDE 0.9 % (FLUSH) 0.9 %
3-10 SYRINGE (ML) INJECTION AS NEEDED
Status: CANCELLED | OUTPATIENT
Start: 2022-02-14

## 2022-01-18 RX ORDER — PANTOPRAZOLE SODIUM 40 MG/10ML
40 INJECTION, POWDER, LYOPHILIZED, FOR SOLUTION INTRAVENOUS ONCE
Status: CANCELLED | OUTPATIENT
Start: 2022-02-14 | End: 2022-01-18

## 2022-01-18 RX ORDER — CHLORHEXIDINE GLUCONATE 0.12 MG/ML
15 RINSE ORAL SEE ADMIN INSTRUCTIONS
Status: CANCELLED | OUTPATIENT
Start: 2022-02-14

## 2022-01-18 RX ORDER — METOCLOPRAMIDE HYDROCHLORIDE 5 MG/ML
10 INJECTION INTRAMUSCULAR; INTRAVENOUS ONCE
Status: CANCELLED | OUTPATIENT
Start: 2022-02-14 | End: 2022-01-18

## 2022-01-18 RX ORDER — SCOLOPAMINE TRANSDERMAL SYSTEM 1 MG/1
1 PATCH, EXTENDED RELEASE TRANSDERMAL CONTINUOUS
Status: CANCELLED | OUTPATIENT
Start: 2022-02-14 | End: 2022-02-17

## 2022-01-18 RX ORDER — SODIUM CHLORIDE 0.9 % (FLUSH) 0.9 %
3 SYRINGE (ML) INJECTION EVERY 12 HOURS SCHEDULED
Status: CANCELLED | OUTPATIENT
Start: 2022-01-18

## 2022-01-18 RX ORDER — GABAPENTIN 250 MG/5ML
300 SOLUTION ORAL ONCE
Status: CANCELLED | OUTPATIENT
Start: 2022-02-14 | End: 2022-01-18

## 2022-01-18 RX ORDER — PROMETHAZINE HYDROCHLORIDE 12.5 MG/1
12.5 TABLET ORAL ONCE
Status: CANCELLED | OUTPATIENT
Start: 2022-02-14 | End: 2022-01-18

## 2022-01-18 RX ORDER — ACETAMINOPHEN 160 MG/5ML
975 SOLUTION ORAL ONCE
Status: CANCELLED | OUTPATIENT
Start: 2022-02-14 | End: 2022-01-18

## 2022-01-24 ENCOUNTER — OFFICE VISIT (OUTPATIENT)
Dept: PODIATRY | Facility: CLINIC | Age: 67
End: 2022-01-24

## 2022-01-24 VITALS
HEART RATE: 64 BPM | DIASTOLIC BLOOD PRESSURE: 60 MMHG | BODY MASS INDEX: 47.31 KG/M2 | HEIGHT: 63 IN | WEIGHT: 267 LBS | OXYGEN SATURATION: 99 % | TEMPERATURE: 97.1 F | SYSTOLIC BLOOD PRESSURE: 151 MMHG

## 2022-01-24 DIAGNOSIS — E11.9 NON-INSULIN DEPENDENT TYPE 2 DIABETES MELLITUS: ICD-10-CM

## 2022-01-24 DIAGNOSIS — B35.1 ONYCHOMYCOSIS: ICD-10-CM

## 2022-01-24 DIAGNOSIS — M79.672 FOOT PAIN, BILATERAL: Primary | ICD-10-CM

## 2022-01-24 DIAGNOSIS — L60.0 ONYCHOCRYPTOSIS: ICD-10-CM

## 2022-01-24 DIAGNOSIS — E11.8 DIABETIC FOOT: ICD-10-CM

## 2022-01-24 DIAGNOSIS — M79.671 FOOT PAIN, BILATERAL: Primary | ICD-10-CM

## 2022-01-24 PROCEDURE — G8404 LOW EXTEMITY NEUR EXAM DOCUM: HCPCS | Performed by: PODIATRIST

## 2022-01-24 PROCEDURE — 11721 DEBRIDE NAIL 6 OR MORE: CPT | Performed by: PODIATRIST

## 2022-01-24 RX ORDER — PITAVASTATIN CALCIUM 2.09 MG/1
2 TABLET, FILM COATED ORAL DAILY
COMMUNITY
Start: 2021-12-17 | End: 2022-05-25

## 2022-01-24 NOTE — PROGRESS NOTES
[facility]    Today's Date: 01/24/22    Patient Name: Marilin Martinez  MRN: [id number]  CSN: [id number]  PCP: Reyes, Rosenberg Acosta, MD, Last PCP Visit: 15 December 2021  Referring Provider: No ref. provider found    SUBJECTIVE     Chief Complaint   Patient presents with   • Left Foot - Nail Problem   • Right Foot - Nail Problem     HPI: Marilin Martinez, a 67 y.o.female, comes to clinic.    New, Established, New Problem:  established     Location:  Toenails    Duration:   Greater than five years    Onset:  Gradual    Nature:  sore with palpation.    Stable, worsening, improving:   Improving    Aggravating factors:  Pain with shoe gear and ambulation.    Previous Treatment:  debridement    Patient reported last blood glucose: 109  __________________________________    Patient reports the following medical changes since their last visit: Upcoming gastric sleeve surgery on 14 February 2022    No other pedal complaints at this time.    Patient denies any fevers, chills, nausea, vomiting, shortness of breathe, nor any other constitutional signs nor symptoms.       Past Medical History:   Diagnosis Date   • Arthritis    • COPD (chronic obstructive pulmonary disease) (HCC)    • Diabetes mellitus (HCC)    • Disease of thyroid gland    • Dyspnea on exertion 8/11/2021   • Foot ulcer (HCC)    • Hyperlipemia    • Hyperlipidemia    • Hypertension    • Ingrown toenail    • Limb swelling    • Seasonal allergies    • Sleep apnea    • 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HCC)    • Thyroid disorder      Past Surgical History:   Procedure Laterality Date   • COLONOSCOPY     • ENDOSCOPY N/A 11/8/2021    Procedure: ESOPHAGOGASTRODUODENOSCOPY WITH COLD BIOPSY;  Surgeon: Mansi Kyle MD;  Location: [facility];  Service: General;  Laterality: N/A;  PRE: SCREENING FOR BARIATRICS  POST: MODERATE SIZE HIATAL HERNIA   • GALLBLADDER SURGERY  1997   • TONSILLECTOMY       Family History   Problem Relation Age of Onset   •  Obesity Mother    • Hypertension Mother    • Cancer Mother    • Arthritis Mother    • Diabetes Mother    • Obesity Sister    • Hypertension Sister    • Cancer Sister    • Arthritis Sister    • Diabetes Father    • Diabetes Daughter      Social History     Socioeconomic History   • Marital status:    Tobacco Use   • Smoking status: Former Smoker     Quit date: 1997     Years since quittin.3   • Smokeless tobacco: Never Used   Vaping Use   • Vaping Use: Never used   Substance and Sexual Activity   • Alcohol use: Not Currently   • Drug use: No   • Sexual activity: Defer     No Known Allergies  Current Outpatient Medications   Medication Sig Dispense Refill   • ALPRAZolam (XANAX) 1 MG tablet Take 1 mg by mouth 3 (Three) Times a Day As Needed for Anxiety.     • amLODIPine (NORVASC) 10 MG tablet Take 1 tablet by mouth Daily. 30 tablet 0   • bumetanide (BUMEX) 1 MG tablet Take 1 mg by mouth Daily As Needed.     • celecoxib (CeleBREX) 200 MG capsule Take 200 mg by mouth Daily.     • ciprofloxacin (CIPRO) 500 MG tablet TAKE 1 TABLET BY MOUTH TWICE DAILY X 2 WEEKS     • cyclobenzaprine (FLEXERIL) 10 MG tablet cyclobenzaprine 10 mg oral tablet take 1 tablet (10 mg) by oral route 2 times per day as needed   Active     • Diclofenac Sodium (VOLTAREN) 1 % gel gel APPLY 4 GRAMS TO AFFECTED AREA FOUR TIMES DAILY     • fluticasone (FLONASE) 50 MCG/ACT nasal spray 1 spray into the nostril(s) as directed by provider Daily As Needed.     • gabapentin (NEURONTIN) 800 MG tablet Take 800 mg by mouth 2 (Two) Times a Day.     • HYDROcodone-acetaminophen (NORCO)  MG per tablet 1 tablet Every 8 (Eight) Hours As Needed.     • ibuprofen (ADVIL,MOTRIN) 600 MG tablet Take 600 mg by mouth Every 6 (Six) Hours As Needed for Mild Pain .     • latanoprost (XALATAN) 0.005 % ophthalmic solution Administer 1 drop to both eyes Every Night.     • levothyroxine (SYNTHROID, LEVOTHROID) 200 MCG tablet Take 200 mcg by mouth Daily.     •  Levothyroxine Sodium 200 MCG capsule Take 200 mcg by mouth Daily.     • lisinopril-hydrochlorothiazide (PRINZIDE,ZESTORETIC) 20-12.5 MG per tablet Take 1 tablet by mouth Daily. 90 tablet 3   • Livalo 2 MG tablet tablet      • Myrbetriq 25 MG tablet sustained-release 24 hour 24 hr tablet Take 25 mg by mouth Daily.     • nebivolol (BYSTOLIC) 10 MG tablet Take 10 mg by mouth Daily.     • Ozempic, 0.25 or 0.5 MG/DOSE, 2 MG/1.5ML solution pen-injector INJECT 0.5MG SUBQ ONCE WEEKLY     • phenazopyridine (PYRIDIUM) 100 MG tablet Take 1 tablet by mouth 3 (Three) Times a Day As Needed for Bladder Spasms. 6 tablet 0   • pioglitazone (ACTOS) 30 MG tablet Take 30 mg by mouth Daily. Every other day     • potassium chloride (KLOR-CON) 8 MEQ CR tablet Take 20 mEq by mouth Daily.     • rOPINIRole (REQUIP) 0.5 MG tablet Take 0.5 mg by mouth Daily.     • SITagliptin (JANUVIA) 100 MG tablet Take 100 mg by mouth Daily.       No current facility-administered medications for this visit.     Review of Systems   Constitutional: Negative.    Skin:        Painful toenails.   All other systems reviewed and are negative.      OBJECTIVE     Vitals:    01/24/22 0746   BP: 151/60   Pulse: 64   Temp: 97.1 °F (36.2 °C)   SpO2: 99%       Lab Results   Component Value Date    HGBA1C 5.17 11/08/2021       Lab Results   Component Value Date    GLUCOSE 73 11/08/2021    CALCIUM 9.9 11/08/2021     11/08/2021    K 4.2 11/08/2021    CO2 27.7 11/08/2021     11/08/2021    BUN 20 11/08/2021    CREATININE 1.16 (H) 11/08/2021    EGFRIFNONA 47 (L) 11/08/2021    BCR 17.2 11/08/2021    ANIONGAP 8.3 11/08/2021       Patient seen in no apparent distress.      PHYSICAL EXAM:     Foot/Ankle Exam:       General:   Appearance: obesity    Orientation: AAOx3    Affect: appropriate    Assistance: walker    Shoe Gear:  Sandals    VASCULAR      Right Foot Vascularity   Normal vascular exam    Dorsalis pedis:  2+  Posterior tibial:  2+  Skin Temperature: warm     Edema Grading:  None  CFT:  < 3 seconds  Pedal Hair Growth:  Present  Varicosities: none       Left Foot Vascularity   Normal vascular exam    Dorsalis pedis:  2+  Posterior tibial:  2+  Skin Temperature: warm    Edema Grading:  None  CFT:  < 3 seconds  Pedal Hair Growth:  Present  Varicosities: none        NEUROLOGIC     Right Foot Neurologic   Normal sensation    Light touch sensation:  Normal  Vibratory sensation:  Normal  Hot/Cold sensation: normal    Protective Sensation using [place] Monofilament:  10     Left Foot Neurologic   Normal sensation    Light touch sensation:  Normal  Vibratory sensation:  Normal  Hot/cold sensation: normal    Protective Sensation using [place] Monofilament:  10     MUSCLE STRENGTH     Right Foot Muscle Strength   Normal strength    Foot dorsiflexion:  5  Foot plantar flexion:  5  Foot inversion:  5  Foot eversion:  5     Left Foot Muscle Strength   Normal strength    Foot dorsiflexion:  5  Foot plantar flexion:  5  Foot inversion:  5  Foot eversion:  5     RANGE OF MOTION      Right Foot Range of Motion   Foot and ankle ROM within normal limits       Left Foot Range of Motion   Foot and ankle ROM within normal limits       DERMATOLOGIC     Right Foot Dermatologic   Skin: skin intact    Nails: onychomycosis, abnormally thick, subungual debris, dystrophic nails and ingrown toenail    Nails comment:  Toenails 1, 2, 3, 4, and 5     Left Foot Dermatologic   Skin: skin intact    Nails: onychomycosis, abnormally thick, subungual debris, dystrophic nails and ingrown toenail    Nails comment:  Toenails 1, 2, 3, 4, and 5      Diabetic Foot Exam Performed    ASSESSMENT/PLAN     Diagnoses and all orders for this visit:    1. Foot pain, bilateral (Primary)    2. Onychomycosis    3. Onychocryptosis    4. Diabetic foot (HCC)    5. Non-insulin dependent type 2 diabetes mellitus (HCC)        Comprehensive lower extremity examination and evaluation was performed.    Discussed  findings and treatment plan including risks, benefits, and treatment options with patient in detail. Patient agreed with treatment plan.    Toenails 1 through 5 bilaterally were debrided in thickness and length and then smoothed with a Dremel Tool.  Tolerated the procedure well without complications.    Diabetic foot exam performed and documented this date, compliant with CQM required standards. Detail of findings as noted in physical exam.  Lower extremity Neurologic exam for diabetic patient performed and documented this date, compliant with PQRS required standards. Detail of findings as noted in physical exam.  Advised patient importance of good routine lower extremity hygiene. Advised patient importance of evaluating for intact skin and pain free nail borders.  Advised patient to use mirror to evaluate plantar/ soles of feet for better visualization. Advised patient monitor and phone office to be seen if any cracking to skin, open lesions, painful nail borders or if nails become elongated prior to next visit. Advised patient importance of daily cleansing of lower extremities, followed by good skin cream to maintain normal hydration of skin. Also advised patient importance of close daily monitoring of blood sugar. Advised to regulate diet and medications to maintain control of blood sugar in optimal range. Contact primary care provider if difficulties maintaining blood sugar levels.  Advised Patient of presence of Diabetes Mellitus condition.  Advised Patient risk of progression and worsening or improvement, then return of condition.  Will monitor condition for any change in future. Treat with most appropriate treatment pending status of condition.  Counseled and advised patient extensively on nature and ramifications of diabetes. Standard instructions given to patient for good diabetic foot care and maintenance. Advised importance of careful monitoring to avoid break down and complications secondary to diabetes.  Advised patient importance of strict maintenance of blood sugar control. Advised patient of possible ominous results from neglect of condition, i.e.: amputation/ loss of digits, feet and legs, or even death.  Patient states understands counseling, will monitor closely, continue good hygiene and routine diabetic foot care. Patient will contact office is questions or problems.      An After Visit Summary was printed and given to the patient at discharge, including (if requested) any available informative/educational handouts regarding diagnosis, treatment, or medications. All questions were answered to patient/family satisfaction. Should symptoms fail to improve or worsen they agree to call or return to clinic or to go to the Emergency Department. Discussed the importance of following up with any needed screening tests/labs/specialist appointments and any requested follow-up recommended by me today. Importance of maintaining follow-up discussed and patient accepts that missed appointments can delay diagnosis and potentially lead to worsening of conditions.    Return in about 9 weeks (around 3/28/2022) for Toenail Care., or sooner if acute issues arise.    This document has been electronically signed by Andrew Fulton DPM on January 24, 2022 08:21 EST

## 2022-02-01 PROBLEM — K44.9 HIATAL HERNIA: Status: ACTIVE | Noted: 2022-02-01

## 2022-02-03 ENCOUNTER — TELEMEDICINE (OUTPATIENT)
Dept: BARIATRICS/WEIGHT MGMT | Facility: CLINIC | Age: 67
End: 2022-02-03

## 2022-02-03 DIAGNOSIS — K44.9 HIATAL HERNIA: ICD-10-CM

## 2022-02-03 DIAGNOSIS — I63.9 CEREBROVASCULAR ACCIDENT (CVA), UNSPECIFIED MECHANISM: ICD-10-CM

## 2022-02-03 DIAGNOSIS — E66.01 OBESITY, CLASS III, BMI 40-49.9 (MORBID OBESITY): Primary | ICD-10-CM

## 2022-02-03 DIAGNOSIS — E11.69 DIABETES MELLITUS TYPE 2 IN OBESE: ICD-10-CM

## 2022-02-03 DIAGNOSIS — G47.30 SLEEP APNEA, UNSPECIFIED TYPE: ICD-10-CM

## 2022-02-03 DIAGNOSIS — E66.9 DIABETES MELLITUS TYPE 2 IN OBESE: ICD-10-CM

## 2022-02-03 DIAGNOSIS — I10 ESSENTIAL HYPERTENSION: ICD-10-CM

## 2022-02-03 PROBLEM — R63.5 WEIGHT GAIN: Status: RESOLVED | Noted: 2021-05-05 | Resolved: 2022-02-03

## 2022-02-03 PROCEDURE — 99215 OFFICE O/P EST HI 40 MIN: CPT | Performed by: SURGERY

## 2022-02-03 RX ORDER — URSODIOL 300 MG/1
300 CAPSULE ORAL 2 TIMES DAILY
Qty: 60 CAPSULE | Refills: 5 | Status: SHIPPED | OUTPATIENT
Start: 2022-02-03 | End: 2022-08-25

## 2022-02-03 NOTE — PROGRESS NOTES
Bariatric Consult:  Referred by Reyes, Rosenberg Acosta, MD    Marilin Martinez is here today for consult on No chief complaint on file.      History of Present Illness:     Marilin Martinez is a 67 y.o. female with morbid obesity with co-morbidities including sleep apnea, diabetes, hypertension, dyslipidemia, cardiovascular disease, osteoarthritis, back pain, knee pain and GERD who presents for surgical consultation for the above procedure. Marilin has completed the initial intake visit and has been examined by our nurse practitioner, dietician, psychologist and underwent the extensive educational teaching process under the guidance of our bariatric coordinator and myself. Marilin also has seen the educational video IVETTE on the surgical procedure if available. Marilin attended today more educational teaching from our bariatric coordinator and myself. Marilin has had an extensive medical workup including a visit with their primary care physician, EKG, chest radiograph, blood work, EGD or UGI and possibly further testing. These have been reviewed by me and discussed with the patient. Marilin is now ready to proceed with surgery. Marilin presently denies nausea, vomiting, fever, chills, chest pain, shortness of air, melena, hematochezia, hemetemesis, dysuria, frequency, hematuria, jaundice or abdominal pain.       Past Medical History:   Diagnosis Date   • Arthritis    • COPD (chronic obstructive pulmonary disease) ([facility])    • Diabetes mellitus ([facility])    • Disease of thyroid gland    • Dyspnea on exertion 8/11/2021   • Foot ulcer ([facility])    • Hyperlipemia    • Hyperlipidemia    • Hypertension    • Ingrown toenail    • Limb swelling    • Seasonal allergies    • Sleep apnea    • 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[facility])    • Thyroid disorder        Encounter Diagnoses   Name Primary?   • Obesity, Class III, BMI 40-49.9 (morbid obesity) ([facility]) Yes   • Diabetes mellitus type 2 in obese ([facility])    • Essential hypertension    • Sleep apnea,  unspecified type    • Hiatal hernia    • Cerebrovascular accident (CVA), unspecified mechanism (HCC)        Past Surgical History:   Procedure Laterality Date   • COLONOSCOPY     • ENDOSCOPY N/A 11/8/2021    Procedure: ESOPHAGOGASTRODUODENOSCOPY WITH COLD BIOPSY;  Surgeon: Mansi Kyle MD;  Location: [facility] ENDOSCOPY;  Service: General;  Laterality: N/A;  PRE: SCREENING FOR BARIATRICS  POST: MODERATE SIZE HIATAL HERNIA   • GALLBLADDER SURGERY  1997   • TONSILLECTOMY         Patient Active Problem List   Diagnosis   • Intracranial bleed (HCC)   • Environmental and seasonal allergies   • Essential hypertension   • Ankle swelling   • Sleep apnea   • Multiple joint pain   • Hypothyroid   • Stroke (HCC)   • Diabetes mellitus type 2 in obese (HCC)   • Dyspnea on exertion   • Dyslipidemia   • Obesity, Class III, BMI 40-49.9 (morbid obesity) (HCC)   • Hiatal hernia       No Known Allergies      Current Outpatient Medications:   •  ALPRAZolam (XANAX) 1 MG tablet, Take 1 mg by mouth 3 (Three) Times a Day As Needed for Anxiety., Disp: , Rfl:   •  amLODIPine (NORVASC) 10 MG tablet, Take 1 tablet by mouth Daily., Disp: 30 tablet, Rfl: 0  •  bumetanide (BUMEX) 1 MG tablet, Take 1 mg by mouth Daily As Needed., Disp: , Rfl:   •  celecoxib (CeleBREX) 200 MG capsule, Take 200 mg by mouth Daily., Disp: , Rfl:   •  ciprofloxacin (CIPRO) 500 MG tablet, TAKE 1 TABLET BY MOUTH TWICE DAILY X 2 WEEKS, Disp: , Rfl:   •  cyclobenzaprine (FLEXERIL) 10 MG tablet, cyclobenzaprine 10 mg oral tablet take 1 tablet (10 mg) by oral route 2 times per day as needed   Active, Disp: , Rfl:   •  Diclofenac Sodium (VOLTAREN) 1 % gel gel, APPLY 4 GRAMS TO AFFECTED AREA FOUR TIMES DAILY, Disp: , Rfl:   •  fluticasone (FLONASE) 50 MCG/ACT nasal spray, 1 spray into the nostril(s) as directed by provider Daily As Needed., Disp: , Rfl:   •  gabapentin (NEURONTIN) 800 MG tablet, Take 800 mg by mouth 2 (Two) Times a Day., Disp: , Rfl:   •   HYDROcodone-acetaminophen (NORCO)  MG per tablet, 1 tablet Every 8 (Eight) Hours As Needed., Disp: , Rfl:   •  ibuprofen (ADVIL,MOTRIN) 600 MG tablet, Take 600 mg by mouth Every 6 (Six) Hours As Needed for Mild Pain ., Disp: , Rfl:   •  latanoprost (XALATAN) 0.005 % ophthalmic solution, Administer 1 drop to both eyes Every Night., Disp: , Rfl:   •  levothyroxine (SYNTHROID, LEVOTHROID) 200 MCG tablet, Take 200 mcg by mouth Daily., Disp: , Rfl:   •  Levothyroxine Sodium 200 MCG capsule, Take 200 mcg by mouth Daily., Disp: , Rfl:   •  lisinopril-hydrochlorothiazide (PRINZIDE,ZESTORETIC) 20-12.5 MG per tablet, Take 1 tablet by mouth Daily., Disp: 90 tablet, Rfl: 3  •  Livalo 2 MG tablet tablet, , Disp: , Rfl:   •  Myrbetriq 25 MG tablet sustained-release 24 hour 24 hr tablet, Take 25 mg by mouth Daily., Disp: , Rfl:   •  nebivolol (BYSTOLIC) 10 MG tablet, Take 10 mg by mouth Daily., Disp: , Rfl:   •  Ozempic, 0.25 or 0.5 MG/DOSE, 2 MG/1.5ML solution pen-injector, INJECT 0.5MG SUBQ ONCE WEEKLY, Disp: , Rfl:   •  phenazopyridine (PYRIDIUM) 100 MG tablet, Take 1 tablet by mouth 3 (Three) Times a Day As Needed for Bladder Spasms., Disp: 6 tablet, Rfl: 0  •  pioglitazone (ACTOS) 30 MG tablet, Take 30 mg by mouth Daily. Every other day, Disp: , Rfl:   •  potassium chloride (KLOR-CON) 8 MEQ CR tablet, Take 20 mEq by mouth Daily., Disp: , Rfl:   •  rOPINIRole (REQUIP) 0.5 MG tablet, Take 0.5 mg by mouth Daily., Disp: , Rfl:   •  SITagliptin (JANUVIA) 100 MG tablet, Take 100 mg by mouth Daily., Disp: , Rfl:     Social History     Socioeconomic History   • Marital status:    Tobacco Use   • Smoking status: Former Smoker     Quit date: 1997     Years since quittin.4   • Smokeless tobacco: Never Used   Vaping Use   • Vaping Use: Never used   Substance and Sexual Activity   • Alcohol use: Not Currently   • Drug use: No   • Sexual activity: Defer       Family History   Problem Relation Age of Onset   • Obesity  Mother    • Hypertension Mother    • Cancer Mother    • Arthritis Mother    • Diabetes Mother    • Obesity Sister    • Hypertension Sister    • Cancer Sister    • Arthritis Sister    • Diabetes Father    • Diabetes Daughter        Review of Systems:  Review of Systems   Constitutional: Positive for fatigue.   Musculoskeletal: Positive for arthralgias, back pain and gait problem.   All other systems reviewed and are negative.        Physical Exam:    Vital Signs:      There is no height or weight on file to calculate BMI.                  Physical Exam  Constitutional:       Appearance: Normal appearance.   HENT:      Head: Normocephalic and atraumatic.   Eyes:      Conjunctiva/sclera: Conjunctivae normal.   Pulmonary:      Effort: Pulmonary effort is normal.   Neurological:      Mental Status: She is alert and oriented to person, place, and time.   Psychiatric:         Mood and Affect: Mood normal.         Behavior: Behavior normal.         Thought Content: Thought content normal.         Judgment: Judgment normal.           Assessment:    Marilin Martinez is a 67 y.o. year old female with medically complicated severe obesity with a BMI of There is no height or weight on file to calculate BMI. and multiple co-morbidities listed in the encounter diagnosis.    I think she is an appropriate candidate for this surgery, and is ready to proceed.      Plan/Discussion/Summary:  Moderate size hiatal hernia per me.  No PPI.  H. pylori negative.  Patient does ambulate with a walker secondary to weakness in lower extremities from CVA.    The patient has returned to the office for a surgical consultation and has requested to proceed with a laparoscopic gastric sleeve.  I have had the opportunity to obtain a history, examine the patient and review the patient's chart.    The patient understands that surgery is a tool and that weight loss is not guaranteed but only seen in the context of appropriate use, regular follow up,  exercise and making appropriate food choices.     I personally discussed the potential complications of the laparoscopic gastric sleeve with this patient.  The patient is well aware of potential complications of the surgery that include but not limited to bleeding, infections, deep vein thrombosis, pulmonary embolism, pulmonary complications such as pneumonia, cardiac event, hernias, small bowel obstruction, damage to the spleen or other organs, bowel injury, disfiguring scars, failure to lose weight, need for additional surgery, conversion to an open procedure and death.  The patient is also aware of complications which apply in particular to the gastric sleeve and can include but not limited to the leakage of gastric contents at the staple line, the development of an intra-abdominal abscess, gastroesophageal reflux disease, Coronel's esophagus, ulcers, vitamin/mineral deficiencies, strictures, and the possibility of converting this procedure to a Frances-en-Y gastric bypass. The patient also understands the possibility of requiring an acid reducer medication for the rest of their life.    The risks, benefits, potential complications and alternative therapies were discussed at great length as outlined in our extensive consent forms, online consent and educational teaching processes.    The patient has confirmed the participation in the programs extensive educational activities.    All questions and concerns were answered to patient's satisfaction.  The patient now wishes to proceed with surgery.     The patient has agreed to a postoperative course of anitcoagulant therapy.      I instructed patient to start on a H2 blocker or proton pump inhibitor if not already on one of these medications.    I explained in detail the procedures that we perform.  All of these procedures have a chance to convert to open if any technical challenges or complications do occur.  Bariatric surgery is not cosmetic surgery but rather a tool  to help a patient make a life-long commitment lifestyle change including diet, exercise, behavior changes, and taking supplemental vitamins and minerals.    Problems after surgery may require more operations to correct them.    The risks, benefits, alternatives, and potential complications of all of the procedures were explained in detail including, but not limited to death, anesthesia and medication adverse effect, deep venous thrombosis, pulmonary embolism, trocar site/incisional hernia, wound infection, abdominal infection, bleeding, failure to lose weight, gain weight, a change in body image, metabolic complications with vitamin deficiences and anemia.    Weight loss expectations were discussed with the patient in detail. The weight loss operations most commonly performed are the sleeve gastrectomy and the Frances-en-Y gastric bypass. These operations result in weight losses up to approximately 25-35% of initial body weight 12 to 24 months after surgery with the gastric bypass usually the higher percent of weight loss but depends on patient using the tool.    For the gastric bypass and loop duodenal switch (CARRINGTON-S) the risks include but not limited to the following early complications:  Anastomotic leak/peritonitis, Frances/Alimentary/biliopancreatic limb obstruction, severe & minor wound infection/seroma, and nausea/vomiting.  Late complications can include but are not limited to malnutrition, vitamin deficiencies, frequent loose stools,  stomal stenosis, marginal ulcer, bowel obstruction, intussusception, internal, and incisional hernia.    Regarding the gastric sleeve, there is less long-term outcome data and higher risk of dysphagia and reflux leading to possible Coronel's esophagus compared to a gastric bypass, as well as risk of internal visceral/organ injury, splenectomy, bleeding, infection, leak (which could require further intervention possible conversion to Frances-en-Y gastric bypass), stenosis and  possibility of regaining weight.    Marilin was counseled regarding diagnostic results, instructions for management, risk factor reductions, prognosis, patient and family education, impressions, risks and benefits of treatment options and importance of compliance with treatment. Total time of the encounter was over 45 minutes counseling the patient regarding the procedure as above and reviewing as well as ordering labs, medications and the procedure.  The chart was also reviewed prior to seeing the patient reviewing previous testing, studies and labs.    Tushar Report   As part of this patient's treatment plan I am prescribing controlled substances. The patient has been made aware of appropriate use of such medications, including potential risk of somnolence, limited ability to drive and /or work safely, and potential for dependence or overdose. It has also been made clear that these medications are for use by this patient only, without concomitant use of alcohol or other substances unless prescribed.    Marilin has completed prescribing agreement detailing terms of continued prescribing of controlled substances, including monitoring TUSHAR reports, urine drug screening, and pill counts if necessary. Marilin is aware that inappropriate use will result in cessation of prescribing such medications.    TUSHAR report has been reviewed      History and physical exam exhibit continued safe and appropriate use of controlled substances.      Marilin understands the surgical procedures and the different surgical options that are available.  She understands the lifestyle changes that are required after surgery and has agreed to follow the guidelines outlined in the weight management program.  She also expressed understanding of the risks involved and had all of female questions answered and desires to proceed.      Quang Shipley MD  2/3/2022

## 2022-02-03 NOTE — PATIENT INSTRUCTIONS
Bariatric Manual    You were provided a manual specific to the procedure that you have chosen.  Please refer to that with any questions or call the office at [phone]

## 2022-02-03 NOTE — H&P (VIEW-ONLY)
Bariatric Consult:  Referred by Reyes, Rosenberg Acosta, MD    Marilin Martinez is here today for consult on No chief complaint on file.      History of Present Illness:     Marilin Martinez is a 67 y.o. female with morbid obesity with co-morbidities including sleep apnea, diabetes, hypertension, dyslipidemia, cardiovascular disease, osteoarthritis, back pain, knee pain and GERD who presents for surgical consultation for the above procedure. Marilin has completed the initial intake visit and has been examined by our nurse practitioner, dietician, psychologist and underwent the extensive educational teaching process under the guidance of our bariatric coordinator and myself. Marilin also has seen the educational video IVETTE on the surgical procedure if available. Marilin attended today more educational teaching from our bariatric coordinator and myself. Marilin has had an extensive medical workup including a visit with their primary care physician, EKG, chest radiograph, blood work, EGD or UGI and possibly further testing. These have been reviewed by me and discussed with the patient. Marilin is now ready to proceed with surgery. Marilin presently denies nausea, vomiting, fever, chills, chest pain, shortness of air, melena, hematochezia, hemetemesis, dysuria, frequency, hematuria, jaundice or abdominal pain.       Past Medical History:   Diagnosis Date   • Arthritis    • COPD (chronic obstructive pulmonary disease) ([facility])    • Diabetes mellitus ([facility])    • Disease of thyroid gland    • Dyspnea on exertion 8/11/2021   • Foot ulcer ([facility])    • Hyperlipemia    • Hyperlipidemia    • Hypertension    • Ingrown toenail    • Limb swelling    • Seasonal allergies    • Sleep apnea    • 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[facility])    • Thyroid disorder        Encounter Diagnoses   Name Primary?   • Obesity, Class III, BMI 40-49.9 (morbid obesity) ([facility]) Yes   • Diabetes mellitus type 2 in obese ([facility])    • Essential hypertension    • Sleep apnea,  unspecified type    • Hiatal hernia    • Cerebrovascular accident (CVA), unspecified mechanism (HCC)        Past Surgical History:   Procedure Laterality Date   • COLONOSCOPY     • ENDOSCOPY N/A 11/8/2021    Procedure: ESOPHAGOGASTRODUODENOSCOPY WITH COLD BIOPSY;  Surgeon: Mansi Kyle MD;  Location: [facility] ENDOSCOPY;  Service: General;  Laterality: N/A;  PRE: SCREENING FOR BARIATRICS  POST: MODERATE SIZE HIATAL HERNIA   • GALLBLADDER SURGERY  1997   • TONSILLECTOMY         Patient Active Problem List   Diagnosis   • Intracranial bleed (HCC)   • Environmental and seasonal allergies   • Essential hypertension   • Ankle swelling   • Sleep apnea   • Multiple joint pain   • Hypothyroid   • Stroke (HCC)   • Diabetes mellitus type 2 in obese (HCC)   • Dyspnea on exertion   • Dyslipidemia   • Obesity, Class III, BMI 40-49.9 (morbid obesity) (HCC)   • Hiatal hernia       No Known Allergies      Current Outpatient Medications:   •  ALPRAZolam (XANAX) 1 MG tablet, Take 1 mg by mouth 3 (Three) Times a Day As Needed for Anxiety., Disp: , Rfl:   •  amLODIPine (NORVASC) 10 MG tablet, Take 1 tablet by mouth Daily., Disp: 30 tablet, Rfl: 0  •  bumetanide (BUMEX) 1 MG tablet, Take 1 mg by mouth Daily As Needed., Disp: , Rfl:   •  celecoxib (CeleBREX) 200 MG capsule, Take 200 mg by mouth Daily., Disp: , Rfl:   •  ciprofloxacin (CIPRO) 500 MG tablet, TAKE 1 TABLET BY MOUTH TWICE DAILY X 2 WEEKS, Disp: , Rfl:   •  cyclobenzaprine (FLEXERIL) 10 MG tablet, cyclobenzaprine 10 mg oral tablet take 1 tablet (10 mg) by oral route 2 times per day as needed   Active, Disp: , Rfl:   •  Diclofenac Sodium (VOLTAREN) 1 % gel gel, APPLY 4 GRAMS TO AFFECTED AREA FOUR TIMES DAILY, Disp: , Rfl:   •  fluticasone (FLONASE) 50 MCG/ACT nasal spray, 1 spray into the nostril(s) as directed by provider Daily As Needed., Disp: , Rfl:   •  gabapentin (NEURONTIN) 800 MG tablet, Take 800 mg by mouth 2 (Two) Times a Day., Disp: , Rfl:   •   HYDROcodone-acetaminophen (NORCO)  MG per tablet, 1 tablet Every 8 (Eight) Hours As Needed., Disp: , Rfl:   •  ibuprofen (ADVIL,MOTRIN) 600 MG tablet, Take 600 mg by mouth Every 6 (Six) Hours As Needed for Mild Pain ., Disp: , Rfl:   •  latanoprost (XALATAN) 0.005 % ophthalmic solution, Administer 1 drop to both eyes Every Night., Disp: , Rfl:   •  levothyroxine (SYNTHROID, LEVOTHROID) 200 MCG tablet, Take 200 mcg by mouth Daily., Disp: , Rfl:   •  Levothyroxine Sodium 200 MCG capsule, Take 200 mcg by mouth Daily., Disp: , Rfl:   •  lisinopril-hydrochlorothiazide (PRINZIDE,ZESTORETIC) 20-12.5 MG per tablet, Take 1 tablet by mouth Daily., Disp: 90 tablet, Rfl: 3  •  Livalo 2 MG tablet tablet, , Disp: , Rfl:   •  Myrbetriq 25 MG tablet sustained-release 24 hour 24 hr tablet, Take 25 mg by mouth Daily., Disp: , Rfl:   •  nebivolol (BYSTOLIC) 10 MG tablet, Take 10 mg by mouth Daily., Disp: , Rfl:   •  Ozempic, 0.25 or 0.5 MG/DOSE, 2 MG/1.5ML solution pen-injector, INJECT 0.5MG SUBQ ONCE WEEKLY, Disp: , Rfl:   •  phenazopyridine (PYRIDIUM) 100 MG tablet, Take 1 tablet by mouth 3 (Three) Times a Day As Needed for Bladder Spasms., Disp: 6 tablet, Rfl: 0  •  pioglitazone (ACTOS) 30 MG tablet, Take 30 mg by mouth Daily. Every other day, Disp: , Rfl:   •  potassium chloride (KLOR-CON) 8 MEQ CR tablet, Take 20 mEq by mouth Daily., Disp: , Rfl:   •  rOPINIRole (REQUIP) 0.5 MG tablet, Take 0.5 mg by mouth Daily., Disp: , Rfl:   •  SITagliptin (JANUVIA) 100 MG tablet, Take 100 mg by mouth Daily., Disp: , Rfl:     Social History     Socioeconomic History   • Marital status:    Tobacco Use   • Smoking status: Former Smoker     Quit date: 1997     Years since quittin.4   • Smokeless tobacco: Never Used   Vaping Use   • Vaping Use: Never used   Substance and Sexual Activity   • Alcohol use: Not Currently   • Drug use: No   • Sexual activity: Defer       Family History   Problem Relation Age of Onset   • Obesity  Mother    • Hypertension Mother    • Cancer Mother    • Arthritis Mother    • Diabetes Mother    • Obesity Sister    • Hypertension Sister    • Cancer Sister    • Arthritis Sister    • Diabetes Father    • Diabetes Daughter        Review of Systems:  Review of Systems   Constitutional: Positive for fatigue.   Musculoskeletal: Positive for arthralgias, back pain and gait problem.   All other systems reviewed and are negative.        Physical Exam:    Vital Signs:      There is no height or weight on file to calculate BMI.                  Physical Exam  Constitutional:       Appearance: Normal appearance.   HENT:      Head: Normocephalic and atraumatic.   Eyes:      Conjunctiva/sclera: Conjunctivae normal.   Pulmonary:      Effort: Pulmonary effort is normal.   Neurological:      Mental Status: She is alert and oriented to person, place, and time.   Psychiatric:         Mood and Affect: Mood normal.         Behavior: Behavior normal.         Thought Content: Thought content normal.         Judgment: Judgment normal.           Assessment:    Marilin Martinez is a 67 y.o. year old female with medically complicated severe obesity with a BMI of There is no height or weight on file to calculate BMI. and multiple co-morbidities listed in the encounter diagnosis.    I think she is an appropriate candidate for this surgery, and is ready to proceed.      Plan/Discussion/Summary:  Moderate size hiatal hernia per me.  No PPI.  H. pylori negative.  Patient does ambulate with a walker secondary to weakness in lower extremities from CVA.    The patient has returned to the office for a surgical consultation and has requested to proceed with a laparoscopic gastric sleeve.  I have had the opportunity to obtain a history, examine the patient and review the patient's chart.    The patient understands that surgery is a tool and that weight loss is not guaranteed but only seen in the context of appropriate use, regular follow up,  exercise and making appropriate food choices.     I personally discussed the potential complications of the laparoscopic gastric sleeve with this patient.  The patient is well aware of potential complications of the surgery that include but not limited to bleeding, infections, deep vein thrombosis, pulmonary embolism, pulmonary complications such as pneumonia, cardiac event, hernias, small bowel obstruction, damage to the spleen or other organs, bowel injury, disfiguring scars, failure to lose weight, need for additional surgery, conversion to an open procedure and death.  The patient is also aware of complications which apply in particular to the gastric sleeve and can include but not limited to the leakage of gastric contents at the staple line, the development of an intra-abdominal abscess, gastroesophageal reflux disease, Coronel's esophagus, ulcers, vitamin/mineral deficiencies, strictures, and the possibility of converting this procedure to a Frances-en-Y gastric bypass. The patient also understands the possibility of requiring an acid reducer medication for the rest of their life.    The risks, benefits, potential complications and alternative therapies were discussed at great length as outlined in our extensive consent forms, online consent and educational teaching processes.    The patient has confirmed the participation in the programs extensive educational activities.    All questions and concerns were answered to patient's satisfaction.  The patient now wishes to proceed with surgery.     The patient has agreed to a postoperative course of anitcoagulant therapy.      I instructed patient to start on a H2 blocker or proton pump inhibitor if not already on one of these medications.    I explained in detail the procedures that we perform.  All of these procedures have a chance to convert to open if any technical challenges or complications do occur.  Bariatric surgery is not cosmetic surgery but rather a tool  to help a patient make a life-long commitment lifestyle change including diet, exercise, behavior changes, and taking supplemental vitamins and minerals.    Problems after surgery may require more operations to correct them.    The risks, benefits, alternatives, and potential complications of all of the procedures were explained in detail including, but not limited to death, anesthesia and medication adverse effect, deep venous thrombosis, pulmonary embolism, trocar site/incisional hernia, wound infection, abdominal infection, bleeding, failure to lose weight, gain weight, a change in body image, metabolic complications with vitamin deficiences and anemia.    Weight loss expectations were discussed with the patient in detail. The weight loss operations most commonly performed are the sleeve gastrectomy and the Frances-en-Y gastric bypass. These operations result in weight losses up to approximately 25-35% of initial body weight 12 to 24 months after surgery with the gastric bypass usually the higher percent of weight loss but depends on patient using the tool.    For the gastric bypass and loop duodenal switch (CARRINGTON-S) the risks include but not limited to the following early complications:  Anastomotic leak/peritonitis, Frances/Alimentary/biliopancreatic limb obstruction, severe & minor wound infection/seroma, and nausea/vomiting.  Late complications can include but are not limited to malnutrition, vitamin deficiencies, frequent loose stools,  stomal stenosis, marginal ulcer, bowel obstruction, intussusception, internal, and incisional hernia.    Regarding the gastric sleeve, there is less long-term outcome data and higher risk of dysphagia and reflux leading to possible Coronel's esophagus compared to a gastric bypass, as well as risk of internal visceral/organ injury, splenectomy, bleeding, infection, leak (which could require further intervention possible conversion to Frances-en-Y gastric bypass), stenosis and  possibility of regaining weight.    Marilin was counseled regarding diagnostic results, instructions for management, risk factor reductions, prognosis, patient and family education, impressions, risks and benefits of treatment options and importance of compliance with treatment. Total time of the encounter was over 45 minutes counseling the patient regarding the procedure as above and reviewing as well as ordering labs, medications and the procedure.  The chart was also reviewed prior to seeing the patient reviewing previous testing, studies and labs.    Tushar Report   As part of this patient's treatment plan I am prescribing controlled substances. The patient has been made aware of appropriate use of such medications, including potential risk of somnolence, limited ability to drive and /or work safely, and potential for dependence or overdose. It has also been made clear that these medications are for use by this patient only, without concomitant use of alcohol or other substances unless prescribed.    Marilin has completed prescribing agreement detailing terms of continued prescribing of controlled substances, including monitoring TUSHAR reports, urine drug screening, and pill counts if necessary. Marilin is aware that inappropriate use will result in cessation of prescribing such medications.    TUSHAR report has been reviewed      History and physical exam exhibit continued safe and appropriate use of controlled substances.      Marilin understands the surgical procedures and the different surgical options that are available.  She understands the lifestyle changes that are required after surgery and has agreed to follow the guidelines outlined in the weight management program.  She also expressed understanding of the risks involved and had all of female questions answered and desires to proceed.      Quang Shipley MD  2/3/2022

## 2022-02-11 ENCOUNTER — PRE-ADMISSION TESTING (OUTPATIENT)
Dept: PREADMISSION TESTING | Facility: HOSPITAL | Age: 67
End: 2022-02-11

## 2022-02-11 VITALS
HEART RATE: 69 BPM | TEMPERATURE: 97.7 F | OXYGEN SATURATION: 97 % | DIASTOLIC BLOOD PRESSURE: 83 MMHG | SYSTOLIC BLOOD PRESSURE: 134 MMHG | RESPIRATION RATE: 20 BRPM

## 2022-02-11 DIAGNOSIS — E66.01 OBESITY, CLASS III, BMI 40-49.9 (MORBID OBESITY): ICD-10-CM

## 2022-02-11 DIAGNOSIS — K44.9 HIATAL HERNIA: ICD-10-CM

## 2022-02-11 LAB
ALBUMIN SERPL-MCNC: 3.9 G/DL (ref 3.5–5.2)
ALBUMIN/GLOB SERPL: 1.2 G/DL
ALP SERPL-CCNC: 48 U/L (ref 39–117)
ALT SERPL W P-5'-P-CCNC: 15 U/L (ref 1–33)
ANION GAP SERPL CALCULATED.3IONS-SCNC: 12 MMOL/L (ref 5–15)
AST SERPL-CCNC: 22 U/L (ref 1–32)
BILIRUB SERPL-MCNC: 0.5 MG/DL (ref 0–1.2)
BUN SERPL-MCNC: 23 MG/DL (ref 8–23)
BUN/CREAT SERPL: 25.6 (ref 7–25)
CALCIUM SPEC-SCNC: 9.7 MG/DL (ref 8.6–10.5)
CHLORIDE SERPL-SCNC: 101 MMOL/L (ref 98–107)
CO2 SERPL-SCNC: 26 MMOL/L (ref 22–29)
CREAT SERPL-MCNC: 0.9 MG/DL (ref 0.57–1)
DEPRECATED RDW RBC AUTO: 43.4 FL (ref 37–54)
ERYTHROCYTE [DISTWIDTH] IN BLOOD BY AUTOMATED COUNT: 12.3 % (ref 12.3–15.4)
GFR SERPL CREATININE-BSD FRML MDRD: 63 ML/MIN/1.73
GLOBULIN UR ELPH-MCNC: 3.3 GM/DL
GLUCOSE SERPL-MCNC: 81 MG/DL (ref 65–99)
HCT VFR BLD AUTO: 41.4 % (ref 34–46.6)
HGB BLD-MCNC: 13.9 G/DL (ref 12–15.9)
MCH RBC QN AUTO: 32.4 PG (ref 26.6–33)
MCHC RBC AUTO-ENTMCNC: 33.6 G/DL (ref 31.5–35.7)
MCV RBC AUTO: 96.5 FL (ref 79–97)
PLATELET # BLD AUTO: 171 10*3/MM3 (ref 140–450)
PMV BLD AUTO: 12.9 FL (ref 6–12)
POTASSIUM SERPL-SCNC: 4.3 MMOL/L (ref 3.5–5.2)
PROT SERPL-MCNC: 7.2 G/DL (ref 6–8.5)
RBC # BLD AUTO: 4.29 10*6/MM3 (ref 3.77–5.28)
SARS-COV-2 ORF1AB RESP QL NAA+PROBE: NOT DETECTED
SODIUM SERPL-SCNC: 139 MMOL/L (ref 136–145)
WBC NRBC COR # BLD: 8.1 10*3/MM3 (ref 3.4–10.8)

## 2022-02-11 PROCEDURE — C9803 HOPD COVID-19 SPEC COLLECT: HCPCS

## 2022-02-11 PROCEDURE — 85027 COMPLETE CBC AUTOMATED: CPT

## 2022-02-11 PROCEDURE — U0004 COV-19 TEST NON-CDC HGH THRU: HCPCS

## 2022-02-11 PROCEDURE — 36415 COLL VENOUS BLD VENIPUNCTURE: CPT

## 2022-02-11 PROCEDURE — 80053 COMPREHEN METABOLIC PANEL: CPT

## 2022-02-11 NOTE — DISCHARGE INSTRUCTIONS
Take only the following medications the morning of surgery:   BYSTOLIC    Do not take Bariatric Vitamins, Folic Acid, Actigall (if applicable) or Lovenox Injections (if applicable) the morning of surgery.  If you have a history of blood clots or have a BMI greater than 50, Dr. Shipley may order Lovenox for after surgery. Do not take Lovenox blood thinner before surgery.      General Instructions:   • Drink one 20 ounce Gatorade G2 the evening before surgery.  Nothing red in color.  • Do not eat solid food after midnight the night before surgery.    • The morning of surgery have another 20 ounce Gatorade G2.  Again, nothing red in color.  Your drink must be completed 2 hours before your arrival time.   • Patients who avoid smoking, chewing tobacco and alcohol for 4 weeks prior to surgery have a reduced risk of post-operative complications.  Quit smoking as many days before surgery as you can.  • Do not smoke, use chewing tobacco or drink alcohol the day of surgery.   • Bring any papers given to you in the doctor's office.  Wear clean comfortable clothes.  • Do not wear contact lenses, false eyelashes or make-up.  Bring a case for your glasses.   • Bring crutches or walker if applicable.  • Remove all piercings.  Leave jewelry and any other valuables at home.  • Remove fingernail polish, gel overlays or any artificial nails.  • Hair extensions with metal clips must be removed prior to surgery.  • The Pre-Admission Testing nurse will instruct you to bring medications if unable to obtain an accurate list in Pre-Admission Testing.    If you were given a blood bank ID arm band remember to bring it with you the day of surgery.    Preventing a Surgical Site Infection:  • For 2 to 3 days before surgery, avoid shaving with a razor because the razor can irritate skin and make it easier to develop an infection.    • Any areas of open skin can increase the risk of a post-operative wound infection by allowing bacteria to enter and  travel throughout the body.  Notify your surgeon if you have any skin wounds / rashes even if it is not near the expected surgical site.  The area will need assessed to determine if surgery should be delayed until it is healed.  • 2 days prior to surgery, take a shower using a fresh bar of anti-bacterial soap (such as Dial).  Use a clean washcloth and dry with a clean towel.    • The day prior to surgery, take a shower using a fresh bar of anti-bacterial soap (such as Dial).  Use a clean washcloth and dry with a clean towel.  Sleep in a clean bed with clean clothing.  Do not allow pets to sleep with you.  • The morning of surgery shower using a fresh bar of anti-bacterial soap (such as Dial).  Use a clean washcloth and dry with a clean towel.  Follow the Chlorhexidine instructions below.    CHLORHEXIDINE CLOTH INSTRUCTIONS  The morning of surgery follow these instructions using the Chlorhexidine cloths you've been given.  These steps reduce bacteria on the body.  Do not use the cloths near your eyes, ears mouth, genitalia or on open wounds.  Throw the cloths away after use but do not try to flush them down a toilet.    • Open and remove one cloth at a time from the package.    • Leave the cloth unfolded and begin the bathing.  • Massage the skin with the cloths using gentle pressure to remove bacteria.  Do not scrub harshly.   • Follow the steps below with one 2% CHG cloth per area (6 total cloths).  • One cloth for neck, shoulders and chest.  • One cloth for both arms, hands, fingers and underarms (do underarms last).  • One cloth for the abdomen followed by groin.  • One cloth for right leg and foot including between the toes.  • One cloth for left leg and foot including between the toes.  • The last cloth is to be used for the back of the neck, back and buttocks.    Allow the CHG to air dry 3 minutes on the skin which will give it time to work and decrease the chance of irritation.  The skin may feel sticky until  it is dry.  Do not rinse with water or any other liquid or you will lose the beneficial effects of the CHG.  If mild skin irritation occurs, do rinse the skin to remove the CHG.  Report this to the nurse at time of admission.  Do not apply lotions, creams, ointments, deodorants or perfumes after using the clothes. Dress in clean clothes before coming to the hospital.    • Ask your surgeon if you will be receiving antibiotics prior to surgery.  • Make sure you, your family, and all healthcare providers clean their hands with soap and water or an alcohol based hand  before caring for you or your wound.      Day of surgery: 2/14/2022 ARRIVAL TIME 8:30 AM  Your arrival time is approximately two hours before your scheduled surgery time.  Upon arrival, a Pre-op nurse and Anesthesiologist will review your health history, obtain vital signs, and answer questions you may have.  A Pre-op nurse will start an IV and you may receive medication in preparation for surgery, including something to help you relax.  If applicable, we do ask that you have your C-PAP/BI-PAP machine available. It can be utilized the night of surgery.     Please be aware that surgery does come with discomfort.  We want to make every effort to control your discomfort so please discuss any uncontrolled symptoms with your nurse.   Your doctor will most likely have prescribed pain medications.      If you are going home after surgery you will receive individualized written care instructions before being discharged.  A responsible adult must drive you to and from the hospital on the day of your surgery and stay with you for 24 hours.  Discharge prescriptions can be filled by the hospital pharmacy during regular pharmacy hours.  If you are having surgery late in the day/evening your prescription may be e-prescribed to your pharmacy.  Please verify your pharmacy hours or chose a 24 hour pharmacy to avoid not having access to your prescription because  your pharmacy has closed for the day.    If you are staying overnight following surgery, you will be transported to your hospital room following the recovery period.  [facility] has all private rooms.    If you have any questions please call Pre-Admission Testing at [phone].  Deductibles and co-payments are collected on the day of service. Please be prepared to pay the required co-pay, deductible or deposit on the day of service as defined by your plan.    Patient Education for Self-Quarantine Process    • Following your COVID testing, we strongly recommend that you wear a mask when you are with other people and practice social distancing.   • Limit your activities to only required outings.  • Wash your hands with soap and water frequently for at least 20 seconds.   • Avoid touching your eyes, nose and mouth with unwashed hands.  • Do not share anything - utensils, drinking glasses, food from the same bowl.   • Sanitize household surfaces daily. Include all high touch areas (door handles, light switches, phones, countertops, etc.)    Call your surgeon immediately if you experience any of the following symptoms:  • Sore Throat  • Shortness of Breath or difficulty breathing  • Cough  • Chills  • Body soreness or muscle pain  • Headache  • Fever  • New loss of taste or smell  • Do not arrive for your surgery ill.  Your procedure will need to be rescheduled to another time.  You will need to call your physician before the day of surgery to avoid any unnecessary exposure to hospital staff as well as other patients.

## 2022-02-13 ENCOUNTER — ANESTHESIA EVENT (OUTPATIENT)
Dept: PERIOP | Facility: HOSPITAL | Age: 67
End: 2022-02-13

## 2022-02-14 ENCOUNTER — HOSPITAL ENCOUNTER (INPATIENT)
Facility: HOSPITAL | Age: 67
LOS: 1 days | Discharge: HOME | End: 2022-02-15
Attending: SURGERY | Admitting: SURGERY

## 2022-02-14 ENCOUNTER — ANESTHESIA (OUTPATIENT)
Dept: PERIOP | Facility: HOSPITAL | Age: 67
End: 2022-02-14

## 2022-02-14 DIAGNOSIS — K44.9 HIATAL HERNIA: ICD-10-CM

## 2022-02-14 DIAGNOSIS — E66.01 OBESITY, CLASS III, BMI 40-49.9 (MORBID OBESITY): ICD-10-CM

## 2022-02-14 PROBLEM — K43.9 VENTRAL HERNIA WITHOUT OBSTRUCTION OR GANGRENE: Status: ACTIVE | Noted: 2022-02-14

## 2022-02-14 LAB
GLUCOSE BLDC GLUCOMTR-MCNC: 117 MG/DL (ref 70–130)
GLUCOSE BLDC GLUCOMTR-MCNC: 124 MG/DL (ref 70–130)
GLUCOSE BLDC GLUCOMTR-MCNC: 134 MG/DL (ref 70–130)
GLUCOSE BLDC GLUCOMTR-MCNC: 83 MG/DL (ref 70–130)

## 2022-02-14 PROCEDURE — 0DB64Z3 EXCISION OF STOMACH, PERCUTANEOUS ENDOSCOPIC APPROACH, VERTICAL: ICD-10-PCS | Performed by: SURGERY

## 2022-02-14 PROCEDURE — C1889 IMPLANT/INSERT DEVICE, NOC: HCPCS | Performed by: SURGERY

## 2022-02-14 PROCEDURE — 0BQT4ZZ REPAIR DIAPHRAGM, PERCUTANEOUS ENDOSCOPIC APPROACH: ICD-10-PCS | Performed by: SURGERY

## 2022-02-14 PROCEDURE — 25010000002 ENOXAPARIN PER 10 MG: Performed by: SURGERY

## 2022-02-14 PROCEDURE — 25010000002 FENTANYL CITRATE (PF) 50 MCG/ML SOLUTION: Performed by: NURSE ANESTHETIST, CERTIFIED REGISTERED

## 2022-02-14 PROCEDURE — 0DBU0ZZ EXCISION OF OMENTUM, OPEN APPROACH: ICD-10-PCS | Performed by: SURGERY

## 2022-02-14 PROCEDURE — 82962 GLUCOSE BLOOD TEST: CPT

## 2022-02-14 PROCEDURE — 0WQF4ZZ REPAIR ABDOMINAL WALL, PERCUTANEOUS ENDOSCOPIC APPROACH: ICD-10-PCS | Performed by: SURGERY

## 2022-02-14 PROCEDURE — 25010000002 METOCLOPRAMIDE PER 10 MG: Performed by: SURGERY

## 2022-02-14 PROCEDURE — 43775 LAP SLEEVE GASTRECTOMY: CPT | Performed by: SURGERY

## 2022-02-14 PROCEDURE — 88307 TISSUE EXAM BY PATHOLOGIST: CPT | Performed by: SURGERY

## 2022-02-14 PROCEDURE — 25010000002 HYDROMORPHONE PER 4 MG: Performed by: NURSE ANESTHETIST, CERTIFIED REGISTERED

## 2022-02-14 PROCEDURE — C9290 INJ, BUPIVACAINE LIPOSOME: HCPCS | Performed by: SURGERY

## 2022-02-14 PROCEDURE — 43775 LAP SLEEVE GASTRECTOMY: CPT | Performed by: NURSE PRACTITIONER

## 2022-02-14 PROCEDURE — 25010000002 CEFAZOLIN PER 500 MG: Performed by: SURGERY

## 2022-02-14 PROCEDURE — 25010000002 ONDANSETRON PER 1 MG: Performed by: NURSE ANESTHETIST, CERTIFIED REGISTERED

## 2022-02-14 PROCEDURE — 25010000002 HYDROMORPHONE PER 4 MG: Performed by: SURGERY

## 2022-02-14 PROCEDURE — 25010000002 MAGNESIUM SULFATE PER 500 MG OF MAGNESIUM: Performed by: NURSE ANESTHETIST, CERTIFIED REGISTERED

## 2022-02-14 PROCEDURE — 25010000002 PROPOFOL 10 MG/ML EMULSION: Performed by: NURSE ANESTHETIST, CERTIFIED REGISTERED

## 2022-02-14 PROCEDURE — 0 BUPIVACAINE LIPOSOME 1.3 % SUSPENSION 20 ML VIAL: Performed by: SURGERY

## 2022-02-14 PROCEDURE — 25010000002 DEXAMETHASONE PER 1 MG: Performed by: NURSE ANESTHETIST, CERTIFIED REGISTERED

## 2022-02-14 DEVICE — IMPLANTABLE DEVICE
Type: IMPLANTABLE DEVICE | Site: STOMACH | Status: FUNCTIONAL
Brand: TITAN SGS STANDARD GASTRIC STAPLER

## 2022-02-14 DEVICE — SEALANT WND FIBRIN TISSEEL PREFIL/SYR/PRIMAFZ 4ML: Type: IMPLANTABLE DEVICE | Site: STOMACH | Status: FUNCTIONAL

## 2022-02-14 RX ORDER — ALBUTEROL SULFATE 2.5 MG/3ML
2.5 SOLUTION RESPIRATORY (INHALATION) EVERY 4 HOURS PRN
Status: DISCONTINUED | OUTPATIENT
Start: 2022-02-14 | End: 2022-02-15 | Stop reason: HOSPADM

## 2022-02-14 RX ORDER — LEVOTHYROXINE SODIUM 0.1 MG/1
200 TABLET ORAL
Status: DISCONTINUED | OUTPATIENT
Start: 2022-02-15 | End: 2022-02-15 | Stop reason: HOSPADM

## 2022-02-14 RX ORDER — DEXAMETHASONE SODIUM PHOSPHATE 10 MG/ML
INJECTION INTRAMUSCULAR; INTRAVENOUS AS NEEDED
Status: DISCONTINUED | OUTPATIENT
Start: 2022-02-14 | End: 2022-02-14 | Stop reason: SURG

## 2022-02-14 RX ORDER — PANTOPRAZOLE SODIUM 40 MG/10ML
40 INJECTION, POWDER, LYOPHILIZED, FOR SOLUTION INTRAVENOUS ONCE
Status: COMPLETED | OUTPATIENT
Start: 2022-02-14 | End: 2022-02-14

## 2022-02-14 RX ORDER — DIPHENHYDRAMINE HYDROCHLORIDE 50 MG/ML
25 INJECTION INTRAMUSCULAR; INTRAVENOUS EVERY 4 HOURS PRN
Status: DISCONTINUED | OUTPATIENT
Start: 2022-02-14 | End: 2022-02-15 | Stop reason: HOSPADM

## 2022-02-14 RX ORDER — EPHEDRINE SULFATE 50 MG/ML
5 INJECTION, SOLUTION INTRAVENOUS ONCE AS NEEDED
Status: DISCONTINUED | OUTPATIENT
Start: 2022-02-14 | End: 2022-02-14 | Stop reason: HOSPADM

## 2022-02-14 RX ORDER — ACETAMINOPHEN 160 MG/5ML
1000 SOLUTION ORAL EVERY 6 HOURS
Status: DISCONTINUED | OUTPATIENT
Start: 2022-02-14 | End: 2022-02-15 | Stop reason: HOSPADM

## 2022-02-14 RX ORDER — PROPOFOL 10 MG/ML
VIAL (ML) INTRAVENOUS AS NEEDED
Status: DISCONTINUED | OUTPATIENT
Start: 2022-02-14 | End: 2022-02-14 | Stop reason: SURG

## 2022-02-14 RX ORDER — PROCHLORPERAZINE EDISYLATE 5 MG/ML
10 INJECTION INTRAMUSCULAR; INTRAVENOUS EVERY 6 HOURS PRN
Status: DISCONTINUED | OUTPATIENT
Start: 2022-02-14 | End: 2022-02-15 | Stop reason: HOSPADM

## 2022-02-14 RX ORDER — ACETAMINOPHEN 500 MG
1000 TABLET ORAL EVERY 6 HOURS
Status: DISCONTINUED | OUTPATIENT
Start: 2022-02-14 | End: 2022-02-15 | Stop reason: HOSPADM

## 2022-02-14 RX ORDER — METOCLOPRAMIDE HYDROCHLORIDE 5 MG/ML
10 INJECTION INTRAMUSCULAR; INTRAVENOUS EVERY 6 HOURS
Status: DISCONTINUED | OUTPATIENT
Start: 2022-02-14 | End: 2022-02-15

## 2022-02-14 RX ORDER — ONDANSETRON 4 MG/1
4 TABLET, ORALLY DISINTEGRATING ORAL EVERY 4 HOURS PRN
Status: DISCONTINUED | OUTPATIENT
Start: 2022-02-14 | End: 2022-02-15 | Stop reason: HOSPADM

## 2022-02-14 RX ORDER — HYDRALAZINE HYDROCHLORIDE 20 MG/ML
10 INJECTION INTRAMUSCULAR; INTRAVENOUS
Status: DISCONTINUED | OUTPATIENT
Start: 2022-02-14 | End: 2022-02-15 | Stop reason: HOSPADM

## 2022-02-14 RX ORDER — DIPHENHYDRAMINE HYDROCHLORIDE 50 MG/ML
12.5 INJECTION INTRAMUSCULAR; INTRAVENOUS
Status: DISCONTINUED | OUTPATIENT
Start: 2022-02-14 | End: 2022-02-14 | Stop reason: HOSPADM

## 2022-02-14 RX ORDER — SODIUM CHLORIDE, SODIUM LACTATE, POTASSIUM CHLORIDE, CALCIUM CHLORIDE 600; 310; 30; 20 MG/100ML; MG/100ML; MG/100ML; MG/100ML
100 INJECTION, SOLUTION INTRAVENOUS CONTINUOUS
Status: DISCONTINUED | OUTPATIENT
Start: 2022-02-14 | End: 2022-02-14 | Stop reason: HOSPADM

## 2022-02-14 RX ORDER — LATANOPROST 50 UG/ML
1 SOLUTION/ DROPS OPHTHALMIC NIGHTLY
Status: DISCONTINUED | OUTPATIENT
Start: 2022-02-14 | End: 2022-02-15 | Stop reason: HOSPADM

## 2022-02-14 RX ORDER — PROMETHAZINE HYDROCHLORIDE 25 MG/1
25 SUPPOSITORY RECTAL ONCE AS NEEDED
Status: DISCONTINUED | OUTPATIENT
Start: 2022-02-14 | End: 2022-02-14 | Stop reason: HOSPADM

## 2022-02-14 RX ORDER — CHLORHEXIDINE GLUCONATE 0.12 MG/ML
15 RINSE ORAL SEE ADMIN INSTRUCTIONS
Status: COMPLETED | OUTPATIENT
Start: 2022-02-14 | End: 2022-02-14

## 2022-02-14 RX ORDER — LIDOCAINE HYDROCHLORIDE 20 MG/ML
INJECTION, SOLUTION INFILTRATION; PERINEURAL AS NEEDED
Status: DISCONTINUED | OUTPATIENT
Start: 2022-02-14 | End: 2022-02-14 | Stop reason: SURG

## 2022-02-14 RX ORDER — MAGNESIUM SULFATE HEPTAHYDRATE 500 MG/ML
INJECTION, SOLUTION INTRAMUSCULAR; INTRAVENOUS AS NEEDED
Status: DISCONTINUED | OUTPATIENT
Start: 2022-02-14 | End: 2022-02-14 | Stop reason: SURG

## 2022-02-14 RX ORDER — KETAMINE HYDROCHLORIDE 10 MG/ML
INJECTION INTRAMUSCULAR; INTRAVENOUS AS NEEDED
Status: DISCONTINUED | OUTPATIENT
Start: 2022-02-14 | End: 2022-02-14 | Stop reason: SURG

## 2022-02-14 RX ORDER — HYDRALAZINE HYDROCHLORIDE 20 MG/ML
5 INJECTION INTRAMUSCULAR; INTRAVENOUS
Status: DISCONTINUED | OUTPATIENT
Start: 2022-02-14 | End: 2022-02-14 | Stop reason: HOSPADM

## 2022-02-14 RX ORDER — NALOXONE HCL 0.4 MG/ML
0.2 VIAL (ML) INJECTION AS NEEDED
Status: DISCONTINUED | OUTPATIENT
Start: 2022-02-14 | End: 2022-02-14 | Stop reason: HOSPADM

## 2022-02-14 RX ORDER — GABAPENTIN 300 MG/1
300 CAPSULE ORAL EVERY 8 HOURS
Status: DISCONTINUED | OUTPATIENT
Start: 2022-02-14 | End: 2022-02-15 | Stop reason: HOSPADM

## 2022-02-14 RX ORDER — PROMETHAZINE HYDROCHLORIDE 25 MG/1
25 SUPPOSITORY RECTAL ONCE
Status: DISCONTINUED | OUTPATIENT
Start: 2022-02-14 | End: 2022-02-14 | Stop reason: HOSPADM

## 2022-02-14 RX ORDER — ONDANSETRON 4 MG/1
4 TABLET, FILM COATED ORAL EVERY 4 HOURS PRN
Status: DISCONTINUED | OUTPATIENT
Start: 2022-02-14 | End: 2022-02-15 | Stop reason: HOSPADM

## 2022-02-14 RX ORDER — HYDROMORPHONE HYDROCHLORIDE 1 MG/ML
0.5 INJECTION, SOLUTION INTRAMUSCULAR; INTRAVENOUS; SUBCUTANEOUS
Status: DISCONTINUED | OUTPATIENT
Start: 2022-02-14 | End: 2022-02-15 | Stop reason: HOSPADM

## 2022-02-14 RX ORDER — GABAPENTIN 250 MG/5ML
300 SOLUTION ORAL EVERY 8 HOURS
Status: DISCONTINUED | OUTPATIENT
Start: 2022-02-14 | End: 2022-02-15 | Stop reason: HOSPADM

## 2022-02-14 RX ORDER — LIDOCAINE HYDROCHLORIDE 10 MG/ML
0.5 INJECTION, SOLUTION EPIDURAL; INFILTRATION; INTRACAUDAL; PERINEURAL ONCE AS NEEDED
Status: DISCONTINUED | OUTPATIENT
Start: 2022-02-14 | End: 2022-02-14 | Stop reason: HOSPADM

## 2022-02-14 RX ORDER — NALOXONE HCL 0.4 MG/ML
0.1 VIAL (ML) INJECTION
Status: DISCONTINUED | OUTPATIENT
Start: 2022-02-14 | End: 2022-02-15 | Stop reason: HOSPADM

## 2022-02-14 RX ORDER — SCOLOPAMINE TRANSDERMAL SYSTEM 1 MG/1
1 PATCH, EXTENDED RELEASE TRANSDERMAL CONTINUOUS
Status: DISCONTINUED | OUTPATIENT
Start: 2022-02-14 | End: 2022-02-15 | Stop reason: HOSPADM

## 2022-02-14 RX ORDER — ONDANSETRON 2 MG/ML
INJECTION INTRAMUSCULAR; INTRAVENOUS AS NEEDED
Status: DISCONTINUED | OUTPATIENT
Start: 2022-02-14 | End: 2022-02-14 | Stop reason: SURG

## 2022-02-14 RX ORDER — PROMETHAZINE HYDROCHLORIDE 25 MG/1
12.5 TABLET ORAL ONCE
Status: DISCONTINUED | OUTPATIENT
Start: 2022-02-14 | End: 2022-02-14 | Stop reason: HOSPADM

## 2022-02-14 RX ORDER — SODIUM CHLORIDE, SODIUM LACTATE, POTASSIUM CHLORIDE, CALCIUM CHLORIDE 600; 310; 30; 20 MG/100ML; MG/100ML; MG/100ML; MG/100ML
9 INJECTION, SOLUTION INTRAVENOUS CONTINUOUS
Status: DISCONTINUED | OUTPATIENT
Start: 2022-02-14 | End: 2022-02-14 | Stop reason: HOSPADM

## 2022-02-14 RX ORDER — FAMOTIDINE 10 MG/ML
20 INJECTION, SOLUTION INTRAVENOUS EVERY 12 HOURS SCHEDULED
Status: DISCONTINUED | OUTPATIENT
Start: 2022-02-14 | End: 2022-02-15

## 2022-02-14 RX ORDER — PROMETHAZINE HYDROCHLORIDE 12.5 MG/1
12.5 SUPPOSITORY RECTAL EVERY 4 HOURS PRN
Status: DISCONTINUED | OUTPATIENT
Start: 2022-02-14 | End: 2022-02-15 | Stop reason: HOSPADM

## 2022-02-14 RX ORDER — MIDAZOLAM HYDROCHLORIDE 1 MG/ML
1 INJECTION INTRAMUSCULAR; INTRAVENOUS
Status: DISCONTINUED | OUTPATIENT
Start: 2022-02-14 | End: 2022-02-14 | Stop reason: HOSPADM

## 2022-02-14 RX ORDER — LORAZEPAM 2 MG/ML
1 INJECTION INTRAMUSCULAR EVERY 12 HOURS PRN
Status: DISCONTINUED | OUTPATIENT
Start: 2022-02-14 | End: 2022-02-15 | Stop reason: HOSPADM

## 2022-02-14 RX ORDER — SODIUM CHLORIDE 0.9 % (FLUSH) 0.9 %
3 SYRINGE (ML) INJECTION EVERY 12 HOURS SCHEDULED
Status: DISCONTINUED | OUTPATIENT
Start: 2022-02-14 | End: 2022-02-14 | Stop reason: HOSPADM

## 2022-02-14 RX ORDER — SODIUM CHLORIDE 9 MG/ML
INJECTION, SOLUTION INTRAVENOUS AS NEEDED
Status: DISCONTINUED | OUTPATIENT
Start: 2022-02-14 | End: 2022-02-14 | Stop reason: HOSPADM

## 2022-02-14 RX ORDER — NEBIVOLOL 10 MG/1
10 TABLET ORAL DAILY
Status: DISCONTINUED | OUTPATIENT
Start: 2022-02-14 | End: 2022-02-15 | Stop reason: HOSPADM

## 2022-02-14 RX ORDER — ROPINIROLE 0.5 MG/1
0.5 TABLET, FILM COATED ORAL NIGHTLY
Status: DISCONTINUED | OUTPATIENT
Start: 2022-02-14 | End: 2022-02-15 | Stop reason: HOSPADM

## 2022-02-14 RX ORDER — SODIUM CHLORIDE 0.9 % (FLUSH) 0.9 %
3-10 SYRINGE (ML) INJECTION AS NEEDED
Status: DISCONTINUED | OUTPATIENT
Start: 2022-02-14 | End: 2022-02-14 | Stop reason: HOSPADM

## 2022-02-14 RX ORDER — MIRTAZAPINE 15 MG/1
15 TABLET, ORALLY DISINTEGRATING ORAL NIGHTLY
Status: DISCONTINUED | OUTPATIENT
Start: 2022-02-14 | End: 2022-02-15 | Stop reason: HOSPADM

## 2022-02-14 RX ORDER — SODIUM CHLORIDE, SODIUM LACTATE, POTASSIUM CHLORIDE, CALCIUM CHLORIDE 600; 310; 30; 20 MG/100ML; MG/100ML; MG/100ML; MG/100ML
150 INJECTION, SOLUTION INTRAVENOUS CONTINUOUS
Status: DISCONTINUED | OUTPATIENT
Start: 2022-02-14 | End: 2022-02-15 | Stop reason: HOSPADM

## 2022-02-14 RX ORDER — ONDANSETRON 2 MG/ML
4 INJECTION INTRAMUSCULAR; INTRAVENOUS ONCE AS NEEDED
Status: DISCONTINUED | OUTPATIENT
Start: 2022-02-14 | End: 2022-02-14 | Stop reason: HOSPADM

## 2022-02-14 RX ORDER — PROMETHAZINE HYDROCHLORIDE 25 MG/1
25 TABLET ORAL ONCE AS NEEDED
Status: DISCONTINUED | OUTPATIENT
Start: 2022-02-14 | End: 2022-02-14 | Stop reason: HOSPADM

## 2022-02-14 RX ORDER — MAGNESIUM HYDROXIDE 1200 MG/15ML
LIQUID ORAL AS NEEDED
Status: DISCONTINUED | OUTPATIENT
Start: 2022-02-14 | End: 2022-02-14 | Stop reason: HOSPADM

## 2022-02-14 RX ORDER — ROCURONIUM BROMIDE 10 MG/ML
INJECTION, SOLUTION INTRAVENOUS AS NEEDED
Status: DISCONTINUED | OUTPATIENT
Start: 2022-02-14 | End: 2022-02-14 | Stop reason: SURG

## 2022-02-14 RX ORDER — HYDROMORPHONE HYDROCHLORIDE 4 MG/1
2 TABLET ORAL EVERY 4 HOURS PRN
Status: DISCONTINUED | OUTPATIENT
Start: 2022-02-14 | End: 2022-02-15 | Stop reason: HOSPADM

## 2022-02-14 RX ORDER — LABETALOL HYDROCHLORIDE 5 MG/ML
5 INJECTION, SOLUTION INTRAVENOUS
Status: DISCONTINUED | OUTPATIENT
Start: 2022-02-14 | End: 2022-02-14 | Stop reason: HOSPADM

## 2022-02-14 RX ORDER — HYDROMORPHONE HYDROCHLORIDE 1 MG/ML
0.5 INJECTION, SOLUTION INTRAMUSCULAR; INTRAVENOUS; SUBCUTANEOUS
Status: DISCONTINUED | OUTPATIENT
Start: 2022-02-14 | End: 2022-02-14 | Stop reason: HOSPADM

## 2022-02-14 RX ORDER — ONDANSETRON 2 MG/ML
4 INJECTION INTRAMUSCULAR; INTRAVENOUS EVERY 4 HOURS PRN
Status: DISCONTINUED | OUTPATIENT
Start: 2022-02-14 | End: 2022-02-15 | Stop reason: HOSPADM

## 2022-02-14 RX ORDER — METOCLOPRAMIDE HYDROCHLORIDE 5 MG/ML
10 INJECTION INTRAMUSCULAR; INTRAVENOUS ONCE
Status: COMPLETED | OUTPATIENT
Start: 2022-02-14 | End: 2022-02-14

## 2022-02-14 RX ORDER — CYANOCOBALAMIN 1000 UG/ML
1000 INJECTION, SOLUTION INTRAMUSCULAR; SUBCUTANEOUS ONCE
Status: COMPLETED | OUTPATIENT
Start: 2022-02-15 | End: 2022-02-15

## 2022-02-14 RX ORDER — KETAMINE HYDROCHLORIDE 10 MG/ML
INJECTION INTRAMUSCULAR; INTRAVENOUS AS NEEDED
Status: DISCONTINUED | OUTPATIENT
Start: 2022-02-14 | End: 2022-02-14

## 2022-02-14 RX ORDER — GABAPENTIN 250 MG/5ML
300 SOLUTION ORAL ONCE
Status: COMPLETED | OUTPATIENT
Start: 2022-02-14 | End: 2022-02-14

## 2022-02-14 RX ORDER — FAMOTIDINE 10 MG/ML
20 INJECTION, SOLUTION INTRAVENOUS ONCE
Status: COMPLETED | OUTPATIENT
Start: 2022-02-14 | End: 2022-02-14

## 2022-02-14 RX ORDER — PROMETHAZINE HYDROCHLORIDE 12.5 MG/1
12.5 TABLET ORAL EVERY 4 HOURS PRN
Status: DISCONTINUED | OUTPATIENT
Start: 2022-02-14 | End: 2022-02-15 | Stop reason: HOSPADM

## 2022-02-14 RX ORDER — MIDAZOLAM HYDROCHLORIDE 1 MG/ML
0.5 INJECTION INTRAMUSCULAR; INTRAVENOUS
Status: DISCONTINUED | OUTPATIENT
Start: 2022-02-14 | End: 2022-02-14 | Stop reason: HOSPADM

## 2022-02-14 RX ORDER — FENTANYL CITRATE 50 UG/ML
50 INJECTION, SOLUTION INTRAMUSCULAR; INTRAVENOUS
Status: DISCONTINUED | OUTPATIENT
Start: 2022-02-14 | End: 2022-02-14 | Stop reason: HOSPADM

## 2022-02-14 RX ORDER — FENTANYL CITRATE 50 UG/ML
INJECTION, SOLUTION INTRAMUSCULAR; INTRAVENOUS AS NEEDED
Status: DISCONTINUED | OUTPATIENT
Start: 2022-02-14 | End: 2022-02-14 | Stop reason: SURG

## 2022-02-14 RX ORDER — CEFAZOLIN SODIUM IN 0.9 % NACL 3 G/100 ML
3 INTRAVENOUS SOLUTION, PIGGYBACK (ML) INTRAVENOUS
Status: COMPLETED | OUTPATIENT
Start: 2022-02-14 | End: 2022-02-14

## 2022-02-14 RX ORDER — INSULIN LISPRO 100 [IU]/ML
0-14 INJECTION, SOLUTION INTRAVENOUS; SUBCUTANEOUS
Status: DISCONTINUED | OUTPATIENT
Start: 2022-02-14 | End: 2022-02-15 | Stop reason: HOSPADM

## 2022-02-14 RX ORDER — EPHEDRINE SULFATE 50 MG/ML
INJECTION, SOLUTION INTRAVENOUS AS NEEDED
Status: DISCONTINUED | OUTPATIENT
Start: 2022-02-14 | End: 2022-02-14 | Stop reason: SURG

## 2022-02-14 RX ORDER — ACETAMINOPHEN 160 MG/5ML
975 SOLUTION ORAL ONCE
Status: COMPLETED | OUTPATIENT
Start: 2022-02-14 | End: 2022-02-14

## 2022-02-14 RX ORDER — DIPHENHYDRAMINE HCL 25 MG
25 CAPSULE ORAL
Status: DISCONTINUED | OUTPATIENT
Start: 2022-02-14 | End: 2022-02-14 | Stop reason: HOSPADM

## 2022-02-14 RX ORDER — FLUMAZENIL 0.1 MG/ML
0.2 INJECTION INTRAVENOUS AS NEEDED
Status: DISCONTINUED | OUTPATIENT
Start: 2022-02-14 | End: 2022-02-14 | Stop reason: HOSPADM

## 2022-02-14 RX ADMIN — ACETAMINOPHEN ORAL SOLUTION 1000 MG: 325 SOLUTION ORAL at 16:24

## 2022-02-14 RX ADMIN — CHLORHEXIDINE GLUCONATE 15 ML: 1.2 RINSE ORAL at 10:20

## 2022-02-14 RX ADMIN — PANTOPRAZOLE SODIUM 40 MG: 40 INJECTION, POWDER, FOR SOLUTION INTRAVENOUS at 11:47

## 2022-02-14 RX ADMIN — PROPOFOL 150 MCG/KG/MIN: 10 INJECTION, EMULSION INTRAVENOUS at 12:18

## 2022-02-14 RX ADMIN — MAGNESIUM SULFATE HEPTAHYDRATE 2 G: 500 INJECTION, SOLUTION INTRAMUSCULAR; INTRAVENOUS at 12:20

## 2022-02-14 RX ADMIN — GABAPENTIN 300 MG: 300 CAPSULE ORAL at 19:29

## 2022-02-14 RX ADMIN — ENOXAPARIN SODIUM 40 MG: 40 INJECTION SUBCUTANEOUS at 13:37

## 2022-02-14 RX ADMIN — ACETAMINOPHEN 1000 MG: 500 TABLET ORAL at 22:08

## 2022-02-14 RX ADMIN — EPHEDRINE SULFATE 10 MG: 50 INJECTION INTRAVENOUS at 12:27

## 2022-02-14 RX ADMIN — HYDROMORPHONE HYDROCHLORIDE 0.5 MG: 1 INJECTION, SOLUTION INTRAMUSCULAR; INTRAVENOUS; SUBCUTANEOUS at 13:51

## 2022-02-14 RX ADMIN — METOCLOPRAMIDE HYDROCHLORIDE 10 MG: 5 INJECTION INTRAMUSCULAR; INTRAVENOUS at 16:25

## 2022-02-14 RX ADMIN — CEFAZOLIN SODIUM 3 G: 10 INJECTION, POWDER, FOR SOLUTION INTRAVENOUS at 11:50

## 2022-02-14 RX ADMIN — ACETAMINOPHEN ORAL SOLUTION 975 MG: 325 SOLUTION ORAL at 10:20

## 2022-02-14 RX ADMIN — ROPINIROLE HYDROCHLORIDE 0.5 MG: 0.5 TABLET, FILM COATED ORAL at 22:08

## 2022-02-14 RX ADMIN — METOCLOPRAMIDE HYDROCHLORIDE 10 MG: 5 INJECTION INTRAMUSCULAR; INTRAVENOUS at 11:47

## 2022-02-14 RX ADMIN — SUGAMMADEX 400 MG: 100 INJECTION, SOLUTION INTRAVENOUS at 13:11

## 2022-02-14 RX ADMIN — HYDROMORPHONE HYDROCHLORIDE 0.5 MG: 1 INJECTION, SOLUTION INTRAMUSCULAR; INTRAVENOUS; SUBCUTANEOUS at 16:25

## 2022-02-14 RX ADMIN — KETAMINE HYDROCHLORIDE 30 MG: 10 INJECTION INTRAMUSCULAR; INTRAVENOUS at 12:20

## 2022-02-14 RX ADMIN — FENTANYL CITRATE 50 MCG: 50 INJECTION INTRAMUSCULAR; INTRAVENOUS at 13:36

## 2022-02-14 RX ADMIN — ONDANSETRON 4 MG: 2 INJECTION INTRAMUSCULAR; INTRAVENOUS at 12:58

## 2022-02-14 RX ADMIN — PROPOFOL 200 MG: 10 INJECTION, EMULSION INTRAVENOUS at 12:11

## 2022-02-14 RX ADMIN — MIRABEGRON 25 MG: 25 TABLET, FILM COATED, EXTENDED RELEASE ORAL at 22:09

## 2022-02-14 RX ADMIN — FAMOTIDINE 20 MG: 10 INJECTION INTRAVENOUS at 11:47

## 2022-02-14 RX ADMIN — GABAPENTIN 300 MG: 250 SOLUTION ORAL at 10:20

## 2022-02-14 RX ADMIN — FENTANYL CITRATE 100 MCG: 50 INJECTION INTRAMUSCULAR; INTRAVENOUS at 12:07

## 2022-02-14 RX ADMIN — SODIUM CHLORIDE, POTASSIUM CHLORIDE, SODIUM LACTATE AND CALCIUM CHLORIDE 9 ML/HR: 600; 310; 30; 20 INJECTION, SOLUTION INTRAVENOUS at 14:36

## 2022-02-14 RX ADMIN — ROCURONIUM BROMIDE 50 MG: 50 INJECTION INTRAVENOUS at 12:11

## 2022-02-14 RX ADMIN — HYDROMORPHONE HYDROCHLORIDE 0.5 MG: 1 INJECTION, SOLUTION INTRAMUSCULAR; INTRAVENOUS; SUBCUTANEOUS at 14:11

## 2022-02-14 RX ADMIN — MIRTAZAPINE 15 MG: 15 TABLET, ORALLY DISINTEGRATING ORAL at 22:09

## 2022-02-14 RX ADMIN — SODIUM CHLORIDE, POTASSIUM CHLORIDE, SODIUM LACTATE AND CALCIUM CHLORIDE: 600; 310; 30; 20 INJECTION, SOLUTION INTRAVENOUS at 12:03

## 2022-02-14 RX ADMIN — LATANOPROST 1 DROP: 50 SOLUTION OPHTHALMIC at 22:09

## 2022-02-14 RX ADMIN — LIDOCAINE HYDROCHLORIDE 100 MG: 20 INJECTION, SOLUTION INFILTRATION; PERINEURAL at 12:11

## 2022-02-14 RX ADMIN — SODIUM CHLORIDE, POTASSIUM CHLORIDE, SODIUM LACTATE AND CALCIUM CHLORIDE 500 ML: 600; 310; 30; 20 INJECTION, SOLUTION INTRAVENOUS at 11:46

## 2022-02-14 RX ADMIN — SCOPALAMINE 1 PATCH: 1 PATCH, EXTENDED RELEASE TRANSDERMAL at 10:18

## 2022-02-14 RX ADMIN — DEXAMETHASONE SODIUM PHOSPHATE 12 MG: 10 INJECTION INTRAMUSCULAR; INTRAVENOUS at 12:21

## 2022-02-14 NOTE — ANESTHESIA PREPROCEDURE EVALUATION
Anesthesia Evaluation     Patient summary reviewed   no history of anesthetic complications:  NPO Solid Status: > 8 hours  NPO Liquid Status: > 2 hours           Airway   Mallampati: III  TM distance: >3 FB  Neck ROM: full  No difficulty expected  Dental    (+) poor dentition        Pulmonary     breath sounds clear to auscultation  (+) a smoker Former, COPD, shortness of breath, sleep apnea,   (-) recent URI  Cardiovascular   Exercise tolerance: poor (<4 METS)    ECG reviewed  Patient on routine beta blocker and Beta blocker given within 24 hours of surgery  Rhythm: regular  Rate: normal    (+) hypertension, hyperlipidemia,   (-) past MI, dysrhythmias, angina      Neuro/Psych  (+) CVA (2017),    (-) seizures  GI/Hepatic/Renal/Endo    (+) morbid obesity, hiatal hernia,  diabetes mellitus type 2, thyroid problem hypothyroidism  (-) liver disease, no renal disease    Musculoskeletal     (+) back pain,   (-) neck stiffness  Abdominal   (+) obese,    Substance History      OB/GYN          Other   arthritis,          Phys Exam Other: Extremely poor dentition and loose teeth on bottom, multiple broken teeth                  Anesthesia Plan    ASA 3     general     intravenous induction     Anesthetic plan, all risks, benefits, and alternatives have been provided, discussed and informed consent has been obtained with: patient.    Plan discussed with CRNA.

## 2022-02-14 NOTE — ANESTHESIA POSTPROCEDURE EVALUATION
Patient: Marilin Martinez    Procedure Summary     Date: 02/14/22 Room / Location:  [facility]  /  [facility]    Anesthesia Start: 1203 Anesthesia Stop: 1323    Procedure: GASTRIC SLEEVE LAPAROSCOPIC With VENTRALHernia Repair AND PARAESOPHAGEAL HERNIA REPAIR AND LYSIS OF ADHESIONS PARTICAL OMENTECTOMY (N/A Abdomen) Diagnosis:       Obesity, Class III, BMI 40-49.9 (morbid obesity) (HCC)      Hiatal hernia      (Obesity, Class III, BMI 40-49.9 (morbid obesity) (HCC) [E66.01])      (Hiatal hernia [K44.9])    Surgeons: Quang Shipley Jr., MD Provider: Kemar Rodriguez DO    Anesthesia Type: general ASA Status: 3          Anesthesia Type: general    Vitals  Vitals Value Taken Time   /44 02/14/22 1346   Temp 36.1 °C (97 °F) 02/14/22 1319   Pulse 68 02/14/22 1400   Resp 16 02/14/22 1345   SpO2 100 % 02/14/22 1400   Vitals shown include unvalidated device data.        Post Anesthesia Care and Evaluation    Patient location during evaluation: bedside  Patient participation: complete - patient participated  Level of consciousness: awake and alert  Pain management: adequate  Airway patency: patent  Anesthetic complications: No anesthetic complications  PONV Status: controlled  Cardiovascular status: acceptable and hemodynamically stable  Respiratory status: acceptable and spontaneous ventilation  Hydration status: acceptable    Comments: /44 (BP Location: Left arm, Patient Position: Lying)   Pulse 73   Temp 36.1 °C (97 °F) (Temporal)   Resp 16   SpO2 100%

## 2022-02-14 NOTE — ANESTHESIA PROCEDURE NOTES
Airway  Urgency: elective    Date/Time: 2/14/2022 12:16 PM  Airway not difficult    General Information and Staff    Patient location during procedure: OR  Anesthesiologist: Kemar Rodriguez DO  CRNA: Meredith Win CRNA    Indications and Patient Condition  Indications for airway management: airway protection    Preoxygenated: yes  MILS not maintained throughout  Mask difficulty assessment: 1 - vent by mask    Final Airway Details  Final airway type: endotracheal airway      Successful airway: ETT  Cuffed: yes   Successful intubation technique: direct laryngoscopy  Facilitating devices/methods: Bougie  Endotracheal tube insertion site: oral  Blade: Claudia  Blade size: 3  ETT size (mm): 7.0  Cormack-Lehane Classification: grade IIa - partial view of glottis  Placement verified by: chest auscultation and capnometry   Cuff volume (mL): 7  Measured from: lips  ETT/EBT  to lips (cm): 21  Number of attempts at approach: 2  Assessment: lips, teeth, and gum same as pre-op and atraumatic intubation    Additional Comments  Pt preoxygenated prior to induction, easy mask airway, atraumatic intubation,dl x1 with mac 3, unable to pass tube, utilized bougie for successful intubation, + ETCO2, + bs bilat,  ETT secured and connected to ventilator.

## 2022-02-14 NOTE — OP NOTE
PREOPERATIVE DIAGNOSIS:  Morbid obesity with multiple comorbidities as referenced in the most recent history and physical.    POSTOPERATIVE DIAGNOSIS:  Morbid obesity with multiple comorbidities as referenced in the most recent history and physical.  Paraesophageal hernia. Incarcerated Ventral hernia. Adhesions    PROCEDURES PERFORMED:  1.  Laparoscopic sleeve gastrectomy with Titan stapler #[id number]  2.  Laparoscopic paraesophageal hernia repair  3. Laparoscopic incarcerated ventral hernia repair  4. Partial omentectomy  2.  Tisseel application.     SURGEON:  Quang Shipley Jr., MD    ASSISTANT: [facility]      Surgery assisted and facilitated by a certified physician assistant, who directly resulted in a decreased operative time, anesthetic time, wound exposure, and possibly of an operative wound infection, thereby decreasing patient morbidity and ultimately total expenditures.  The surgical assistant assisted in placement of trochars, take down of the gastrocolic omentum, short gastric vessels and dissection at the angle of His.  Also assisted in retraction of the stomach during stapling so as not to kink the gastric sleeve.  Also assisted in removing of the gastric specimen, closure of the fascial defect as well as closure of the skin incisions.    ANESTHESIA:  General endotracheal and TAP block with Bupivacaine and Exparel    ESTIMATED BLOOD LOSS:   Less than 25 mL unless dictated below.    FLUIDS:  Crystalloids.    SPECIMENS:  Gastric remnant    DRAINS:  None.    COUNTS:  Correct.    COMPLICATIONS:  None.    INDICATIONS:  This patient with morbid obesity and associated comorbidities presents for elective laparoscopic, possible open sleeve gastrectomy.  The patient has received medical clearance to proceed.  The patient has undergone our extensive educational process and consent process and wishes to proceed.    DESCRIPTION OF PROCEDURE:  The patient was brought to the operating room and placed supine  upon the operating room table. SCD hose were placed.  The patient underwent uneventful general endotracheal anesthesia per the anesthesiology staff. The abdomen was prepped with ChloraPrep and draped in the usual sterile fashion.  An Ioban was used as well if not allergic.  Anesthesia staff then passed a 38-Fr balloon bougie into the stomach to decompress.  A 5-10 mm transverse incision was made a few centimeters above and to the left of the umbilicus and the peritoneal cavity entered under direct camera visualization using a 5 or 10 mm 0° laparoscope and an Optiview trocar.  The abdomen was then insufflated to a pressure of 15-16 mmHg with CO2 gas.  Exploratory laparoscopy revealed no evidence of injury from the entrance technique and no significant abnormalities unless addendum dictated below.  An angled laparoscope was then used.  The patient was placed in reverse Trendelenburg position.  Under direct camera visualization a 19 mm trocar was placed in the right lateral subcostal position.  A 5 mm trocar was placed in the right midabdominal position.  A 5 mm trocar was placed in the left midabdominal position. A Pamela retractor was placed through an epigastric incision and used to elevate the left lobe of the liver.  The fat pad was elevated and the left laurie exposed.  At this point, approximately MCC along the greater curvature, the gastrocolic omentum was divided with the Enseal and this proceeded superiorly to the angle of His taking down the short gastric vessels.  All posterior attachments of the lesser sac and posterior aspect of the stomach to the pancreas were taken down as well.  The left laurie was exposed along its length.  Dissection then proceeded medially taking down the greater curvature with an Enseal until just proximal to the pylorus.  The 38 Armenian bougie was then directed distally into the stomach to the pylorus.  The balloon was inflated with 14 cc of saline.  The stomach was marked in  the 3 positions using indelible ink.  The 3 markings were at the angle of Hiss, the incisura and antrum using 1cm, 3cm and 4-5cm respectively.  The Titan stapler was then placed through the 19 mm trocar into the abdomen and opened up and the stomach pulled in to the markings on the stomach.  Careful attention was made to stay 1 cm from the esophagus.  Positive pressure retraction was then used to size the stomach perfectly.  The balloon on the bougie was then deflated and placed to suction prior to closing the stapler.  The stapler was then fired and then removed after completion.  Areas of the staple line were oversewn with absorbable sutures as needed for bleeding or questionable staple lines.  At this point, the gastrectomy specimen was withdrawn through the 12 mm trocar site incision. The specimen staple line was inspected and was intact.  The specimen was then sent off to pathology.  At this point, the sleeve was submerged under saline and using the bougie to insufflate the stomach, a leak test was performed.  This revealed the sleeve to be watertight, no air bubbles, no leak, and no bleeding seen from the staple lines and no significant abnormalities.  Irrigation fluid from the abdomen was then suctioned.  The gastric sleeve staple line was then treated with Tisseel fibrin glue. The fascia at the 19 mm trocar site incision was closed with a single 0 Vicryl suture in a figure-of-eight fashion placed under direct laparoscopic camera visualization with a suture passer and tying the knot extracorporeally.  The fascia in the area was infiltrated with local anesthesia. All incisions were then infiltrated with local anesthetic. The remaining trocars were removed under direct camera visualization with no bleeding noted from their sites.  The abdomen was desufflated of gas. The skin in each incision was closed using 4-0 antibiotic impregnated Monocryl in a subcuticular fashion followed by Dermabond.  The patient  tolerated the procedure well without complication and was taken to the recovery room in stable condition.  All sponge, needle and instrument counts were correct.     The patient was noted to have a paraesophageal hernia.  The phrenoesophageal membrane was opened up with electrocautery and the right and left crura were cleaned off using sharp and blunt dissection.  The peritoneum overlying the right laurie was incised and the plane along the hernia sac was developed.  The dissection then extended anteriorly and laterally to the left laurie.  The base of the crural confluence was dissected free of adhesions to the hernia sac.  The hernia sac was carefully dissected into the mediastinum with caudal traction.  The interfaces between the pleura, pericardium, spine, and aorta were developed as a dissection was carried cephalically to the top of the hernia sac.  Sac contents were completely reduced back into the abdominal cavity.  Careful attention was made not to injure the vagus nerve.  The esophagus was identified and dissected circumferentially and along its previous stomach course in order to reduce tension, allowing the gastroesophageal junction to rest comfortably within the abdominal cavity.  The gastrosplenic ligament and the short gastric vessels were divided with the Enseal device.  The retroesophageal window was developed and the esophagus was retracted caudally.  The crural pillars were then approximated with 0 Ethibond sutures.  Anterior reinforcement was performed as well if needed.    Patient did have incarcerated ventral hernia in the epigastric region. The omentum was reduced using the Enseal device. The piece of the omentum that was incarcerated was completely removed through the 19 trocar site. Partial omentectomy was performed using the Enseal device. Hernia was repaired with 0 Vicryl suture in figure-of-eight fashion using the suture passer. Stab wound incision was made over the hernia. Patient did have  adhesions in the left upper quadrant and were taken down using the Enseal.

## 2022-02-15 VITALS
TEMPERATURE: 97.1 F | SYSTOLIC BLOOD PRESSURE: 136 MMHG | HEIGHT: 63 IN | OXYGEN SATURATION: 99 % | BODY MASS INDEX: 49.42 KG/M2 | HEART RATE: 55 BPM | RESPIRATION RATE: 16 BRPM | DIASTOLIC BLOOD PRESSURE: 62 MMHG

## 2022-02-15 LAB
ALBUMIN SERPL-MCNC: 3.7 G/DL (ref 3.5–5.2)
ALBUMIN/GLOB SERPL: 1.1 G/DL
ALP SERPL-CCNC: 48 U/L (ref 39–117)
ALT SERPL W P-5'-P-CCNC: 18 U/L (ref 1–33)
ANION GAP SERPL CALCULATED.3IONS-SCNC: 9 MMOL/L (ref 5–15)
AST SERPL-CCNC: 35 U/L (ref 1–32)
BASOPHILS # BLD AUTO: 0.01 10*3/MM3 (ref 0–0.2)
BASOPHILS NFR BLD AUTO: 0.1 % (ref 0–1.5)
BILIRUB SERPL-MCNC: 0.3 MG/DL (ref 0–1.2)
BUN SERPL-MCNC: 13 MG/DL (ref 8–23)
BUN/CREAT SERPL: 16.5 (ref 7–25)
CALCIUM SPEC-SCNC: 9.3 MG/DL (ref 8.6–10.5)
CHLORIDE SERPL-SCNC: 101 MMOL/L (ref 98–107)
CO2 SERPL-SCNC: 27 MMOL/L (ref 22–29)
CREAT SERPL-MCNC: 0.79 MG/DL (ref 0.57–1)
DEPRECATED RDW RBC AUTO: 40.7 FL (ref 37–54)
EOSINOPHIL # BLD AUTO: 0 10*3/MM3 (ref 0–0.4)
EOSINOPHIL NFR BLD AUTO: 0 % (ref 0.3–6.2)
ERYTHROCYTE [DISTWIDTH] IN BLOOD BY AUTOMATED COUNT: 11.8 % (ref 12.3–15.4)
GFR SERPL CREATININE-BSD FRML MDRD: 73 ML/MIN/1.73
GLOBULIN UR ELPH-MCNC: 3.3 GM/DL
GLUCOSE BLDC GLUCOMTR-MCNC: 111 MG/DL (ref 70–130)
GLUCOSE BLDC GLUCOMTR-MCNC: 96 MG/DL (ref 70–130)
GLUCOSE SERPL-MCNC: 121 MG/DL (ref 65–99)
HCT VFR BLD AUTO: 39 % (ref 34–46.6)
HGB BLD-MCNC: 13.5 G/DL (ref 12–15.9)
LAB AP CASE REPORT: NORMAL
LYMPHOCYTES # BLD AUTO: 0.63 10*3/MM3 (ref 0.7–3.1)
LYMPHOCYTES NFR BLD AUTO: 6 % (ref 19.6–45.3)
MAGNESIUM SERPL-MCNC: 2.2 MG/DL (ref 1.6–2.4)
MCH RBC QN AUTO: 32.5 PG (ref 26.6–33)
MCHC RBC AUTO-ENTMCNC: 34.6 G/DL (ref 31.5–35.7)
MCV RBC AUTO: 94 FL (ref 79–97)
MONOCYTES # BLD AUTO: 0.47 10*3/MM3 (ref 0.1–0.9)
MONOCYTES NFR BLD AUTO: 4.4 % (ref 5–12)
NEUTROPHILS NFR BLD AUTO: 89 % (ref 42.7–76)
NEUTROPHILS NFR BLD AUTO: 9.41 10*3/MM3 (ref 1.7–7)
PATH REPORT.FINAL DX SPEC: NORMAL
PATH REPORT.GROSS SPEC: NORMAL
PHOSPHATE SERPL-MCNC: 3.4 MG/DL (ref 2.5–4.5)
PLATELET # BLD AUTO: 153 10*3/MM3 (ref 140–450)
PMV BLD AUTO: 13.3 FL (ref 6–12)
POTASSIUM SERPL-SCNC: 3.9 MMOL/L (ref 3.5–5.2)
PROT SERPL-MCNC: 7 G/DL (ref 6–8.5)
RBC # BLD AUTO: 4.15 10*6/MM3 (ref 3.77–5.28)
SODIUM SERPL-SCNC: 137 MMOL/L (ref 136–145)
WBC NRBC COR # BLD: 10.57 10*3/MM3 (ref 3.4–10.8)

## 2022-02-15 PROCEDURE — 82962 GLUCOSE BLOOD TEST: CPT

## 2022-02-15 PROCEDURE — 85025 COMPLETE CBC W/AUTO DIFF WBC: CPT | Performed by: SURGERY

## 2022-02-15 PROCEDURE — 84100 ASSAY OF PHOSPHORUS: CPT | Performed by: SURGERY

## 2022-02-15 PROCEDURE — 83735 ASSAY OF MAGNESIUM: CPT | Performed by: SURGERY

## 2022-02-15 PROCEDURE — 25010000002 ENOXAPARIN PER 10 MG: Performed by: SURGERY

## 2022-02-15 PROCEDURE — 80053 COMPREHEN METABOLIC PANEL: CPT | Performed by: SURGERY

## 2022-02-15 PROCEDURE — 25010000002 CYANOCOBALAMIN PER 1000 MCG: Performed by: SURGERY

## 2022-02-15 RX ORDER — PANTOPRAZOLE SODIUM 40 MG/1
40 TABLET, DELAYED RELEASE ORAL
Status: DISCONTINUED | OUTPATIENT
Start: 2022-02-15 | End: 2022-02-15 | Stop reason: HOSPADM

## 2022-02-15 RX ORDER — SEMAGLUTIDE 1.34 MG/ML
0.25 INJECTION, SOLUTION SUBCUTANEOUS WEEKLY
Start: 2022-02-15 | End: 2022-03-23

## 2022-02-15 RX ORDER — ONDANSETRON 4 MG/1
4 TABLET, ORALLY DISINTEGRATING ORAL EVERY 4 HOURS PRN
Qty: 30 TABLET | Refills: 0 | Status: SHIPPED | OUTPATIENT
Start: 2022-02-15 | End: 2023-01-10

## 2022-02-15 RX ORDER — THIAMINE HYDROCHLORIDE 100 MG/ML
100 INJECTION, SOLUTION INTRAMUSCULAR; INTRAVENOUS ONCE
Status: DISCONTINUED | OUTPATIENT
Start: 2022-02-15 | End: 2022-02-15

## 2022-02-15 RX ADMIN — ACETAMINOPHEN 1000 MG: 500 TABLET ORAL at 10:24

## 2022-02-15 RX ADMIN — LEVOTHYROXINE SODIUM 200 MCG: 0.1 TABLET ORAL at 06:20

## 2022-02-15 RX ADMIN — PANTOPRAZOLE SODIUM 40 MG: 40 TABLET, DELAYED RELEASE ORAL at 06:20

## 2022-02-15 RX ADMIN — CYANOCOBALAMIN 1000 MCG: 1000 INJECTION, SOLUTION INTRAMUSCULAR at 08:44

## 2022-02-15 RX ADMIN — HYDROMORPHONE HYDROCHLORIDE 2 MG: 4 TABLET ORAL at 12:16

## 2022-02-15 RX ADMIN — Medication 100 MG: at 04:24

## 2022-02-15 RX ADMIN — ACETAMINOPHEN 1000 MG: 500 TABLET ORAL at 04:24

## 2022-02-15 RX ADMIN — GABAPENTIN 300 MG: 300 CAPSULE ORAL at 04:24

## 2022-02-15 RX ADMIN — LISINOPRIL: 20 TABLET ORAL at 08:44

## 2022-02-15 RX ADMIN — GABAPENTIN 300 MG: 300 CAPSULE ORAL at 12:13

## 2022-02-15 RX ADMIN — NEBIVOLOL HYDROCHLORIDE 10 MG: 10 TABLET ORAL at 08:44

## 2022-02-15 RX ADMIN — ENOXAPARIN SODIUM 40 MG: 40 INJECTION SUBCUTANEOUS at 08:44

## 2022-02-15 NOTE — NURSING NOTE
Pt lost IV access on 1st shift. IV team notified and attempted to gain access with pt but was unsuccessful. MD notified, orders given to give Protonix in am, Thiamine 100 mg tab PO, dc IV Reglan.

## 2022-02-15 NOTE — DISCHARGE INSTRUCTIONS
GOING HOME AFTER GASTRIC SLEEVE/ GASTRIC BYPASS SURGERY  [facility] Loss: Post-Operative Information/Instructions  Quang Shipley Jr., MD  General Patient Instructions for Discharge   - Call Surgeon's office at [phone] for follow-up appointment.    - Be sure you, the patient, have a follow-up appointment to be seen within seven (7) days after discharge. If not, please call [phone] to schedule an appointment. If you are discharged on a Saturday or Sunday, please call Monday to schedule the appointment.  - Contact the Surgeon at [phone] for any questions or concerns, including temperature greater than or equal to 101F, shortness of breath, leg swelling, redness at incision sites, nausea, vomiting, chills, or problems or questions.    - Follow the Gastric Stage 1 Diet    à Clear liquids, room temperature, sugar-free, caffeine-free, non-carbonated, 70 grams of protein, No Straws.  - You may shower. No tub bath for 2 weeks.  - No lifting, pushing, pulling, or tugging >25 pounds for 3 weeks.  - Ambulate every 3 hours while awake minimum for seven (7) days, increase distance daily.  - For the next several weeks, you are at an increased risk for blood clot formation. Therefore, you should walk regularly. You should not sit for prolonged periods of time, more than 45 minutes, without getting up and walking for 5-10 minutes. This includes any car rides, including the drive home from the hospital. If driving any distance greater than 30 miles over the next two (2) weeks, stop every 30-45 minutes and walk for 5-10 minutes each time.  - Continue using Incentive Spirometer and coughing exercises at least every two (2) hours while awake for one week.  - Continue use of CPAP/BIPAP for diagnosis of sleep apnea as directed.  - No driving or operating machinery allowed while taking narcotic (prescription) pain medication, and until you feel comfortable forcefully applying the brakes if needed. (This  usually takes more than 3 days.)    - Make an appointment with your Primary Care Physician within one week post-op to look at your home medications for possible changes or discontinuity.   Medications  - The nurse will provide a list of medications for you to continue at home   - If you received a Lovenox (Enoxaparin) or Apixiban (Eliquis) prescription at pre-op visit with Surgeon, start taking the medicine the morning after discharge unless directed otherwise.    - If you were prescribed Lovenox (Enoxaparin), review the education/teaching material/video with the nurse.    - Take post op pain meds as prescribed as needed.   - Continue Foltx until finished.   - Resume use of Actigall (Ursodiol) one (1) week after surgery if patient still has gallbladder. You should have been given a prescription at your pre-op visit. Contact the office if you do not have the prescription.   - Resume bariatric vitamin regimen as instructed in pre-op education with bariatric coordinator.    - Zegerid or Prilosec OTC (or generic) by mouth once daily for four (4) weeks unless you are already taking a proton pump inhibitor as home medication. Follow dosing instructions on package.   Nausea/Vomiting:  The following are possible causes for nausea/vomiting:  - Drinking too much or too fast.  - Sinus drainage/post nasal drip for allergy sufferers (you may take Sudafed, Claritin, Tylenol Sinus/Allergy, or other decongestants and nose sprays to help with this discomfort).  - Low blood sugar (sweating, shaky, irritable, weakness, dizzy or tunnel-vision) - treatment is to sip 100% fruit juice - no sugar added until symptoms subside.  - Acid in fruit juice - (may dilute with water or avoid).  - Eating or drinking something that is not on clear liquid (stage 1) diet.  Any nausea/vomiting that prohibits you from keeping fluids down for greater than 24 hours requires a call to the surgeon's office.  Urine:  Use your urine color as a guide to  determine if you are drinking enough fluid. The darker the urine, the more fluids you need to drink. Urine should be clear to light yellow if you are getting enough fluid. If you should experience frequency, burning or pain with urination, blood in urine, contact us or your primary care physician for possible UTI (urinary tract infection), which could require antibiotics (liquid preferred).  Bowel Movements:  You may not have a bowel movement for 2-5 days after going home. You may then experience liquid, runny or loose stools for approximately 3-4 weeks following surgery. This would require you to drink even more fluids to prevent dehydration. Some patients may experience constipation, which can be treated with increased fluids, drinking warm liquids, increased activity and the use of a Fleets Enema, Milk of Magnesia, or suppositories. The first couple of bowel movements could be bloody, tarry black or dark maroon in color. This is OK as long as the stool returns to a normal color in 1-2 days. If however, you have frequent or a large amount of bloody or tarry black stools and/or become light-headed or dizzy, you may be bleeding and require urgent attention. Please call us right away.  Abdominal Incisions:  You will have small incisions. Do not scrub incisions, but allow the warm, soapy water to run over the incisions, rinse well, and pat dry. You may use any brand of anti-bacterial soap. Do not use Peroxide or Neosporin type ointments on sites, unless instructed to do so by a surgeon or nurse. Monitor daily for signs/symptoms of infection, which might include: drainage with a foul odor, pain, redness, swelling or heat at the incision sight; fever, body aches and chills. If you suspect infection or have a fever, give us a call.  Pain:  You will be given a prescription for pain medication to control your pain. If you feel the dose is too strong, you may take half the ordered dose, or you may take Tylenol adult liquid  per package instructions for minor pain. Do not take any medications that contain aspirin or aspirin products.  Do not take medications like: Motrin, Aleve, Ibuprofen, Advil, Naproxen, Celebrex, Daypro, Bextra, Meloxicam or other medications commonly used for arthritis or joint pain.  No steroids or cortisone injections. There may be pain, which should improve every few days. Pain should not suddenly get worse or more intense. Pain that suddenly changes and is constant and severe should be called in to the surgeon's office. Any sudden pain in the lower extremities with associated warmth and redness should be called in to the surgeon's office immediately. Do not rub or massage this area, as it could be a blood clot.  Diet:  Remain on the clear liquid diet (stage 1) per your  which includes 70 grams of protein each day, sugar free, non carbonated and no straws. Day 1 is the day of surgery. If you are tolerating the stage 1 diet, you may then proceed to stage 2 diet, as instructed in the . Do not progress to the stage 2 diet if you are having nausea/vomiting. Refer to the Basic Nutrition and Food Principles guide.  Medications:  The nurse will let you know which medications you will need to continue once you go home. Do not take any medications that are extended or time released if you had the gastric bypass procedure, OK to take if you had the gastric sleeve procedure. Large capsules can be opened and diluted with clear liquids. Check with your physician or pharmacist as to which pills may be crushed and which capsules may be opened and diluted safely. Continue taking Foltx as surgeon orders. If you still have your gall bladder and were prescribed Actigall (Ursodiol), you may resume this medication one week after your surgery. You will remain on Actigall (Ursodiol) for approximately 6 months. The dose is 1 pill, 2 times each day for 6 months.  Activity:  Continue your deep breathing and  coughing exercises with your Incentive Spirometer breathing device at least every 3 hours while awake (10 repetitions each time) for one week. May use CPAP. This will help to prevent respiratory problems such as pneumonia. No lifting, pulling or tugging anything over 25 pounds for 3 weeks after surgery. You may shower but no tub baths, hot tubs or swimming for 2 weeks. Moderate walking is recommended every 3 hours while awake minimum, increase distance daily. Further exercise will be discussed at the first post-op visit. No driving or operating machinery allow until off narcotic pain medication and until you feel comfortable forcefully applying the brakes (usually takes 3 or more days). For the next few weeks you are at an increased risk for blood clot formation. Therefore you should walk regularly and you should not sit for prolonged periods of time, more than 45 minutes without getting up and walking for 5-10 minutes. This includes car rides. Including riding home from the hospital. If riding a distance greater than 30 miles over the next 2 weeks stop every 30-45 minutes and walk 5-10 mintues each time. No tanning bed use for 8 weeks after surgery and in general, not recommended due to the increased risk for skin cancer. Incisions will burn/blister very badly with tanning bed use.  Illness:  Your primary care physician should treat general illness such as ear infections, sinus infections, and viral type illnesses, etc. Medications prescribed should be liquid/elixir form when possible, for the first 30 days.  General:  In general, it is recommended that you weigh yourself no more than once per week. Let the weight come off you and concentrate on more important things. Remember the weight was not gained overnight, nor will it be lost overnight. Gastric Bypass/ Gastric Sleeve weight loss will continue over a period of 12-18 months. Do not  yourself according to how others are doing after surgery, as this will  cause unnecessary discouragement.  THE ABOVE ARE GENERAL GUIDELINES TO ASSIST YOU ONCE HOME, IF YOU ARE IN DOUBT, OR YOU HAVE ANY QUESTIONS, CALL US AT THE NUMBERS LISTED BELOW.  IN THE EVENT OF SUDDEN CHEST PAIN, SHORTNESS OF BREATH, OR ANY LIFE THREATENING CONDITION, CALL 911.  Any time you are evaluated or admitted to another facility, please have someone notify the surgeon's office.  Supplements:  70 grams of protein taken EVERY DAY. Remember to drink at least 64 ounces of fluid a day, sipping slowly early on. Increase this amount during the summertime. Sipping slowly will not stretch your new stomach. Drinking too fast or gulping liquids will cause brief discomfort and early could cause staple line disruption (leak). With eating, tiny bites, then chew, chew, chew, and swallow. Lay your fork/spoon down for 2-3 minutes, and then take your next bite. Your pouch will tell you within 1-2 bites if it is going to tolerate what you are eating.   Protein Vendors:  Refer to protein vendors' handout from consult class. You can always find protein drinks at the bariatric office, grocery stores, [employer], drug [employer], [employer], health food stores, and on the Internet. Find one high in protein (15-30 grams per serving) and low carb (less than 18 grams per serving).  Now is a great time to re-read your . Please review specific instructions given to you at discharge by your physician (surgeon).  HOW/WHEN TO CONTACT US:  It is imperative that you contact us with any of the following:    Ÿ fever greater than 101 degrees  Ÿ shortness of breath  Ÿ leg swelling  Ÿ body aches  Ÿ shaking chills  Ÿ nausea and vomitting  Ÿ pain that has worsened  Ÿ redness at incision sites  Ÿ pus or foul smelling drainage from an incision or wound  Ÿ inability to keep fluids down for more than a day  Ÿ any other condition you feel needs our attention.  [facility] - Bariatric: [phone] call this number anytime 24 hours a  day / 7 days a week.  Teach-back Questions to be answered by the patient prior to discharge.   What complications would prompt you to call your doctor when you return home? _________________    What is the purpose of your prescribed medication? ________________  What are some potential side effects of the medications you will be taking at home? _______________

## 2022-02-15 NOTE — PLAN OF CARE
Goal Outcome Evaluation:           Progress: improving  Outcome Summary: Pt POD #1, Gastric Sleeve/paraesophageal hernia repair. Pt up with use of walker and stand-by assistance. Ambulated hallways and up to bathroom throughout the shift. PT continues on clear liquid diet and tolerating well. Pt also tolerating taking PO meds well. VSS, on RA. Daughter at bedside, plan is to DC home today.

## 2022-02-15 NOTE — DISCHARGE SUMMARY
"Discharge Summary    Patient name: Marilin Martinez    Medical record number: [id number]    Admission date: 2/14/2022  Discharge date:      Attending physician: Dr. Quang Shipley    Primary care physician: Reyes, Rosenberg Acosta, MD    Referring physician: Quang Shipley Jr., MD  [address] [address]    Condition on discharge: Stable    Primary Diagnoses:  Morbid obesity with co-morbidities    Operative Procedure:  Laparoscopic sleeve gastrectomy with paraesophageal hernia repair, ventral hernia repair     Marilin Martinez  is post op day one status post procedure listed. Patient denies shortness of air and lower extremity pain. Feels better than yesterday. No vomiting this am. Ambulating well and using incentive spirometer.        /62 (BP Location: Left arm, Patient Position: Lying)   Pulse 55   Temp 97.1 °F (36.2 °C) (Skin)   Resp 16   Ht 160 cm (63\")   SpO2 99%   BMI 49.42 kg/m²     General:  alert, appears stated age and cooperative   Abdomen: soft, bowel sounds active, appropriate tenderness   Incision:   healing well, no drainage, no erythema, no hernia, no seroma, no swelling, no dehiscence, incision well approximated   Heart: Regular rate   Lungs: Clear to auscultation bilaterally     I reviewed the patient's new clinical results.     Lab Results (last 24 hours)     Procedure Component Value Units Date/Time    POC Glucose Once [[id number]]  (Normal) Collected: 02/15/22 1112    Specimen: Blood Updated: 02/15/22 1114     Glucose 96 mg/dL      Comment: Meter: [id number] : [id number] Danuta LOUIS       Comprehensive Metabolic Panel [[id number]]  (Abnormal) Collected: 02/15/22 0953    Specimen: Blood Updated: 02/15/22 1114     Glucose 121 mg/dL      BUN 13 mg/dL      Creatinine 0.79 mg/dL      Sodium 137 mmol/L      Potassium 3.9 mmol/L      Chloride 101 mmol/L      CO2 27.0 mmol/L      Calcium 9.3 mg/dL      Total Protein 7.0 g/dL      Albumin 3.70 g/dL     " " ALT (SGPT) 18 U/L      AST (SGOT) 35 U/L      Alkaline Phosphatase 48 U/L      Total Bilirubin 0.3 mg/dL      eGFR Non African Amer 73 mL/min/1.73      Globulin 3.3 gm/dL      A/G Ratio 1.1 g/dL      BUN/Creatinine Ratio 16.5     Anion Gap 9.0 mmol/L     Narrative:      GFR Normal >60  Chronic Kidney Disease <60  Kidney Failure <15      Phosphorus [[id number]]  (Normal) Collected: 02/15/22 0953    Specimen: Blood Updated: 02/15/22 1114     Phosphorus 3.4 mg/dL     Magnesium [[id number]]  (Normal) Collected: 02/15/22 0953    Specimen: Blood Updated: 02/15/22 1114     Magnesium 2.2 mg/dL     Tissue Pathology Exam [[id number]] Collected: 02/14/22 1235    Specimen: Tissue from Stomach Updated: 02/15/22 1106     Case Report --     Surgical Pathology Report                         Case: [id number]                                  Authorizing Provider:  Quang Shipley Jr.,   Collected:           02/14/2022 12:35 PM                                 MD                                                                           Ordering Location:     [facility]  Received:            02/14/2022 02:25 PM                                 [facility] OR                                                                       Pathologist:           Calixto Cabrera MD                                                         Specimen:    Stomach, STOMACH                                                                            Final Diagnosis --     1. Stomach, Partial Gastrectomy: Benign stomach with   A. Chronic gastritis.   B. Features suggestive of developing fundic gland polyp.   C. No intestinal metaplasia.   D. No definitive Helicobacter pylori.    adalgisa/pkm        Gross Description --     1. Received in formalin labeled \"stomach\" is a partial gastrectomy specimen consisting of a 19 cm long greater curvature and a 17.5 cm single staple line margin.  The serosa is smooth tan-pink with a minimal amount of adherent adipose " tissue.  The mucosa is tan-pink and focally erythematous with normal rugae and the wall thickness averages 0.3 cm.  Representative sections are submitted as 1A-1C.     Jap/uso/fede/claude      CBC & Differential [[id number]]  (Abnormal) Collected: 02/15/22 0953    Specimen: Blood Updated: 02/15/22 1100    Narrative:      The following orders were created for panel order CBC & Differential.  Procedure                               Abnormality         Status                     ---------                               -----------         ------                     CBC Auto Differential[[id number]]        Abnormal            Final result                 Please view results for these tests on the individual orders.    CBC Auto Differential [[id number]]  (Abnormal) Collected: 02/15/22 0953    Specimen: Blood Updated: 02/15/22 1100     WBC 10.57 10*3/mm3      RBC 4.15 10*6/mm3      Hemoglobin 13.5 g/dL      Hematocrit 39.0 %      MCV 94.0 fL      MCH 32.5 pg      MCHC 34.6 g/dL      RDW 11.8 %      RDW-SD 40.7 fl      MPV 13.3 fL      Platelets 153 10*3/mm3      Neutrophil % 89.0 %      Lymphocyte % 6.0 %      Monocyte % 4.4 %      Eosinophil % 0.0 %      Basophil % 0.1 %      Neutrophils, Absolute 9.41 10*3/mm3      Lymphocytes, Absolute 0.63 10*3/mm3      Monocytes, Absolute 0.47 10*3/mm3      Eosinophils, Absolute 0.00 10*3/mm3      Basophils, Absolute 0.01 10*3/mm3     POC Glucose Once [[id number]]  (Normal) Collected: 02/15/22 0600    Specimen: Blood Updated: 02/15/22 0601     Glucose 111 mg/dL      Comment: Meter: [id number] : [id number] Marbin Laura NA       POC Glucose Once [[id number]]  (Abnormal) Collected: 02/14/22 2108    Specimen: Blood Updated: 02/14/22 2110     Glucose 134 mg/dL      Comment: Meter: [id number] : [id number] Marbin Laura NA       POC Glucose Once [[id number]]  (Normal) Collected: 02/14/22 1626    Specimen: Blood Updated: 02/14/22 1627     Glucose 117 mg/dL      Comment: Meter: [id number]  : [id number] Jarred LOUIS       POC Glucose Once [[id number]]  (Normal) Collected: 02/14/22 1402    Specimen: Blood Updated: 02/14/22 1403     Glucose 124 mg/dL      Comment: Meter: [id number] : [id number] Ambika LOVE RN                Assessment:      Doing well postoperatively.      Plan:   1. Continue Stage 1 diet  2. Continue with ambulation and Incentive spirometry  3. Plan for d/c home    Patient was seen and examined by Dr. Shipley.    Hospital Course: The patient is a very pleasant 67 y.o. female that was admitted to the hospital with morbid obesity with comorbidities who underwent the above mentioned procedure without complication.  Postoperatively the patient was then transferred to the bariatric unit per protocol.  The patient was afebrile with vital signs stable.  They were ambulating well and using the incentive spirometer.  POD 1 the patient was started on bariatric diet which they tolerated without difficulty.  Patient was then discharged home to follow up as an outpatient.  Patient was instructed to hold Ozempic and take half of her Januvia dose or call primary care provider for dosing while she is on liquid diet.       Discharge medications:      Discharge Medications      New Medications      Instructions Start Date   ondansetron ODT 4 MG disintegrating tablet  Commonly known as: ZOFRAN-ODT   4 mg, Translingual, Every 4 Hours PRN         Changes to Medications      Instructions Start Date   folic acid-vit B6-vit B12 2.5-25-1 MG tablet tablet  Commonly known as: FOLBEE  What changed: additional instructions   1 tablet, Oral, Daily      Ozempic (0.25 or 0.5 MG/DOSE) 2 MG/1.5ML solution pen-injector  Generic drug: Semaglutide(0.25 or 0.5MG/DOS)  What changed: See the new instructions.   0.25 mg, Subcutaneous, Weekly, Hold while on clear liquid diet.      SITagliptin 100 MG tablet  Commonly known as: JANUVIA  What changed: additional instructions   100 mg, Oral, Daily, Call primary  care physician for dosing while on clear liquid diet.  Would recommend 1/2 dose.         Continue These Medications      Instructions Start Date   ALPRAZolam 1 MG tablet  Commonly known as: XANAX   1 mg, Oral, 3 Times Daily PRN      bumetanide 1 MG tablet  Commonly known as: BUMEX   1 mg, Oral, Daily PRN      celecoxib 200 MG capsule  Commonly known as: CeleBREX   200 mg, Oral, Daily, HOLD PRIOR TO SURG      cyclobenzaprine 10 MG tablet  Commonly known as: FLEXERIL   cyclobenzaprine 10 mg oral tablet take 1 tablet (10 mg) by oral route 2 times per day as needed   Active      Diclofenac Sodium 1 % gel gel  Commonly known as: VOLTAREN   APPLY 4 GRAMS TO AFFECTED AREA FOUR TIMES DAILY      enoxaparin 40 MG/0.4ML solution syringe  Commonly known as: Lovenox   40 mg, Subcutaneous, Every 12 Hours Scheduled, Start after surgery unless instructed otherwise      fluticasone 50 MCG/ACT nasal spray  Commonly known as: FLONASE   1 spray, Nasal, Daily PRN      gabapentin 800 MG tablet  Commonly known as: NEURONTIN   800 mg, Oral, 2 Times Daily      HYDROcodone-acetaminophen  MG per tablet  Commonly known as: NORCO   1 tablet, Every 8 Hours PRN      latanoprost 0.005 % ophthalmic solution  Commonly known as: XALATAN   1 drop, Both Eyes, Nightly      Levothyroxine Sodium 200 MCG capsule   200 mcg, Oral, Daily      lisinopril-hydrochlorothiazide 20-12.5 MG per tablet  Commonly known as: PRINZIDE,ZESTORETIC   1 tablet, Oral, Daily      Livalo 2 MG tablet tablet  Generic drug: pitavastatin calcium   2 mg, Oral, Daily      Myrbetriq 25 MG tablet sustained-release 24 hour 24 hr tablet  Generic drug: Mirabegron ER   25 mg, Oral, Nightly      nebivolol 10 MG tablet  Commonly known as: BYSTOLIC   10 mg, Oral, Daily      potassium chloride 8 MEQ CR tablet  Commonly known as: KLOR-CON   20 mEq, Oral, Daily PRN      rOPINIRole 0.5 MG tablet  Commonly known as: REQUIP   0.5 mg, Oral, Nightly      ursodiol 300 MG capsule  Commonly known  as: Actigall   300 mg, Oral, 2 Times Daily         Stop These Medications    CHLORHEXIDINE GLUCONATE CLOTH EX            Discharge instructions:  Per Bariatric manual; per our protocol      Follow-up appointment: Follow up with Dr. Shipley in the office as scheduled.  If not already scheduled call for appointment at [phone].

## 2022-02-15 NOTE — PROGRESS NOTES
Continued Stay Note  [facility]     Patient Name: Marilin Martinez  MRN: [id number]  Today's Date: 2/15/2022    Admit Date: 2/14/2022     Discharge Plan     Row Name 02/15/22 1400       Plan    Final Discharge Disposition Code 01 - home or self-care    Final Note Pt d/c'ed home               Discharge Codes    No documentation.               Expected Discharge Date and Time     Expected Discharge Date Expected Discharge Time    Feb 15, 2022             Xenia Doss RN

## 2022-02-16 ENCOUNTER — READMISSION MANAGEMENT (OUTPATIENT)
Dept: CALL CENTER | Facility: HOSPITAL | Age: 67
End: 2022-02-16

## 2022-02-16 NOTE — OUTREACH NOTE
Prep Survey      Responses   [facility] patient discharged from? [place]   Is LACE score < 7 ? No   Emergency Room discharge w/ pulse ox? No   Eligibility Readm Mgmt   Discharge diagnosis  Laparoscopic sleeve gastrectomy with paraesophageal hernia repair, ventral hernia repair   Does the patient have one of the following disease processes/diagnoses(primary or secondary)? General Surgery   Does the patient have Home health ordered? No   Is there a DME ordered? No   Comments regarding appointments see AVS   Prep survey completed? Yes          Beatriz Rivera RN

## 2022-02-18 ENCOUNTER — READMISSION MANAGEMENT (OUTPATIENT)
Dept: CALL CENTER | Facility: HOSPITAL | Age: 67
End: 2022-02-18

## 2022-02-18 ENCOUNTER — OFFICE VISIT (OUTPATIENT)
Dept: BARIATRICS/WEIGHT MGMT | Facility: CLINIC | Age: 67
End: 2022-02-18

## 2022-02-18 VITALS
HEART RATE: 71 BPM | DIASTOLIC BLOOD PRESSURE: 82 MMHG | WEIGHT: 266 LBS | TEMPERATURE: 97.3 F | SYSTOLIC BLOOD PRESSURE: 144 MMHG | HEIGHT: 63 IN | BODY MASS INDEX: 47.13 KG/M2 | RESPIRATION RATE: 18 BRPM

## 2022-02-18 DIAGNOSIS — E66.9 DIABETES MELLITUS TYPE 2 IN OBESE: ICD-10-CM

## 2022-02-18 DIAGNOSIS — G47.30 SLEEP APNEA, UNSPECIFIED TYPE: ICD-10-CM

## 2022-02-18 DIAGNOSIS — E66.01 OBESITY, CLASS III, BMI 40-49.9 (MORBID OBESITY): Primary | ICD-10-CM

## 2022-02-18 DIAGNOSIS — Z98.890 S/P REPAIR OF VENTRAL HERNIA: ICD-10-CM

## 2022-02-18 DIAGNOSIS — E11.69 DIABETES MELLITUS TYPE 2 IN OBESE: ICD-10-CM

## 2022-02-18 DIAGNOSIS — Z87.19 S/P REPAIR OF VENTRAL HERNIA: ICD-10-CM

## 2022-02-18 DIAGNOSIS — I10 ESSENTIAL HYPERTENSION: ICD-10-CM

## 2022-02-18 DIAGNOSIS — Z98.84 S/P LAPAROSCOPIC SLEEVE GASTRECTOMY: ICD-10-CM

## 2022-02-18 DIAGNOSIS — M25.50 MULTIPLE JOINT PAIN: ICD-10-CM

## 2022-02-18 PROBLEM — K44.9 HIATAL HERNIA: Status: RESOLVED | Noted: 2022-02-01 | Resolved: 2022-02-18

## 2022-02-18 PROBLEM — I62.9 INTRACRANIAL BLEED: Status: RESOLVED | Noted: 2017-09-09 | Resolved: 2022-02-18

## 2022-02-18 PROCEDURE — 99024 POSTOP FOLLOW-UP VISIT: CPT | Performed by: NURSE PRACTITIONER

## 2022-02-18 NOTE — OUTREACH NOTE
General Surgery Week 1 Survey      Responses   [facility] patient discharged from? [place]   Does the patient have one of the following disease processes/diagnoses(primary or secondary)? General Surgery   Week 1 attempt successful? Yes   Call start time 0954   Rescheduled Rescheduled-pt requested  [Patient on way to Dr melendez ]   Call end time 0954   Discharge diagnosis  Laparoscopic sleeve gastrectomy with paraesophageal hernia repair, ventral hernia repair   Is patient permission given to speak with other caregiver? Yes   List who call center can speak with daughter Yahaira   Person spoke with today (if not patient) and relationship daughter Yahaira and patient          Sophia Macedo RN

## 2022-02-18 NOTE — PROGRESS NOTES
[facility] BARIATRIC SURGERY  [address] [facility] [address]  [facility] [address]  [phone]  [address] [facility] [address]  [facility] [address]  [phone]  Dept: [phone]  2/18/2022      Marilin Martinez.  [id number]  [id number]  1955  female      Chief Complaint   Patient presents with   • Post-op     1 week post op sleeve       BH Post-Op Bariatric Surgery:   Marilin Martinez is status post laparopscopic Laparoscopic Sleeve/PEH and ventral hernia repair procedure, performed on 2/14/22.     HPI:   Today's weight is 121 kg (266 lb) pounds, today's BMI is Body mass index is 47.13 kg/m².,@ has a  loss of 13 pounds since the last visit and@ weight loss since surgery is 13 pounds. The patient reports a decreased portion size and loss of appetite.  Marilin Martinez denies nausea, vomiting, tachycardia, palpitations, fever and reports incisional pain is improving daily, she has been up frequently using her walker. The patient c/o appropriate post-op incisional discomfort that is improving. she is doing well with protein and water intake so far. Taking their vitamins, walking and using IS. Denies fevers, chills, chest pain or shortness of air.     She did get in around 68oz of fluid yesterday and about 30g of protein via liquid supplements. She reports that she has been holding ozempic and junuvia and her blood sugars have been running in the upper 80's. She has a follow up with her PCP to address diabetic medications this next week.      Diet and Exercise: Diet history reviewed and discussed with the patient. Weight loss/gains to date discussed with the patient. No carbonated beverage consumption and exercising regularly- walking frequently.   Supplements: multivitamins, B-12, calcium, iron, B-1 and Vitamin D.   Patient is taking blood thinner as prescribed: lovenox (forgot to take this morning)  Current outpatient and discharge medications have been reconciled for  the patient.  Reviewed by: JORGE Cunningham      Review of Systems   Constitutional: Positive for fatigue. Negative for appetite change, fever and unexpected weight change.   HENT: Negative.    Eyes: Negative.    Respiratory: Negative.    Cardiovascular: Negative.  Negative for leg swelling.   Gastrointestinal: Positive for abdominal pain and constipation. Negative for abdominal distention, diarrhea, nausea and vomiting.   Genitourinary: Negative for difficulty urinating, frequency and urgency.   Musculoskeletal: Negative for back pain.   Skin: Positive for wound.   Psychiatric/Behavioral: Negative.    All other systems reviewed and are negative.      Patient Active Problem List   Diagnosis   • Environmental and seasonal allergies   • Essential hypertension   • Ankle swelling   • Sleep apnea   • Multiple joint pain   • Hypothyroid   • Stroke ([facility])   • Diabetes mellitus type 2 in obese ([facility])   • Dyspnea on exertion   • Dyslipidemia   • Obesity, Class III, BMI 40-49.9 (morbid obesity) ([facility])   • Ventral hernia without obstruction or gangrene   • S/P laparoscopic sleeve gastrectomy   • S/P repair of ventral hernia       The following portions of the patient's history were reviewed and updated as appropriate: allergies, current medications, past family history, past medical history, past social history, past surgical history and problem list.    Vitals:    02/18/22 1043   BP: 144/82   Pulse: 71   Resp: 18   Temp: 97.3 °F (36.3 °C)       Physical Exam  Vitals reviewed.   Constitutional:       Appearance: Normal appearance. She is obese.   HENT:      Head: Normocephalic and atraumatic.   Cardiovascular:      Rate and Rhythm: Normal rate and regular rhythm.      Heart sounds: Normal heart sounds, S1 normal and S2 normal. No murmur heard.      Pulmonary:      Effort: Pulmonary effort is normal.      Breath sounds: Normal breath sounds.   Abdominal:      General: There is distension (mild- directly right lateral lap  incision at level of internal sutures).      Palpations: Abdomen is soft.      Tenderness: There is abdominal tenderness (mild, expected, with movement). There is no guarding or rebound.      Hernia: No hernia is present.   Skin:     General: Skin is warm and dry.      Findings: Bruising and erythema present.      Comments: Wound edges are well approximated incisions appear closed with Exofin intact.  Mild circumferential erythema outlined by light yellow ecchymosis noted.     Mild tenderness to palpation as expected.   Neurological:      Mental Status: She is alert.         Assessment:   Post-op, the patient is doing well.     Encounter Diagnoses   Name Primary?   • Obesity, Class III, BMI 40-49.9 (morbid obesity) ([facility]) Yes   • Diabetes mellitus type 2 in obese ([facility])    • Essential hypertension    • S/P laparoscopic sleeve gastrectomy    • S/P repair of ventral hernia    • Multiple joint pain    • Sleep apnea, unspecified type        Plan:   She was advised to closely follow her blood sugars. She is doing well with increasing fluid intake and was given a list of protein shakes and supplements to shop for to help her increase her protein intake. She will report dizziness on position changes and continue to monitor her blood pressure but actually seems to be stable on her bumex as she is doing well with intake. Reviewed with patient the importance of following the manual for diet progression. Increase activity as tolerated. Continue increasing daily intake of protein and water.   Return to work: the patient is to return to 3 weeks from their surgery date with no restrictions unless they develop medical problems in which we will see them back in the office. They received a note in our office today with their return to work date.  Activity restrictions: no lifting, pushing or pulling over 25lbs for 3 weeks.   Recommended patient be sure to get at least 70 grams of protein per day. Discussed with the patient the  recommended amount of water per day to intake. Reviewed vitamin requirements. Be sure to do routine exercise and increase activity as tolerated. No asa, nsaids or steroids for 8 weeks if gastric sleeve procedure and lifelong if gastric bypass procedure.. Patient may use miralax as needed if necessary.     Instructions / Recommendations: dietary counseling recommended, recommended a daily protein intake of  grams, vitamin supplement(s) recommended, recommended exercising at least 150 minutes per week, behavior modifications recommended and instructed to call the office for concerns, questions, or problems.     The patient was instructed to follow up at one month follow up appt.     The patient was counseled regarding post op bariatric manual

## 2022-02-22 ENCOUNTER — READMISSION MANAGEMENT (OUTPATIENT)
Dept: CALL CENTER | Facility: HOSPITAL | Age: 67
End: 2022-02-22

## 2022-02-22 NOTE — OUTREACH NOTE
General Surgery Week 1 Survey      Responses   [facility] patient discharged from? [place]   Does the patient have one of the following disease processes/diagnoses(primary or secondary)? General Surgery   Week 1 attempt successful? Yes   Call start time 1449   Call end time 1450   Discharge diagnosis  Laparoscopic sleeve gastrectomy with paraesophageal hernia repair, ventral hernia repair   Is patient permission given to speak with other caregiver? Yes   List who call center can speak with Yahaira daughter    Person spoke with today (if not patient) and relationship Yahaira daughter    Meds reviewed with patient/caregiver? Yes   Is the patient taking all medications as directed (includes completed medication regime)? Yes   Does the patient have a follow up appointment scheduled with their surgeon? Yes   Has the patient kept scheduled appointments due by today? Yes  [2-18-22 with surgeon - went well per daughter ]   What is the patient's perception of their health status since discharge? Improving   Is the patient/caregiver able to teach back signs and symptoms of incisional infection? Fever,  Increased redness, swelling or pain at the incisonal site,  Increased drainage or bleeding   Is the patient/caregiver able to teach back steps to recovery at home? Rest and rebuild strength, gradually increase activity   Week 1 call completed? Yes          Florence Love RN

## 2022-03-02 ENCOUNTER — READMISSION MANAGEMENT (OUTPATIENT)
Dept: CALL CENTER | Facility: HOSPITAL | Age: 67
End: 2022-03-02

## 2022-03-02 NOTE — OUTREACH NOTE
General Surgery Week 2 Survey      Responses   [facility] patient discharged from? [place]   Does the patient have one of the following disease processes/diagnoses(primary or secondary)? General Surgery   Week 2 attempt successful? Yes   Call start time 0804   Call end time 0810   Discharge diagnosis  Laparoscopic sleeve gastrectomy with paraesophageal hernia repair, ventral hernia repair   Is patient permission given to speak with other caregiver? Yes   List who call center can speak with Yahaira el    Meds reviewed with patient/caregiver? Yes   Is the patient having any side effects they believe may be caused by any medication additions or changes? No   Does the patient have all medications related to this admission filled (includes all antibiotics, pain medications, etc.) Yes   Is the patient taking all medications as directed (includes completed medication regime)? Yes   Does the patient have a follow up appointment scheduled with their surgeon? Yes   Has the patient kept scheduled appointments due by today? Yes   Comments She has seen the PCP.    Did the patient receive a copy of their discharge instructions? Yes   Nursing interventions Reviewed instructions with patient,  Educated on MyChart   What is the patient's perception of their health status since discharge? Improving  [From the surgery standpoint, She is doing well. She has back painit is some better. ]   Nursing interventions Nurse provided patient education   Is the patient /caregiver able to teach back basic post-op care? Continue use of incentive spirometry at least 1 week post discharge,  Practice 'cough and deep breath',  Drive as instructed by MD in discharge instructions,  Take showers only when approved by MD-sponge bathe until then,  No tub bath, swimming, or hot tub until instructed by MD,  Keep incision areas clean,dry and protected,  Do not remove steri-strips,  Lifting as instructed by MD in discharge instructions   Is the  patient/caregiver able to teach back signs and symptoms of incisional infection? Fever,  Increased redness, swelling or pain at the incisonal site,  Increased drainage or bleeding   Is the patient/caregiver able to teach back steps to recovery at home? Rest and rebuild strength, gradually increase activity,  Eat a well-balance diet,  Set small, achievable goals for return to baseline health   Is the patient/caregiver able to teach back the hierarchy of who to call/visit for symptoms/problems? PCP, Specialist, Home health nurse, Urgent Care, ED, 911 Yes   Week 2 call completed? Yes   Wrap up additional comments She has had some back pain using heat and cold.           Vielka Kebede RN

## 2022-03-11 ENCOUNTER — READMISSION MANAGEMENT (OUTPATIENT)
Dept: CALL CENTER | Facility: HOSPITAL | Age: 67
End: 2022-03-11

## 2022-03-11 NOTE — OUTREACH NOTE
General Surgery Week 3 Survey    Flowsheet Row Responses   [facility] patient discharged from? [place]   Does the patient have one of the following disease processes/diagnoses(primary or secondary)? General Surgery   Week 3 attempt successful? Yes   Call start time 1026   Call end time 1027   Discharge diagnosis  Laparoscopic sleeve gastrectomy with paraesophageal hernia repair, ventral hernia repair   Is patient permission given to speak with other caregiver? Yes   Meds reviewed with patient/caregiver? Yes   Is the patient having any side effects they believe may be caused by any medication additions or changes? No   Does the patient have all medications related to this admission filled (includes all antibiotics, pain medications, etc.) Yes   Is the patient taking all medications as directed (includes completed medication regime)? Yes   Does the patient have a follow up appointment scheduled with their surgeon? Yes   Has the patient kept scheduled appointments due by today? Yes   Psychosocial issues? No   Did the patient receive a copy of their discharge instructions? Yes   Nursing interventions Reviewed instructions with patient   What is the patient's perception of their health status since discharge? Improving   Is the patient/caregiver able to teach back signs and symptoms of incisional infection? Increased redness, swelling or pain at the incisonal site, Increased drainage or bleeding, Incisional warmth, Pus or odor from incision, Fever   Is the patient/caregiver able to teach back steps to recovery at home? Make a list of questions for surgeon's appointment, Rest and rebuild strength, gradually increase activity   If the patient is a current smoker, are they able to teach back resources for cessation? Not a smoker   Is the patient/caregiver able to teach back the hierarchy of who to call/visit for symptoms/problems? PCP, Specialist, Home health nurse, Urgent Care, ED, 911 Yes   Week 3 call completed?  Yes   Revoked No further contact(revokes)-requires comment   Is the patient interested in additional calls from an ambulatory ?  NOTE:  applies to high risk patients requiring additional follow-up. No   Graduated/Revoked comments All goals met.    Wrap up additional comments She is doing great.           BRAYDEN ROE - Registered Nurse

## 2022-03-23 ENCOUNTER — LAB (OUTPATIENT)
Dept: LAB | Facility: HOSPITAL | Age: 67
End: 2022-03-23

## 2022-03-23 ENCOUNTER — OFFICE VISIT (OUTPATIENT)
Dept: BARIATRICS/WEIGHT MGMT | Facility: CLINIC | Age: 67
End: 2022-03-23

## 2022-03-23 VITALS
SYSTOLIC BLOOD PRESSURE: 139 MMHG | DIASTOLIC BLOOD PRESSURE: 72 MMHG | TEMPERATURE: 97.5 F | HEIGHT: 63 IN | BODY MASS INDEX: 46.07 KG/M2 | WEIGHT: 260 LBS | HEART RATE: 64 BPM | RESPIRATION RATE: 18 BRPM

## 2022-03-23 DIAGNOSIS — E78.5 DYSLIPIDEMIA: ICD-10-CM

## 2022-03-23 DIAGNOSIS — E11.69 DIABETES MELLITUS TYPE 2 IN OBESE: ICD-10-CM

## 2022-03-23 DIAGNOSIS — Z98.84 S/P LAPAROSCOPIC SLEEVE GASTRECTOMY: ICD-10-CM

## 2022-03-23 DIAGNOSIS — M25.473 ANKLE SWELLING, UNSPECIFIED LATERALITY: ICD-10-CM

## 2022-03-23 DIAGNOSIS — E66.9 DIABETES MELLITUS TYPE 2 IN OBESE: ICD-10-CM

## 2022-03-23 DIAGNOSIS — M25.50 MULTIPLE JOINT PAIN: ICD-10-CM

## 2022-03-23 DIAGNOSIS — G47.30 SLEEP APNEA, UNSPECIFIED TYPE: ICD-10-CM

## 2022-03-23 DIAGNOSIS — E66.01 OBESITY, CLASS III, BMI 40-49.9 (MORBID OBESITY): ICD-10-CM

## 2022-03-23 DIAGNOSIS — E66.01 OBESITY, CLASS III, BMI 40-49.9 (MORBID OBESITY): Primary | ICD-10-CM

## 2022-03-23 DIAGNOSIS — I10 ESSENTIAL HYPERTENSION: ICD-10-CM

## 2022-03-23 LAB
25(OH)D3 SERPL-MCNC: 43.8 NG/ML (ref 30–100)
ALBUMIN SERPL-MCNC: 4.3 G/DL (ref 3.5–5.2)
ALBUMIN/GLOB SERPL: 1.5 G/DL
ALP SERPL-CCNC: 56 U/L (ref 39–117)
ALT SERPL W P-5'-P-CCNC: 19 U/L (ref 1–33)
ANION GAP SERPL CALCULATED.3IONS-SCNC: 10 MMOL/L (ref 5–15)
AST SERPL-CCNC: 21 U/L (ref 1–32)
BASOPHILS # BLD AUTO: 0.07 10*3/MM3 (ref 0–0.2)
BASOPHILS NFR BLD AUTO: 0.9 % (ref 0–1.5)
BILIRUB SERPL-MCNC: 0.5 MG/DL (ref 0–1.2)
BUN SERPL-MCNC: 13 MG/DL (ref 8–23)
BUN/CREAT SERPL: 13 (ref 7–25)
CALCIUM SPEC-SCNC: 9.6 MG/DL (ref 8.6–10.5)
CHLORIDE SERPL-SCNC: 105 MMOL/L (ref 98–107)
CO2 SERPL-SCNC: 27 MMOL/L (ref 22–29)
CREAT SERPL-MCNC: 1 MG/DL (ref 0.57–1)
DEPRECATED RDW RBC AUTO: 44.7 FL (ref 37–54)
EGFRCR SERPLBLD CKD-EPI 2021: 62.6 ML/MIN/1.73
EOSINOPHIL # BLD AUTO: 0.16 10*3/MM3 (ref 0–0.4)
EOSINOPHIL NFR BLD AUTO: 2.1 % (ref 0.3–6.2)
ERYTHROCYTE [DISTWIDTH] IN BLOOD BY AUTOMATED COUNT: 12.6 % (ref 12.3–15.4)
FERRITIN SERPL-MCNC: 204 NG/ML (ref 13–150)
FOLATE SERPL-MCNC: >20 NG/ML (ref 4.78–24.2)
GLOBULIN UR ELPH-MCNC: 2.8 GM/DL
GLUCOSE SERPL-MCNC: 97 MG/DL (ref 65–99)
HCT VFR BLD AUTO: 42.2 % (ref 34–46.6)
HGB BLD-MCNC: 13.9 G/DL (ref 12–15.9)
IRON 24H UR-MRATE: 77 MCG/DL (ref 37–145)
LYMPHOCYTES # BLD AUTO: 1.91 10*3/MM3 (ref 0.7–3.1)
LYMPHOCYTES NFR BLD AUTO: 25.1 % (ref 19.6–45.3)
MCH RBC QN AUTO: 32 PG (ref 26.6–33)
MCHC RBC AUTO-ENTMCNC: 32.9 G/DL (ref 31.5–35.7)
MCV RBC AUTO: 97.2 FL (ref 79–97)
MONOCYTES # BLD AUTO: 0.55 10*3/MM3 (ref 0.1–0.9)
MONOCYTES NFR BLD AUTO: 7.2 % (ref 5–12)
NEUTROPHILS NFR BLD AUTO: 4.89 10*3/MM3 (ref 1.7–7)
NEUTROPHILS NFR BLD AUTO: 64.3 % (ref 42.7–76)
PLATELET # BLD AUTO: 172 10*3/MM3 (ref 140–450)
PMV BLD AUTO: 12.6 FL (ref 6–12)
POTASSIUM SERPL-SCNC: 4.3 MMOL/L (ref 3.5–5.2)
PREALB SERPL-MCNC: 18 MG/DL (ref 20–40)
PROT SERPL-MCNC: 7.1 G/DL (ref 6–8.5)
RBC # BLD AUTO: 4.34 10*6/MM3 (ref 3.77–5.28)
SODIUM SERPL-SCNC: 142 MMOL/L (ref 136–145)
WBC NRBC COR # BLD: 7.61 10*3/MM3 (ref 3.4–10.8)

## 2022-03-23 PROCEDURE — 99024 POSTOP FOLLOW-UP VISIT: CPT | Performed by: NURSE PRACTITIONER

## 2022-03-23 PROCEDURE — 83921 ORGANIC ACID SINGLE QUANT: CPT

## 2022-03-23 PROCEDURE — 82746 ASSAY OF FOLIC ACID SERUM: CPT

## 2022-03-23 PROCEDURE — 84134 ASSAY OF PREALBUMIN: CPT

## 2022-03-23 PROCEDURE — 82306 VITAMIN D 25 HYDROXY: CPT

## 2022-03-23 PROCEDURE — 84425 ASSAY OF VITAMIN B-1: CPT

## 2022-03-23 PROCEDURE — 82728 ASSAY OF FERRITIN: CPT

## 2022-03-23 PROCEDURE — 83540 ASSAY OF IRON: CPT

## 2022-03-23 PROCEDURE — 36415 COLL VENOUS BLD VENIPUNCTURE: CPT

## 2022-03-23 PROCEDURE — 80053 COMPREHEN METABOLIC PANEL: CPT

## 2022-03-23 PROCEDURE — 85025 COMPLETE CBC W/AUTO DIFF WBC: CPT

## 2022-03-23 NOTE — PROGRESS NOTES
[facility] BARIATRIC SURGERY  [address] [facility] [address]  [facility] [address]  [phone]  [address] [facility] [address]  [facility] [address]  [phone]  Dept: [phone]  3/23/2022      Marilin Martinez.  [id number]  [id number]  1955  female      Chief Complaint   Patient presents with   • Post-op     1 MONTH POST OP SLEEVE       BH Post-Op Bariatric Surgery:   Marilin Martinez is status post Laparoscopic Sleeve/PEH procedure, performed on 2/14/22     HPI:   Today's weight is 118 kg (260 lb) pounds, today's BMI is Body mass index is 46.07 kg/m².,@ has a  loss of 6 pounds since the last visit and@ weight loss since surgery is 19 pounds. The patient reports a decreased portion size and loss of appetite.      Marilin Martinez denies nausea, vomiting, relfux and reports that she has tolerated her dietary progression well and is using premier protein shakes to help reach her goal for protein intake. She has gotten burned out on the fusion shakes.      Diet and Exercise: Diet history reviewed and discussed with the patient. Weight loss/gains to date discussed with the patient. The patient states they are eating 40 grams of protein per day. She reports eating 2 meals per day, a typical portion size of 1/2 cup, eating 0-1 snacks per day, drinking 3-4 or more 8-oz. glasses of water per day, no carbonated beverage consumption. She has been getting up and walking a little bit more.     Supplements: celebrate MTV with iron and calcium.     Review of Systems   Constitutional: Positive for appetite change. Negative for fatigue and unexpected weight change.   HENT: Negative.    Eyes: Negative.    Respiratory: Negative.    Cardiovascular: Negative.  Negative for leg swelling.   Gastrointestinal: Positive for constipation. Negative for abdominal distention, abdominal pain, diarrhea, nausea and vomiting.   Genitourinary: Negative for difficulty urinating, frequency and  urgency.   Musculoskeletal: Negative for back pain.   Skin: Negative.    Psychiatric/Behavioral: Negative.    All other systems reviewed and are negative.      Patient Active Problem List   Diagnosis   • Environmental and seasonal allergies   • Essential hypertension   • Ankle swelling   • Sleep apnea   • Multiple joint pain   • Hypothyroid   • Stroke ([facility])   • Diabetes mellitus type 2 in obese ([facility])   • Dyspnea on exertion   • Dyslipidemia   • Obesity, Class III, BMI 40-49.9 (morbid obesity) ([facility])   • Ventral hernia without obstruction or gangrene   • S/P laparoscopic sleeve gastrectomy   • S/P repair of ventral hernia       Past Medical History:   Diagnosis Date   • Arthritis    • Back pain    • Diabetes mellitus ([facility])    • Disease of thyroid gland    • Hyperlipemia    • Hyperlipidemia    • Hypertension    • Knee pain, bilateral    • Left-sided weakness    • Limb swelling    • Neuropathy    • OAB (overactive bladder)    • Seasonal allergies    • Sleep apnea     CPAP   • 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[facility]) 2017   • Thyroid disorder        The following portions of the patient's history were reviewed and updated as appropriate: allergies, current medications, past family history, past medical history, past social history, past surgical history and problem list.    Vitals:    03/23/22 1039   BP: 139/72   Pulse: 64   Resp: 18   Temp: 97.5 °F (36.4 °C)       Physical Exam  Vitals and nursing note reviewed.   Constitutional:       Appearance: She is well-developed. She is obese.   Neck:      Thyroid: No thyromegaly.   Cardiovascular:      Rate and Rhythm: Normal rate.   Pulmonary:      Effort: Pulmonary effort is normal. No respiratory distress.   Abdominal:      Palpations: Abdomen is soft.   Musculoskeletal:         General: No tenderness.   Skin:     General: Skin is warm and dry.   Neurological:      Mental Status: She is alert.   Psychiatric:         Behavior: Behavior normal.         Assessment:   Post-op, the patient is  doing well.     Encounter Diagnoses   Name Primary?   • Obesity, Class III, BMI 40-49.9 (morbid obesity) ([facility]) Yes   • Diabetes mellitus type 2 in obese ([facility])    • Essential hypertension    • Dyslipidemia    • S/P laparoscopic sleeve gastrectomy    • Ankle swelling, unspecified laterality    • Multiple joint pain    • Sleep apnea, unspecified type        Plan:   Patient was advised to start supplementing her protein intake with an unsweetened unflavored protein supplement such as isopure. We will trend vitamin levels today. She was given sample of bariatricpal MTV.   Encouraged patient to be sure to get plenty of lean protein per day through small frequent meals all with a protein source.   Activity restrictions: none.   Recommended patient be sure to get at least 70 grams of protein per day by eating small, frequent meals all with high lean protein choices. Be sure to limit/cut back on daily carbohydrate intake. Discussed with the patient the recommended amount of water per day to intake- half of body weight in ounces. Reviewed vitamin requirements. Be sure to do routine exercise, 150 minutes per week minimum, including both cardio and strength training.     Instructions / Recommendations: dietary counseling recommended, recommended a daily protein intake of  grams, vitamin supplement(s) recommended, recommended exercising at least 150 minutes per week, behavior modifications recommended and instructed to call the office for concerns, questions, or problems.     The patient was instructed to follow up in 2 months .   . Total time spent during this encounter today was 25 minutes

## 2022-03-26 LAB — METHYLMALONATE SERPL-SCNC: 158 NMOL/L (ref 0–378)

## 2022-03-28 ENCOUNTER — TELEPHONE (OUTPATIENT)
Dept: BARIATRICS/WEIGHT MGMT | Facility: CLINIC | Age: 67
End: 2022-03-28

## 2022-03-28 NOTE — TELEPHONE ENCOUNTER
Inform about results labs  ----- Message from JORGE Cunningham sent at 3/28/2022  4:00 PM EDT -----  Please encourage patient to increase protein intake by at least 15g daily as prealbumin was low.

## 2022-03-30 ENCOUNTER — TELEPHONE (OUTPATIENT)
Dept: ORTHOPEDIC SURGERY | Facility: CLINIC | Age: 67
End: 2022-03-30

## 2022-03-30 NOTE — TELEPHONE ENCOUNTER
Caller: PATIENT     Relationship to patient: SELF    Best call back number: [phone]    Chief complaint: BILATERAL KNEE PAIN    Type of visit: WORK IN     Requested date: 04/11/22     Additional notes: PT'S DAUGHTER HAS APPT. WITH DR. JUAN ON 04/11/22.   PT. ASKING IF SHE CAN BE WORKED IN THAT DAY DUE TO RIDING TOGETHER.   PLEASE CALL TO ADVISE.

## 2022-04-01 LAB — VIT B1 BLD-SCNC: 167.8 NMOL/L (ref 66.5–200)

## 2022-04-13 ENCOUNTER — OFFICE VISIT (OUTPATIENT)
Dept: ORTHOPEDIC SURGERY | Facility: CLINIC | Age: 67
End: 2022-04-13

## 2022-04-13 VITALS — HEART RATE: 77 BPM | HEIGHT: 63 IN | OXYGEN SATURATION: 96 % | BODY MASS INDEX: 46.07 KG/M2 | WEIGHT: 260 LBS

## 2022-04-13 DIAGNOSIS — M17.12 PRIMARY OSTEOARTHRITIS OF LEFT KNEE: Primary | ICD-10-CM

## 2022-04-13 PROCEDURE — 20610 DRAIN/INJ JOINT/BURSA W/O US: CPT | Performed by: ORTHOPAEDIC SURGERY

## 2022-04-13 RX ORDER — TRIAMCINOLONE ACETONIDE 40 MG/ML
40 INJECTION, SUSPENSION INTRA-ARTICULAR; INTRAMUSCULAR
Status: COMPLETED | OUTPATIENT
Start: 2022-04-13 | End: 2022-04-13

## 2022-04-13 RX ORDER — LIDOCAINE HYDROCHLORIDE 10 MG/ML
9 INJECTION, SOLUTION INFILTRATION; PERINEURAL
Status: COMPLETED | OUTPATIENT
Start: 2022-04-13 | End: 2022-04-13

## 2022-04-13 RX ADMIN — LIDOCAINE HYDROCHLORIDE 9 ML: 10 INJECTION, SOLUTION INFILTRATION; PERINEURAL at 10:17

## 2022-04-13 RX ADMIN — TRIAMCINOLONE ACETONIDE 40 MG: 40 INJECTION, SUSPENSION INTRA-ARTICULAR; INTRAMUSCULAR at 10:17

## 2022-04-13 NOTE — PROGRESS NOTES
"Chief Complaint  Follow-up of the Left Knee     Subjective      Marilin Martinez presents to [facility] for a follow-up of left knee. Patient has left knee osteoarthritis, this being treated conservatively. No new injuries or complaints. She has had injections in the past that has given her relief.     No Known Allergies     Social History     Socioeconomic History   • Marital status:    Tobacco Use   • Smoking status: Former Smoker     Quit date: 1997     Years since quittin.6   • Smokeless tobacco: Never Used   Vaping Use   • Vaping Use: Never used   Substance and Sexual Activity   • Alcohol use: Not Currently   • Drug use: No   • Sexual activity: Defer        Review of Systems     Objective   Vital Signs:   Pulse 77   Ht 160 cm (62.99\")   Wt 118 kg (260 lb)   SpO2 96%   BMI 46.07 kg/m²       Physical Exam  Constitutional:       Appearance: Normal appearance. Patient is well-developed and normal weight.   HENT:      Head: Normocephalic.      Right Ear: Hearing and external ear normal.      Left Ear: Hearing and external ear normal.      Nose: Nose normal.   Eyes:      Conjunctiva/sclera: Conjunctivae normal.   Cardiovascular:      Rate and Rhythm: Normal rate.   Pulmonary:      Effort: Pulmonary effort is normal.      Breath sounds: No wheezing or rales.   Abdominal:      Palpations: Abdomen is soft.      Tenderness: There is no abdominal tenderness.   Musculoskeletal:      Cervical back: Normal range of motion.   Skin:     Findings: No rash.   Neurological:      Mental Status: Patient  is alert and oriented to person, place, and time.   Psychiatric:         Mood and Affect: Mood and affect normal.         Judgment: Judgment normal.       Ortho Exam      LEFT KNEE: flexion to 100 degrees. -2 degrees of extension. Calf supple, non-tender, no signs of DVT. Dorsal Pedal Pulse 2+, posterior tibialis pulse 2+. Sensation grossly intact. Neurovascular intact.  Good " strength to hamstrings, quadriceps, dorsiflexors and plantar flexors. Stable to varus/valgus stress. Stable anterior and posterior drawer. Skin intact. Tender medial and lateral joint line.       Large Joint Arthrocentesis: L knee  Date/Time: 4/13/2022 10:17 AM  Consent given by: patient  Site marked: site marked  Timeout: Immediately prior to procedure a time out was called to verify the correct patient, procedure, equipment, support staff and site/side marked as required   Supporting Documentation  Indications: pain   Procedure Details  Location: knee - L knee  Needle size: 22 G  Medications administered: 9 mL lidocaine 1 %; 40 mg triamcinolone acetonide 40 MG/ML  Patient tolerance: patient tolerated the procedure well with no immediate complications              Imaging Results (Most Recent)     None           Result Review :         No results found.           Assessment and Plan     DX: Left knee osteoarthritis     Left knee injection given, patient tolerated this well. She has had gel injections in the past that worked well, her insurance recently denied this.     Call or return if worsening symptoms.    Follow Up     PRN.       Patient was given instructions and counseling regarding her condition or for health maintenance advice. Please see specific information pulled into the AVS if appropriate.     Scribed for Murray Calvillo MD by Lydia Fay.  04/13/22   10:12 EDT      I have personally performed the services described in this document as scribed by the above individual and it is both accurate and complete. Murray Calvillo MD 04/13/22

## 2022-04-18 ENCOUNTER — OFFICE VISIT (OUTPATIENT)
Dept: PODIATRY | Facility: CLINIC | Age: 67
End: 2022-04-18

## 2022-04-18 VITALS
DIASTOLIC BLOOD PRESSURE: 84 MMHG | HEIGHT: 63 IN | TEMPERATURE: 97.5 F | HEART RATE: 53 BPM | OXYGEN SATURATION: 99 % | SYSTOLIC BLOOD PRESSURE: 138 MMHG | WEIGHT: 254 LBS | BODY MASS INDEX: 45 KG/M2

## 2022-04-18 DIAGNOSIS — L60.0 ONYCHOCRYPTOSIS: ICD-10-CM

## 2022-04-18 DIAGNOSIS — E11.8 DIABETIC FOOT: ICD-10-CM

## 2022-04-18 DIAGNOSIS — M79.671 FOOT PAIN, BILATERAL: ICD-10-CM

## 2022-04-18 DIAGNOSIS — B35.1 ONYCHOMYCOSIS: ICD-10-CM

## 2022-04-18 DIAGNOSIS — E11.9 NON-INSULIN DEPENDENT TYPE 2 DIABETES MELLITUS: Primary | ICD-10-CM

## 2022-04-18 DIAGNOSIS — M79.672 FOOT PAIN, BILATERAL: ICD-10-CM

## 2022-04-18 PROCEDURE — G8404 LOW EXTEMITY NEUR EXAM DOCUM: HCPCS | Performed by: PODIATRIST

## 2022-04-18 PROCEDURE — 11721 DEBRIDE NAIL 6 OR MORE: CPT | Performed by: PODIATRIST

## 2022-04-18 NOTE — PROGRESS NOTES
[facility] - PODIATRY    Today's Date: 04/18/22    Patient Name: Marilin Martinez  MRN: [id number]  CSN: [id number]  PCP: Reyes, Rosenberg Acosta, MD, Last PCP Visit: 15 December 2021  Referring Provider: No ref. provider found    SUBJECTIVE     Chief Complaint   Patient presents with   • Left Foot - Nail Problem   • Right Foot - Nail Problem     HPI: Marilin Martinez, a 67 y.o.female, comes to clinic.    New, Established, New Problem:  established     Location:  Toenails    Duration:   Greater than five years    Onset:  Gradual    Nature:  sore with palpation.    Stable, worsening, improving:   Stable    Aggravating factors:  Pain with shoe gear and ambulation.    Previous Treatment:  debridement    Patient reported last blood glucose: 82  __________________________________    Patient reports the following medical changes since their last visit: gastric sleeve surgery on 14 February 2022.  Lost 18 pounds so far.    Patient denies any fevers, chills, nausea, vomiting, shortness of breathe, nor any other constitutional signs nor symptoms.       Past Medical History:   Diagnosis Date   • Arthritis    • Back pain    • Diabetes mellitus (HCC)    • Disease of thyroid gland    • Hyperlipemia    • Hyperlipidemia    • Hypertension    • Knee pain, bilateral    • Left-sided weakness    • Limb swelling    • Neuropathy    • OAB (overactive bladder)    • Seasonal allergies    • Sleep apnea     CPAP   • 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HCC) 2017   • Thyroid disorder      Past Surgical History:   Procedure Laterality Date   • COLONOSCOPY     • ENDOSCOPY N/A 11/08/2021    Procedure: ESOPHAGOGASTRODUODENOSCOPY WITH COLD BIOPSY;  Surgeon: Mansi Kyle MD;  Location: [facility] ENDOSCOPY;  Service: General;  Laterality: N/A;  PRE: SCREENING FOR BARIATRICS  POST: MODERATE SIZE HIATAL HERNIA   • GALLBLADDER SURGERY  1997   • GASTRIC SLEEVE LAPAROSCOPIC N/A 02/14/2022    Procedure: GASTRIC SLEEVE LAPAROSCOPIC With VENTRALHernia  Repair AND PARAESOPHAGEAL HERNIA REPAIR AND LYSIS OF ADHESIONS PARTICAL OMENTECTOMY;  Surgeon: Quang Shipley Jr., MD;  [place] [facility] [place] [facility]  Service: Bariatric;  Laterality: N/A;   • GASTRIC SLEEVE LAPAROSCOPIC     • TONSILLECTOMY     • TUBAL ABDOMINAL LIGATION       Family History   Problem Relation Age of Onset   • Obesity Mother    • Hypertension Mother    • Cancer Mother    • Arthritis Mother    • Diabetes Mother    • Obesity Sister    • Hypertension Sister    • Cancer Sister    • Arthritis Sister    • Diabetes Father    • Diabetes Daughter    • Malig Hyperthermia Neg Hx      Social History     Socioeconomic History   • Marital status:    Tobacco Use   • Smoking status: Former Smoker     Quit date: 1997     Years since quittin.6   • Smokeless tobacco: Never Used   Vaping Use   • Vaping Use: Never used   Substance and Sexual Activity   • Alcohol use: Not Currently   • Drug use: No   • Sexual activity: Defer     No Known Allergies  Current Outpatient Medications   Medication Sig Dispense Refill   • ALPRAZolam (XANAX) 1 MG tablet Take 1 mg by mouth 3 (Three) Times a Day As Needed for Anxiety.     • bumetanide (BUMEX) 1 MG tablet Take 1 mg by mouth Daily As Needed.     • celecoxib (CeleBREX) 200 MG capsule Take 200 mg by mouth Daily. HOLD PRIOR TO SURG     • cyclobenzaprine (FLEXERIL) 10 MG tablet cyclobenzaprine 10 mg oral tablet take 1 tablet (10 mg) by oral route 2 times per day as needed   Active     • Diclofenac Sodium (VOLTAREN) 1 % gel gel APPLY 4 GRAMS TO AFFECTED AREA FOUR TIMES DAILY     • fluticasone (FLONASE) 50 MCG/ACT nasal spray 1 spray into the nostril(s) as directed by provider Daily As Needed.     • gabapentin (NEURONTIN) 800 MG tablet Take 800 mg by mouth 2 (Two) Times a Day.     • HYDROcodone-acetaminophen (NORCO)  MG per tablet 1 tablet Every 8 (Eight) Hours As Needed.     • latanoprost (XALATAN) 0.005 % ophthalmic solution Administer 1 drop to both eyes Every  Night.     • Levothyroxine Sodium 200 MCG capsule Take 200 mcg by mouth Daily.     • lisinopril-hydrochlorothiazide (PRINZIDE,ZESTORETIC) 20-12.5 MG per tablet Take 1 tablet by mouth Daily. 90 tablet 3   • Livalo 2 MG tablet tablet Take 2 mg by mouth Daily.     • Myrbetriq 25 MG tablet sustained-release 24 hour 24 hr tablet Take 25 mg by mouth Every Night.     • nebivolol (BYSTOLIC) 10 MG tablet Take 10 mg by mouth Daily.     • ondansetron ODT (ZOFRAN-ODT) 4 MG disintegrating tablet Place 1 tablet on the tongue Every 4 (Four) Hours As Needed for Nausea or Vomiting. 30 tablet 0   • potassium chloride (KLOR-CON) 8 MEQ CR tablet Take 20 mEq by mouth Daily As Needed.     • rOPINIRole (REQUIP) 0.5 MG tablet Take 0.5 mg by mouth Every Night.     • ursodiol (Actigall) 300 MG capsule Take 1 capsule by mouth 2 (Two) Times a Day. 60 capsule 5     No current facility-administered medications for this visit.     Review of Systems   Constitutional: Negative.    Skin:        Painful toenails.   All other systems reviewed and are negative.      OBJECTIVE     Vitals:    04/18/22 0930   BP: 138/84   Pulse: 53   Temp: 97.5 °F (36.4 °C)   SpO2: 99%       Lab Results   Component Value Date    HGBA1C 5.17 11/08/2021       Lab Results   Component Value Date    GLUCOSE 97 03/23/2022    CALCIUM 9.6 03/23/2022     03/23/2022    K 4.3 03/23/2022    CO2 27.0 03/23/2022     03/23/2022    BUN 13 03/23/2022    CREATININE 1.00 03/23/2022    EGFRIFNONA 73 02/15/2022    BCR 13.0 03/23/2022    ANIONGAP 10.0 03/23/2022       Patient seen in no apparent distress.      PHYSICAL EXAM:     Foot/Ankle Exam:       General:   Appearance: obesity    Orientation: AAOx3    Affect: appropriate    Assistance: walker    Shoe Gear:  Sandals    VASCULAR      Right Foot Vascularity   Normal vascular exam    Dorsalis pedis:  2+  Posterior tibial:  2+  Skin Temperature: warm    Edema Grading:  None  CFT:  < 3 seconds  Pedal Hair Growth:   Present  Varicosities: none       Left Foot Vascularity   Normal vascular exam    Dorsalis pedis:  2+  Posterior tibial:  2+  Skin Temperature: warm    Edema Grading:  None  CFT:  < 3 seconds  Pedal Hair Growth:  Present  Varicosities: none        NEUROLOGIC     Right Foot Neurologic   Normal sensation    Light touch sensation:  Normal  Vibratory sensation:  Normal  Hot/Cold sensation: normal    Protective Sensation using [place] Monofilament:  10     Left Foot Neurologic   Normal sensation    Light touch sensation:  Normal  Vibratory sensation:  Normal  Hot/cold sensation: normal    Protective Sensation using [place] Monofilament:  10     MUSCLE STRENGTH     Right Foot Muscle Strength   Normal strength    Foot dorsiflexion:  5  Foot plantar flexion:  5  Foot inversion:  5  Foot eversion:  5     Left Foot Muscle Strength   Normal strength    Foot dorsiflexion:  5  Foot plantar flexion:  5  Foot inversion:  5  Foot eversion:  5     RANGE OF MOTION      Right Foot Range of Motion   Foot and ankle ROM within normal limits       Left Foot Range of Motion   Foot and ankle ROM within normal limits       DERMATOLOGIC     Right Foot Dermatologic   Skin: skin intact    Nails: onychomycosis, abnormally thick, subungual debris, dystrophic nails and ingrown toenail    Nails comment:  Toenails 1, 2, 3, 4, and 5     Left Foot Dermatologic   Skin: skin intact    Nails: onychomycosis, abnormally thick, subungual debris, dystrophic nails and ingrown toenail    Nails comment:  Toenails 1, 2, 3, 4, and 5      Diabetic Foot Exam Performed    ASSESSMENT/PLAN     Diagnoses and all orders for this visit:    1. Non-insulin dependent type 2 diabetes mellitus (HCC) (Primary)    2. Onychocryptosis    3. Foot pain, bilateral    4. Diabetic foot (HCC)    5. Onychomycosis        Comprehensive lower extremity examination and evaluation was performed.    Discussed findings and treatment plan including risks, benefits, and  treatment options with patient in detail. Patient agreed with treatment plan.    Toenails 1 through 5 bilaterally were debrided in thickness and length and then smoothed with a Dremel Tool.  Tolerated the procedure well without complications.    Diabetic foot exam performed and documented this date, compliant with CQM required standards. Detail of findings as noted in physical exam.  Lower extremity Neurologic exam for diabetic patient performed and documented this date, compliant with PQRS required standards. Detail of findings as noted in physical exam.  Advised patient importance of good routine lower extremity hygiene. Advised patient importance of evaluating for intact skin and pain free nail borders.  Advised patient to use mirror to evaluate plantar/ soles of feet for better visualization. Advised patient monitor and phone office to be seen if any cracking to skin, open lesions, painful nail borders or if nails become elongated prior to next visit. Advised patient importance of daily cleansing of lower extremities, followed by good skin cream to maintain normal hydration of skin. Also advised patient importance of close daily monitoring of blood sugar. Advised to regulate diet and medications to maintain control of blood sugar in optimal range. Contact primary care provider if difficulties maintaining blood sugar levels.  Advised Patient of presence of Diabetes Mellitus condition.  Advised Patient risk of progression and worsening or improvement, then return of condition.  Will monitor condition for any change in future. Treat with most appropriate treatment pending status of condition.  Counseled and advised patient extensively on nature and ramifications of diabetes. Standard instructions given to patient for good diabetic foot care and maintenance. Advised importance of careful monitoring to avoid break down and complications secondary to diabetes. Advised patient importance of strict maintenance of blood  sugar control. Advised patient of possible ominous results from neglect of condition, i.e.: amputation/ loss of digits, feet and legs, or even death.  Patient states understands counseling, will monitor closely, continue good hygiene and routine diabetic foot care. Patient will contact office is questions or problems.      An After Visit Summary was printed and given to the patient at discharge, including (if requested) any available informative/educational handouts regarding diagnosis, treatment, or medications. All questions were answered to patient/family satisfaction. Should symptoms fail to improve or worsen they agree to call or return to clinic or to go to the Emergency Department. Discussed the importance of following up with any needed screening tests/labs/specialist appointments and any requested follow-up recommended by me today. Importance of maintaining follow-up discussed and patient accepts that missed appointments can delay diagnosis and potentially lead to worsening of conditions.    Return in about 9 weeks (around 6/20/2022) for Toenail Care., or sooner if acute issues arise.    This document has been electronically signed by Andrew Fulton DPM on April 18, 2022 09:42 EDT

## 2022-04-20 ENCOUNTER — OFFICE VISIT (OUTPATIENT)
Dept: ORTHOPEDIC SURGERY | Facility: CLINIC | Age: 67
End: 2022-04-20

## 2022-04-20 VITALS — HEIGHT: 63 IN | OXYGEN SATURATION: 94 % | BODY MASS INDEX: 44.65 KG/M2 | HEART RATE: 88 BPM | WEIGHT: 252 LBS

## 2022-04-20 DIAGNOSIS — M17.11 PRIMARY OSTEOARTHRITIS OF RIGHT KNEE: Primary | ICD-10-CM

## 2022-04-20 PROCEDURE — 20610 DRAIN/INJ JOINT/BURSA W/O US: CPT | Performed by: PHYSICIAN ASSISTANT

## 2022-04-20 RX ORDER — TRIAMCINOLONE ACETONIDE 40 MG/ML
40 INJECTION, SUSPENSION INTRA-ARTICULAR; INTRAMUSCULAR
Status: COMPLETED | OUTPATIENT
Start: 2022-04-20 | End: 2022-04-20

## 2022-04-20 RX ORDER — LIDOCAINE HYDROCHLORIDE 10 MG/ML
5 INJECTION, SOLUTION INFILTRATION; PERINEURAL
Status: COMPLETED | OUTPATIENT
Start: 2022-04-20 | End: 2022-04-20

## 2022-04-20 RX ADMIN — TRIAMCINOLONE ACETONIDE 40 MG: 40 INJECTION, SUSPENSION INTRA-ARTICULAR; INTRAMUSCULAR at 09:01

## 2022-04-20 RX ADMIN — LIDOCAINE HYDROCHLORIDE 5 ML: 10 INJECTION, SOLUTION INFILTRATION; PERINEURAL at 09:01

## 2022-04-20 NOTE — PATIENT INSTRUCTIONS
Patient received left knee steroid injection last week and right knee steroid injection was given today. We discussed seeking approval for visco injections if these injections do not help    Follow up as needed.

## 2022-04-20 NOTE — PROGRESS NOTES
"Chief Complaint  Follow-up of the Right Knee    Subjective          Marilin Martinez presents to [facility] for follow-up of right knee pain, right knee osteoarthritis.  Patient has history of bilateral knee osteoarthritis that she is conservatively managed with intermittent injections.  She has had gel injections in the past reports great relief from this, however insurance has not approved her to do gel injections for this year.  Patient states that she had left knee steroid injection last week.  She is here today using walker for ambulation assistance.  She is here today to have right knee steroid injection.    Objective   No Known Allergies    Vital Signs:   Pulse 88   Ht 160 cm (63\")   Wt 114 kg (252 lb)   SpO2 94%   BMI 44.64 kg/m²       Physical Exam  Constitutional:       Appearance: Normal appearance. Patient is well-developed and normal weight.   HENT:      Head: Normocephalic.      Right Ear: Hearing and external ear normal.      Left Ear: Hearing and external ear normal.      Nose: Nose normal.   Eyes:      Conjunctiva/sclera: Conjunctivae normal.   Cardiovascular:      Rate and Rhythm: Normal rate.   Pulmonary:      Effort: Pulmonary effort is normal.      Breath sounds: No wheezing or rales.   Abdominal:      Palpations: Abdomen is soft.      Tenderness: There is no abdominal tenderness.   Musculoskeletal:      Cervical back: Normal range of motion.   Skin:     Findings: No rash.   Neurological:      Mental Status: Patient is alert and oriented to person, place, and time.   Psychiatric:         Mood and Affect: Mood and affect normal.         Judgment: Judgment normal.     Ortho Exam  Right knee: Tenderness to the medial joint line, tenderness to the lateral joint line.  AROM is -5 to 105 degrees flexion.  Good muscle tone the quadriceps, hamstrings and ankle flexors.  Patient uses walker for ambulation assistance.  Sensation is intact.  Neurovascular intact " distally.  Calf is soft, nontender.      Result Review :   The following data was reviewed by: ELYSE Archuleta on 04/20/2022:         Imaging Results (Most Recent)     None         Right knee : R knee  Date/Time: 4/20/2022 9:01 AM  Consent given by: patient  Site marked: site marked  Timeout: Immediately prior to procedure a time out was called to verify the correct patient, procedure, equipment, support staff and site/side marked as required   Supporting Documentation  Indications: pain   Procedure Details  Location: knee - R knee  Needle gauge: 21G.  Medications administered: 5 mL lidocaine 1 %; 40 mg triamcinolone acetonide 40 MG/ML  Patient tolerance: patient tolerated the procedure well with no immediate complications            Assessment and Plan    Problem List Items Addressed This Visit        Musculoskeletal and Injuries    Primary osteoarthritis of right knee - Primary          Follow Up   Return if symptoms worsen or fail to improve.  Patient Instructions   Patient received left knee steroid injection last week and right knee steroid injection was given today. We discussed seeking approval for visco injections if these injections do not help    Follow up as needed.    Patient was given instructions and counseling regarding her condition or for health maintenance advice. Please see specific information pulled into the AVS if appropriate.

## 2022-05-25 ENCOUNTER — OFFICE VISIT (OUTPATIENT)
Dept: CARDIOLOGY | Facility: CLINIC | Age: 67
End: 2022-05-25

## 2022-05-25 VITALS
HEIGHT: 63 IN | HEART RATE: 57 BPM | BODY MASS INDEX: 44.12 KG/M2 | SYSTOLIC BLOOD PRESSURE: 143 MMHG | DIASTOLIC BLOOD PRESSURE: 60 MMHG | WEIGHT: 249 LBS

## 2022-05-25 DIAGNOSIS — I10 ESSENTIAL HYPERTENSION: Primary | ICD-10-CM

## 2022-05-25 DIAGNOSIS — R06.09 DYSPNEA ON EXERTION: ICD-10-CM

## 2022-05-25 DIAGNOSIS — E78.5 DYSLIPIDEMIA: ICD-10-CM

## 2022-05-25 PROCEDURE — 99214 OFFICE O/P EST MOD 30 MIN: CPT | Performed by: INTERNAL MEDICINE

## 2022-05-25 NOTE — PROGRESS NOTES
Chief Complaint  Hypertension    Subjective    Patient is following up after undergoing gastric bypass surgery has had improvement in dyspnea exertion denies any ongoing anginal chest discomfort    Past Medical History:   Diagnosis Date   • Arthritis    • Back pain    • Diabetes mellitus (HCC)    • Disease of thyroid gland    • Hyperlipemia    • Hyperlipidemia    • Hypertension    • Knee pain, bilateral    • Left-sided weakness    • Limb swelling    • Neuropathy    • OAB (overactive bladder)    • Seasonal allergies    • Sleep apnea     CPAP   • 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HCC) 2017   • Thyroid disorder          Current Outpatient Medications:   •  ALPRAZolam (XANAX) 1 MG tablet, Take 1 mg by mouth 3 (Three) Times a Day As Needed for Anxiety., Disp: , Rfl:   •  bumetanide (BUMEX) 1 MG tablet, Take 1 mg by mouth Daily As Needed., Disp: , Rfl:   •  celecoxib (CeleBREX) 200 MG capsule, Take 200 mg by mouth Daily. HOLD PRIOR TO SURG, Disp: , Rfl:   •  cyclobenzaprine (FLEXERIL) 10 MG tablet, cyclobenzaprine 10 mg oral tablet take 1 tablet (10 mg) by oral route 2 times per day as needed   Active, Disp: , Rfl:   •  Diclofenac Sodium (VOLTAREN) 1 % gel gel, APPLY 4 GRAMS TO AFFECTED AREA FOUR TIMES DAILY, Disp: , Rfl:   •  fluticasone (FLONASE) 50 MCG/ACT nasal spray, 1 spray into the nostril(s) as directed by provider Daily As Needed., Disp: , Rfl:   •  gabapentin (NEURONTIN) 800 MG tablet, Take 800 mg by mouth 2 (Two) Times a Day., Disp: , Rfl:   •  HYDROcodone-acetaminophen (NORCO)  MG per tablet, 1 tablet Every 8 (Eight) Hours As Needed., Disp: , Rfl:   •  latanoprost (XALATAN) 0.005 % ophthalmic solution, Administer 1 drop to both eyes Every Night., Disp: , Rfl:   •  Levothyroxine Sodium 200 MCG capsule, Take 200 mcg by mouth Daily., Disp: , Rfl:   •  lisinopril-hydrochlorothiazide (PRINZIDE,ZESTORETIC) 20-12.5 MG per tablet, Take 1 tablet by mouth Daily., Disp: 90 tablet, Rfl: 3  •  Myrbetriq 25 MG tablet  "sustained-release 24 hour 24 hr tablet, Take 25 mg by mouth Every Night., Disp: , Rfl:   •  nebivolol (BYSTOLIC) 10 MG tablet, Take 10 mg by mouth Daily., Disp: , Rfl:   •  ondansetron ODT (ZOFRAN-ODT) 4 MG disintegrating tablet, Place 1 tablet on the tongue Every 4 (Four) Hours As Needed for Nausea or Vomiting., Disp: 30 tablet, Rfl: 0  •  potassium chloride (KLOR-CON) 8 MEQ CR tablet, Take 20 mEq by mouth Daily As Needed., Disp: , Rfl:   •  rOPINIRole (REQUIP) 0.5 MG tablet, Take 0.5 mg by mouth Every Night., Disp: , Rfl:   •  ursodiol (Actigall) 300 MG capsule, Take 1 capsule by mouth 2 (Two) Times a Day., Disp: 60 capsule, Rfl: 5  •  Pitavastatin Calcium 4 MG tablet, Take 1 tablet by mouth Every Night., Disp: 90 tablet, Rfl: 3    Medications Discontinued During This Encounter   Medication Reason   • Livalo 2 MG tablet tablet      No Known Allergies     Social History     Tobacco Use   • Smoking status: Former Smoker     Quit date: 1997     Years since quittin.7   • Smokeless tobacco: Never Used   Vaping Use   • Vaping Use: Never used   Substance Use Topics   • Alcohol use: Not Currently   • Drug use: No       Family History   Problem Relation Age of Onset   • Obesity Mother    • Hypertension Mother    • Cancer Mother    • Arthritis Mother    • Diabetes Mother    • Obesity Sister    • Hypertension Sister    • Cancer Sister    • Arthritis Sister    • Diabetes Father    • Diabetes Daughter    • Malig Hyperthermia Neg Hx         Objective     /60   Pulse 57   Ht 160 cm (63\")   Wt 113 kg (249 lb)   BMI 44.11 kg/m²       Physical Exam    General Appearance:   · no acute distress  · Alert and oriented x3  HENT:   · lips not cyanotic  · Atraumatic  Neck:  · No jvd   · supple  Respiratory:  · no respiratory distress  · normal breath sounds  · no rales  Cardiovascular:  · Regular rate and rhythm  · no S3, no S4   · no murmur  · no rub  Extremities  · No cyanosis  · lower extremity edema:+1  Skin: "   · warm, dry  · No rashes      Result Review :     No results found for: PROBNP  CMP    CMP 2/11/22 2/15/22 3/23/22   Glucose 81 121 (A) 97   BUN 23 13 13   Creatinine 0.90 0.79 1.00   eGFR Non African Am 63 73    Sodium 139 137 142   Potassium 4.3 3.9 4.3   Chloride 101 101 105   Calcium 9.7 9.3 9.6   Albumin 3.90 3.70 4.30   Total Bilirubin 0.5 0.3 0.5   Alkaline Phosphatase 48 48 56   AST (SGOT) 22 35 (A) 21   ALT (SGPT) 15 18 19   (A) Abnormal value            CBC w/diff    CBC w/Diff 2/11/22 2/15/22 3/23/22   WBC 8.10 10.57 7.61   RBC 4.29 4.15 4.34   Hemoglobin 13.9 13.5 13.9   Hematocrit 41.4 39.0 42.2   MCV 96.5 94.0 97.2 (A)   MCH 32.4 32.5 32.0   MCHC 33.6 34.6 32.9   RDW 12.3 11.8 (A) 12.6   Platelets 171 153 172   Neutrophil Rel %  89.0 (A) 64.3   Lymphocyte Rel %  6.0 (A) 25.1   Monocyte Rel %  4.4 (A) 7.2   Eosinophil Rel %  0.0 (A) 2.1   Basophil Rel %  0.1 0.9   (A) Abnormal value             Lab Results   Component Value Date    TSH 4.850 (H) 11/08/2021      No results found for: FREET4   No results found for: DDIMERQUANT  Magnesium   Date Value Ref Range Status   02/15/2022 2.2 1.6 - 2.4 mg/dL Final      No results found for: DIGOXIN   No results found for: TROPONINT        Lipid Panel    Lipid Panel 11/8/21   Total Cholesterol 179   Triglycerides 59   HDL Cholesterol 40   VLDL Cholesterol 11   LDL Cholesterol  128 (A)   LDL/HDL Ratio 3.18   (A) Abnormal value            No results found for: POCTROP    Results for orders placed in visit on 08/17/21    Adult Transthoracic Echo Complete W/ Cont if Necessary Per Protocol    Interpretation Summary  · Estimated left ventricular EF = 55% Left ventricular systolic function is normal.  · Left atrial enlargement mild  · Trace pericardial effusion  · Estimated right ventricular systolic pressure from tricuspid regurgitation is mildly elevated (35-45 mmHg).                 Diagnoses and all orders for this visit:    1. Essential hypertension  (Primary)  Assessment & Plan:  Mild systolic elevation in office recommend continue on lisinopril hydrochlorothiazide 20/2.5 once a day and Bystolic 10 mg once a day and monitoring at home with a log  Counseled patient on  • low-sodium diet of less than 2 g  • Aerobic activity 30 minutes a day 5 times a week  • Weight loss          2. Dyspnea on exertion  Assessment & Plan:  · nuc stress 8/21Left ventricular ejection fraction is normal. (Calculated EF = 50%).  · Findings consistent with a normal ECG stress test.  · Myocardial perfusion imaging indicates a small-sized infarct located in the apex with no significant ischemia noted.  · Impressions are consistent with a low risk study.   echo 8/21  · Estimated left ventricular EF = 55% Left ventricular systolic function is normal.  · Left atrial enlargement mild  · Trace pericardial effusion  · Estimated right ventricular systolic pressure from tricuspid regurgitation is mildly elevated (35-45 mmHg).             3. Dyslipidemia  Assessment & Plan:  Patient's LDL is above goal given her previous stroke and diabetes history recommend titrating up on the Livalo to 4 mg a day repeat lipids and LFTs on next visit    Orders:  -     Lipid Panel; Future  -     Hepatic Function Panel; Future    Other orders  -     Pitavastatin Calcium 4 MG tablet; Take 1 tablet by mouth Every Night.  Dispense: 90 tablet; Refill: 3          Follow Up     Return in about 6 months (around 11/25/2022) for Follow with Yamel Torres, EKG with F/U.          Patient was given instructions and counseling regarding her condition or for health maintenance advice. Please see specific information pulled into the AVS if appropriate.

## 2022-05-25 NOTE — ASSESSMENT & PLAN NOTE
Patient's LDL is above goal given her previous stroke and diabetes history recommend titrating up on the Livalo to 4 mg a day repeat lipids and LFTs on next visit

## 2022-05-25 NOTE — ASSESSMENT & PLAN NOTE
· nuc stress 8/21Left ventricular ejection fraction is normal. (Calculated EF = 50%).  · Findings consistent with a normal ECG stress test.  · Myocardial perfusion imaging indicates a small-sized infarct located in the apex with no significant ischemia noted.  · Impressions are consistent with a low risk study.   echo 8/21  · Estimated left ventricular EF = 55% Left ventricular systolic function is normal.  · Left atrial enlargement mild  · Trace pericardial effusion  · Estimated right ventricular systolic pressure from tricuspid regurgitation is mildly elevated (35-45 mmHg).

## 2022-05-25 NOTE — ASSESSMENT & PLAN NOTE
Mild systolic elevation in office recommend continue on lisinopril hydrochlorothiazide 20/2.5 once a day and Bystolic 10 mg once a day and monitoring at home with a log  Counseled patient on  • low-sodium diet of less than 2 g  • Aerobic activity 30 minutes a day 5 times a week  • Weight loss

## 2022-05-31 ENCOUNTER — OFFICE VISIT (OUTPATIENT)
Dept: BARIATRICS/WEIGHT MGMT | Facility: CLINIC | Age: 67
End: 2022-05-31

## 2022-05-31 VITALS
DIASTOLIC BLOOD PRESSURE: 74 MMHG | TEMPERATURE: 97.3 F | BODY MASS INDEX: 43.59 KG/M2 | WEIGHT: 246 LBS | HEIGHT: 63 IN | RESPIRATION RATE: 18 BRPM | SYSTOLIC BLOOD PRESSURE: 168 MMHG | HEART RATE: 55 BPM

## 2022-05-31 DIAGNOSIS — Z98.84 S/P LAPAROSCOPIC SLEEVE GASTRECTOMY: ICD-10-CM

## 2022-05-31 DIAGNOSIS — E11.69 DIABETES MELLITUS TYPE 2 IN OBESE: ICD-10-CM

## 2022-05-31 DIAGNOSIS — I10 ESSENTIAL HYPERTENSION: ICD-10-CM

## 2022-05-31 DIAGNOSIS — G47.30 SLEEP APNEA, UNSPECIFIED TYPE: ICD-10-CM

## 2022-05-31 DIAGNOSIS — E66.9 DIABETES MELLITUS TYPE 2 IN OBESE: ICD-10-CM

## 2022-05-31 DIAGNOSIS — E66.01 OBESITY, CLASS III, BMI 40-49.9 (MORBID OBESITY): Primary | ICD-10-CM

## 2022-05-31 PROCEDURE — 99213 OFFICE O/P EST LOW 20 MIN: CPT | Performed by: NURSE PRACTITIONER

## 2022-05-31 NOTE — PROGRESS NOTES
[facility]  [address] [facility] [address]  [place] [address]  [phone]  [address] [address]  [phone]  Dept: [phone]  5/31/2022      Marilin Martinez.  [id number]  [id number]  1955  female      Chief Complaint   Patient presents with   • Follow-up     3 month sleeve follow up        BH Post-Op Bariatric Surgery:   Marilin Martinez is status post Laparoscopic Sleeve/PEH procedure, performed on 2/14/22     HPI:   Today's weight is 112 kg (246 lb) pounds, today's BMI is Body mass index is 43.59 kg/m².,@ has a  loss of 14 pounds since the last visit and@ weight loss since surgery is 33 pounds. The patient reports a decreased portion size and loss of appetite.      Marilin Martinez denies nausea, vomiting, reflux and reports that she is getting in a few shakes per week. She reports that she is eating at least a couple of times per day. She reports that she is able to tolerate 4oz of chicken in a sitting. She may do bakari or flavor packets in her water. She reports that she has gotten burned out on protein shakes. She does report regular bowel movements.      Diet and Exercise: Diet history reviewed and discussed with the patient. Weight loss/gains to date discussed with the patient. The patient states they are eating 10-15 grams of protein per day. She reports eating 2 meals per day, a typical portion size of 1/3 cup, eating 1 snacks per day, drinking 4-5 or more 8-oz. glasses of water per day, no carbonated beverage consumption.     Supplements: bariatricpal MTV with iron and calcium     Review of Systems   Constitutional: Positive for appetite change. Negative for fatigue and unexpected weight change.   HENT: Negative.    Eyes: Negative.    Respiratory: Negative.    Cardiovascular: Negative.  Negative for leg swelling.   Gastrointestinal: Negative for abdominal distention, abdominal pain, constipation,  diarrhea, nausea and vomiting.   Genitourinary: Negative for difficulty urinating, frequency and urgency.   Musculoskeletal: Negative for back pain.   Skin: Negative.    Psychiatric/Behavioral: Negative.    All other systems reviewed and are negative.      Patient Active Problem List   Diagnosis   • Environmental and seasonal allergies   • Essential hypertension   • Ankle swelling   • Sleep apnea   • Multiple joint pain   • Hypothyroid   • Stroke ([facility])   • Diabetes mellitus type 2 in obese ([facility])   • Dyspnea on exertion   • Dyslipidemia   • Obesity, Class III, BMI 40-49.9 (morbid obesity) ([facility])   • Ventral hernia without obstruction or gangrene   • S/P laparoscopic sleeve gastrectomy   • S/P repair of ventral hernia   • Primary osteoarthritis of right knee       Past Medical History:   Diagnosis Date   • Arthritis    • Back pain    • Diabetes mellitus ([facility])    • Disease of thyroid gland    • Hyperlipemia    • Hyperlipidemia    • Hypertension    • Knee pain, bilateral    • Left-sided weakness    • Limb swelling    • Neuropathy    • OAB (overactive bladder)    • Seasonal allergies    • Sleep apnea     CPAP   • 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[facility]) 2017   • Thyroid disorder        The following portions of the patient's history were reviewed and updated as appropriate: allergies, current medications, past family history, past medical history, past social history, past surgical history and problem list.    Vitals:    05/31/22 1032   BP: 168/74   Pulse: 55   Resp: 18   Temp: 97.3 °F (36.3 °C)       Physical Exam  Vitals and nursing note reviewed.   Constitutional:       Appearance: She is well-developed.   Neck:      Thyroid: No thyromegaly.   Cardiovascular:      Rate and Rhythm: Normal rate.   Pulmonary:      Effort: Pulmonary effort is normal. No respiratory distress.   Abdominal:      Palpations: Abdomen is soft.   Musculoskeletal:         General: No tenderness.   Skin:     General: Skin is warm and dry.   Neurological:       Mental Status: She is alert.   Psychiatric:         Behavior: Behavior normal.         Assessment:   Post-op, the patient is doing well.     Encounter Diagnoses   Name Primary?   • Obesity, Class III, BMI 40-49.9 (morbid obesity) ([facility]) Yes   • S/P laparoscopic sleeve gastrectomy    • Diabetes mellitus type 2 in obese ([facility])    • Essential hypertension    • Sleep apnea, unspecified type        Plan:   Patient was advised to start adding at least two protein supplements to her routine daily, discussed options and recommended follow up with the dietitian.   Encouraged patient to be sure to get plenty of lean protein per day through small frequent meals all with a protein source.   Activity restrictions: none.   Recommended patient be sure to get at least 70 grams of protein per day by eating small, frequent meals all with high lean protein choices. Be sure to limit/cut back on daily carbohydrate intake. Discussed with the patient the recommended amount of water per day to intake- half of body weight in ounces. Reviewed vitamin requirements. Be sure to do routine exercise, 150 minutes per week minimum, including both cardio and strength training.     Instructions / Recommendations: dietary counseling recommended, recommended a daily protein intake of  grams, vitamin supplement(s) recommended, recommended exercising at least 150 minutes per week, behavior modifications recommended and instructed to call the office for concerns, questions, or problems.     The patient was instructed to follow up in 3 months .      Total time spent during this encounter today was 25 minutes

## 2022-07-13 ENCOUNTER — OFFICE VISIT (OUTPATIENT)
Dept: ORTHOPEDIC SURGERY | Facility: CLINIC | Age: 67
End: 2022-07-13

## 2022-07-13 VITALS — HEART RATE: 96 BPM | BODY MASS INDEX: 43.59 KG/M2 | WEIGHT: 246 LBS | HEIGHT: 63 IN | OXYGEN SATURATION: 95 %

## 2022-07-13 DIAGNOSIS — M17.11 PRIMARY OSTEOARTHRITIS OF RIGHT KNEE: Primary | ICD-10-CM

## 2022-07-13 DIAGNOSIS — M17.12 PRIMARY OSTEOARTHRITIS OF LEFT KNEE: ICD-10-CM

## 2022-07-13 PROCEDURE — 99214 OFFICE O/P EST MOD 30 MIN: CPT | Performed by: PHYSICIAN ASSISTANT

## 2022-07-13 NOTE — PATIENT INSTRUCTIONS
Patient wishes to seek approval for visco injections to bilateral knees. If denied, will return for bilateral knee steroid injections. Patient educated on duration between injections.     Bilateral knee xrays obtained and reviewed today.

## 2022-07-13 NOTE — PROGRESS NOTES
"Chief Complaint  Pain of the Left Knee and Pain of the Right Knee    Subjective          Marilin Martinez presents to [facility] for follow-up of bilateral knee pain, bilateral knee osteoarthritis.  Patient has been conservatively managing her bilateral knee pain and osteoarthritis with gel injections.  Her last gel injections were over the past year.  She was recently denied injections.  She received a gastric sleeve as of recently has lost 70 pounds.  She is currently not a surgical candidate.  She would like to seek approval for gel injections once again.  She states her injections did not provide her with significant mount of relief.    Objective   No Known Allergies    Vital Signs:   Pulse 96   Ht 160 cm (62.99\")   Wt 112 kg (246 lb)   SpO2 95%   BMI 43.59 kg/m²       Physical Exam  Constitutional:       Appearance: Normal appearance. Patient is well-developed and normal weight.   HENT:      Head: Normocephalic.      Right Ear: Hearing and external ear normal.      Left Ear: Hearing and external ear normal.      Nose: Nose normal.   Eyes:      Conjunctiva/sclera: Conjunctivae normal.   Cardiovascular:      Rate and Rhythm: Normal rate.   Pulmonary:      Effort: Pulmonary effort is normal.      Breath sounds: No wheezing or rales.   Abdominal:      Palpations: Abdomen is soft.      Tenderness: There is no abdominal tenderness.   Musculoskeletal:      Cervical back: Normal range of motion.   Skin:     Findings: No rash.   Neurological:      Mental Status: Patient is alert and oriented to person, place, and time.   Psychiatric:         Mood and Affect: Mood and affect normal.         Judgment: Judgment normal.     Ortho Exam  Bilateral knees: Tenderness to the medial joint line, tenderness to the lateral joint line.  Swelling.  No discoloration.  Skin dry and intact.  Full knee extension.  Palpable crepitus.  Flexion 110 degrees.  Full plantarflexion and dorsiflexion of the " ankle.  Patient uses walker for ambulation assistance.  Sensation and pulses are intact.  Neurovascular intact distally.    Result Review :            Imaging Results (Most Recent)     Procedure Component Value Units Date/Time    XR Knee 3+ View With [place] Right [[id number]] Resulted: 07/13/22 1522     Updated: 07/13/22 1523    Narrative:      X-Ray Report:  Study: X-rays ordered, taken in the office, and reviewed today  Site: right knee Xray  Indication: pain   View: Lateral, Standing and Sunrise view(s)  Findings: Bone on bone osteoarthritis.   Prior studies available for comparison: yes       XR Knee 3+ View With [place] Left [[id number]] Resulted: 07/13/22 1521     Updated: 07/13/22 1522    Narrative:      X-Ray Report:  Study: X-rays ordered, taken in the office, and reviewed today  Site: left knee Xray  Indication: pain   View: Lateral, Standing and Sunrise view(s)  Findings: Bone on bone osteoarthritis.   Prior studies available for comparison: no                   Assessment and Plan    Problem List Items Addressed This Visit        Musculoskeletal and Injuries    Primary osteoarthritis of right knee - Primary    Relevant Orders    XR Knee 3+ View With [place] Right (Completed)      Other Visit Diagnoses     Primary osteoarthritis of left knee        Relevant Orders    XR Knee 3+ View With [place] Left (Completed)          Follow Up   Return if symptoms worsen or fail to improve.  Patient Instructions   Patient wishes to seek approval for visco injections to bilateral knees. If denied, will return for bilateral knee steroid injections. Patient educated on duration between injections.     Bilateral knee xrays obtained and reviewed today.    Patient was given instructions and counseling regarding her condition or for health maintenance advice. Please see specific information pulled into the AVS if appropriate.

## 2022-08-08 ENCOUNTER — OFFICE VISIT (OUTPATIENT)
Dept: ORTHOPEDIC SURGERY | Facility: CLINIC | Age: 67
End: 2022-08-08

## 2022-08-08 VITALS — HEIGHT: 62 IN | HEART RATE: 76 BPM | OXYGEN SATURATION: 96 % | WEIGHT: 245 LBS | BODY MASS INDEX: 45.08 KG/M2

## 2022-08-08 DIAGNOSIS — M17.11 PRIMARY OSTEOARTHRITIS OF RIGHT KNEE: ICD-10-CM

## 2022-08-08 DIAGNOSIS — M17.12 PRIMARY OSTEOARTHRITIS OF LEFT KNEE: Primary | ICD-10-CM

## 2022-08-08 PROCEDURE — 20610 DRAIN/INJ JOINT/BURSA W/O US: CPT | Performed by: PHYSICIAN ASSISTANT

## 2022-08-08 RX ORDER — HYALURONATE SODIUM 10 MG/ML
20 SYRINGE (ML) INTRAARTICULAR
Status: COMPLETED | OUTPATIENT
Start: 2022-08-08 | End: 2022-08-08

## 2022-08-08 RX ADMIN — Medication 20 MG: at 13:08

## 2022-08-08 NOTE — PATIENT INSTRUCTIONS
Bilateral first Euflexxa injection of the series given today.       Follow up in one week for second Euflexxa.

## 2022-08-08 NOTE — PROGRESS NOTES
"Chief Complaint  Follow-up of the Left Knee and Follow-up of the Right Knee    Subjective      Marilin Martinez presents to [facility] for follow up of bilateral knee pain, bilateral knee osteoarthritis. Patient was recently approved for bilateral Euflexxa injections. She had gastric sleeve recently and has love +70lbs. She is working toward being a candidate for knee replacement. She was recently approved for bilateral Euflexxa injection. She presents for the first of her series.     Objective   No Known Allergies    Vital Signs:   Pulse 76   Ht 157.5 cm (62\")   Wt 111 kg (245 lb)   SpO2 96%   BMI 44.81 kg/m²       Physical Exam    Constitutional: Awake, alert. Well nourished appearance.    Integumentary: Warm, dry, intact. No obvious rashes.    HENT: Atraumatic, normocephalic.   Respiratory: Non labored respirations .   Cardiovascular: Intact peripheral pulses.    Psychiatric: Normal mood and affect. A&O X3    Ortho Exam  BILATERAL KNEES: tender joint lines, no discoloration or swelling. Skin dry, intact. AROM 0-110 degrees flexion. Stable to varus and valgus stress. Ankle motion is intact. Patient with rollator walker for ambulation assistance. Sensation intact. Neurovascular intact distally.    Imaging Results (Most Recent)     None           Right knee : R knee  Date/Time: 8/8/2022 1:08 PM  Consent given by: patient  Site marked: site marked  Timeout: Immediately prior to procedure a time out was called to verify the correct patient, procedure, equipment, support staff and site/side marked as required   Supporting Documentation  Indications: pain   Procedure Details  Location: knee - R knee  Needle gauge: 21G.  Medications administered: 20 mg Sodium Hyaluronate 20 MG/2ML  Patient tolerance: patient tolerated the procedure well with no immediate complications    Left knee : L knee  Date/Time: 8/8/2022 1:08 PM  Consent given by: patient  Site marked: site marked  Timeout: " Immediately prior to procedure a time out was called to verify the correct patient, procedure, equipment, support staff and site/side marked as required   Supporting Documentation  Indications: pain   Procedure Details  Location: knee - L knee  Needle gauge: 21G.  Medications administered: 20 mg Sodium Hyaluronate 20 MG/2ML  Patient tolerance: patient tolerated the procedure well with no immediate complications              Assessment and Plan   Problem List Items Addressed This Visit        Musculoskeletal and Injuries    Primary osteoarthritis of right knee    Primary osteoarthritis of left knee - Primary          Follow Up   Return if symptoms worsen or fail to improve.    Patient Instructions   Bilateral first Euflexxa injection of the series given today.       Follow up in one week for second Euflexxa.     Patient was given instructions and counseling regarding her condition or for health maintenance advice. Please see specific information pulled into the AVS if appropriate.

## 2022-08-15 ENCOUNTER — OFFICE VISIT (OUTPATIENT)
Dept: ORTHOPEDIC SURGERY | Facility: CLINIC | Age: 67
End: 2022-08-15

## 2022-08-15 VITALS — WEIGHT: 245 LBS | HEIGHT: 62 IN | OXYGEN SATURATION: 98 % | BODY MASS INDEX: 45.08 KG/M2 | HEART RATE: 84 BPM

## 2022-08-15 DIAGNOSIS — M17.12 PRIMARY OSTEOARTHRITIS OF LEFT KNEE: Primary | ICD-10-CM

## 2022-08-15 DIAGNOSIS — M17.11 PRIMARY OSTEOARTHRITIS OF RIGHT KNEE: ICD-10-CM

## 2022-08-15 PROCEDURE — 20610 DRAIN/INJ JOINT/BURSA W/O US: CPT | Performed by: PHYSICIAN ASSISTANT

## 2022-08-15 RX ORDER — HYALURONATE SODIUM 10 MG/ML
20 SYRINGE (ML) INTRAARTICULAR
Status: COMPLETED | OUTPATIENT
Start: 2022-08-15 | End: 2022-08-15

## 2022-08-15 RX ADMIN — Medication 20 MG: at 13:27

## 2022-08-15 RX ADMIN — Medication 20 MG: at 13:26

## 2022-08-15 NOTE — PROGRESS NOTES
"Chief Complaint  Follow-up of the Left Knee and Follow-up of the Right Knee    Subjective      Marilin Martinez presents to [facility] for follow-up of bilateral knee pain, bilateral knee osteoarthritis.  Patient presents today for second Euflexxa injection of the series.  She had no relief after first Euflexxa.  She is working to be a candidate for knee replacement.  She had gastric sleeve procedure recently and is lost +70 lbs from SW.     Objective   No Known Allergies    Vital Signs:   Pulse 84   Ht 157.5 cm (62\")   Wt 111 kg (245 lb)   SpO2 98%   BMI 44.81 kg/m²       Physical Exam    Constitutional: Awake, alert. Well nourished appearance.    Integumentary: Warm, dry, intact. No obvious rashes.    HENT: Atraumatic, normocephalic.   Respiratory: Non labored respirations .   Cardiovascular: Intact peripheral pulses.    Psychiatric: Normal mood and affect. A&O X3    Ortho Exam  LEFT KNEE: Tender lateral joint line. Skin dry and intact. Neurovascular intact distally. Sensation to light touch intact. AROM -2 to 110 degrees flexion. Ankle motion intact. Patient ambulates with walker. Good muscle tone of the quadriceps, hamstrings and ankle flexors.     RIGHT KNEE: Tender joint lines. Skin dry and intact. Sensation to light touch is intact. Neurovascular intact distally. AROM 0 to 110 degrees flexion. Good muscle tone of the quadriceps, hamstrings and ankle flexors.     Imaging Results (Most Recent)     None           Left knee : L knee  Date/Time: 8/15/2022 1:26 PM  Consent given by: patient  Site marked: site marked  Timeout: Immediately prior to procedure a time out was called to verify the correct patient, procedure, equipment, support staff and site/side marked as required   Supporting Documentation  Indications: pain   Procedure Details  Location: knee - L knee  Needle gauge: 21G.  Medications administered: 20 mg Sodium Hyaluronate 20 MG/2ML  Patient tolerance: patient " tolerated the procedure well with no immediate complications    Right knee : R knee  Date/Time: 8/15/2022 1:27 PM  Consent given by: patient  Site marked: site marked  Timeout: Immediately prior to procedure a time out was called to verify the correct patient, procedure, equipment, support staff and site/side marked as required   Supporting Documentation  Indications: pain   Procedure Details  Location: knee - R knee  Needle gauge: 21G.  Medications administered: 20 mg Sodium Hyaluronate 20 MG/2ML  Patient tolerance: patient tolerated the procedure well with no immediate complications              Assessment and Plan   Problem List Items Addressed This Visit        Musculoskeletal and Injuries    Primary osteoarthritis of right knee    Primary osteoarthritis of left knee - Primary          Follow Up   Return if symptoms worsen or fail to improve.    Patient Instructions   Second Euflexxa injections given today.       Follow up in one week for final Euflexxa.     Patient was given instructions and counseling regarding her condition or for health maintenance advice. Please see specific information pulled into the AVS if appropriate.

## 2022-08-24 ENCOUNTER — OFFICE VISIT (OUTPATIENT)
Dept: ORTHOPEDIC SURGERY | Facility: CLINIC | Age: 67
End: 2022-08-24

## 2022-08-24 VITALS — WEIGHT: 245 LBS | BODY MASS INDEX: 45.08 KG/M2 | HEIGHT: 62 IN

## 2022-08-24 DIAGNOSIS — M17.11 PRIMARY OSTEOARTHRITIS OF RIGHT KNEE: ICD-10-CM

## 2022-08-24 DIAGNOSIS — M17.12 PRIMARY OSTEOARTHRITIS OF LEFT KNEE: Primary | ICD-10-CM

## 2022-08-24 PROCEDURE — 20610 DRAIN/INJ JOINT/BURSA W/O US: CPT | Performed by: PHYSICIAN ASSISTANT

## 2022-08-24 RX ORDER — HYALURONATE SODIUM 10 MG/ML
20 SYRINGE (ML) INTRAARTICULAR
Status: COMPLETED | OUTPATIENT
Start: 2022-08-24 | End: 2022-08-24

## 2022-08-24 RX ADMIN — Medication 20 MG: at 13:41

## 2022-08-24 RX ADMIN — Medication 20 MG: at 13:40

## 2022-08-24 NOTE — PATIENT INSTRUCTIONS
Bilateral Euflexxa injection of the knees given today.       Patient continues to work on decreasing BMI for surgical intervention.       Follow up as needed.

## 2022-08-24 NOTE — PROGRESS NOTES
"Chief Complaint  Pain and Follow-up of the Left Knee and Follow-up and Pain of the Right Knee    Subjective      Marilin Mratinez presents to [facility] for follow-up of bilateral knee pain, bilateral knee osteoarthritis.  Patient presents today for third Euflexxa injection of bilateral knee series.  She reports no significant change of her knee pain after the Euflexxa series.  She is working to be a candidate for bilateral total knee arthroplasty, as she recently had gastric sleeve procedure on 02/14/2022.  She has lost 70+ lbs from her starting weight.     Objective   No Known Allergies    Vital Signs:   Ht 157.5 cm (62\")   Wt 111 kg (245 lb)   BMI 44.81 kg/m²       Physical Exam    Constitutional: Awake, alert. Well nourished appearance.    Integumentary: Warm, dry, intact. No obvious rashes.    HENT: Atraumatic, normocephalic.   Respiratory: Non labored respirations .   Cardiovascular: Intact peripheral pulses.    Psychiatric: Normal mood and affect. A&O X3    Ortho Exam  BILATERAL KNEES: Sensation is intact, neurovascular intact distally, skin dry and intact, no swelling or discoloration, tender joint lines, AROM is 0 to 100 degrees of flexion.  Full plantar flexion dorsiflexion of the ankle.  Patient ambulates with walker.  Sensation and pulses are intact.  Neurovascular intact distally.    Imaging Results (Most Recent)     None           Large Joint Arthrocentesis: R knee  Date/Time: 8/24/2022 1:40 PM  Consent given by: patient  Site marked: site marked  Timeout: Immediately prior to procedure a time out was called to verify the correct patient, procedure, equipment, support staff and site/side marked as required   Supporting Documentation  Indications: pain   Procedure Details  Location: knee - R knee  Needle gauge: 21G.  Medications administered: 20 mg Sodium Hyaluronate 20 MG/2ML  Patient tolerance: patient tolerated the procedure well with no immediate " complications    Large Joint Arthrocentesis: L knee  Date/Time: 8/24/2022 1:41 PM  Consent given by: patient  Site marked: site marked  Timeout: Immediately prior to procedure a time out was called to verify the correct patient, procedure, equipment, support staff and site/side marked as required   Supporting Documentation  Indications: pain   Procedure Details  Location: knee - L knee  Needle gauge: 21G.  Medications administered: 20 mg Sodium Hyaluronate 20 MG/2ML  Patient tolerance: patient tolerated the procedure well with no immediate complications              Assessment and Plan   Problem List Items Addressed This Visit        Musculoskeletal and Injuries    Primary osteoarthritis of right knee    Primary osteoarthritis of left knee - Primary          Follow Up   Return if symptoms worsen or fail to improve.    Patient Instructions   Bilateral Euflexxa injection of the knees given today.       Patient continues to work on decreasing BMI for surgical intervention.       Follow up as needed.     Patient was given instructions and counseling regarding her condition or for health maintenance advice. Please see specific information pulled into the AVS if appropriate.

## 2022-08-25 ENCOUNTER — OFFICE VISIT (OUTPATIENT)
Dept: BARIATRICS/WEIGHT MGMT | Facility: CLINIC | Age: 67
End: 2022-08-25

## 2022-08-25 VITALS
SYSTOLIC BLOOD PRESSURE: 135 MMHG | TEMPERATURE: 97.3 F | WEIGHT: 241.4 LBS | BODY MASS INDEX: 42.77 KG/M2 | HEIGHT: 63 IN | DIASTOLIC BLOOD PRESSURE: 74 MMHG | HEART RATE: 52 BPM

## 2022-08-25 DIAGNOSIS — L98.7 EXCESSIVE AND REDUNDANT SKIN AND SUBCUTANEOUS TISSUE: ICD-10-CM

## 2022-08-25 DIAGNOSIS — G47.30 SLEEP APNEA, UNSPECIFIED TYPE: ICD-10-CM

## 2022-08-25 DIAGNOSIS — E78.5 DYSLIPIDEMIA: ICD-10-CM

## 2022-08-25 DIAGNOSIS — E66.01 OBESITY, CLASS III, BMI 40-49.9 (MORBID OBESITY): Primary | ICD-10-CM

## 2022-08-25 DIAGNOSIS — M25.50 MULTIPLE JOINT PAIN: ICD-10-CM

## 2022-08-25 DIAGNOSIS — B37.2 CANDIDAL INTERTRIGO: ICD-10-CM

## 2022-08-25 DIAGNOSIS — I10 ESSENTIAL HYPERTENSION: ICD-10-CM

## 2022-08-25 DIAGNOSIS — E66.9 DIABETES MELLITUS TYPE 2 IN OBESE: ICD-10-CM

## 2022-08-25 DIAGNOSIS — E11.69 DIABETES MELLITUS TYPE 2 IN OBESE: ICD-10-CM

## 2022-08-25 DIAGNOSIS — Z98.890 S/P REPAIR OF VENTRAL HERNIA: ICD-10-CM

## 2022-08-25 DIAGNOSIS — R06.09 DYSPNEA ON EXERTION: ICD-10-CM

## 2022-08-25 DIAGNOSIS — Z87.19 S/P REPAIR OF VENTRAL HERNIA: ICD-10-CM

## 2022-08-25 PROBLEM — I63.9 STROKE (HCC): Status: RESOLVED | Noted: 2021-05-05 | Resolved: 2022-08-25

## 2022-08-25 PROCEDURE — 99213 OFFICE O/P EST LOW 20 MIN: CPT | Performed by: NURSE PRACTITIONER

## 2022-08-25 RX ORDER — MONTELUKAST SODIUM 10 MG/1
1 TABLET ORAL DAILY
COMMUNITY
Start: 2022-07-09 | End: 2023-01-10

## 2022-08-25 RX ORDER — NYSTATIN 100000 [USP'U]/G
POWDER TOPICAL 3 TIMES DAILY
Qty: 60 G | Refills: 2 | Status: SHIPPED | OUTPATIENT
Start: 2022-08-25 | End: 2022-11-29 | Stop reason: SDUPTHER

## 2022-08-25 NOTE — PROGRESS NOTES
[facility]  [address] [facility] [address]  [place] [place] [address]  [phone]  [address] [facility] [address]  [place] [place] [address]  [phone]  Dept: [phone]  8/25/2022      Marilin Martinez.  [id number]  [id number]  1955  female      Chief Complaint   Patient presents with   • Follow-up     6m sleeve       [facility] Post-Op Bariatric Surgery:   Marilin Martinez is status post Laparoscopic Sleeve procedure, performed on 2/14/22     HPI:   Today's weight is 109 kg (241 lb 6.4 oz) pounds, today's BMI is Body mass index is 42.77 kg/m².,@ has a  loss of 4.6 pounds since the last visit and@ weight loss since surgery is 38 pounds. The patient reports a decreased portion size and loss of appetite.      Marilin Martinez denies nausea, vomiting, reflux and reports that she is doing well    Routine: she typically isn't breakfast eater, may have a piece of bread with deli meat and pickle chips, she doesn't cook much for dinner due to the amount that gets wasted so will make a sandwhich, may snack on cheese or a handful of chips in the evenings.    If she is out for lunch may have a burger without the top bun. She may have 2-3 shakes per week to help get her protein in.      Diet and Exercise: Diet history reviewed and discussed with the patient. Weight loss/gains to date discussed with the patient. The patient states they are eating 60 grams of protein per day. She reports eating 3 meals per day, a typical portion size of 1 cup, eating 0-1 snacks per day, drinking 5-6 or more 8-oz. glasses of water per day, no carbonated beverage consumption and exercising regularly.     Supplements: celebrate [employer]- but has had a hard time remembering to take it    Review of Systems   Constitutional: Positive for appetite change. Negative for fatigue and unexpected weight change.   HENT: Negative.    Eyes: Negative.    Respiratory: Negative.    Cardiovascular: Negative.   Negative for leg swelling.   Gastrointestinal: Negative for abdominal distention, abdominal pain, constipation, diarrhea, nausea and vomiting.   Genitourinary: Negative for difficulty urinating, frequency and urgency.   Musculoskeletal: Negative for back pain.   Skin: Positive for rash (under abdominal pannus and within skin folds of underarms).   Psychiatric/Behavioral: Negative.    All other systems reviewed and are negative.      Patient Active Problem List   Diagnosis   • Environmental and seasonal allergies   • Essential hypertension   • Ankle swelling   • Sleep apnea   • Multiple joint pain   • Hypothyroid   • Diabetes mellitus type 2 in obese ([facility])   • Dyspnea on exertion   • Dyslipidemia   • Obesity, Class III, BMI 40-49.9 (morbid obesity) ([facility])   • Ventral hernia without obstruction or gangrene   • S/P laparoscopic sleeve gastrectomy   • S/P repair of ventral hernia   • Primary osteoarthritis of right knee   • Primary osteoarthritis of left knee       Past Medical History:   Diagnosis Date   • Arthritis    • Back pain    • Diabetes mellitus ([facility])    • Disease of thyroid gland    • Hyperlipemia    • Hyperlipidemia    • Hypertension    • Knee pain, bilateral    • Left-sided weakness    • Limb swelling    • Neuropathy    • OAB (overactive bladder)    • Seasonal allergies    • Sleep apnea     CPAP   • 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    Vitals:    08/25/22 1201   BP: 135/74   Pulse: 52   Temp: 97.3 °F (36.3 °C)       Physical Exam  Vitals and nursing note reviewed.   Constitutional:       Appearance: She is well-developed.   Neck:      Thyroid: No thyromegaly.   Cardiovascular:      Rate and Rhythm: Normal rate.   Pulmonary:      Effort: Pulmonary effort is normal.  No respiratory distress.   Abdominal:      Palpations: Abdomen is soft.   Musculoskeletal:         General: No tenderness.   Skin:     General: Skin is warm.      Findings: Erythema and rash present.      Comments: Erythematous rash with white plaque noted under abdominal pannus, redundant skin of pannus and underarms   Neurological:      Mental Status: She is alert.   Psychiatric:         Behavior: Behavior normal.         Assessment:   Post-op, the patient is doing well.     Encounter Diagnoses   Name Primary?   • Obesity, Class III, BMI 40-49.9 (morbid obesity) ([facility]) Yes   • Diabetes mellitus type 2 in obese ([facility])    • Essential hypertension    • Dyspnea on exertion    • Dyslipidemia    • S/P repair of ventral hernia    • Multiple joint pain    • Sleep apnea, unspecified type        Plan:   We will start nystatin powder to help prevent candidal infections due to skin redundancy.  Encouraged patient to be sure to get plenty of lean protein per day through small frequent meals all with a protein source.   Activity restrictions: none.   Recommended patient be sure to get at least 70 grams of protein per day by eating small, frequent meals all with high lean protein choices. Be sure to limit/cut back on daily carbohydrate intake. Discussed with the patient the recommended amount of water per day to intake- half of body weight in ounces. Reviewed vitamin requirements. Be sure to do routine exercise, 150 minutes per week minimum, including both cardio and strength training.     Instructions / Recommendations: dietary counseling recommended, recommended a daily protein intake of  grams, vitamin supplement(s) recommended, recommended exercising at least 150 minutes per week, behavior modifications recommended and instructed to call the office for concerns, questions, or problems.     The patient was instructed to follow up in 3 months .      Total time spent during this encounter today was 25 minutes

## 2022-09-12 ENCOUNTER — OFFICE VISIT (OUTPATIENT)
Dept: ORTHOPEDIC SURGERY | Facility: CLINIC | Age: 67
End: 2022-09-12

## 2022-09-12 VITALS — OXYGEN SATURATION: 99 % | WEIGHT: 246.6 LBS | HEIGHT: 62 IN | HEART RATE: 69 BPM | BODY MASS INDEX: 45.38 KG/M2

## 2022-09-12 DIAGNOSIS — M17.12 PRIMARY OSTEOARTHRITIS OF LEFT KNEE: Primary | ICD-10-CM

## 2022-09-12 DIAGNOSIS — M17.11 PRIMARY OSTEOARTHRITIS OF RIGHT KNEE: ICD-10-CM

## 2022-09-12 PROCEDURE — 99212 OFFICE O/P EST SF 10 MIN: CPT | Performed by: PHYSICIAN ASSISTANT

## 2022-09-12 RX ORDER — CIPROFLOXACIN 500 MG/1
TABLET, FILM COATED ORAL
COMMUNITY
Start: 2022-08-26 | End: 2022-11-29

## 2022-09-12 NOTE — PROGRESS NOTES
"Chief Complaint  Pain and Follow-up of the Right Knee and Pain and Follow-up of the Left Knee    Subjective      Marilin Martinez presents to [facility] for follow up of bilateral knee pain, bilateral knee osteoarthritis. Patient recently completed bilateral Euflexxa series on 08/24/22. she reports no relief of her symptoms from this.  She recently had gastric sleeve procedure on 02/14/2022.  She has lost 70+ pounds since then.  Her goal is to ultimately have bilateral knee arthroplasty.    Objective   No Known Allergies    Vital Signs:   Pulse 69   Ht 157.5 cm (62\")   Wt 112 kg (246 lb 9.6 oz)   SpO2 99%   BMI 45.10 kg/m²       Physical Exam    Constitutional: Awake, alert. Well nourished appearance.    Integumentary: Warm, dry, intact. No obvious rashes.    HENT: Atraumatic, normocephalic.   Respiratory: Non labored respirations .   Cardiovascular: Intact peripheral pulses.    Psychiatric: Normal mood and affect. A&O X3    Ortho Exam  Bilateral knees: Tender joint lines, moderate swelling, skin dry and intact, no discoloration.  Full knee extension with flexion to 100 degrees bilaterally.  Palpable patellofemoral crepitus.  Ankle motion is intact.  Patient ambulates with Rollator walker.  Sensation and pulses are intact.  Neurovascular intact distally.    Imaging Results (Most Recent)     None                    Assessment and Plan   Problem List Items Addressed This Visit        Musculoskeletal and Injuries    Primary osteoarthritis of right knee    Primary osteoarthritis of left knee - Primary          Follow Up   Return in about 3 weeks (around 10/3/2022).    Patient Instructions   Patient had no relief from Euflexxa series. Discussed repeating steroid injections once eligible in 3 weeks.       Follow up 3 to 4 weeks.     Patient was given instructions and counseling regarding her condition or for health maintenance advice. Please see specific information pulled into the " AVS if appropriate.

## 2022-09-12 NOTE — PATIENT INSTRUCTIONS
Patient had no relief from Euflexxa series. Discussed repeating steroid injections once eligible in 3 weeks.       Follow up 3 to 4 weeks.

## 2022-10-05 ENCOUNTER — OFFICE VISIT (OUTPATIENT)
Dept: ORTHOPEDIC SURGERY | Facility: CLINIC | Age: 67
End: 2022-10-05

## 2022-10-05 VITALS — OXYGEN SATURATION: 96 % | HEART RATE: 89 BPM | BODY MASS INDEX: 42.56 KG/M2 | HEIGHT: 63 IN | WEIGHT: 240.2 LBS

## 2022-10-05 DIAGNOSIS — M17.11 PRIMARY OSTEOARTHRITIS OF RIGHT KNEE: ICD-10-CM

## 2022-10-05 DIAGNOSIS — M17.12 PRIMARY OSTEOARTHRITIS OF LEFT KNEE: Primary | ICD-10-CM

## 2022-10-05 PROCEDURE — 20610 DRAIN/INJ JOINT/BURSA W/O US: CPT | Performed by: PHYSICIAN ASSISTANT

## 2022-10-05 RX ORDER — LIDOCAINE HYDROCHLORIDE 10 MG/ML
5 INJECTION, SOLUTION INFILTRATION; PERINEURAL
Status: COMPLETED | OUTPATIENT
Start: 2022-10-05 | End: 2022-10-05

## 2022-10-05 RX ORDER — LATANOPROSTENE BUNOD 0.24 MG/ML
1 SOLUTION/ DROPS OPHTHALMIC NIGHTLY
COMMUNITY
Start: 2022-10-03

## 2022-10-05 RX ORDER — TRIAMCINOLONE ACETONIDE 40 MG/ML
40 INJECTION, SUSPENSION INTRA-ARTICULAR; INTRAMUSCULAR
Status: COMPLETED | OUTPATIENT
Start: 2022-10-05 | End: 2022-10-05

## 2022-10-05 RX ADMIN — LIDOCAINE HYDROCHLORIDE 5 ML: 10 INJECTION, SOLUTION INFILTRATION; PERINEURAL at 14:44

## 2022-10-05 RX ADMIN — LIDOCAINE HYDROCHLORIDE 5 ML: 10 INJECTION, SOLUTION INFILTRATION; PERINEURAL at 14:45

## 2022-10-05 RX ADMIN — TRIAMCINOLONE ACETONIDE 40 MG: 40 INJECTION, SUSPENSION INTRA-ARTICULAR; INTRAMUSCULAR at 14:45

## 2022-10-05 RX ADMIN — TRIAMCINOLONE ACETONIDE 40 MG: 40 INJECTION, SUSPENSION INTRA-ARTICULAR; INTRAMUSCULAR at 14:44

## 2022-10-05 NOTE — PROGRESS NOTES
"Chief Complaint  Pain and Follow-up of the Left Knee and Pain and Follow-up of the Right Knee    Subjective      Marilin Martinez presents to [facility] for follow-up of bilateral knee pain, bilateral knee osteoarthritis.  Patient has history of bone-on-bone osteoarthritis that she has been conservatively managing with injections.  She recently completed Euflexxa series of bilateral knees on 08/24/22.  She reports no change in knee pain following series of injection.  She has had mild relief from steroid injections in the past and requests injections today.  She denies recent trauma or injury.    Objective   No Known Allergies    Vital Signs:   Pulse 89   Ht 160 cm (63\")   Wt 109 kg (240 lb 3.2 oz)   SpO2 96%   BMI 42.55 kg/m²       Physical Exam    Constitutional: Awake, alert. Well nourished appearance.    Integumentary: Warm, dry, intact. No obvious rashes.    HENT: Atraumatic, normocephalic.   Respiratory: Non labored respirations .   Cardiovascular: Intact peripheral pulses.    Psychiatric: Normal mood and affect. A&O X3    Ortho Exam  Bilateral knees: Tender joint lines, mild swelling, no discoloration.  Full knee extension with 100 degrees of flexion bilaterally.  Crepitus with range of motion.  Good motion of the ankle.  Patient using Rollator walker with chair attachment.  Sensation is intact.  Neurovascular intact distally.    Imaging Results (Most Recent)     None           Large Joint Arthrocentesis: R knee  Date/Time: 10/5/2022 2:44 PM  Consent given by: patient  Site marked: site marked  Timeout: Immediately prior to procedure a time out was called to verify the correct patient, procedure, equipment, support staff and site/side marked as required   Supporting Documentation  Indications: pain   Procedure Details  Location: knee - R knee  Preparation: Patient was prepped and draped in the usual sterile fashion  Needle gauge: 21 G.  Medications administered: 5 mL " lidocaine 1 %; 40 mg triamcinolone acetonide 40 MG/ML  Patient tolerance: patient tolerated the procedure well with no immediate complications    Large Joint Arthrocentesis: L knee  Date/Time: 10/5/2022 2:45 PM  Consent given by: patient  Site marked: site marked  Timeout: Immediately prior to procedure a time out was called to verify the correct patient, procedure, equipment, support staff and site/side marked as required   Supporting Documentation  Indications: pain   Procedure Details  Location: knee - L knee  Preparation: Patient was prepped and draped in the usual sterile fashion  Needle gauge: 21 G.  Approach: lateral  Medications administered: 5 mL lidocaine 1 %; 40 mg triamcinolone acetonide 40 MG/ML  Patient tolerance: patient tolerated the procedure well with no immediate complications              Assessment and Plan   Problem List Items Addressed This Visit        Musculoskeletal and Injuries    Primary osteoarthritis of right knee    Primary osteoarthritis of left knee - Primary          Follow Up   No follow-ups on file.    Patient Instructions   Bilateral steroid injections of the knees given today.     Dr. Calvillo would like to operate once patient reaches 220lbs.     Follow up in 3 months.    Patient was given instructions and counseling regarding her condition or for health maintenance advice. Please see specific information pulled into the AVS if appropriate.

## 2022-10-05 NOTE — PATIENT INSTRUCTIONS
Bilateral steroid injections of the knees given today.     Dr. Calvillo would like to operate once patient reaches 220lbs.     Follow up in 3 months.

## 2022-11-29 ENCOUNTER — OFFICE VISIT (OUTPATIENT)
Dept: BARIATRICS/WEIGHT MGMT | Facility: CLINIC | Age: 67
End: 2022-11-29

## 2022-11-29 VITALS
HEIGHT: 63 IN | DIASTOLIC BLOOD PRESSURE: 65 MMHG | SYSTOLIC BLOOD PRESSURE: 127 MMHG | WEIGHT: 223 LBS | BODY MASS INDEX: 39.51 KG/M2 | HEART RATE: 69 BPM | TEMPERATURE: 98 F

## 2022-11-29 DIAGNOSIS — I10 ESSENTIAL HYPERTENSION: ICD-10-CM

## 2022-11-29 DIAGNOSIS — G47.30 SLEEP APNEA, UNSPECIFIED TYPE: ICD-10-CM

## 2022-11-29 DIAGNOSIS — B37.2 CANDIDAL INTERTRIGO: ICD-10-CM

## 2022-11-29 DIAGNOSIS — L98.7 EXCESSIVE AND REDUNDANT SKIN AND SUBCUTANEOUS TISSUE: ICD-10-CM

## 2022-11-29 DIAGNOSIS — E66.9 DIABETES MELLITUS TYPE 2 IN OBESE: ICD-10-CM

## 2022-11-29 DIAGNOSIS — Z98.84 S/P LAPAROSCOPIC SLEEVE GASTRECTOMY: ICD-10-CM

## 2022-11-29 DIAGNOSIS — E66.9 OBESITY, CLASS II, BMI 35-39.9: Primary | ICD-10-CM

## 2022-11-29 DIAGNOSIS — E11.69 DIABETES MELLITUS TYPE 2 IN OBESE: ICD-10-CM

## 2022-11-29 PROBLEM — E66.01 OBESITY, CLASS III, BMI 40-49.9 (MORBID OBESITY): Status: RESOLVED | Noted: 2022-01-18 | Resolved: 2022-11-29

## 2022-11-29 PROBLEM — E66.813 OBESITY, CLASS III, BMI 40-49.9 (MORBID OBESITY): Status: RESOLVED | Noted: 2022-01-18 | Resolved: 2022-11-29

## 2022-11-29 PROBLEM — Z87.19 S/P REPAIR OF VENTRAL HERNIA: Status: RESOLVED | Noted: 2022-02-18 | Resolved: 2022-11-29

## 2022-11-29 PROBLEM — Z98.890 S/P REPAIR OF VENTRAL HERNIA: Status: RESOLVED | Noted: 2022-02-18 | Resolved: 2022-11-29

## 2022-11-29 PROBLEM — K43.9 VENTRAL HERNIA WITHOUT OBSTRUCTION OR GANGRENE: Status: RESOLVED | Noted: 2022-02-14 | Resolved: 2022-11-29

## 2022-11-29 PROBLEM — E66.812 OBESITY, CLASS II, BMI 35-39.9: Status: ACTIVE | Noted: 2022-11-29

## 2022-11-29 PROCEDURE — 99213 OFFICE O/P EST LOW 20 MIN: CPT | Performed by: NURSE PRACTITIONER

## 2022-11-29 RX ORDER — NYSTATIN 100000 [USP'U]/G
POWDER TOPICAL 3 TIMES DAILY
Qty: 60 G | Refills: 2 | Status: SHIPPED | OUTPATIENT
Start: 2022-11-29

## 2022-11-29 NOTE — PROGRESS NOTES
[facility] BARIATRIC [facility] BARIATRIC SURGERY  [address] [facility] [address]  [facility] [id number]  [phone]  [address] [facility] [address]  [facility] [id number] [phone]  Dept: [phone]  11/29/2022      Marilin Martinez.  [id number]  [id number]  1955  female      Chief Complaint   Patient presents with   • Follow-up     9 MO GS  PD 2-14-22  Pt concerned about red spots all on arms and thinks it is due to blood thinner shots taken after GS surgery, spots started after she quit the blood thinner shots. Pt has seen a dermatologist and said just blood under the skin but nothing she was able to do.         BH Post-Op Bariatric Surgery:   Marilin Martinez is status post Laparoscopic Sleeve/PEH procedure, performed on 2/14/22.    HPI:   Today's weight is 101 kg (223 lb) pounds, today's BMI is Body mass index is 39.51 kg/m².,@ has a  loss of 23 pounds since the last visit and@ weight loss since surgery is 53 pounds. The patient reports a decreased portion size and loss of appetite.      Marilin Martinez denies nausea, vomiting, reflux and reports that she has a sore that she saw her PCP about on her backside. She is still seeing moisture, odor, and itching behind the back of her knee and between skin folds between thighs.      Diet and Exercise: Diet history reviewed and discussed with the patient. Weight loss/gains to date discussed with the patient. The patient states they are eating 60 grams of protein per day. She reports eating 2 meals per day, a typical portion size of 1/2 cup, eating 1-2 snacks per day, drinking 5-6 or more 8-oz. glasses of water per day, no carbonated beverage consumption and exercising regularly- she has been taking an exercise class at the gym.     She usually gets breakfast and dinner, she doesn't always get lunch  B: bagel or toast, or eggs and sausage  D: chicken: 5-6oz of meat (12-15g of protein)  S: cookie or candy     Supplements: celebrate [employer]  with iron.     Review of Systems   Constitutional: Positive for fatigue.   HENT: Negative.    Respiratory: Negative.    Cardiovascular: Negative.    Gastrointestinal: Negative.    Endocrine: Negative.    Genitourinary: Negative.    Musculoskeletal: Positive for back pain.   Skin: Negative.    Neurological: Negative.    Psychiatric/Behavioral: Negative.        Patient Active Problem List   Diagnosis   • Environmental and seasonal allergies   • Essential hypertension   • Ankle swelling   • Sleep apnea   • Multiple joint pain   • Hypothyroid   • Diabetes mellitus type 2 in obese (HCC)   • Dyspnea on exertion   • Dyslipidemia   • S/P laparoscopic sleeve gastrectomy   • Primary osteoarthritis of right knee   • Primary osteoarthritis of left knee   • Candidal intertrigo   • Excessive and redundant skin and subcutaneous tissue   • Obesity, Class II, BMI 35-39.9       Past Medical History:   Diagnosis Date   • Arthritis    • Back pain    • Diabetes mellitus (HCC)    • Disease of thyroid gland    • Hyperlipemia    • Hyperlipidemia    • Hypertension    • Knee pain, bilateral    • Left-sided weakness    • Limb swelling    • Neuropathy    • OAB (overactive bladder)    • Seasonal allergies    • Sleep apnea     CPAP   • 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    Vitals:    11/29/22 1139   BP: 127/65   Pulse: 69   Temp: 98 °F (36.7 °C)       Physical Exam  Vitals and nursing note reviewed.   Constitutional:       Appearance: She is well-developed.   Neck:      Thyroid: No thyromegaly.   Cardiovascular:      Rate and Rhythm: Normal rate.   Pulmonary:      Effort: Pulmonary effort is normal. No respiratory distress.   Abdominal:       Palpations: Abdomen is soft.   Musculoskeletal:         General: No tenderness.   Skin:     General: Skin is warm and dry.   Neurological:      Mental Status: She is alert.   Psychiatric:         Behavior: Behavior normal.         Assessment:   Post-op, the patient is doing well.     Encounter Diagnoses   Name Primary?   • Obesity, Class II, BMI 35-39.9 Yes   • Diabetes mellitus type 2 in obese (HCC)    • Essential hypertension    • Candidal intertrigo    • Sleep apnea, unspecified type    • Excessive and redundant skin and subcutaneous tissue    • S/P laparoscopic sleeve gastrectomy        Plan:   Encouraged patient to be sure to get plenty of lean protein per day through small frequent meals all with a protein source.   Activity restrictions: none.   Recommended patient be sure to get at least 70 grams of protein per day by eating small, frequent meals all with high lean protein choices. Be sure to limit/cut back on daily carbohydrate intake. Discussed with the patient the recommended amount of water per day to intake- half of body weight in ounces. Reviewed vitamin requirements. Be sure to do routine exercise, 150 minutes per week minimum, including both cardio and strength training.     Instructions / Recommendations: dietary counseling recommended, recommended a daily protein intake of  grams, vitamin supplement(s) recommended, recommended exercising at least 150 minutes per week, behavior modifications recommended and instructed to call the office for concerns, questions, or problems.     The patient was instructed to follow up in 3 month .      Total time spent during this encounter today was 25 minutes

## 2023-01-09 ENCOUNTER — OFFICE VISIT (OUTPATIENT)
Dept: ORTHOPEDIC SURGERY | Facility: CLINIC | Age: 68
End: 2023-01-09
Payer: MEDICARE

## 2023-01-09 ENCOUNTER — PREP FOR SURGERY (OUTPATIENT)
Dept: OTHER | Facility: HOSPITAL | Age: 68
End: 2023-01-09

## 2023-01-09 VITALS — BODY MASS INDEX: 40.67 KG/M2 | HEIGHT: 62 IN | WEIGHT: 221 LBS

## 2023-01-09 DIAGNOSIS — M17.12 PRIMARY OSTEOARTHRITIS OF LEFT KNEE: Primary | ICD-10-CM

## 2023-01-09 DIAGNOSIS — M17.11 PRIMARY OSTEOARTHRITIS OF RIGHT KNEE: Primary | ICD-10-CM

## 2023-01-09 DIAGNOSIS — Z96.651 AFTERCARE FOLLOWING RIGHT KNEE JOINT REPLACEMENT SURGERY: Primary | ICD-10-CM

## 2023-01-09 DIAGNOSIS — Z47.1 AFTERCARE FOLLOWING RIGHT KNEE JOINT REPLACEMENT SURGERY: Primary | ICD-10-CM

## 2023-01-09 DIAGNOSIS — M17.11 PRIMARY OSTEOARTHRITIS OF RIGHT KNEE: ICD-10-CM

## 2023-01-09 PROCEDURE — 20610 DRAIN/INJ JOINT/BURSA W/O US: CPT | Performed by: PHYSICIAN ASSISTANT

## 2023-01-09 PROCEDURE — 99214 OFFICE O/P EST MOD 30 MIN: CPT | Performed by: PHYSICIAN ASSISTANT

## 2023-01-09 RX ORDER — TRIAMCINOLONE ACETONIDE 40 MG/ML
40 INJECTION, SUSPENSION INTRA-ARTICULAR; INTRAMUSCULAR
Status: COMPLETED | OUTPATIENT
Start: 2023-01-09 | End: 2023-01-09

## 2023-01-09 RX ORDER — CEFAZOLIN SODIUM 2 G/100ML
2 INJECTION, SOLUTION INTRAVENOUS ONCE
Status: CANCELLED | OUTPATIENT
Start: 2023-01-09 | End: 2023-01-09

## 2023-01-09 RX ORDER — LIDOCAINE HYDROCHLORIDE 10 MG/ML
5 INJECTION, SOLUTION INFILTRATION; PERINEURAL
Status: COMPLETED | OUTPATIENT
Start: 2023-01-09 | End: 2023-01-09

## 2023-01-09 RX ORDER — TRANEXAMIC ACID 10 MG/ML
1000 INJECTION, SOLUTION INTRAVENOUS ONCE
Status: CANCELLED | OUTPATIENT
Start: 2023-01-09 | End: 2023-01-09

## 2023-01-09 RX ORDER — CEFAZOLIN SODIUM IN 0.9 % NACL 3 G/100 ML
3 INTRAVENOUS SOLUTION, PIGGYBACK (ML) INTRAVENOUS ONCE
Status: CANCELLED | OUTPATIENT
Start: 2023-01-09 | End: 2023-01-09

## 2023-01-09 RX ADMIN — TRIAMCINOLONE ACETONIDE 40 MG: 40 INJECTION, SUSPENSION INTRA-ARTICULAR; INTRAMUSCULAR at 14:20

## 2023-01-09 RX ADMIN — LIDOCAINE HYDROCHLORIDE 5 ML: 10 INJECTION, SOLUTION INFILTRATION; PERINEURAL at 14:20

## 2023-01-09 NOTE — PROGRESS NOTES
Chief Complaint  Pain and Follow-up of the Right Knee and Pain and Follow-up of the Left Knee    Subjective      Marilin Martinez presents to [facility] for follow-up of bilateral knee pain and advanced osteoarthritis.  Patient with known history of advanced osteoarthritis of the bilateral knees with \"bone-on-bone\", which she has previously managed conservatively with intermittent injections in the past.  She was told that she could consider total knee arthroplasty when she reached a weight of 220 pounds.  She presents today for follow-up with use of walker reporting she has made continued efforts at weight loss and waited at 221 today.  She would like to talk about total knee arthroplasty for her right knee and left knee steroid injection in office today.    Objective   No Known Allergies    Vital Signs:   Ht 157.5 cm (62\")   Wt 100 kg (221 lb)   BMI 40.42 kg/m²       Physical Exam    Constitutional: Awake, alert. Well nourished appearance.    Integumentary: Warm, dry, intact. No obvious rashes.    HENT: Atraumatic, normocephalic.   Respiratory: Non labored respirations .   Cardiovascular: Intact peripheral pulses.    Psychiatric: Normal mood and affect. A&O X3    Ortho Exam  Bilateral knees: Tenderness to palpation of medial and lateral joint lines.  Mild edema.  Skin is warm, dry, and intact.  Crepitus with ROM.  Left knee with -8 degrees of extension and flexion to 115 degrees.  Left knee -2 degrees of extension and flexion 120 degrees.  Full plantarflexion and dorsiflexion of the ankles.  Sensation intact to light touch.  Distal neurovascular intact.  Patient is ambulatory with assistance of a walker, antalgic gait noted.     Imaging Results (Most Recent)     Procedure Component Value Units Date/Time    XR Knee 3 View Left [[id number]] Resulted: 01/09/23 1642     Updated: 01/09/23 1643    Narrative:      X-Ray Report:  Study: X-rays ordered, taken in the office, and reviewed  today.   Site: Left knee Xray  Indication: Pain  View: AP/Lateral, Standing and Sunrise view(s)  Findings: Severely advanced osteoarthritis of the left knee with   bone-on-bone findings.   Prior studies available for comparison: yes       XR Knee 3 View Right [[id number]] Resulted: 01/09/23 1642     Updated: 01/09/23 1642    Narrative:      X-Ray Report:  Study: X-rays ordered, taken in the office, and reviewed today.   Site: Right knee Xray  Indication: Pain  View: AP/Lateral, Standing and Sunrise view(s)  Findings: Severely advanced osteoarthritis of the right knee with   bone-on-bone findings.  Prior studies available for comparison: yes              Left knee : L knee  Date/Time: 1/9/2023 2:20 PM  Consent given by: patient  Site marked: site marked  Timeout: Immediately prior to procedure a time out was called to verify the correct patient, procedure, equipment, support staff and site/side marked as required   Supporting Documentation  Indications: pain   Procedure Details  Location: knee - L knee  Needle gauge: 21G.  Medications administered: 5 mL lidocaine 1 %; 40 mg triamcinolone acetonide 40 MG/ML  Patient tolerance: patient tolerated the procedure well with no immediate complications              Assessment and Plan   Problem List Items Addressed This Visit        Musculoskeletal and Injuries    Primary osteoarthritis of right knee    Relevant Orders    XR Knee 3 View Right (Completed)    Primary osteoarthritis of left knee - Primary    Relevant Orders    XR Knee 3 View Left (Completed)       Follow Up   Return for Recheck.  Educated on risk of smoking. Discussed options for smoking cessation.    Patient Instructions   X-rays taken and reviewed. Patient has continued measures to lose weight to goal of 220lbs and feels ready to proceed with TKA. She would like to proceed with R TKA first.     Discussed surgery. Risks/benefits discussed with patient including, but not limited to: infection, bleeding,  neurovascular damage, malunion, nonunion, aesthetic deformity, need for further surgery, and death. Discussed with patient the implant type being used during surgery and patient understands and desires to proceed. Surgery pamphlet provided to patient. Call or return if worsening symptoms.    Left knee steroid injection administered in office today. Follow up post-op.     Patient was given instructions and counseling regarding her condition or for health maintenance advice. Please see specific information pulled into the AVS if appropriate.

## 2023-01-09 NOTE — PATIENT INSTRUCTIONS
X-rays taken and reviewed. Patient has continued measures to lose weight to goal of 220lbs and feels ready to proceed with TKA. She would like to proceed with R TKA first.     Discussed surgery. Risks/benefits discussed with patient including, but not limited to: infection, bleeding, neurovascular damage, malunion, nonunion, aesthetic deformity, need for further surgery, and death. Discussed with patient the implant type being used during surgery and patient understands and desires to proceed. Surgery pamphlet provided to patient. Call or return if worsening symptoms.    Left knee steroid injection administered in office today. Follow up post-op.

## 2023-01-10 ENCOUNTER — OFFICE VISIT (OUTPATIENT)
Dept: CARDIOLOGY | Facility: CLINIC | Age: 68
End: 2023-01-10
Payer: MEDICARE

## 2023-01-10 VITALS
BODY MASS INDEX: 40.85 KG/M2 | SYSTOLIC BLOOD PRESSURE: 143 MMHG | HEIGHT: 62 IN | HEART RATE: 66 BPM | DIASTOLIC BLOOD PRESSURE: 63 MMHG | WEIGHT: 222 LBS

## 2023-01-10 DIAGNOSIS — E78.5 DYSLIPIDEMIA: ICD-10-CM

## 2023-01-10 DIAGNOSIS — I10 ESSENTIAL HYPERTENSION: Primary | ICD-10-CM

## 2023-01-10 PROBLEM — R06.09 DYSPNEA ON EXERTION: Status: RESOLVED | Noted: 2021-08-11 | Resolved: 2023-01-10

## 2023-01-10 PROBLEM — M25.473 ANKLE SWELLING: Status: RESOLVED | Noted: 2021-05-05 | Resolved: 2023-01-10

## 2023-01-10 PROCEDURE — 99213 OFFICE O/P EST LOW 20 MIN: CPT | Performed by: NURSE PRACTITIONER

## 2023-01-10 RX ORDER — AMLODIPINE BESYLATE AND BENAZEPRIL HYDROCHLORIDE 5; 40 MG/1; MG/1
1 CAPSULE ORAL DAILY
Qty: 30 CAPSULE | Refills: 3 | Status: SHIPPED | OUTPATIENT
Start: 2023-01-10

## 2023-01-10 RX ORDER — BUMETANIDE 1 MG/1
1 TABLET ORAL DAILY PRN
Qty: 30 TABLET | Refills: 3 | Status: SHIPPED | OUTPATIENT
Start: 2023-01-10

## 2023-01-10 RX ORDER — HYDROCHLOROTHIAZIDE 12.5 MG/1
12.5 CAPSULE, GELATIN COATED ORAL DAILY
Qty: 90 CAPSULE | Refills: 3 | Status: SHIPPED | OUTPATIENT
Start: 2023-01-10

## 2023-01-10 NOTE — PROGRESS NOTES
Chief Complaint  Follow-up and Hypertension    Subjective            History of Present Illness  Marilin Martinez is a 68-year-old white/ female patient who presents to the office today for follow-up.  She has hypertension and hyperlipidemia.  She reports compliance with her medications.  She denies any chest pain, shortness of breath, lightheadedness/dizziness, palpitations, or worsening in her edema which is chronic in nature.    PMH  Past Medical History:   Diagnosis Date   • Arthritis    • Back pain    • Diabetes mellitus (HCC)    • Disease of thyroid gland    • Hyperlipemia    • Hyperlipidemia    • Hypertension    • Knee pain, bilateral    • Left-sided weakness    • Limb swelling    • Neuropathy    • OAB (overactive bladder)    • Seasonal allergies    • Sleep apnea     CPAP   • 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HCC) 2017   • Thyroid disorder    • Ventral hernia without obstruction or gangrene 2022         ALLERGY  No Known Allergies       SURGICALHX  Past Surgical History:   Procedure Laterality Date   • COLONOSCOPY     • ENDOSCOPY N/A 2021    Procedure: ESOPHAGOGASTRODUODENOSCOPY WITH COLD BIOPSY;  Surgeon: Mansi Kyle MD;  Location: [facility] ENDOSCOPY;  Service: General;  Laterality: N/A;  PRE: SCREENING FOR BARIATRICS  POST: MODERATE SIZE HIATAL HERNIA   • GALLBLADDER SURGERY     • GASTRIC SLEEVE LAPAROSCOPIC N/A 2022    Procedure: GASTRIC SLEEVE LAPAROSCOPIC With VENTRALHernia Repair AND PARAESOPHAGEAL HERNIA REPAIR AND LYSIS OF ADHESIONS PARTICAL OMENTECTOMY;  Surgeon: Quang Shipley Jr., MD;  Location: [facility] OR [facility];  Service: Bariatric;  Laterality: N/A;   • GASTRIC SLEEVE LAPAROSCOPIC     • TONSILLECTOMY     • TUBAL ABDOMINAL LIGATION            SOC  Social History     Socioeconomic History   • Marital status:    Tobacco Use   • Smoking status: Former     Types: Cigarettes     Quit date: 1997     Years since quittin.3   • Smokeless tobacco: Never   Vaping Use   •  Vaping Use: Never used   Substance and Sexual Activity   • Alcohol use: Not Currently   • Drug use: No   • Sexual activity: Defer         FAMHX  Family History   Problem Relation Age of Onset   • Obesity Mother    • Hypertension Mother    • Cancer Mother    • Arthritis Mother    • Diabetes Mother    • Obesity Sister    • Hypertension Sister    • Cancer Sister    • Arthritis Sister    • Diabetes Father    • Diabetes Daughter    • Malig Hyperthermia Neg Hx           MEDSIGONLY  Current Outpatient Medications on File Prior to Visit   Medication Sig   • ALPRAZolam (XANAX) 1 MG tablet Take 1 mg by mouth 3 (Three) Times a Day As Needed for Anxiety.   • cyclobenzaprine (FLEXERIL) 10 MG tablet cyclobenzaprine 10 mg oral tablet take 1 tablet (10 mg) by oral route 2 times per day as needed   Active   • Diclofenac Sodium (VOLTAREN) 1 % gel gel APPLY 4 GRAMS TO AFFECTED AREA FOUR TIMES DAILY   • gabapentin (NEURONTIN) 800 MG tablet Take 800 mg by mouth 2 (Two) Times a Day.   • HYDROcodone-acetaminophen (NORCO)  MG per tablet 1 tablet Every 8 (Eight) Hours As Needed.   • latanoprost (XALATAN) 0.005 % ophthalmic solution Administer 1 drop to both eyes Every Night.   • Levothyroxine Sodium 200 MCG capsule Take 200 mcg by mouth Daily.   • lisinopril-hydrochlorothiazide (PRINZIDE,ZESTORETIC) 20-12.5 MG per tablet Take 1 tablet by mouth Daily.   • Myrbetriq 25 MG tablet sustained-release 24 hour 24 hr tablet Take 25 mg by mouth Every Night.   • nebivolol (BYSTOLIC) 10 MG tablet Take 10 mg by mouth Daily.   • nystatin (MYCOSTATIN) 109080 UNIT/GM powder Apply  topically to the appropriate area as directed 3 (Three) Times a Day.   • rOPINIRole (REQUIP) 0.5 MG tablet Take 0.5 mg by mouth Every Night.   • Vyzulta 0.024 % solution    • [DISCONTINUED] bumetanide (BUMEX) 1 MG tablet Take 1 mg by mouth Daily As Needed.   • [DISCONTINUED] celecoxib (CeleBREX) 200 MG capsule Take 200 mg by mouth Daily. HOLD PRIOR TO SURG   •  "[DISCONTINUED] fluticasone (FLONASE) 50 MCG/ACT nasal spray 1 spray into the nostril(s) as directed by provider Daily As Needed.   • [DISCONTINUED] montelukast (SINGULAIR) 10 MG tablet Take 1 tablet by mouth Daily.   • [DISCONTINUED] ondansetron ODT (ZOFRAN-ODT) 4 MG disintegrating tablet Place 1 tablet on the tongue Every 4 (Four) Hours As Needed for Nausea or Vomiting.   • [DISCONTINUED] Pitavastatin Calcium 4 MG tablet Take 1 tablet by mouth Every Night.   • [DISCONTINUED] potassium chloride (KLOR-CON) 8 MEQ CR tablet Take 20 mEq by mouth Daily As Needed.     Current Facility-Administered Medications on File Prior to Visit   Medication   • [COMPLETED] lidocaine (XYLOCAINE) 1 % injection 5 mL   • [COMPLETED] triamcinolone acetonide (KENALOG-40) injection 40 mg         Objective   /63   Pulse 66   Ht 157.5 cm (62\")   Wt 101 kg (222 lb)   BMI 40.60 kg/m²       Physical Exam  Constitutional:       Appearance: She is obese.   HENT:      Head: Normocephalic.   Neck:      Vascular: No carotid bruit.   Cardiovascular:      Rate and Rhythm: Normal rate and regular rhythm.      Pulses: Normal pulses.      Heart sounds: Normal heart sounds. No murmur heard.  Pulmonary:      Effort: Pulmonary effort is normal.      Breath sounds: Normal breath sounds.   Musculoskeletal:      Cervical back: Neck supple.      Right lower leg: Edema present.      Left lower leg: Edema present.   Skin:     General: Skin is dry.      Capillary Refill: Capillary refill takes less than 2 seconds.   Neurological:      Mental Status: She is alert and oriented to person, place, and time.   Psychiatric:         Behavior: Behavior normal.       Result Review :   The following data was reviewed by: JORGE Girard on 01/10/2023:  No results found for: PROBNP  CMP    CMP 3/23/22   Glucose 97   BUN 13   Creatinine 1.00   eGFR Non African Am    eGFR 62.6   Sodium 142   Potassium 4.3   Chloride 105   Calcium 9.6   Total Protein 7.1   Albumin " 4.30   Globulin 2.8   Total Bilirubin 0.5   Alkaline Phosphatase 56   AST (SGOT) 21   ALT (SGPT) 19   Albumin/Globulin Ratio 1.5   BUN/Creatinine Ratio 13.0   Anion Gap 10.0   (A) Abnormal value       Comments are available for some flowsheets but are not being displayed.           CBC w/diff    CBC w/Diff 3/23/22   WBC 7.61   RBC 4.34   Hemoglobin 13.9   Hematocrit 42.2   MCV 97.2 (A)   MCH 32.0   MCHC 32.9   RDW 12.6   Platelets 172   Neutrophil Rel % 64.3   Lymphocyte Rel % 25.1   Monocyte Rel % 7.2   Eosinophil Rel % 2.1   Basophil Rel % 0.9   (A) Abnormal value             Lab Results   Component Value Date    TSH 4.850 (H) 11/08/2021      No results found for: FREET4   No results found for: DDIMERQUANT  Magnesium   Date Value Ref Range Status   02/15/2022 2.2 1.6 - 2.4 mg/dL Final      No results found for: DIGOXIN   No results found for: TROPONINT        Results for orders placed in visit on 08/17/21    Adult Transthoracic Echo Complete W/ Cont if Necessary Per Protocol    Interpretation Summary  · Estimated left ventricular EF = 55% Left ventricular systolic function is normal.  · Left atrial enlargement mild  · Trace pericardial effusion  · Estimated right ventricular systolic pressure from tricuspid regurgitation is mildly elevated (35-45 mmHg).         Assessment and Plan    Diagnoses and all orders for this visit:    1. Essential hypertension (Primary)  Currently controlled and without adverse effects from medication, continue Lotrel 5-40 mg daily, Bystolic 10 mg 3 times weekly, and hydrochlorothiazide 12.5 mg daily.  Low-sodium diet discussed.  We will change her Bumex dose to 1 mg daily as needed for swelling in the lower extremities.  She is currently euvolemic on exam.  Check BMP in 2 weeks to assess renal function  -     Basic Metabolic Panel; Future    2. Dyslipidemia  There is no recent lipid panel available for review, will request from PCP.  For now continue Lobello 4 mg 3 times a week.    Other  orders  -     bumetanide (BUMEX) 1 MG tablet; Take 1 tablet by mouth Daily As Needed (for lower extremity swelling).  Dispense: 30 tablet; Refill: 3  -     Pitavastatin Calcium 4 MG tablet; Take 1 tablet by mouth Every 3 (Three) Days. (Patient taking differently: Take 4 mg by mouth Take As Directed. Takes three times a week)  Dispense: 30 tablet; Refill: 3  -     hydroCHLOROthiazide (MICROZIDE) 12.5 MG capsule; Take 1 capsule by mouth Daily.  Dispense: 90 capsule; Refill: 3  -     amLODIPine-benazepril (LOTREL) 5-40 MG per capsule; Take 1 capsule by mouth Daily.  Dispense: 30 capsule; Refill: 3            Follow Up   Return in about 6 months (around 7/10/2023) for Follow up with Dr Love.    Patient was given instructions and counseling regarding her condition or for health maintenance advice. Please see specific information pulled into the AVS if appropriate.     Marilin Martinez  reports that she quit smoking about 25 years ago. Her smoking use included cigarettes. She has never used smokeless tobacco.           Yamel Torres, JORGE  01/10/23  11:03 EST    Dictated Utilizing Dragon Dictation

## 2023-01-13 ENCOUNTER — PRE-ADMISSION TESTING (OUTPATIENT)
Dept: PREADMISSION TESTING | Facility: HOSPITAL | Age: 68
End: 2023-01-13
Payer: MEDICARE

## 2023-01-13 VITALS
SYSTOLIC BLOOD PRESSURE: 128 MMHG | DIASTOLIC BLOOD PRESSURE: 68 MMHG | OXYGEN SATURATION: 95 % | HEART RATE: 55 BPM | BODY MASS INDEX: 40.16 KG/M2 | HEIGHT: 62 IN | RESPIRATION RATE: 18 BRPM | TEMPERATURE: 97.7 F | WEIGHT: 218.26 LBS

## 2023-01-13 DIAGNOSIS — M17.11 PRIMARY OSTEOARTHRITIS OF RIGHT KNEE: ICD-10-CM

## 2023-01-13 LAB
ALBUMIN SERPL-MCNC: 3.9 G/DL (ref 3.5–5.2)
ALBUMIN/GLOB SERPL: 1.3 G/DL
ALP SERPL-CCNC: 58 U/L (ref 39–117)
ALT SERPL W P-5'-P-CCNC: 11 U/L (ref 1–33)
ANION GAP SERPL CALCULATED.3IONS-SCNC: 7.3 MMOL/L (ref 5–15)
AST SERPL-CCNC: 16 U/L (ref 1–32)
BASOPHILS # BLD AUTO: 0.04 10*3/MM3 (ref 0–0.2)
BASOPHILS NFR BLD AUTO: 0.4 % (ref 0–1.5)
BILIRUB SERPL-MCNC: 0.5 MG/DL (ref 0–1.2)
BUN SERPL-MCNC: 18 MG/DL (ref 8–23)
BUN/CREAT SERPL: 20.5 (ref 7–25)
CALCIUM SPEC-SCNC: 9.8 MG/DL (ref 8.6–10.5)
CHLORIDE SERPL-SCNC: 106 MMOL/L (ref 98–107)
CO2 SERPL-SCNC: 25.7 MMOL/L (ref 22–29)
CREAT SERPL-MCNC: 0.88 MG/DL (ref 0.57–1)
DEPRECATED RDW RBC AUTO: 49.1 FL (ref 37–54)
EGFRCR SERPLBLD CKD-EPI 2021: 72.6 ML/MIN/1.73
EOSINOPHIL # BLD AUTO: 0.07 10*3/MM3 (ref 0–0.4)
EOSINOPHIL NFR BLD AUTO: 0.8 % (ref 0.3–6.2)
ERYTHROCYTE [DISTWIDTH] IN BLOOD BY AUTOMATED COUNT: 13.3 % (ref 12.3–15.4)
GLOBULIN UR ELPH-MCNC: 3.1 GM/DL
GLUCOSE SERPL-MCNC: 83 MG/DL (ref 65–99)
HBA1C MFR BLD: 5.1 % (ref 4.8–5.6)
HCT VFR BLD AUTO: 40.7 % (ref 34–46.6)
HGB BLD-MCNC: 13.5 G/DL (ref 12–15.9)
IMM GRANULOCYTES # BLD AUTO: 0.02 10*3/MM3 (ref 0–0.05)
IMM GRANULOCYTES NFR BLD AUTO: 0.2 % (ref 0–0.5)
INR PPP: 1.06 (ref 0.86–1.15)
LYMPHOCYTES # BLD AUTO: 2.06 10*3/MM3 (ref 0.7–3.1)
LYMPHOCYTES NFR BLD AUTO: 23 % (ref 19.6–45.3)
MCH RBC QN AUTO: 33.2 PG (ref 26.6–33)
MCHC RBC AUTO-ENTMCNC: 33.2 G/DL (ref 31.5–35.7)
MCV RBC AUTO: 100 FL (ref 79–97)
MONOCYTES # BLD AUTO: 0.65 10*3/MM3 (ref 0.1–0.9)
MONOCYTES NFR BLD AUTO: 7.3 % (ref 5–12)
NEUTROPHILS NFR BLD AUTO: 6.11 10*3/MM3 (ref 1.7–7)
NEUTROPHILS NFR BLD AUTO: 68.3 % (ref 42.7–76)
NRBC BLD AUTO-RTO: 0 /100 WBC (ref 0–0.2)
PLATELET # BLD AUTO: 176 10*3/MM3 (ref 140–450)
PMV BLD AUTO: 12.3 FL (ref 6–12)
POTASSIUM SERPL-SCNC: 4.4 MMOL/L (ref 3.5–5.2)
PROT SERPL-MCNC: 7 G/DL (ref 6–8.5)
PROTHROMBIN TIME: 13.9 SECONDS (ref 11.8–14.9)
RBC # BLD AUTO: 4.07 10*6/MM3 (ref 3.77–5.28)
SODIUM SERPL-SCNC: 139 MMOL/L (ref 136–145)
WBC NRBC COR # BLD: 8.95 10*3/MM3 (ref 3.4–10.8)

## 2023-01-13 PROCEDURE — 83036 HEMOGLOBIN GLYCOSYLATED A1C: CPT

## 2023-01-13 PROCEDURE — 80053 COMPREHEN METABOLIC PANEL: CPT

## 2023-01-13 PROCEDURE — 85025 COMPLETE CBC W/AUTO DIFF WBC: CPT

## 2023-01-13 PROCEDURE — 85610 PROTHROMBIN TIME: CPT

## 2023-01-13 PROCEDURE — 36415 COLL VENOUS BLD VENIPUNCTURE: CPT

## 2023-01-13 RX ORDER — LEVOTHYROXINE SODIUM 0.1 MG/1
100 TABLET ORAL TAKE AS DIRECTED
COMMUNITY

## 2023-01-13 RX ORDER — DAPAGLIFLOZIN 10 MG/1
10 TABLET, FILM COATED ORAL DAILY
COMMUNITY

## 2023-01-13 NOTE — DISCHARGE INSTRUCTIONS
IMPORTANT INSTRUCTIONS - PRE-ADMISSION TESTING  DO NOT EAT OR CHEW anything after midnight the night before your procedure.    You may have CLEAR liquids up to 3 hours prior to ARRIVAL time.   Take the following medications the morning of your procedure with JUST A SIP OF WATER: LEVOTHYROXINE, NEBIVOLOL, HYDROCODONE  IF NEEDED, ALPRAZOLAM IF NEEDED, GABAPENTIN    DO NOT BRING your medications to the hospital with you, UNLESS something has changed since your PRE-Admission Testing appointment.  Hold all vitamins, supplements, and NSAIDS (Non- steroidal anti-inflammatory meds) for one week prior to surgery (you MAY take Tylenol or Acetaminophen).  If you are diabetic, check your blood sugar the morning of your procedure. If it is less than 70 or if you are feeling symptomatic, call the following number for further instructions: [phone].  Use your inhalers/nebulizers as usual, the morning of your procedure. BRING YOUR INHALERS with you.   Bring your CPAP or BIPAP to hospital, ONLY IF YOU WILL BE SPENDING THE NIGHT.   Make sure you have a ride home and have someone who will stay with you the day of your procedure after you go home.  If you have any questions, please call your Pre-Admission Testing NurseMAL at [phone].   Per anesthesia request, do not smoke for 24 hours before your procedure or as instructed by your surgeon.     Clear Liquid Diet        Find out when you need to start a clear liquid diet.   Think of “clear liquids” as anything you could read a newspaper through. This includes things like water, broth, sports drinks, or tea WITHOUT any kind of milk or cream.           Once you are told to start a clear liquid diet, only drink these things until 3 hours before arrival to the hospital or when the hospital says to stop. Total volume limitation: 8 oz.       Clear liquids you CAN drink:   Water   Clear broth: beef, chicken, vegetable, or bone broth with nothing in it   Gatorade   Lemonade or  Edgardo-aid   Soda   Tea, coffee (NO cream or honey)   Jell-O (without fruit)   Popsicles (without fruit or cream)   Italian ices   Juice without pulp: apple, white, grape   You may use salt, pepper, and sugar    Do NOT drink:   Milk or cream   Soy milk, almond milk, coconut milk, or other non-dairy drinks and   creamers   Milkshakes or smoothies   Tomato juice   Orange juice   Grapefruit juice   Cream soups or any other than broth         Clear Liquid Diet:  Do NOT eat any solid food.  Do NOT eat or suck on mints or candy.  Do NOT chew gum.  Do NOT drink thick liquids like milk or juice with pulp in it.  Do NOT add milk, cream, or anything like soy milk or almond milk to coffee or tea.

## 2023-01-13 NOTE — SIGNIFICANT NOTE
Pt lives with her daughter, but stated that daughter works and if at all possible would like to  go  to  a rehab facility for a  brief stay before going  home.

## 2023-01-18 ENCOUNTER — ANESTHESIA EVENT (OUTPATIENT)
Dept: PERIOP | Facility: HOSPITAL | Age: 68
End: 2023-01-18
Payer: MEDICARE

## 2023-01-30 ENCOUNTER — TELEPHONE (OUTPATIENT)
Dept: CARDIOLOGY | Facility: CLINIC | Age: 68
End: 2023-01-30
Payer: MEDICARE

## 2023-01-31 ENCOUNTER — APPOINTMENT (OUTPATIENT)
Dept: GENERAL RADIOLOGY | Facility: HOSPITAL | Age: 68
End: 2023-01-31
Payer: MEDICARE

## 2023-01-31 ENCOUNTER — ANESTHESIA (OUTPATIENT)
Dept: PERIOP | Facility: HOSPITAL | Age: 68
End: 2023-01-31
Payer: MEDICARE

## 2023-01-31 ENCOUNTER — HOSPITAL ENCOUNTER (OUTPATIENT)
Facility: HOSPITAL | Age: 68
Discharge: HOME | End: 2023-02-06
Attending: ORTHOPAEDIC SURGERY | Admitting: ORTHOPAEDIC SURGERY
Payer: MEDICARE

## 2023-01-31 DIAGNOSIS — R26.2 DIFFICULTY WALKING: ICD-10-CM

## 2023-01-31 DIAGNOSIS — Z78.9 DECREASED ACTIVITIES OF DAILY LIVING (ADL): Primary | ICD-10-CM

## 2023-01-31 DIAGNOSIS — M17.11 OSTEOARTHROSIS, LOCALIZED, PRIMARY, KNEE, RIGHT: ICD-10-CM

## 2023-01-31 DIAGNOSIS — R26.2 DIFFICULTY IN WALKING: ICD-10-CM

## 2023-01-31 LAB
GLUCOSE BLDC GLUCOMTR-MCNC: 140 MG/DL (ref 70–99)
GLUCOSE BLDC GLUCOMTR-MCNC: 147 MG/DL (ref 70–99)
GLUCOSE BLDC GLUCOMTR-MCNC: 156 MG/DL (ref 70–99)
GLUCOSE BLDC GLUCOMTR-MCNC: 65 MG/DL (ref 70–99)

## 2023-01-31 PROCEDURE — 20985 CPTR-ASST DIR MS PX: CPT | Performed by: ORTHOPAEDIC SURGERY

## 2023-01-31 PROCEDURE — 82962 GLUCOSE BLOOD TEST: CPT

## 2023-01-31 PROCEDURE — 25010000002 MORPHINE PER 10 MG: Performed by: ORTHOPAEDIC SURGERY

## 2023-01-31 PROCEDURE — 63710000001 GABAPENTIN 400 MG CAPSULE: Performed by: INTERNAL MEDICINE

## 2023-01-31 PROCEDURE — 73560 X-RAY EXAM OF KNEE 1 OR 2: CPT

## 2023-01-31 PROCEDURE — 94799 UNLISTED PULMONARY SVC/PX: CPT

## 2023-01-31 PROCEDURE — 99204 OFFICE O/P NEW MOD 45 MIN: CPT | Performed by: INTERNAL MEDICINE

## 2023-01-31 PROCEDURE — 25010000002 PROPOFOL 10 MG/ML EMULSION: Performed by: NURSE ANESTHETIST, CERTIFIED REGISTERED

## 2023-01-31 PROCEDURE — 25010000002 ROPIVACAINE PER 1 MG: Performed by: ORTHOPAEDIC SURGERY

## 2023-01-31 PROCEDURE — 25010000002 KETOROLAC TROMETHAMINE PER 15 MG: Performed by: ORTHOPAEDIC SURGERY

## 2023-01-31 PROCEDURE — 25010000002 EPINEPHRINE 1 MG/ML SOLUTION: Performed by: ORTHOPAEDIC SURGERY

## 2023-01-31 PROCEDURE — 25010000002 ONDANSETRON PER 1 MG: Performed by: NURSE ANESTHETIST, CERTIFIED REGISTERED

## 2023-01-31 PROCEDURE — 25010000002 CEFAZOLIN IN DEXTROSE 2-4 GM/100ML-% SOLUTION: Performed by: ORTHOPAEDIC SURGERY

## 2023-01-31 PROCEDURE — C1776 JOINT DEVICE (IMPLANTABLE): HCPCS | Performed by: ORTHOPAEDIC SURGERY

## 2023-01-31 PROCEDURE — 25010000002 CEFAZOLIN IN DEXTROSE 2-4 GM/100ML-% SOLUTION: Performed by: PHYSICIAN ASSISTANT

## 2023-01-31 PROCEDURE — 25010000002 KETOROLAC TROMETHAMINE PER 15 MG: Performed by: NURSE ANESTHETIST, CERTIFIED REGISTERED

## 2023-01-31 PROCEDURE — 63710000001 ACETAMINOPHEN 500 MG TABLET: Performed by: ORTHOPAEDIC SURGERY

## 2023-01-31 PROCEDURE — 27447 TOTAL KNEE ARTHROPLASTY: CPT | Performed by: ORTHOPAEDIC SURGERY

## 2023-01-31 PROCEDURE — S0260 H&P FOR SURGERY: HCPCS | Performed by: ORTHOPAEDIC SURGERY

## 2023-01-31 PROCEDURE — A9270 NON-COVERED ITEM OR SERVICE: HCPCS | Performed by: ORTHOPAEDIC SURGERY

## 2023-01-31 PROCEDURE — C1713 ANCHOR/SCREW BN/BN,TIS/BN: HCPCS | Performed by: ORTHOPAEDIC SURGERY

## 2023-01-31 PROCEDURE — 94761 N-INVAS EAR/PLS OXIMETRY MLT: CPT

## 2023-01-31 PROCEDURE — 25010000002 MIDAZOLAM PER 1 MG: Performed by: ANESTHESIOLOGY

## 2023-01-31 PROCEDURE — 25010000002 FENTANYL CITRATE (PF) 50 MCG/ML SOLUTION: Performed by: NURSE ANESTHETIST, CERTIFIED REGISTERED

## 2023-01-31 PROCEDURE — A9270 NON-COVERED ITEM OR SERVICE: HCPCS | Performed by: INTERNAL MEDICINE

## 2023-01-31 PROCEDURE — 25010000002 DEXAMETHASONE PER 1 MG: Performed by: NURSE ANESTHETIST, CERTIFIED REGISTERED

## 2023-01-31 DEVICE — SCRW HEX PERSONA FML 2.5X25MM PK/2: Type: IMPLANTABLE DEVICE | Site: KNEE | Status: FUNCTIONAL

## 2023-01-31 DEVICE — EXT STEM FEM/KN PERSONA TPR 14XPLS30MM: Type: IMPLANTABLE DEVICE | Site: KNEE | Status: FUNCTIONAL

## 2023-01-31 DEVICE — CAP KNEE CPS UPCHRG: Type: IMPLANTABLE DEVICE | Site: KNEE | Status: FUNCTIONAL

## 2023-01-31 DEVICE — IMPLANTABLE DEVICE: Type: IMPLANTABLE DEVICE | Site: KNEE | Status: FUNCTIONAL

## 2023-01-31 DEVICE — CAP TOTL KN CMT PRIMARY W/ROSA: Type: IMPLANTABLE DEVICE | Site: KNEE | Status: FUNCTIONAL

## 2023-01-31 DEVICE — PAT KN PERSONA ALLPOLY CMT 7.5X26MM: Type: IMPLANTABLE DEVICE | Site: KNEE | Status: FUNCTIONAL

## 2023-01-31 DEVICE — STEM TIB/KN PERSONA CMT 5D SZD RT: Type: IMPLANTABLE DEVICE | Site: KNEE | Status: FUNCTIONAL

## 2023-01-31 DEVICE — CMT BONE PALACOS R HI/VISC 1X40: Type: IMPLANTABLE DEVICE | Site: KNEE | Status: FUNCTIONAL

## 2023-01-31 DEVICE — CAP EXT STEM KN UPCHRG: Type: IMPLANTABLE DEVICE | Site: KNEE | Status: FUNCTIONAL

## 2023-01-31 RX ORDER — DEXTROSE MONOHYDRATE 25 G/50ML
25 INJECTION, SOLUTION INTRAVENOUS
Status: DISCONTINUED | OUTPATIENT
Start: 2023-01-31 | End: 2023-02-06 | Stop reason: HOSPADM

## 2023-01-31 RX ORDER — CEFAZOLIN SODIUM IN 0.9 % NACL 3 G/100 ML
3 INTRAVENOUS SOLUTION, PIGGYBACK (ML) INTRAVENOUS ONCE
Status: DISCONTINUED | OUTPATIENT
Start: 2023-01-31 | End: 2023-01-31 | Stop reason: ALTCHOICE

## 2023-01-31 RX ORDER — KETOROLAC TROMETHAMINE 30 MG/ML
INJECTION, SOLUTION INTRAMUSCULAR; INTRAVENOUS AS NEEDED
Status: DISCONTINUED | OUTPATIENT
Start: 2023-01-31 | End: 2023-01-31 | Stop reason: SURG

## 2023-01-31 RX ORDER — MEPERIDINE HYDROCHLORIDE 25 MG/ML
12.5 INJECTION INTRAMUSCULAR; INTRAVENOUS; SUBCUTANEOUS
Status: DISCONTINUED | OUTPATIENT
Start: 2023-01-31 | End: 2023-01-31 | Stop reason: HOSPADM

## 2023-01-31 RX ORDER — ONDANSETRON 2 MG/ML
4 INJECTION INTRAMUSCULAR; INTRAVENOUS EVERY 6 HOURS PRN
Status: DISCONTINUED | OUTPATIENT
Start: 2023-01-31 | End: 2023-02-06 | Stop reason: HOSPADM

## 2023-01-31 RX ORDER — ONDANSETRON 4 MG/1
4 TABLET, FILM COATED ORAL EVERY 6 HOURS PRN
Status: DISCONTINUED | OUTPATIENT
Start: 2023-01-31 | End: 2023-02-06 | Stop reason: HOSPADM

## 2023-01-31 RX ORDER — CEFDINIR 300 MG/1
300 CAPSULE ORAL 2 TIMES DAILY
COMMUNITY
Start: 2023-01-25 | End: 2023-02-06 | Stop reason: HOSPADM

## 2023-01-31 RX ORDER — INSULIN LISPRO 100 [IU]/ML
2-7 INJECTION, SOLUTION INTRAVENOUS; SUBCUTANEOUS
Status: DISCONTINUED | OUTPATIENT
Start: 2023-01-31 | End: 2023-02-06 | Stop reason: HOSPADM

## 2023-01-31 RX ORDER — SODIUM CHLORIDE, SODIUM LACTATE, POTASSIUM CHLORIDE, CALCIUM CHLORIDE 600; 310; 30; 20 MG/100ML; MG/100ML; MG/100ML; MG/100ML
9 INJECTION, SOLUTION INTRAVENOUS CONTINUOUS PRN
Status: DISCONTINUED | OUTPATIENT
Start: 2023-01-31 | End: 2023-01-31 | Stop reason: HOSPADM

## 2023-01-31 RX ORDER — NALOXONE HCL 0.4 MG/ML
0.4 VIAL (ML) INJECTION
Status: DISCONTINUED | OUTPATIENT
Start: 2023-01-31 | End: 2023-02-06 | Stop reason: HOSPADM

## 2023-01-31 RX ORDER — ACETAMINOPHEN 500 MG
1000 TABLET ORAL EVERY 6 HOURS
Status: DISCONTINUED | OUTPATIENT
Start: 2023-01-31 | End: 2023-02-01

## 2023-01-31 RX ORDER — NICOTINE POLACRILEX 4 MG
15 LOZENGE BUCCAL
Status: DISCONTINUED | OUTPATIENT
Start: 2023-01-31 | End: 2023-02-06 | Stop reason: HOSPADM

## 2023-01-31 RX ORDER — MAGNESIUM HYDROXIDE 1200 MG/15ML
LIQUID ORAL AS NEEDED
Status: DISCONTINUED | OUTPATIENT
Start: 2023-01-31 | End: 2023-01-31 | Stop reason: HOSPADM

## 2023-01-31 RX ORDER — CELECOXIB 100 MG/1
200 CAPSULE ORAL ONCE
Status: COMPLETED | OUTPATIENT
Start: 2023-01-31 | End: 2023-01-31

## 2023-01-31 RX ORDER — ONDANSETRON 2 MG/ML
4 INJECTION INTRAMUSCULAR; INTRAVENOUS ONCE AS NEEDED
Status: DISCONTINUED | OUTPATIENT
Start: 2023-01-31 | End: 2023-01-31 | Stop reason: HOSPADM

## 2023-01-31 RX ORDER — DEXAMETHASONE SODIUM PHOSPHATE 4 MG/ML
INJECTION, SOLUTION INTRA-ARTICULAR; INTRALESIONAL; INTRAMUSCULAR; INTRAVENOUS; SOFT TISSUE AS NEEDED
Status: DISCONTINUED | OUTPATIENT
Start: 2023-01-31 | End: 2023-01-31 | Stop reason: SURG

## 2023-01-31 RX ORDER — DEXTROSE MONOHYDRATE 25 G/50ML
25 INJECTION, SOLUTION INTRAVENOUS
Status: DISCONTINUED | OUTPATIENT
Start: 2023-01-31 | End: 2023-02-01 | Stop reason: SDUPTHER

## 2023-01-31 RX ORDER — ALPRAZOLAM 0.25 MG/1
0.5 TABLET ORAL 3 TIMES DAILY PRN
Status: DISCONTINUED | OUTPATIENT
Start: 2023-01-31 | End: 2023-02-06 | Stop reason: HOSPADM

## 2023-01-31 RX ORDER — PROMETHAZINE HYDROCHLORIDE 25 MG/1
25 SUPPOSITORY RECTAL ONCE AS NEEDED
Status: DISCONTINUED | OUTPATIENT
Start: 2023-01-31 | End: 2023-01-31 | Stop reason: HOSPADM

## 2023-01-31 RX ORDER — EPHEDRINE SULFATE 50 MG/ML
INJECTION, SOLUTION INTRAVENOUS AS NEEDED
Status: DISCONTINUED | OUTPATIENT
Start: 2023-01-31 | End: 2023-01-31 | Stop reason: SURG

## 2023-01-31 RX ORDER — PROMETHAZINE HYDROCHLORIDE 12.5 MG/1
25 TABLET ORAL ONCE AS NEEDED
Status: DISCONTINUED | OUTPATIENT
Start: 2023-01-31 | End: 2023-01-31 | Stop reason: HOSPADM

## 2023-01-31 RX ORDER — HYDROCODONE BITARTRATE AND ACETAMINOPHEN 7.5; 325 MG/1; MG/1
1 TABLET ORAL EVERY 4 HOURS PRN
Status: DISCONTINUED | OUTPATIENT
Start: 2023-01-31 | End: 2023-02-06 | Stop reason: HOSPADM

## 2023-01-31 RX ORDER — ONDANSETRON 2 MG/ML
INJECTION INTRAMUSCULAR; INTRAVENOUS AS NEEDED
Status: DISCONTINUED | OUTPATIENT
Start: 2023-01-31 | End: 2023-01-31 | Stop reason: SURG

## 2023-01-31 RX ORDER — AMOXICILLIN 250 MG
2 CAPSULE ORAL 2 TIMES DAILY PRN
Status: DISCONTINUED | OUTPATIENT
Start: 2023-01-31 | End: 2023-02-06 | Stop reason: HOSPADM

## 2023-01-31 RX ORDER — TRANEXAMIC ACID 10 MG/ML
1000 INJECTION, SOLUTION INTRAVENOUS ONCE
Status: COMPLETED | OUTPATIENT
Start: 2023-01-31 | End: 2023-01-31

## 2023-01-31 RX ORDER — BISACODYL 10 MG
10 SUPPOSITORY, RECTAL RECTAL DAILY PRN
Status: DISCONTINUED | OUTPATIENT
Start: 2023-01-31 | End: 2023-02-05

## 2023-01-31 RX ORDER — GLYCOPYRROLATE 0.2 MG/ML
0.2 INJECTION INTRAMUSCULAR; INTRAVENOUS
Status: COMPLETED | OUTPATIENT
Start: 2023-01-31 | End: 2023-01-31

## 2023-01-31 RX ORDER — GABAPENTIN 400 MG/1
800 CAPSULE ORAL EVERY 8 HOURS SCHEDULED
Status: DISCONTINUED | OUTPATIENT
Start: 2023-01-31 | End: 2023-02-06 | Stop reason: HOSPADM

## 2023-01-31 RX ORDER — MONTELUKAST SODIUM 10 MG/1
10 TABLET ORAL NIGHTLY
COMMUNITY
End: 2023-03-20

## 2023-01-31 RX ORDER — PROPOFOL 10 MG/ML
VIAL (ML) INTRAVENOUS AS NEEDED
Status: DISCONTINUED | OUTPATIENT
Start: 2023-01-31 | End: 2023-01-31 | Stop reason: SURG

## 2023-01-31 RX ORDER — CEFAZOLIN SODIUM 2 G/100ML
2 INJECTION, SOLUTION INTRAVENOUS ONCE
Status: COMPLETED | OUTPATIENT
Start: 2023-01-31 | End: 2023-01-31

## 2023-01-31 RX ORDER — PHENYLEPHRINE HCL IN 0.9% NACL 1 MG/10 ML
SYRINGE (ML) INTRAVENOUS AS NEEDED
Status: DISCONTINUED | OUTPATIENT
Start: 2023-01-31 | End: 2023-01-31 | Stop reason: SURG

## 2023-01-31 RX ORDER — CEFAZOLIN SODIUM 2 G/100ML
2 INJECTION, SOLUTION INTRAVENOUS EVERY 8 HOURS
Status: COMPLETED | OUTPATIENT
Start: 2023-01-31 | End: 2023-02-01

## 2023-01-31 RX ORDER — HYDROCODONE BITARTRATE AND ACETAMINOPHEN 7.5; 325 MG/1; MG/1
2 TABLET ORAL EVERY 4 HOURS PRN
Status: DISCONTINUED | OUTPATIENT
Start: 2023-01-31 | End: 2023-02-06 | Stop reason: HOSPADM

## 2023-01-31 RX ORDER — BUPIVACAINE HYDROCHLORIDE AND EPINEPHRINE 5; 5 MG/ML; UG/ML
INJECTION, SOLUTION EPIDURAL; INTRACAUDAL; PERINEURAL
Status: COMPLETED | OUTPATIENT
Start: 2023-01-31 | End: 2023-01-31

## 2023-01-31 RX ORDER — LIDOCAINE HYDROCHLORIDE 20 MG/ML
INJECTION, SOLUTION EPIDURAL; INFILTRATION; INTRACAUDAL; PERINEURAL AS NEEDED
Status: DISCONTINUED | OUTPATIENT
Start: 2023-01-31 | End: 2023-01-31 | Stop reason: SURG

## 2023-01-31 RX ORDER — ACETAMINOPHEN 500 MG
1000 TABLET ORAL ONCE
Status: COMPLETED | OUTPATIENT
Start: 2023-01-31 | End: 2023-01-31

## 2023-01-31 RX ORDER — OXYCODONE HYDROCHLORIDE 5 MG/1
5 TABLET ORAL
Status: DISCONTINUED | OUTPATIENT
Start: 2023-01-31 | End: 2023-01-31 | Stop reason: HOSPADM

## 2023-01-31 RX ORDER — SODIUM CHLORIDE, SODIUM LACTATE, POTASSIUM CHLORIDE, CALCIUM CHLORIDE 600; 310; 30; 20 MG/100ML; MG/100ML; MG/100ML; MG/100ML
80 INJECTION, SOLUTION INTRAVENOUS CONTINUOUS
Status: DISCONTINUED | OUTPATIENT
Start: 2023-01-31 | End: 2023-02-02

## 2023-01-31 RX ORDER — BISACODYL 5 MG/1
10 TABLET, DELAYED RELEASE ORAL DAILY PRN
Status: DISCONTINUED | OUTPATIENT
Start: 2023-01-31 | End: 2023-02-06 | Stop reason: HOSPADM

## 2023-01-31 RX ORDER — FINERENONE 10 MG/1
10 TABLET, FILM COATED ORAL DAILY
COMMUNITY

## 2023-01-31 RX ORDER — KETOROLAC TROMETHAMINE 15 MG/ML
15 INJECTION, SOLUTION INTRAMUSCULAR; INTRAVENOUS EVERY 6 HOURS
Status: DISCONTINUED | OUTPATIENT
Start: 2023-01-31 | End: 2023-01-31

## 2023-01-31 RX ORDER — ROCURONIUM BROMIDE 10 MG/ML
INJECTION, SOLUTION INTRAVENOUS AS NEEDED
Status: DISCONTINUED | OUTPATIENT
Start: 2023-01-31 | End: 2023-01-31 | Stop reason: SURG

## 2023-01-31 RX ORDER — MIDAZOLAM HYDROCHLORIDE 1 MG/ML
2 INJECTION INTRAMUSCULAR; INTRAVENOUS ONCE
Status: COMPLETED | OUTPATIENT
Start: 2023-01-31 | End: 2023-01-31

## 2023-01-31 RX ORDER — FENTANYL CITRATE 50 UG/ML
INJECTION, SOLUTION INTRAMUSCULAR; INTRAVENOUS AS NEEDED
Status: DISCONTINUED | OUTPATIENT
Start: 2023-01-31 | End: 2023-01-31 | Stop reason: SURG

## 2023-01-31 RX ADMIN — TRANEXAMIC ACID 1000 MG: 10 INJECTION, SOLUTION INTRAVENOUS at 15:20

## 2023-01-31 RX ADMIN — ONDANSETRON 4 MG: 2 INJECTION INTRAMUSCULAR; INTRAVENOUS at 14:13

## 2023-01-31 RX ADMIN — ROCURONIUM BROMIDE 50 MG: 10 INJECTION, SOLUTION INTRAVENOUS at 13:52

## 2023-01-31 RX ADMIN — BUPIVACAINE HYDROCHLORIDE AND EPINEPHRINE BITARTRATE 40 ML: 5; .005 INJECTION, SOLUTION EPIDURAL; INTRACAUDAL; PERINEURAL at 13:33

## 2023-01-31 RX ADMIN — TRANEXAMIC ACID 1000 MG: 10 INJECTION, SOLUTION INTRAVENOUS at 13:02

## 2023-01-31 RX ADMIN — Medication 100 MCG: at 13:55

## 2023-01-31 RX ADMIN — CEFAZOLIN SODIUM 2 G: 2 INJECTION, SOLUTION INTRAVENOUS at 21:19

## 2023-01-31 RX ADMIN — Medication 100 MCG: at 15:13

## 2023-01-31 RX ADMIN — GABAPENTIN 800 MG: 400 CAPSULE ORAL at 21:19

## 2023-01-31 RX ADMIN — FENTANYL CITRATE 50 MCG: 50 INJECTION, SOLUTION INTRAMUSCULAR; INTRAVENOUS at 15:20

## 2023-01-31 RX ADMIN — LIDOCAINE HYDROCHLORIDE 100 MG: 20 INJECTION, SOLUTION EPIDURAL; INFILTRATION; INTRACAUDAL; PERINEURAL at 13:51

## 2023-01-31 RX ADMIN — ACETAMINOPHEN 1000 MG: 500 TABLET ORAL at 19:35

## 2023-01-31 RX ADMIN — MIDAZOLAM HYDROCHLORIDE 2 MG: 2 INJECTION, SOLUTION INTRAMUSCULAR; INTRAVENOUS at 13:02

## 2023-01-31 RX ADMIN — SUGAMMADEX 200 MG: 100 INJECTION, SOLUTION INTRAVENOUS at 15:34

## 2023-01-31 RX ADMIN — Medication 100 MCG: at 15:09

## 2023-01-31 RX ADMIN — DEXTROSE MONOHYDRATE 25 G: 25 INJECTION, SOLUTION INTRAVENOUS at 13:03

## 2023-01-31 RX ADMIN — Medication 200 MCG: at 14:24

## 2023-01-31 RX ADMIN — SODIUM CHLORIDE, POTASSIUM CHLORIDE, SODIUM LACTATE AND CALCIUM CHLORIDE: 600; 310; 30; 20 INJECTION, SOLUTION INTRAVENOUS at 14:42

## 2023-01-31 RX ADMIN — SODIUM CHLORIDE, POTASSIUM CHLORIDE, SODIUM LACTATE AND CALCIUM CHLORIDE 80 ML/HR: 600; 310; 30; 20 INJECTION, SOLUTION INTRAVENOUS at 18:35

## 2023-01-31 RX ADMIN — FENTANYL CITRATE 50 MCG: 50 INJECTION, SOLUTION INTRAMUSCULAR; INTRAVENOUS at 13:52

## 2023-01-31 RX ADMIN — DEXAMETHASONE SODIUM PHOSPHATE 4 MG: 4 INJECTION, SOLUTION INTRA-ARTICULAR; INTRALESIONAL; INTRAMUSCULAR; INTRAVENOUS; SOFT TISSUE at 14:13

## 2023-01-31 RX ADMIN — CEFAZOLIN SODIUM 2 G: 2 INJECTION, SOLUTION INTRAVENOUS at 13:45

## 2023-01-31 RX ADMIN — SODIUM CHLORIDE, POTASSIUM CHLORIDE, SODIUM LACTATE AND CALCIUM CHLORIDE 9 ML/HR: 600; 310; 30; 20 INJECTION, SOLUTION INTRAVENOUS at 11:44

## 2023-01-31 RX ADMIN — ACETAMINOPHEN 1000 MG: 500 TABLET ORAL at 11:44

## 2023-01-31 RX ADMIN — EPHEDRINE SULFATE 20 MG: 50 INJECTION INTRAVENOUS at 15:34

## 2023-01-31 RX ADMIN — PROPOFOL 140 MG: 10 INJECTION, EMULSION INTRAVENOUS at 13:52

## 2023-01-31 RX ADMIN — KETOROLAC TROMETHAMINE 30 MG: 30 INJECTION, SOLUTION INTRAMUSCULAR; INTRAVENOUS at 14:13

## 2023-01-31 RX ADMIN — Medication 200 MCG: at 14:13

## 2023-01-31 RX ADMIN — GLYCOPYRROLATE 0.2 MG: 0.2 INJECTION INTRAMUSCULAR; INTRAVENOUS at 13:02

## 2023-01-31 RX ADMIN — CELECOXIB 200 MG: 100 CAPSULE ORAL at 11:44

## 2023-01-31 NOTE — ANESTHESIA PREPROCEDURE EVALUATION
Anesthesia Evaluation     Patient summary reviewed   no history of anesthetic complications:  NPO Solid Status: > 8 hours  NPO Liquid Status: > 2 hours           Airway   Mallampati: III  TM distance: >3 FB  Neck ROM: full  No difficulty expected  Dental    (+) poor dentition        Pulmonary     breath sounds clear to auscultation  (+) a smoker Former, COPD, shortness of breath, sleep apnea,   (-) recent URI  Cardiovascular   Exercise tolerance: poor (<4 METS)    ECG reviewed  Patient on routine beta blocker and Beta blocker given within 24 hours of surgery  Rhythm: regular  Rate: normal    (+) hypertension, hyperlipidemia,   (-) past MI, dysrhythmias, angina      Neuro/Psych  (+) CVA (2017),    (-) seizures  GI/Hepatic/Renal/Endo    (+) morbid obesity, hiatal hernia,  diabetes mellitus type 2, thyroid problem hypothyroidism  (-) liver disease, no renal disease    Musculoskeletal     (+) back pain,   (-) neck stiffness  Abdominal   (+) obese,    Substance History      OB/GYN          Other   arthritis,                        Anesthesia Plan    ASA 3     general with block and ERAS Protocol     intravenous induction     Anesthetic plan, risks, benefits, and alternatives have been provided, discussed and informed consent has been obtained with: patient.    Plan discussed with CRNA.

## 2023-01-31 NOTE — ANESTHESIA POSTPROCEDURE EVALUATION
Patient: Marilin Martinez    Procedure Summary     Date: 01/31/23 Room / Location: [facility] OR 06 / [facility] MAIN OR    Anesthesia Start: 1345 Anesthesia Stop: 1603    Procedure: RIGHT TOTAL KNEE ARTHROPLASTY WITH DANITA ROBOT AND SHIREEN (Right: Knee) Diagnosis:       Primary osteoarthritis of right knee      (Primary osteoarthritis of right knee [M17.11])    Surgeons: Murray Calvillo MD Provider: Jordan Herrera MD    Anesthesia Type: general with block, ERAS Protocol ASA Status: 3          Anesthesia Type: general with block, ERAS Protocol    Vitals  Vitals Value Taken Time   BP 88/50 01/31/23 1639   Temp 36.1 °C (96.9 °F) 01/31/23 1600   Pulse 51 01/31/23 1640   Resp 16 01/31/23 1635   SpO2 99 % 01/31/23 1640   Vitals shown include unvalidated device data.        Post Anesthesia Care and Evaluation    Patient location during evaluation: bedside  Patient participation: complete - patient participated  Level of consciousness: awake  Pain score: 1  Pain management: adequate    Airway patency: patent  Anesthetic complications: No anesthetic complications  PONV Status: none  Cardiovascular status: acceptable and stable  Respiratory status: acceptable  Hydration status: acceptable    Comments: An Anesthesiologist personally participated in the most demanding procedures (including induction and emergence if applicable) in the anesthesia plan, monitored the course of anesthesia administration at frequent intervals and remained physically present and available for immediate diagnosis and treatment of emergencies.

## 2023-01-31 NOTE — PRE-PROCEDURE NOTE
Dr BEEN NOTIFIED THAT DUE TO PATIENTS EXTREMELY LARGE THIGH AND NORMAL SIZE CALF A THIGH HIGH MIKHAIL WOULD BE INEFFECTIVE.  DR BEEN ADVISED TO NOT APPLY ANY MIKHAIL HOSE.

## 2023-01-31 NOTE — H&P
[facility] HISTORY AND PHYSICAL  Date: 2023   Patient Name: Marilin Martinez  : 1955  MRN: [id number]  Primary Care Physician:  Reyes, Rosenberg Acosta, MD  Date of admission: 2023    Subjective   Subjective     Chief Complaint: Manage diabetes and hypertension    HPI:    Marilin Martinez is a 68 y.o. female past medical history of obesity with BMI of 38, osteoarthritis, diabetes, neuropathy, stroke, and hypothyroidism who is being admitted status post right knee replaced    Patient has advanced bilateral knee osteoarthritis with bone-on-bone.  This has been managed conservatively with over-the-counter medications and injections.  The patient has worked really hard at losing weight.  It was decided due to the fact that she is having night pain and difficulty with day-to-day pain that she would get total knee replacement.  No fevers no chills no nausea no vomiting    The patient's procedure went well but they did have to place a wound VAC on her right knee.  Orthopedics Philith the patient to stay 1 extra day so we are for 2 days.  Her blood pressures have been relatively soft today.    All systems reviewed abnormalities noted      Personal History     Past Medical History:  Osteoarthritis  Diabetes  Dyslipidemia  hypertension  Neuropathy  Overactive bladder  Obstructive sleep apnea  Stroke  Hypothyroidism    Past Surgical History:  Colonoscopy  Endoscopy  Cholecystectomy  Gastric sleeve  Tonsillectomy  Tubal ligation    Family History:   Osteoarthritis, cancer, diabetes, hypertension    Social History:   Former smoker quit 97.  No alcohol.    Home Medications:  ALPRAZolam, HYDROcodone-acetaminophen, Latanoprostene Bunod, Mirabegron ER, Pitavastatin Calcium, amLODIPine-benazepril, bumetanide, dapagliflozin Propanediol, gabapentin, hydroCHLOROthiazide, levothyroxine, nebivolol, nystatin, and rOPINIRole    Allergies:  No Known Allergies        Objective   Objective      Vitals:   Temp:  [96.9 °F (36.1 °C)-98.9 °F (37.2 °C)] 96.9 °F (36.1 °C)  Heart Rate:  [48-60] 53  Resp:  [10-20] 13  BP: ()/(41-62) 96/50  Flow (L/min):  [2] 2    Physical Exam    Constitutional: Awake, alert, no acute distress   Eyes: Pupils equal, sclerae anicteric, no conjunctival injection   HENT: NCAT, mucous membranes moist   Neck: Supple, no thyromegaly, no lymphadenopathy, trachea midline   Respiratory: Clear to auscultation bilaterally, nonlabored respirations    Cardiovascular: RRR, no murmurs, rubs, or gallops, palpable pedal pulses bilaterally   Gastrointestinal: Positive bowel sounds, soft, nontender, nondistended   Musculoskeletal: Right knee with wound VAC in place no pain   Psychiatric: Appropriate affect, cooperative   Neurologic: Oriented x 3, strength symmetric in all extremities, Cranial Nerves grossly intact to confrontation, speech clear   Skin: No rashes     Result Review    Result Review:  I have personally reviewed the results from the time of this admission to 1/31/2023 17:57 EST and agree with these findings:      Assessment & Plan   Assessment / Plan     Assessment/Plan:   Right knee replacement requiring wound VAC postop day 0  Hypertension  Type 2 diabetes  but most recent A1c is 5.1  Obesity  Hypothyroidism    Plan:  --Admit to hospital service  -- Consult Ortho  -- Antibiotics per orthopedics  -- IV and oral pain medications  -- PT/OT  -- Sliding scale  -- Continue wound VAC per orthopedics  -- Hold blood pressure medication and and Lasix due to hypotension  -- Hold levothyroxine because apparently she supposed to get 3 times a week      DVT prophylaxis:  SCD  Apixaban     CODE STATUS:     Full code    Admission Status:  I believe this patient meets outpatient in bed status.    Electronically signed by Kamari Bowden MD, 01/31/23, 5:57 PM EST.

## 2023-01-31 NOTE — ANESTHESIA PROCEDURE NOTES
Peripheral Block    Pre-sedation assessment completed: 1/31/2023 1:27 PM    Patient reassessed immediately prior to procedure    Patient location during procedure: pre-op  Start time: 1/31/2023 1:27 PM  Stop time: 1/31/2023 1:33 PM  Reason for block: at surgeon's request and post-op pain management  Performed by  Anesthesiologist: Shakir Cruz MD  Preanesthetic Checklist  Completed: patient identified, IV checked, site marked, risks and benefits discussed, surgical consent, monitors and equipment checked, pre-op evaluation and timeout performed  Prep:  Pt Position: supine  Sterile barriers:alcohol skin prep, partial drape, cap, washed/disinfected hands, mask and gloves  Prep: ChloraPrep  Patient monitoring: blood pressure monitoring, continuous pulse oximetry and EKG  Procedure    Sedation: yes  Performed under: local infiltration  Guidance:ultrasound guided and nerve stimulator    ULTRASOUND INTERPRETATION.  Using ultrasound guidance a 20 G gauge needle was placed in close proximity to the nerve, at which point, under ultrasound guidance anesthetic was injected in the area of the nerve and spread of the anesthesia was seen on ultrasound in close proximity thereto.  There were no abnormalities seen on ultrasound; a digital image was taken; and the patient tolerated the procedure with no complications. Images:still images obtained, printed/placed on chart    Laterality:right  Block Type:adductor canal block  Injection Technique:single-shot  Needle Type:echogenic  Needle Gauge:20 G (4in)  Resistance on Injection: none    Medications Used: bupivacaine-EPINEPHrine PF (MARCAINE w/EPI) 0.5% -[id number] injection - Injection, Adductor Canal   40 mL - 1/31/2023 1:33:00 PM      Medications  Comment:Precedex 50mcg added to above solution    Post Assessment  Injection Assessment: negative aspiration for heme, no paresthesia on injection and incremental injection  Patient Tolerance:comfortable throughout  block  Complications:no  Additional Notes  The block or continuous infusion is requested by the referring physician for management of postoperative pain, or pain related to a procedure. Ultrasound guidance (deemed medically necessary). Painless injection, pt was awake and conversant during the procedure without complications. Needle and surrounding structures visualized throughout procedure. No adverse reactions or complications seen during this period. Post-procedure image showed no signs of complication, and anatomy was consistent with an uncomplicated nerve blockade.

## 2023-01-31 NOTE — H&P
"h and p      Chief Complaint  Pain and Follow-up of the Right Knee and Pain and Follow-up of the Left Knee        Subjective          Marilin Martinez presents to [facility] for follow-up of bilateral knee pain and advanced osteoarthritis.  Patient with known history of advanced osteoarthritis of the bilateral knees with \"bone-on-bone\", which she has previously managed conservatively with intermittent injections in the past.  She was told that she could consider total knee arthroplasty when she reached a weight of 220 pounds.  She presents today for follow-up with use of walker reporting she has made continued efforts at weight loss and waited at 221 today.  She would like to talk about total knee arthroplasty for her right knee and left knee steroid injection in office today.           Objective      No Known Allergies     Vital Signs:   Ht 157.5 cm (62\")   Wt 100 kg (221 lb)   BMI 40.42 kg/m²        Physical Exam                         Constitutional: Awake, alert. Well nourished appearance.               Integumentary: Warm, dry, intact. No obvious rashes.               HENT: Atraumatic, normocephalic.              Respiratory: Non labored respirations .              Cardiovascular: Intact peripheral pulses.               Psychiatric: Normal mood and affect. A&O X3     Ortho Exam  Bilateral knees: Tenderness to palpation of medial and lateral joint lines.  Mild edema.  Skin is warm, dry, and intact.  Crepitus with ROM.  Left knee with -8 degrees of extension and flexion to 115 degrees.  Left knee -2 degrees of extension and flexion 120 degrees.  Full plantarflexion and dorsiflexion of the ankles.  Sensation intact to light touch.  Distal neurovascular intact.  Patient is ambulatory with assistance of a walker, antalgic gait noted.              Imaging Results (Most Recent)      Procedure Component Value Units Date/Time     XR Knee 3 View Left [[id number]] Resulted: 01/09/23 " 1642       Updated: 01/09/23 1643     Narrative:       X-Ray Report:  Study: X-rays ordered, taken in the office, and reviewed today.   Site: Left knee Xray  Indication: Pain  View: AP/Lateral, Standing and Sunrise view(s)  Findings: Severely advanced osteoarthritis of the left knee with   bone-on-bone findings.   Prior studies available for comparison: yes         XR Knee 3 View Right [[id number]] Resulted: 01/09/23 1642       Updated: 01/09/23 1642     Narrative:       X-Ray Report:  Study: X-rays ordered, taken in the office, and reviewed today.   Site: Right knee Xray  Indication: Pain  View: AP/Lateral, Standing and Sunrise view(s)  Findings: Severely advanced osteoarthritis of the right knee with   bone-on-bone findings.  Prior studies available for comparison: yes                 Left knee : L knee  Date/Time: 1/9/2023 2:20 PM  Consent given by: patient  Site marked: site marked  Timeout: Immediately prior to procedure a time out was called to verify the correct patient, procedure, equipment, support staff and site/side marked as required   Supporting Documentation  Indications: pain   Procedure Details  Location: knee - L knee  Needle gauge: 21G.  Medications administered: 5 mL lidocaine 1 %; 40 mg triamcinolone acetonide 40 MG/ML  Patient tolerance: patient tolerated the procedure well with no immediate complications                   Assessment      Assessment and Plan       Problem List Items Addressed This Visit               Musculoskeletal and Injuries     Primary osteoarthritis of right knee     Relevant Orders     XR Knee 3 View Right (Completed)     Primary osteoarthritis of left knee - Primary     Relevant Orders     XR Knee 3 View Left (Completed)         Follow Up   Return for Recheck.  Educated on risk of smoking. Discussed options for smoking cessation.     Patient Instructions   X-rays taken and reviewed. Patient has continued measures to lose weight to goal of 220lbs and feels ready to proceed  with TKA. She would like to proceed with R TKA first.      Discussed surgery. Risks/benefits discussed with patient including, but not limited to: infection, bleeding, neurovascular damage, malunion, nonunion, aesthetic deformity, need for further surgery, and death. Discussed with patient the implant type being used during surgery and patient understands and desires to proceed. Surgery pamphlet provided to patient. Call or return if worsening symptoms.     Murray Calvillo MD  01/31/23

## 2023-02-01 LAB
DEPRECATED RDW RBC AUTO: 45.1 FL (ref 37–54)
ERYTHROCYTE [DISTWIDTH] IN BLOOD BY AUTOMATED COUNT: 12.3 % (ref 12.3–15.4)
GLUCOSE BLDC GLUCOMTR-MCNC: 103 MG/DL (ref 70–99)
GLUCOSE BLDC GLUCOMTR-MCNC: 106 MG/DL (ref 70–99)
GLUCOSE BLDC GLUCOMTR-MCNC: 113 MG/DL (ref 70–99)
GLUCOSE BLDC GLUCOMTR-MCNC: 114 MG/DL (ref 70–99)
HCT VFR BLD AUTO: 35.4 % (ref 34–46.6)
HGB BLD-MCNC: 12.2 G/DL (ref 12–15.9)
MCH RBC QN AUTO: 33.8 PG (ref 26.6–33)
MCHC RBC AUTO-ENTMCNC: 34.5 G/DL (ref 31.5–35.7)
MCV RBC AUTO: 98.1 FL (ref 79–97)
PLATELET # BLD AUTO: 113 10*3/MM3 (ref 140–450)
PMV BLD AUTO: 12.5 FL (ref 6–12)
RBC # BLD AUTO: 3.61 10*6/MM3 (ref 3.77–5.28)
WBC NRBC COR # BLD: 14.09 10*3/MM3 (ref 3.4–10.8)

## 2023-02-01 PROCEDURE — 63710000001 ACETAMINOPHEN 500 MG TABLET: Performed by: ORTHOPAEDIC SURGERY

## 2023-02-01 PROCEDURE — 97605 NEG PRS WND THER DME<=50SQCM: CPT

## 2023-02-01 PROCEDURE — 99214 OFFICE O/P EST MOD 30 MIN: CPT | Performed by: INTERNAL MEDICINE

## 2023-02-01 PROCEDURE — 93005 ELECTROCARDIOGRAM TRACING: CPT | Performed by: INTERNAL MEDICINE

## 2023-02-01 PROCEDURE — 25010000002 CEFAZOLIN IN DEXTROSE 2-4 GM/100ML-% SOLUTION: Performed by: ORTHOPAEDIC SURGERY

## 2023-02-01 PROCEDURE — 97165 OT EVAL LOW COMPLEX 30 MIN: CPT

## 2023-02-01 PROCEDURE — A9270 NON-COVERED ITEM OR SERVICE: HCPCS | Performed by: ORTHOPAEDIC SURGERY

## 2023-02-01 PROCEDURE — A9270 NON-COVERED ITEM OR SERVICE: HCPCS | Performed by: INTERNAL MEDICINE

## 2023-02-01 PROCEDURE — 93010 ELECTROCARDIOGRAM REPORT: CPT | Performed by: INTERNAL MEDICINE

## 2023-02-01 PROCEDURE — 63710000001 ROPINIROLE 0.25 MG TABLET: Performed by: INTERNAL MEDICINE

## 2023-02-01 PROCEDURE — 97150 GROUP THERAPEUTIC PROCEDURES: CPT

## 2023-02-01 PROCEDURE — 97116 GAIT TRAINING THERAPY: CPT

## 2023-02-01 PROCEDURE — 63710000001 GABAPENTIN 400 MG CAPSULE: Performed by: INTERNAL MEDICINE

## 2023-02-01 PROCEDURE — 97530 THERAPEUTIC ACTIVITIES: CPT

## 2023-02-01 PROCEDURE — 97161 PT EVAL LOW COMPLEX 20 MIN: CPT

## 2023-02-01 PROCEDURE — 97535 SELF CARE MNGMENT TRAINING: CPT

## 2023-02-01 PROCEDURE — 94799 UNLISTED PULMONARY SVC/PX: CPT

## 2023-02-01 PROCEDURE — 63710000001 APIXABAN 2.5 MG TABLET: Performed by: ORTHOPAEDIC SURGERY

## 2023-02-01 PROCEDURE — 85027 COMPLETE CBC AUTOMATED: CPT | Performed by: ORTHOPAEDIC SURGERY

## 2023-02-01 PROCEDURE — 82962 GLUCOSE BLOOD TEST: CPT

## 2023-02-01 PROCEDURE — 63710000001 HYDROCODONE-ACETAMINOPHEN 7.5-325 MG TABLET: Performed by: ORTHOPAEDIC SURGERY

## 2023-02-01 RX ORDER — ASPIRIN 325 MG
325 TABLET, DELAYED RELEASE (ENTERIC COATED) ORAL DAILY
Status: DISCONTINUED | OUTPATIENT
Start: 2023-02-02 | End: 2023-02-06 | Stop reason: HOSPADM

## 2023-02-01 RX ORDER — ACETAMINOPHEN 500 MG
1000 TABLET ORAL 3 TIMES DAILY
Status: DISCONTINUED | OUTPATIENT
Start: 2023-02-01 | End: 2023-02-04

## 2023-02-01 RX ORDER — ROPINIROLE 0.25 MG/1
0.5 TABLET, FILM COATED ORAL NIGHTLY
Status: DISCONTINUED | OUTPATIENT
Start: 2023-02-01 | End: 2023-02-06 | Stop reason: HOSPADM

## 2023-02-01 RX ADMIN — HYDROCODONE BITARTRATE AND ACETAMINOPHEN 2 TABLET: 7.5; 325 TABLET ORAL at 11:45

## 2023-02-01 RX ADMIN — HYDROCODONE BITARTRATE AND ACETAMINOPHEN 1 TABLET: 7.5; 325 TABLET ORAL at 17:47

## 2023-02-01 RX ADMIN — ROPINIROLE HYDROCHLORIDE 0.5 MG: 0.25 TABLET, FILM COATED ORAL at 21:10

## 2023-02-01 RX ADMIN — GABAPENTIN 800 MG: 400 CAPSULE ORAL at 21:10

## 2023-02-01 RX ADMIN — ACETAMINOPHEN 1000 MG: 500 TABLET ORAL at 06:00

## 2023-02-01 RX ADMIN — APIXABAN 2.5 MG: 2.5 TABLET, FILM COATED ORAL at 08:32

## 2023-02-01 RX ADMIN — GABAPENTIN 800 MG: 400 CAPSULE ORAL at 14:55

## 2023-02-01 RX ADMIN — ACETAMINOPHEN 1000 MG: 500 TABLET ORAL at 01:40

## 2023-02-01 RX ADMIN — GABAPENTIN 800 MG: 400 CAPSULE ORAL at 06:00

## 2023-02-01 RX ADMIN — CEFAZOLIN SODIUM 2 G: 2 INJECTION, SOLUTION INTRAVENOUS at 04:49

## 2023-02-01 NOTE — THERAPY EVALUATION
Acute Care - Physical Therapy Initial Evaluation   Radha     Patient Name: Marilin Martinez  : 1955  MRN: [id number]  Today's Date: 2023     Admit date: 2023     Referring Physician: Antonio Morejon MD     Surgery Date:2023   Procedure(s) (LRB):  RIGHT TOTAL KNEE ARTHROPLASTY WITH DANITA ROBOT AND SHIREEN (Right)            Visit Dx:     ICD-10-CM ICD-9-CM   1. Decreased activities of daily living (ADL)  Z78.9 V49.89   2. Difficulty in walking  R26.2 719.7     Patient Active Problem List   Diagnosis   • Environmental and seasonal allergies   • Essential hypertension   • Sleep apnea   • Multiple joint pain   • Hypothyroid   • Diabetes mellitus type 2 in obese (HCC)   • Dyslipidemia   • S/P laparoscopic sleeve gastrectomy   • Primary osteoarthritis of right knee   • Primary osteoarthritis of left knee   • Candidal intertrigo   • Excessive and redundant skin and subcutaneous tissue   • Obesity, Class II, BMI 35-39.9   • Osteoarthrosis, localized, primary, knee, right     Past Medical History:   Diagnosis Date   • Arthritis     KNEE PAIN   • Back pain    • Diabetes mellitus (HCC)    • Hyperlipemia    • Hypertension     managed by cardiologist   • Knee pain, bilateral    • Neuropathy    • OAB (overactive bladder)    • Seasonal allergies    • Sleep apnea     CPAP   • 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[facility])     no residual   • Thyroid disorder      Past Surgical History:   Procedure Laterality Date   • COLONOSCOPY     • ENDOSCOPY N/A 2021    Procedure: ESOPHAGOGASTRODUODENOSCOPY WITH COLD BIOPSY;  Surgeon: Mansi Kyle MD;  Location: [facility] ENDOSCOPY;  Service: General;  Laterality: N/A;  PRE: SCREENING FOR BARIATRICS  POST: MODERATE SIZE HIATAL HERNIA   • GALLBLADDER SURGERY     • GASTRIC SLEEVE LAPAROSCOPIC N/A 2022    Procedure: GASTRIC SLEEVE LAPAROSCOPIC With VENTRALHernia Repair AND PARAESOPHAGEAL HERNIA REPAIR AND LYSIS OF ADHESIONS PARTICAL OMENTECTOMY;  Surgeon: Quang Shipley  Wilfrid Hebert MD;  Location:  [facility];  Service: Bariatric;  Laterality: N/A;   • TONSILLECTOMY     • TOTAL KNEE ARTHROPLASTY Right 1/31/2023    Procedure: RIGHT TOTAL KNEE ARTHROPLASTY WITH DANITA ROBOT AND SHIREEN;  Surgeon: Murray Calvillo MD;  Location: [facility] MAIN OR;  Service: Robotics - Ortho;  Laterality: Right;   • TUBAL ABDOMINAL LIGATION       PT Assessment (last 12 hours)     PT Evaluation and Treatment     Row Name 02/01/23 1100          Physical Therapy Time and Intention    Subjective Information no complaints  -JUAN     Document Type evaluation  -JUAN     Mode of Treatment individual therapy;physical therapy  -JUAN     Patient Effort good  -JUAN     Row Name 02/01/23 1100          General Information    Patient Observations alert;cooperative;agree to therapy  -JUAN     Prior Level of Function independent:;all household mobility;community mobility  -JUAN     Equipment Currently Used at Home walker, rolling  -JUAN     Existing Precautions/Restrictions fall;weight bearing  -JUAN     Barriers to Rehab none identified  -JUAN     Row Name 02/01/23 1100          Range of Motion (ROM)    Range of Motion right lower extremity  10-85° knee  -JUAN     Row Name 02/01/23 1100          Strength (Manual Muscle Testing)    Strength (Manual Muscle Testing) right lower extremity  3+/5  -JUAN     Row Name 02/01/23 1100          Mobility    Extremity Weight-bearing Status right lower extremity  -JUAN     Right Lower Extremity (Weight-bearing Status) weight-bearing as tolerated (WBAT)  -JUAN     Row Name 02/01/23 1100          Bed Mobility    Bed Mobility bed mobility (all) activities;supine-sit  -JUAN     All Activities, [place] (Bed Mobility) minimum assist (75% patient effort)  -JUAN     Supine-Sit [place] (Bed Mobility) minimum assist (75% patient effort)  -JUAN     Row Name 02/01/23 1100          Transfers    Transfers bed-chair transfer;sit-stand transfer  -JUAN     Row Name 02/01/23 1100          Bed-Chair Transfer    Bed-Chair  [place] (Transfers) contact guard  -JUAN     Assistive Device (Bed-Chair Transfers) walker, front-wheeled  -JUAN     Row Name 02/01/23 1100          Sit-Stand Transfer    Sit-Stand [place] (Transfers) contact guard  -JUAN     Assistive Device (Sit-Stand Transfers) walker, front-wheeled  -JUAN     Row Name 02/01/23 1100          Gait/Stairs (Locomotion)    Gait/Stairs Locomotion gait/ambulation assistive device  -JUAN     [place] Level (Gait) contact guard  -JUAN     Assistive Device (Gait) walker, front-wheeled  -JUAN     Distance in Feet (Gait) 75  -JUAN     Pattern (Gait) step-to  -JUAN     Row Name 02/01/23 1100          Safety Issues, Functional Mobility    Impairments Affecting Function (Mobility) balance;endurance/activity tolerance;pain  -JUAN     Row Name 02/01/23 1100          Balance    Dynamic Standing Balance contact guard  -JUAN     Position/Device Used, Standing Balance walker, front-wheeled  -JUAN     Row Name             Wound 01/31/23 1112 Left gluteal Pressure Injury    Wound - Properties Group Placement Date: 01/31/23  -EG Placement Time: 1112  -EG Present on Hospital Admission: Y  -EG Side: Left  -EG Location: gluteal  -EG Primary Wound Type: Pressure inj  -EG Additional Comments: PT STATES SHE HAS PRESSURE SORE TO LEFT GLUTEAL AREA DRESSING D/I STATES HOME HEALTH NURSE APPLIED TO AREA YESTERDAY.  NOTED HEALED SCAR TO RIGHT GLUTEAL AREA PT STATES SHE HAD PREVIOUS PRESSURE SORE THERE.  -EG    Retired Wound - Properties Group Placement Date: 01/31/23  -EG Placement Time: 1112  -EG Present on Hospital Admission: Y  -EG Side: Left  -EG Location: gluteal  -EG Primary Wound Type: Pressure inj  -EG Additional Comments: PT STATES SHE HAS PRESSURE SORE TO LEFT GLUTEAL AREA DRESSING D/I STATES HOME HEALTH NURSE APPLIED TO AREA YESTERDAY.  NOTED HEALED SCAR TO RIGHT GLUTEAL AREA PT STATES SHE HAD PREVIOUS PRESSURE SORE THERE.  -EG    Retired Wound - Properties Group Date first assessed: 01/31/23  -EG Time first  assessed: 1112  -EG Present on Hospital Admission: Y  -EG Side: Left  -EG Location: gluteal  -EG Primary Wound Type: Pressure inj  -EG Additional Comments: PT STATES SHE HAS PRESSURE SORE TO LEFT GLUTEAL AREA DRESSING D/I STATES HOME HEALTH NURSE APPLIED TO AREA YESTERDAY.  NOTED HEALED SCAR TO RIGHT GLUTEAL AREA PT STATES SHE HAD PREVIOUS PRESSURE SORE THERE.  -EG    Row Name             Wound 01/31/23 1426 Right anterior knee Incision    Wound - Properties Group Placement Date: 01/31/23  -EGA Placement Time: 1426  -EGA Present on Hospital Admission: N  -EGA Side: Right  -EGA Orientation: anterior  -EGA Location: knee  -EGA Primary Wound Type: Incision  -EGA    Retired Wound - Properties Group Placement Date: 01/31/23  -EGA Placement Time: 1426  -EGA Present on Hospital Admission: N  -EGA Side: Right  -EGA Orientation: anterior  -EGA Location: knee  -EGA Primary Wound Type: Incision  -EGA    Retired Wound - Properties Group Date first assessed: 01/31/23  -EGA Time first assessed: 1426  -EGA Present on Hospital Admission: N  -EGA Side: Right  -EGA Location: knee  -EGA Primary Wound Type: Incision  -EGA    Row Name             NPWT (Negative Pressure Wound Therapy) 01/31/23 1530 Right knee    NPWT (Negative Pressure Wound Therapy) - Properties Group Placement Date: 01/31/23  -MB (r) MW (t) Placement Time: 1530  -MB (r) MW (t) Location: Right knee  -MB (r) MW (t)    Retired NPWT (Negative Pressure Wound Therapy) - Properties Group Placement Date: 01/31/23  -MB (r) MW (t) Placement Time: 1530  -MB (r) MW (t) Location: Right knee  -MB (r) MW (t)    Retired NPWT (Negative Pressure Wound Therapy) - Properties Group Placement Date: 01/31/23  -MB (r) MW (t) Placement Time: 1530  -MB (r) MW (t) Location: Right knee  -MB (r) MW (t)    Row Name 02/01/23 1100          Plan of Care Review    Plan of Care Reviewed With patient  -JUAN     Outcome Evaluation Patient presents with decreased strength, transfers and ambulation.  Skilled  physical therapy services will be required to address these mobility deficits.  Recommend follow up with HHPT services upon d/c from the hospiatl  -JUAN     Row Name 02/01/23 1100          Therapy Assessment/Plan (PT)    Patient/Family Therapy Goals Statement (PT) Pt wants to feel better  -JUAN     Rehab Potential (PT) good, to achieve stated therapy goals  -JUAN     Criteria for Skilled Interventions Met (PT) skilled treatment is necessary  -JUAN     Therapy Frequency (PT) 2 times/day  -JUAN     Predicted Duration of Therapy Intervention (PT) 10 days  -JUAN     Problem List (PT) problems related to;balance;mobility;strength;range of motion (ROM);pain  -JUAN     Activity Limitations Related to Problem List (PT) unable to transfer safely;unable to ambulate safely  -JUAN     Row Name 02/01/23 1100          PT Evaluation Complexity    History, PT Evaluation Complexity no personal factors and/or comorbidities  -JUAN     Examination of Body Systems (PT Eval Complexity) total of 4 or more elements  -JUAN     Clinical Presentation (PT Evaluation Complexity) stable  -JUAN     Clinical Decision Making (PT Evaluation Complexity) low complexity  -JUAN     Overall Complexity (PT Evaluation Complexity) low complexity  -JUAN     Row Name 02/01/23 1100          Therapy Plan Review/Discharge Plan (PT)    Therapy Plan Review (PT) evaluation/treatment results reviewed;care plan/treatment goals reviewed;participants voiced agreement with care plan;participants included;patient;daughter  -JUAN     Row Name 02/01/23 1100          Physical Therapy Goals    Transfer Goal Selection (PT) transfer, PT goal 1  -JUAN     Gait Training Goal Selection (PT) gait training, PT goal 1  -JUAN     Row Name 02/01/23 1100          Transfer Goal 1 (PT)    Activity/Assistive Device (Transfer Goal 1, PT) transfers, all  -JUAN     [place] Level/Cues Needed (Transfer Goal 1, PT) independent  -JUAN     Time Frame (Transfer Goal 1, PT) long term goal (LTG);10 days  -JUAN     Row Name 02/01/23  1100          Gait Training Goal 1 (PT)    Activity/Assistive Device (Gait Training Goal 1, PT) gait (walking locomotion);assistive device use;walker, rolling  -JUAN     [place] Level (Gait Training Goal 1, PT) independent  -JUAN     Distance (Gait Training Goal 1, PT) 300  -JUAN     Time Frame (Gait Training Goal 1, PT) long term goal (LTG);10 days  -JUAN           User Key  (r) = Recorded By, (t) = Taken By, (c) = Cosigned By    Initials Name Provider Type    Bree Moreno, RN Registered Nurse    Ben Chun, PT Physical Therapist    Bita Marsh RN Registered Nurse    Jennyfer Carey, RN Registered Nurse                Physical Therapy Education     Title: PT OT SLP Therapies (Done)     Topic: Physical Therapy (Done)     Point: Mobility training (Done)     Learning Progress Summary           Patient Acceptance, E,TB, VU by JUAN at 2/1/2023 1120                   Point: Precautions (Done)     Learning Progress Summary           Patient Acceptance, E,TB, VU by JUAN at 2/1/2023 1120                               User Key     Initials Effective Dates Name Provider Type Discipline    JUAN 06/03/21 -  Ben Lentz, PT Physical Therapist PT              PT Recommendation and Plan  Anticipated Discharge Disposition (PT): home with home health  Planned Therapy Interventions (PT): balance training, bed mobility training, gait training, home exercise program, stair training, strengthening, transfer training, ROM (range of motion)  Therapy Frequency (PT): 2 times/day  Plan of Care Reviewed With: patient  Outcome Evaluation: Patient presents with decreased strength, transfers and ambulation.  Skilled physical therapy services will be required to address these mobility deficits.  Recommend follow up with HHPT services upon d/c from the Hospitals in [place]   Outcome Measures     Row Name 02/01/23 1100             How much help from another person do you currently need...    Turning from your back to your side while in flat  bed without using bedrails? 3  -JUAN      Moving from lying on back to sitting on the side of a flat bed without bedrails? 3  -JUAN      Moving to and from a bed to a chair (including a wheelchair)? 3  -JUAN      Standing up from a chair using your arms (e.g., wheelchair, bedside chair)? 3  -JUAN      Climbing 3-5 steps with a railing? 3  -JUAN      To walk in hospital room? 3  -JUAN      AM-PAC 6 Clicks Score (PT) 18  -JUAN         Functional Assessment    Outcome Measure Options AM-PAC 6 Clicks Basic Mobility (PT)  -JUAN            User Key  (r) = Recorded By, (t) = Taken By, (c) = Cosigned By    Initials Name Provider Type    Ben Chun PT Physical Therapist                 Time Calculation:    PT Charges     Row Name 02/01/23 1112             Time Calculation    PT Received On 02/01/23  -JUAN      PT Goal Re-Cert Due Date 02/10/23  -JUAN         Untimed Charges    PT Eval/Re-eval Minutes 25  -JUAN         Total Minutes    Untimed Charges Total Minutes 25  -JUAN       Total Minutes 25  -JUAN            User Key  (r) = Recorded By, (t) = Taken By, (c) = Cosigned By    Initials Name Provider Type    Ben Chun PT Physical Therapist              Therapy Charges for Today     Code Description Service Date Service Provider Modifiers Qty    [id number] HC PT EVAL LOW COMPLEXITY 2 2/1/2023 Ben Lentz PT GP 1          PT G-Codes  Outcome Measure Options: AM-PAC 6 Clicks Basic Mobility (PT)  AM-PAC 6 Clicks Score (PT): 18  AM-PAC 6 Clicks Score (OT): 21    Ben Lentz PT  2/1/2023

## 2023-02-01 NOTE — CASE MANAGEMENT/SOCIAL WORK
Discharge Planning Assessment   Radha     Patient Name: Marilin Martinez  MRN: [id number]  Today's Date: 2/1/2023    Admit Date: 1/31/2023    Plan: Patient is s/p RTKR. RN JV met with patient at bedside. Facesheet reviewed, RN JV role and discharge planning discussed. Patient is usually independent at home with ADLs, patients daughter lives with her, and works odd hours and will not be able to help as much at home during recovery. Patient denies DME needs, has rolling walker and 3 in 1 at home. Patient requesting inpatient rehab, referrals placed, patient also notes she is current with [facility], referral placed. Patient is set up with [facility], 2/2/23 at 2:30pm, if going home. Daughter will provide transportation home, RNJV to follow.   Discharge Needs Assessment     Row Name 02/01/23 1337       Living Environment    People in Home child(farhan), adult    Current Living Arrangements home    Primary Care Provided by self    Provides Primary Care For no one    Family Caregiver if Needed child(farhan), adult    Quality of Family Relationships helpful;involved;supportive    Able to Return to Prior Arrangements yes       Resource/Environmental Concerns    Resource/Environmental Concerns none    Transportation Concerns none       Transition Planning    Patient/Family Anticipates Transition to inpatient rehabilitation facility    Patient/Family Anticipated Services at Transition outpatient care;rehabilitation services    Transportation Anticipated family or friend will provide       Discharge Needs Assessment    Readmission Within the Last 30 Days no previous admission in last 30 days    Equipment Currently Used at Home walker, rolling;cpap;cane, quad tip    Concerns to be Addressed discharge planning    Anticipated Changes Related to Illness none    Equipment Needed After Discharge none    Discharge Facility/Level of Care Needs outpatient therapy;acute rehab    Provided Post Acute Provider List? N/A    Provided Post  Acute Provider Quality & Resource List? N/A               Discharge Plan     Row Name 02/01/23 1341       Plan    Plan Patient is s/p RTKR. RN CM met with patient at bedside. Facesheet reviewed, RN JV role and discharge planning discussed. Patient is usually independent at home with ADLs, patients daughter lives with her, and works odd hours and will not be able to help as much at home during recovery. Patient denies DME needs, has rolling walker and 3 in 1 at home. Patient requesting inpatient rehab, referrals placed, patient also notes she is current with [facility], referral placed. Patient is set up with [facility], 2/2/23 at 2:30pm, if going home. Daughter will provide transportation home, RNCM to follow.              Continued Care and Services - Admitted Since 1/31/2023     Destination     Service Provider Request Status Selected Services Address Phone Fax Patient Preferred    [facility] Pending - Request Sent N/A [address] [place] [address] [phone] --    [facility] Pending - Request Sent N/A [address] [place] [id number] [phone] --          Home Medical Care     Service Provider Request Status Selected Services Address Phone Fax Patient Preferred    [facility] Pending - Request Sent N/A [address] [place] [address] [phone] --              Expected Discharge Date and Time     Expected Discharge Date Expected Discharge Time    Feb 3, 2023          Demographic Summary     Row Name 02/01/23 1335       General Information    Arrived From home;PACU/recovery room    Referral Source admission list    Reason for Consult discharge planning    Preferred Language English       Contact Information    Permission Granted to Share Info With family/designee    Contact Information Obtained for                Functional Status      Row Name 02/01/23 1337       Functional Status    Usual Activity Tolerance good    Current Activity Tolerance good       Assessment of Health Literacy    How often do you have someone help you read hospital materials? Occasionally    How often do you have problems learning about your medical condition because of difficulty understanding written information? Never    How often do you have a problem understanding what is told to you about your medical condition? Never    How confident are you filling out medical forms by yourself? Quite a bit    Health Literacy Good       Functional Status, IADL    Medications independent    Meal Preparation independent;assistive person    Housekeeping independent;assistive person    Laundry independent;assistive person    Shopping independent;assistive person       Mental Status    General Appearance WDL WDL       Mental Status Summary    Recent Changes in Mental Status/Cognitive Functioning no changes               Psychosocial    No documentation.                Abuse/Neglect    No documentation.                Legal    No documentation.                Substance Abuse    No documentation.                Patient Forms    No documentation.                   Roxi Aburto RN

## 2023-02-01 NOTE — THERAPY TREATMENT NOTE
Acute Care - Physical Therapy Treatment Note   Radha     Patient Name: Marilin Martinez  : 1955  MRN: [id number]  Today's Date: 2023 Gait- individual; ther- ex -group setting; 3 participants         Visit Dx:     ICD-10-CM ICD-9-CM   1. Decreased activities of daily living (ADL)  Z78.9 V49.89   2. Difficulty in walking  R26.2 719.7     Patient Active Problem List   Diagnosis   • Environmental and seasonal allergies   • Essential hypertension   • Sleep apnea   • Multiple joint pain   • Hypothyroid   • Diabetes mellitus type 2 in obese (HCC)   • Dyslipidemia   • S/P laparoscopic sleeve gastrectomy   • Primary osteoarthritis of right knee   • Primary osteoarthritis of left knee   • Candidal intertrigo   • Excessive and redundant skin and subcutaneous tissue   • Obesity, Class II, BMI 35-39.9   • Osteoarthrosis, localized, primary, knee, right     Past Medical History:   Diagnosis Date   • Arthritis     KNEE PAIN   • Back pain    • Diabetes mellitus ([facility])    • Hyperlipemia    • Hypertension     managed by cardiologist   • Knee pain, bilateral    • Neuropathy    • OAB (overactive bladder)    • Seasonal allergies    • Sleep apnea     CPAP   • 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[facility])     no residual   • Thyroid disorder      Past Surgical History:   Procedure Laterality Date   • COLONOSCOPY     • ENDOSCOPY N/A 2021    Procedure: ESOPHAGOGASTRODUODENOSCOPY WITH COLD BIOPSY;  Surgeon: Mansi Kyle MD;  Location: [facility] ENDOSCOPY;  Service: General;  Laterality: N/A;  PRE: SCREENING FOR BARIATRICS  POST: MODERATE SIZE HIATAL HERNIA   • GALLBLADDER SURGERY     • GASTRIC SLEEVE LAPAROSCOPIC N/A 2022    Procedure: GASTRIC SLEEVE LAPAROSCOPIC With VENTRALHernia Repair AND PARAESOPHAGEAL HERNIA REPAIR AND LYSIS OF ADHESIONS PARTICAL OMENTECTOMY;  Surgeon: Quang Shipley Jr., MD;  [place] [facility] [place] [facility] [place]  Service: Bariatric;  Laterality: N/A;   • TONSILLECTOMY     • TOTAL KNEE ARTHROPLASTY Right  1/31/2023    Procedure: RIGHT TOTAL KNEE ARTHROPLASTY WITH DANITA ROBOT AND SHIREEN;  Surgeon: Murray Calvillo MD;  Location: [facility] MAIN OR;  Service: Robotics - Ortho;  Laterality: Right;   • TUBAL ABDOMINAL LIGATION       PT Assessment (last 12 hours)     PT Evaluation and Treatment     Row Name 02/01/23 1530 02/01/23 1210       Physical Therapy Time and Intention    Subjective Information complains of;weakness;fatigue  -RH complains of;weakness;fatigue  -RH    Document Type therapy note (daily note)  -RH therapy note (daily note)  -RH    Mode of Treatment physical therapy;group therapy;individual therapy  -RH physical therapy;group therapy;individual therapy  -RH    Patient Effort fair  -RH fair  -RH    Row Name 02/01/23 1100          Physical Therapy Time and Intention    Subjective Information no complaints  -JUAN     Document Type evaluation  -JUAN     Mode of Treatment individual therapy;physical therapy  -JUAN     Patient Effort good  -JUAN     Row Name 02/01/23 1100          General Information    Patient Observations alert;cooperative;agree to therapy  -JUAN     Prior Level of Function independent:;all household mobility;community mobility  -JUAN     Equipment Currently Used at Home walker, rolling  -JUAN     Existing Precautions/Restrictions fall;weight bearing  -JUAN     Barriers to Rehab none identified  -JUAN     Row Name 02/01/23 1210          Pain    Additional Documentation Pain Scale: FACES Pre/Post-Treatment (Group)  -RH     Row Name 02/01/23 1530 02/01/23 1210       Pain Scale: FACES Pre/Post-Treatment    Pain: FACES Scale, Pretreatment 2-->hurts little bit  -RH 2-->hurts little bit  -RH    Posttreatment Pain Rating -- 2-->hurts little bit  -RH    Pain Location - Side/Orientation -- Right  -RH    Pain Location - -- knee  -RH    Row Name 02/01/23 1210 02/01/23 1100       Range of Motion (ROM)    Range of Motion --  Pt R knee AAROM at 88 degrees flex and 8 degrees ext.  -RH right lower extremity  10-85° knee  -JUAN     Row Name 02/01/23 1210 02/01/23 1100       Strength (Manual Muscle Testing)    Strength (Manual Muscle Testing) --  Pt R knee ext strength at 3-/5.  -RH right lower extremity  3+/5  -JUAN    Row Name 02/01/23 1100          Mobility    Extremity Weight-bearing Status right lower extremity  -JUAN     Right Lower Extremity (Weight-bearing Status) weight-bearing as tolerated (WBAT)  -JUAN     Row Name 02/01/23 1100          Bed Mobility    Bed Mobility bed mobility (all) activities;supine-sit  -JUAN     All Activities, [place] (Bed Mobility) minimum assist (75% patient effort)  -JUAN     Supine-Sit [place] (Bed Mobility) minimum assist (75% patient effort)  -JUAN     Row Name 02/01/23 1530 02/01/23 1210       Transfers    Transfers sit-stand transfer;stand-sit transfer  -RH sit-stand transfer;stand-sit transfer  -RH    Row Name 02/01/23 1100          Transfers    Transfers bed-chair transfer;sit-stand transfer  -JUAN     Row Name 02/01/23 1100          Bed-Chair Transfer    Bed-Chair [place] (Transfers) contact guard  -JUAN     Assistive Device (Bed-Chair Transfers) walker, front-wheeled  -JUAN     Row Name 02/01/23 1530 02/01/23 1210       Sit-Stand Transfer    Sit-Stand [place] (Transfers) contact guard  -RH contact guard  -RH    Assistive Device (Sit-Stand Transfers) walker, front-wheeled  -RH walker, front-wheeled  -RH    Row Name 02/01/23 1100          Sit-Stand Transfer    Sit-Stand [place] (Transfers) contact guard  -JUAN     Assistive Device (Sit-Stand Transfers) walker, front-wheeled  -JUAN     Row Name 02/01/23 1530 02/01/23 1210       Stand-Sit Transfer    Stand-Sit [place] (Transfers) contact guard  -RH contact guard  -RH    Assistive Device (Stand-Sit Transfers) walker, front-wheeled  -RH walker, front-wheeled  -RH    Row Name 02/01/23 1530 02/01/23 1210       Gait/Stairs (Locomotion)    Gait/Stairs Locomotion gait/ambulation independence;gait/ambulation assistive device;distance ambulated;gait  pattern;gait deviations  -RH gait/ambulation independence;gait/ambulation assistive device;distance ambulated;gait pattern;gait deviations  -RH    [place] Level (Gait) contact guard  -RH contact guard  -RH    Assistive Device (Gait) walker, front-wheeled  -RH walker, front-wheeled  -RH    Distance in Feet (Gait) 70  -RH 70  -RH    Pattern (Gait) 3-point;step-through  -RH 3-point;step-through  -RH    Deviations/Abnormal Patterns (Gait) base of support, wide;gait speed decreased;stride length decreased  -RH base of support, wide;gait speed decreased;stride length decreased  -RH    Bilateral Gait Deviations forward flexed posture;heel strike decreased  -RH heel strike decreased  -RH    Gait Assessment/Intervention Pt amb with RW and CGA with bilateral decreased heel strike and 3 point step-through gait.  -RH Pt amb with RW and CGA with decreased heel strike bilaterally and 3 point step-through gait pattern.  -RH    Row Name 02/01/23 1100          Gait/Stairs (Locomotion)    Gait/Stairs Locomotion gait/ambulation assistive device  -JUAN     [place] Level (Gait) contact guard  -JUAN     Assistive Device (Gait) walker, front-wheeled  -JUAN     Distance in Feet (Gait) 75  -JUAN     Pattern (Gait) step-to  -JUAN     Row Name 02/01/23 1100          Safety Issues, Functional Mobility    Impairments Affecting Function (Mobility) balance;endurance/activity tolerance;pain  -JUAN     Row Name 02/01/23 1530 02/01/23 1210       Balance    Dynamic Standing Balance contact guard  -RH contact guard  -RH    Position/Device Used, Standing Balance walker, front-wheeled  -RH walker, front-wheeled  -RH    Row Name 02/01/23 1100          Balance    Dynamic Standing Balance contact guard  -JUAN     Position/Device Used, Standing Balance walker, front-wheeled  -JUAN     Row Name             Wound 01/31/23 1112 Left gluteal Pressure Injury    Wound - Properties Group Placement Date: 01/31/23  -EG Placement Time: 1112 -EG Present on Hospital  Admission: Y  -EG Side: Left  -EG Location: gluteal  -EG Primary Wound Type: Pressure inj  -EG Additional Comments: PT STATES SHE HAS PRESSURE SORE TO LEFT GLUTEAL AREA DRESSING D/I STATES HOME HEALTH NURSE APPLIED TO AREA YESTERDAY.  NOTED HEALED SCAR TO RIGHT GLUTEAL AREA PT STATES SHE HAD PREVIOUS PRESSURE SORE THERE.  -EG    Retired Wound - Properties Group Placement Date: 01/31/23  -EG Placement Time: 1112  -EG Present on Hospital Admission: Y  -EG Side: Left  -EG Location: gluteal  -EG Primary Wound Type: Pressure inj  -EG Additional Comments: PT STATES SHE HAS PRESSURE SORE TO LEFT GLUTEAL AREA DRESSING D/I STATES HOME HEALTH NURSE APPLIED TO AREA YESTERDAY.  NOTED HEALED SCAR TO RIGHT GLUTEAL AREA PT STATES SHE HAD PREVIOUS PRESSURE SORE THERE.  -EG    Retired Wound - Properties Group Date first assessed: 01/31/23  -EG Time first assessed: 1112  -EG Present on Hospital Admission: Y  -EG Side: Left  -EG Location: gluteal  -EG Primary Wound Type: Pressure inj  -EG Additional Comments: PT STATES SHE HAS PRESSURE SORE TO LEFT GLUTEAL AREA DRESSING D/I STATES HOME HEALTH NURSE APPLIED TO AREA YESTERDAY.  NOTED HEALED SCAR TO RIGHT GLUTEAL AREA PT STATES SHE HAD PREVIOUS PRESSURE SORE THERE.  -EG    Row Name             Wound 01/31/23 1426 Right anterior knee Incision    Wound - Properties Group Placement Date: 01/31/23  -EGA Placement Time: 1426  -EGA Present on Hospital Admission: N  -EGA Side: Right  -EGA Orientation: anterior  -EGA Location: knee  -EGA Primary Wound Type: Incision  -EGA    Retired Wound - Properties Group Placement Date: 01/31/23  -EGA Placement Time: 1426  -EGA Present on Hospital Admission: N  -EGA Side: Right  -EGA Orientation: anterior  -EGA Location: knee  -EGA Primary Wound Type: Incision  -EGA    Retired Wound - Properties Group Date first assessed: 01/31/23  -EGA Time first assessed: 1426  -EGA Present on Hospital Admission: N  -EGA Side: Right  -EGA Location: knee  -EGA Primary Wound  Type: Incision  -EGA    Row Name             NPWT (Negative Pressure Wound Therapy) 01/31/23 1530 Right knee    NPWT (Negative Pressure Wound Therapy) - Properties Group Placement Date: 01/31/23  -MB (r) MW (t) Placement Time: 1530  -MB (r) MW (t) Location: Right knee  -MB (r) MW (t)    Retired NPWT (Negative Pressure Wound Therapy) - Properties Group Placement Date: 01/31/23  -MB (r) MW (t) Placement Time: 1530  -MB (r) MW (t) Location: Right knee  -MB (r) MW (t)    Retired NPWT (Negative Pressure Wound Therapy) - Properties Group Placement Date: 01/31/23  -MB (r) MW (t) Placement Time: 1530  -MB (r) MW (t) Location: Right knee  -MB (r) MW (t)    Row Name 02/01/23 1100          Plan of Care Review    Plan of Care Reviewed With patient  -JUAN     Outcome Evaluation Patient presents with decreased strength, transfers and ambulation.  Skilled physical therapy services will be required to address these mobility deficits.  Recommend follow up with HHPT services upon d/c from the hospiatl  -JUAN     Row Name 02/01/23 1530 02/01/23 1210       Vital Signs    O2 Delivery Intra Treatment room air  -RH room air  -RH    Row Name 02/01/23 1100          Therapy Assessment/Plan (PT)    Patient/Family Therapy Goals Statement (PT) Pt wants to feel better  -JUAN     Rehab Potential (PT) good, to achieve stated therapy goals  -JUAN     Criteria for Skilled Interventions Met (PT) skilled treatment is necessary  -JUAN     Therapy Frequency (PT) 2 times/day  -JUAN     Predicted Duration of Therapy Intervention (PT) 10 days  -JUAN     Problem List (PT) problems related to;balance;mobility;strength;range of motion (ROM);pain  -JUAN     Activity Limitations Related to Problem List (PT) unable to transfer safely;unable to ambulate safely  -JUAN     Row Name 02/01/23 1100          PT Evaluation Complexity    History, PT Evaluation Complexity no personal factors and/or comorbidities  -JUAN     Examination of Body Systems (PT Eval Complexity) total of 4 or more  elements  -JUAN     Clinical Presentation (PT Evaluation Complexity) stable  -JUAN     Clinical Decision Making (PT Evaluation Complexity) low complexity  -JUAN     Overall Complexity (PT Evaluation Complexity) low complexity  -JUAN     Row Name 02/01/23 1210          Progress Summary (PT)    Progress Toward Functional Goals (PT) progress toward functional goals is fair  -RH     Row Name 02/01/23 1100          Therapy Plan Review/Discharge Plan (PT)    Therapy Plan Review (PT) evaluation/treatment results reviewed;care plan/treatment goals reviewed;participants voiced agreement with care plan;participants included;patient;daughter  -JUAN     Row Name 02/01/23 1100          Physical Therapy Goals    Transfer Goal Selection (PT) transfer, PT goal 1  -JUAN     Gait Training Goal Selection (PT) gait training, PT goal 1  -JUAN     Row Name 02/01/23 1100          Transfer Goal 1 (PT)    Activity/Assistive Device (Transfer Goal 1, PT) transfers, all  -JUAN     [place] Level/Cues Needed (Transfer Goal 1, PT) independent  -JUAN     Time Frame (Transfer Goal 1, PT) long term goal (LTG);10 days  -JUAN     Row Name 02/01/23 1100          Gait Training Goal 1 (PT)    Activity/Assistive Device (Gait Training Goal 1, PT) gait (walking locomotion);assistive device use;walker, rolling  -JUAN     [place] Level (Gait Training Goal 1, PT) independent  -JUAN     Distance (Gait Training Goal 1, PT) 300  -JUAN     Time Frame (Gait Training Goal 1, PT) long term goal (LTG);10 days  -JUAN           User Key  (r) = Recorded By, (t) = Taken By, (c) = Cosigned By    Initials Name Provider Type    Bree Moreno, RN Registered Nurse    Geo Hugo, PTA Physical Therapist Assistant    Ben Chun, PT Physical Therapist    Bita Marsh RN Registered Nurse    Jennyfer Carey, RN Registered Nurse                Right Knee Ther-ex   Exercise  Reps  Sets    Long arc Quads   10 2   Short arc Quads  10 2   Heel Slides  10 2   Ankle Pumps  10 2    Quad sets  10 2   Glut sets  10 2   Straight leg raise  10 2          Physical Therapy Education     Title: PT OT SLP Therapies (Done)     Topic: Physical Therapy (Done)     Point: Mobility training (Done)     Learning Progress Summary           Patient Acceptance, E,TB, VU by JUAN at 2/1/2023 1120                   Point: Precautions (Done)     Learning Progress Summary           Patient Acceptance, E,TB, VU by JUAN at 2/1/2023 1120                               User Key     Initials Effective Dates Name Provider Type Discipline    JUAN 06/03/21 -  Ben eLntz PT Physical Therapist PT              PT Recommendation and Plan     Progress Summary (PT)  Progress Toward Functional Goals (PT): progress toward functional goals is fair   Outcome Measures     Row Name 02/01/23 1100             How much help from another person do you currently need...    Turning from your back to your side while in flat bed without using bedrails? 3  -JUAN      Moving from lying on back to sitting on the side of a flat bed without bedrails? 3  -JUAN      Moving to and from a bed to a chair (including a wheelchair)? 3  -JUAN      Standing up from a chair using your arms (e.g., wheelchair, bedside chair)? 3  -JUAN      Climbing 3-5 steps with a railing? 3  -JUAN      To walk in hospital room? 3  -JUAN      AM-PAC 6 Clicks Score (PT) 18  -JUAN         Functional Assessment    Outcome Measure Options AM-PAC 6 Clicks Basic Mobility (PT)  -JUAN            User Key  (r) = Recorded By, (t) = Taken By, (c) = Cosigned By    Initials Name Provider Type    Ben Chun PT Physical Therapist                 Time Calculation:    PT Charges     Row Name 02/01/23 1530 02/01/23 1206 02/01/23 1112       Time Calculation    PT Received On 02/01/23  -RH 02/01/23  -RH 02/01/23  -JUAN    PT Goal Re-Cert Due Date -- -- 02/10/23  -JUAN       Timed Charges    27528 - Gait Training Minutes  7  -RH 8  -RH --    86206 - PT Therapeutic Activity Minutes 5  -RH 4  -RH --        Untimed Charges    PT Eval/Re-eval Minutes -- -- 25  -JUAN    PT Group Therapy Minutes 20  -RH 30  -RH --       Total Minutes    Timed Charges Total Minutes 12  -RH 12  -RH --    Untimed Charges Total Minutes 20  -RH 30  -RH 25  -JUAN     Total Minutes 32  -RH 42  -RH 25  -JUAN          User Key  (r) = Recorded By, (t) = Taken By, (c) = Cosigned By    Initials Name Provider Type     Geo Sanchez PTA Physical Therapist Assistant    Ben Chun PT Physical Therapist              Therapy Charges for Today     Code Description Service Date Service Provider Modifiers Qty    95222196226 HC PT THER PROC GROUP 2/1/2023 Geo Sanchez, PTA GP 1    28874303784 HC GAIT TRAINING EA 15 MIN 2/1/2023 Geo Sanchez, PTA GP 1    00052952708 HC GAIT TRAINING EA 15 MIN 2/1/2023 Geo Sanchez, PTA GP 1    11781532933 HC PT THER PROC GROUP 2/1/2023 Geo Sanchez, PTA GP 1          PT G-Codes  Outcome Measure Options: AM-PAC 6 Clicks Basic Mobility (PT)  AM-PAC 6 Clicks Score (PT): 18  AM-PAC 6 Clicks Score (OT): 21    Geo Sanchez PTA  2/1/2023

## 2023-02-01 NOTE — THERAPY TREATMENT NOTE
Acute Care - Physical Therapy Treatment Note   Radha     Patient Name: Marilin Martinez  : 1955  MRN: [id number]  Today's Date: 2023 Gait- individual; ther- ex -group setting; 4 participants         Visit Dx:     ICD-10-CM ICD-9-CM   1. Decreased activities of daily living (ADL)  Z78.9 V49.89   2. Difficulty in walking  R26.2 719.7     Patient Active Problem List   Diagnosis   • Environmental and seasonal allergies   • Essential hypertension   • Sleep apnea   • Multiple joint pain   • Hypothyroid   • Diabetes mellitus type 2 in obese (HCC)   • Dyslipidemia   • S/P laparoscopic sleeve gastrectomy   • Primary osteoarthritis of right knee   • Primary osteoarthritis of left knee   • Candidal intertrigo   • Excessive and redundant skin and subcutaneous tissue   • Obesity, Class II, BMI 35-39.9   • Osteoarthrosis, localized, primary, knee, right     Past Medical History:   Diagnosis Date   • Arthritis     KNEE PAIN   • Back pain    • Diabetes mellitus ([facility])    • Hyperlipemia    • Hypertension     managed by cardiologist   • Knee pain, bilateral    • Neuropathy    • OAB (overactive bladder)    • Seasonal allergies    • Sleep apnea     CPAP   • 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[facility])     no residual   • Thyroid disorder      Past Surgical History:   Procedure Laterality Date   • COLONOSCOPY     • ENDOSCOPY N/A 2021    Procedure: ESOPHAGOGASTRODUODENOSCOPY WITH COLD BIOPSY;  Surgeon: Mansi Kyle MD;  Location: [facility] ENDOSCOPY;  Service: General;  Laterality: N/A;  PRE: SCREENING FOR BARIATRICS  POST: MODERATE SIZE HIATAL HERNIA   • GALLBLADDER SURGERY     • GASTRIC SLEEVE LAPAROSCOPIC N/A 2022    Procedure: GASTRIC SLEEVE LAPAROSCOPIC With VENTRALHernia Repair AND PARAESOPHAGEAL HERNIA REPAIR AND LYSIS OF ADHESIONS PARTICAL OMENTECTOMY;  Surgeon: Quang Shipley Jr., MD;  Location[place] [facility] [place] [facility] [place];  Service: Bariatric;  Laterality: N/A;   • TONSILLECTOMY     • TOTAL KNEE ARTHROPLASTY Right  1/31/2023    Procedure: RIGHT TOTAL KNEE ARTHROPLASTY WITH DANITA ROBOT AND SHIREEN;  Surgeon: Murray Calvillo MD;  Location: [facility] MAIN OR;  Service: Robotics - Ortho;  Laterality: Right;   • TUBAL ABDOMINAL LIGATION       PT Assessment (last 12 hours)     PT Evaluation and Treatment     Row Name 02/01/23 1210 02/01/23 1100       Physical Therapy Time and Intention    Subjective Information complains of;weakness;fatigue  -RH no complaints  -JUAN    Document Type therapy note (daily note)  -RH evaluation  -JUAN    Mode of Treatment physical therapy;group therapy;individual therapy  -RH individual therapy;physical therapy  -JUAN    Patient Effort fair  -RH good  -JUAN    Row Name 02/01/23 1100          General Information    Patient Observations alert;cooperative;agree to therapy  -JUAN     Prior Level of Function independent:;all household mobility;community mobility  -JUAN     Equipment Currently Used at Home walker, rolling  -JUAN     Existing Precautions/Restrictions fall;weight bearing  -JUAN     Barriers to Rehab none identified  -JUAN     Row Name 02/01/23 1210          Pain    Additional Documentation Pain Scale: FACES Pre/Post-Treatment (Group)  -RH     Row Name 02/01/23 1210          Pain Scale: FACES Pre/Post-Treatment    Pain: FACES Scale, Pretreatment 2-->hurts little bit  -RH     Posttreatment Pain Rating 2-->hurts little bit  -RH     Pain Location - Side/Orientation Right  -RH     Pain Location - knee  -RH     Row Name 02/01/23 1210 02/01/23 1100       Range of Motion (ROM)    Range of Motion --  Pt R knee AAROM at 88 degrees flex and 8 degrees ext.  -RH right lower extremity  10-85° knee  -JUAN    Row Name 02/01/23 1210 02/01/23 1100       Strength (Manual Muscle Testing)    Strength (Manual Muscle Testing) --  Pt R knee ext strength at 3-/5.  -RH right lower extremity  3+/5  -JUAN    Row Name 02/01/23 1100          Mobility    Extremity Weight-bearing Status right lower extremity  -JUAN     Right Lower Extremity  (Weight-bearing Status) weight-bearing as tolerated (WBAT)  -JUAN     Row Name 02/01/23 1100          Bed Mobility    Bed Mobility bed mobility (all) activities;supine-sit  -JUAN     All Activities, [place] (Bed Mobility) minimum assist (75% patient effort)  -JUAN     Supine-Sit [place] (Bed Mobility) minimum assist (75% patient effort)  -JUAN     Row Name 02/01/23 1210 02/01/23 1100       Transfers    Transfers sit-stand transfer;stand-sit transfer  -RH bed-chair transfer;sit-stand transfer  -JUAN    Row Name 02/01/23 1100          Bed-Chair Transfer    Bed-Chair [place] (Transfers) contact guard  -JUAN     Assistive Device (Bed-Chair Transfers) walker, front-wheeled  -JUAN     Row Name 02/01/23 1210 02/01/23 1100       Sit-Stand Transfer    Sit-Stand [place] (Transfers) contact guard  -RH contact guard  -JUAN    Assistive Device (Sit-Stand Transfers) walker, front-wheeled  -RH walker, front-wheeled  -JUAN    Row Name 02/01/23 1210          Stand-Sit Transfer    Stand-Sit [place] (Transfers) contact guard  -RH     Assistive Device (Stand-Sit Transfers) walker, front-wheeled  -RH     Row Name 02/01/23 1210 02/01/23 1100       Gait/Stairs (Locomotion)    Gait/Stairs Locomotion gait/ambulation independence;gait/ambulation assistive device;distance ambulated;gait pattern;gait deviations  -RH gait/ambulation assistive device  -JUAN    [place] Level (Gait) contact guard  -RH contact guard  -JUAN    Assistive Device (Gait) walker, front-wheeled  -RH walker, front-wheeled  -JUAN    Distance in Feet (Gait) 70  -RH 75  -JUAN    Pattern (Gait) 3-point;step-through  -RH step-to  -JUAN    Deviations/Abnormal Patterns (Gait) base of support, wide;gait speed decreased;stride length decreased  -RH --    Bilateral Gait Deviations heel strike decreased  -RH --    Gait Assessment/Intervention Pt amb with RW and CGA with decreased heel strike bilaterally and 3 point step-through gait pattern.  -RH --    Row Name 02/01/23 1100           Safety Issues, Functional Mobility    Impairments Affecting Function (Mobility) balance;endurance/activity tolerance;pain  -JUAN     Row Name 02/01/23 1210 02/01/23 1100       Balance    Dynamic Standing Balance contact guard  -RH contact guard  -JUAN    Position/Device Used, Standing Balance walker, front-wheeled  -RH walker, front-wheeled  -JUAN    Row Name             Wound 01/31/23 1112 Left gluteal Pressure Injury    Wound - Properties Group Placement Date: 01/31/23  -EG Placement Time: 1112  -EG Present on Hospital Admission: Y  -EG Side: Left  -EG Location: gluteal  -EG Primary Wound Type: Pressure inj  -EG Additional Comments: PT STATES SHE HAS PRESSURE SORE TO LEFT GLUTEAL AREA DRESSING D/I STATES HOME HEALTH NURSE APPLIED TO AREA YESTERDAY.  NOTED HEALED SCAR TO RIGHT GLUTEAL AREA PT STATES SHE HAD PREVIOUS PRESSURE SORE THERE.  -EG    Retired Wound - Properties Group Placement Date: 01/31/23  -EG Placement Time: 1112  -EG Present on Hospital Admission: Y  -EG Side: Left  -EG Location: gluteal  -EG Primary Wound Type: Pressure inj  -EG Additional Comments: PT STATES SHE HAS PRESSURE SORE TO LEFT GLUTEAL AREA DRESSING D/I STATES HOME HEALTH NURSE APPLIED TO AREA YESTERDAY.  NOTED HEALED SCAR TO RIGHT GLUTEAL AREA PT STATES SHE HAD PREVIOUS PRESSURE SORE THERE.  -EG    Retired Wound - Properties Group Date first assessed: 01/31/23  -EG Time first assessed: 1112  -EG Present on Hospital Admission: Y  -EG Side: Left  -EG Location: gluteal  -EG Primary Wound Type: Pressure inj  -EG Additional Comments: PT STATES SHE HAS PRESSURE SORE TO LEFT GLUTEAL AREA DRESSING D/I STATES HOME HEALTH NURSE APPLIED TO AREA YESTERDAY.  NOTED HEALED SCAR TO RIGHT GLUTEAL AREA PT STATES SHE HAD PREVIOUS PRESSURE SORE THERE.  -EG    Row Name             Wound 01/31/23 1426 Right anterior knee Incision    Wound - Properties Group Placement Date: 01/31/23  -EGA Placement Time: 1426 -EGA Present on Hospital Admission: N  -EGA Side:  Right  -EGA Orientation: anterior  -EGA Location: knee  -EGA Primary Wound Type: Incision  -EGA    Retired Wound - Properties Group Placement Date: 01/31/23  -EGA Placement Time: 1426  -EGA Present on Hospital Admission: N  -EGA Side: Right  -EGA Orientation: anterior  -EGA Location: knee  -EGA Primary Wound Type: Incision  -EGA    Retired Wound - Properties Group Date first assessed: 01/31/23  -EGA Time first assessed: 1426  -EGA Present on Hospital Admission: N  -EGA Side: Right  -EGA Location: knee  -EGA Primary Wound Type: Incision  -EGA    Row Name             NPWT (Negative Pressure Wound Therapy) 01/31/23 1530 Right knee    NPWT (Negative Pressure Wound Therapy) - Properties Group Placement Date: 01/31/23  -MB (r) MW (t) Placement Time: 1530  -MB (r) MW (t) Location: Right knee  -MB (r) MW (t)    Retired NPWT (Negative Pressure Wound Therapy) - Properties Group Placement Date: 01/31/23  -MB (r) MW (t) Placement Time: 1530  -MB (r) MW (t) Location: Right knee  -MB (r) MW (t)    Retired NPWT (Negative Pressure Wound Therapy) - Properties Group Placement Date: 01/31/23  -MB (r) MW (t) Placement Time: 1530  -MB (r) MW (t) Location: Right knee  -MB (r) MW (t)    Row Name 02/01/23 1100          Plan of Care Review    Plan of Care Reviewed With patient  -JUAN     Outcome Evaluation Patient presents with decreased strength, transfers and ambulation.  Skilled physical therapy services will be required to address these mobility deficits.  Recommend follow up with HHPT services upon d/c from the hospiatl  -JUAN     Row Name 02/01/23 1210          Vital Signs    O2 Delivery Intra Treatment room air  -RH     Row Name 02/01/23 1100          Therapy Assessment/Plan (PT)    Patient/Family Therapy Goals Statement (PT) Pt wants to feel better  -JUAN     Rehab Potential (PT) good, to achieve stated therapy goals  -JUAN     Criteria for Skilled Interventions Met (PT) skilled treatment is necessary  -JUAN     Therapy Frequency (PT) 2  times/day  -JUAN     Predicted Duration of Therapy Intervention (PT) 10 days  -JUAN     Problem List (PT) problems related to;balance;mobility;strength;range of motion (ROM);pain  -JUAN     Activity Limitations Related to Problem List (PT) unable to transfer safely;unable to ambulate safely  -JUAN     Row Name 02/01/23 1100          PT Evaluation Complexity    History, PT Evaluation Complexity no personal factors and/or comorbidities  -JUAN     Examination of Body Systems (PT Eval Complexity) total of 4 or more elements  -JUAN     Clinical Presentation (PT Evaluation Complexity) stable  -JUAN     Clinical Decision Making (PT Evaluation Complexity) low complexity  -JUAN     Overall Complexity (PT Evaluation Complexity) low complexity  -JUAN     Row Name 02/01/23 1210          Progress Summary (PT)    Progress Toward Functional Goals (PT) progress toward functional goals is fair  -RH     Row Name 02/01/23 1100          Therapy Plan Review/Discharge Plan (PT)    Therapy Plan Review (PT) evaluation/treatment results reviewed;care plan/treatment goals reviewed;participants voiced agreement with care plan;participants included;patient;daughter  -JUAN     Row Name 02/01/23 1100          Physical Therapy Goals    Transfer Goal Selection (PT) transfer, PT goal 1  -JUAN     Gait Training Goal Selection (PT) gait training, PT goal 1  -JUAN     Row Name 02/01/23 1100          Transfer Goal 1 (PT)    Activity/Assistive Device (Transfer Goal 1, PT) transfers, all  -JUAN     [place] Level/Cues Needed (Transfer Goal 1, PT) independent  -JUAN     Time Frame (Transfer Goal 1, PT) long term goal (LTG);10 days  -JUAN     Row Name 02/01/23 1100          Gait Training Goal 1 (PT)    Activity/Assistive Device (Gait Training Goal 1, PT) gait (walking locomotion);assistive device use;walker, rolling  -JUAN     [place] Level (Gait Training Goal 1, PT) independent  -JUAN     Distance (Gait Training Goal 1, PT) 300  -JUAN     Time Frame (Gait Training Goal 1, PT) long  term goal (LTG);10 days  -JUAN           User Key  (r) = Recorded By, (t) = Taken By, (c) = Cosigned By    Initials Name Provider Type    EG Bree Gonzalez, RN Registered Nurse    Geo Hugo, ANAHI Physical Therapist Assistant    JUANBen Dhaliwal, PT Physical Therapist    Bita Marsh RN Registered Nurse    Jennyfer Carey RN Registered Nurse                Right Knee Ther-ex   Exercise  Reps  Sets    Long arc Quads   10 2   Short arc Quads  10 2   Heel Slides  10 2   Ankle Pumps  10 2   Quad sets  10 2   Glut sets  10 2   Straight leg raise  10 2          Physical Therapy Education     Title: PT OT SLP Therapies (Done)     Topic: Physical Therapy (Done)     Point: Mobility training (Done)     Learning Progress Summary           Patient Acceptance, E,TB, VU by JUAN at 2/1/2023 1120                   Point: Precautions (Done)     Learning Progress Summary           Patient Acceptance, E,TB, VU by JUAN at 2/1/2023 1120                               User Key     Initials Effective Dates Name Provider Type Discipline    JUAN 06/03/21 -  Ben Lentz, PT Physical Therapist PT              PT Recommendation and Plan     Progress Summary (PT)  Progress Toward Functional Goals (PT): progress toward functional goals is fair   Outcome Measures     Row Name 02/01/23 1100             How much help from another person do you currently need...    Turning from your back to your side while in flat bed without using bedrails? 3  -JUAN      Moving from lying on back to sitting on the side of a flat bed without bedrails? 3  -JUAN      Moving to and from a bed to a chair (including a wheelchair)? 3  -JUAN      Standing up from a chair using your arms (e.g., wheelchair, bedside chair)? 3  -JUAN      Climbing 3-5 steps with a railing? 3  -JUAN      To walk in hospital room? 3  -JUAN      AM-PAC 6 Clicks Score (PT) 18  -JUAN         Functional Assessment    Outcome Measure Options AM-PAC 6 Clicks Basic Mobility (PT)  -JUAN            User Key   (r) = Recorded By, (t) = Taken By, (c) = Cosigned By    Initials Name Provider Type    Ben Chun, INNA Physical Therapist                 Time Calculation:    PT Charges     Row Name 02/01/23 1206 02/01/23 1112          Time Calculation    PT Received On 02/01/23  -RH 02/01/23  -JUAN     PT Goal Re-Cert Due Date -- 02/10/23  -JUAN        Timed Charges    77022 - Gait Training Minutes  8  -RH --     21301 - PT Therapeutic Activity Minutes 4  -RH --        Untimed Charges    PT Eval/Re-eval Minutes -- 25  -JUAN     PT Group Therapy Minutes 30  -RH --        Total Minutes    Timed Charges Total Minutes 12  -RH --     Untimed Charges Total Minutes 30  -RH 25  -JUAN      Total Minutes 42  -RH 25  -JUAN           User Key  (r) = Recorded By, (t) = Taken By, (c) = Cosigned By    Initials Name Provider Type     Geo Sanchez, ANAHI Physical Therapist Assistant    Ben Chun, PT Physical Therapist              Therapy Charges for Today     Code Description Service Date Service Provider Modifiers Qty    93611003812 HC PT THER PROC GROUP 2/1/2023 Geo Sanchez, PTA GP 1    12234209883 HC GAIT TRAINING EA 15 MIN 2/1/2023 Geo Sanchez, PTA GP 1          PT G-Codes  Outcome Measure Options: AM-PAC 6 Clicks Basic Mobility (PT)  AM-PAC 6 Clicks Score (PT): 18  AM-PAC 6 Clicks Score (OT): 21    Geo Sanchez PTA  2/1/2023

## 2023-02-01 NOTE — THERAPY EVALUATION
Patient Name: Marilin Martinez  : 1955    MRN: [id number]                              Today's Date: 2023       Admit Date: 2023    Visit Dx:     ICD-10-CM ICD-9-CM   1. Decreased activities of daily living (ADL)  Z78.9 V49.89     Patient Active Problem List   Diagnosis   • Environmental and seasonal allergies   • Essential hypertension   • Sleep apnea   • Multiple joint pain   • Hypothyroid   • Diabetes mellitus type 2 in obese (HCC)   • Dyslipidemia   • S/P laparoscopic sleeve gastrectomy   • Primary osteoarthritis of right knee   • Primary osteoarthritis of left knee   • Candidal intertrigo   • Excessive and redundant skin and subcutaneous tissue   • Obesity, Class II, BMI 35-39.9   • Osteoarthrosis, localized, primary, knee, right     Past Medical History:   Diagnosis Date   • Arthritis     KNEE PAIN   • Back pain    • Diabetes mellitus ([facility])    • Hyperlipemia    • Hypertension     managed by cardiologist   • Knee pain, bilateral    • Neuropathy    • OAB (overactive bladder)    • Seasonal allergies    • Sleep apnea     CPAP   • 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[facility])     no residual   • Thyroid disorder      Past Surgical History:   Procedure Laterality Date   • COLONOSCOPY     • ENDOSCOPY N/A 2021    Procedure: ESOPHAGOGASTRODUODENOSCOPY WITH COLD BIOPSY;  Surgeon: Mansi Kyle MD;  Location: [facility] ENDOSCOPY;  Service: General;  Laterality: N/A;  PRE: SCREENING FOR BARIATRICS  POST: MODERATE SIZE HIATAL HERNIA   • GALLBLADDER SURGERY     • GASTRIC SLEEVE LAPAROSCOPIC N/A 2022    Procedure: GASTRIC SLEEVE LAPAROSCOPIC With VENTRALHernia Repair AND PARAESOPHAGEAL HERNIA REPAIR AND LYSIS OF ADHESIONS PARTICAL OMENTECTOMY;  Surgeon: Quang Shipley Jr., MD;  Location: [facility] OR [facility];  Service: Bariatric;  Laterality: N/A;   • TONSILLECTOMY     • TUBAL ABDOMINAL LIGATION        General Information     Row Name 23 0908 23 0851       OT Time and Intention    Document Type  therapy note (daily note)  -AC evaluation  -AC    Mode of Treatment individual therapy;occupational therapy  -AC individual therapy;occupational therapy  -AC    Row Name 02/01/23 0908 02/01/23 0851       General Information    Patient Profile Reviewed yes  -AC yes  -AC    Prior Level of Function -- --  patient reports using a rolling walker for functional mobility, uses a walk in shower at a gym for bathing and just received the 3 in1 commode.  -AC    Existing Precautions/Restrictions fall;weight bearing  -AC fall;weight bearing  wound vac  -AC    Barriers to Rehab -- none identified  -AC    Row Name 02/01/23 0851          Occupational Profile    Reason for Services/Referral (Occupational Profile) Pt. is a 68year old female admitted for the above diagnosis status post right total knee replacement on 1/31/2023. Pt. referred to OT services to assess independence with ADLs and adl transfers/fx'l mobility. No previous OT services for current condition.  -AC     Row Name 02/01/23 0851          Living Environment    People in Home child(farhan), adult  -AC     Row Name 02/01/23 0908 02/01/23 0851       Cognition    Orientation Status (Cognition) oriented x 3  -AC oriented x 3  -AC    Row Name 02/01/23 0908 02/01/23 0851       Safety Issues, Functional Mobility    Impairments Affecting Function (Mobility) balance;endurance/activity tolerance;pain  -AC balance;endurance/activity tolerance;pain  -AC          User Key  (r) = Recorded By, (t) = Taken By, (c) = Cosigned By    Initials Name Provider Type    AC Marcela Montiel OT Occupational Therapist                 Mobility/ADL's     Row Name 02/01/23 0910 02/01/23 0856       Bed Mobility    Comment, (Bed Mobility) pt. in recliner upon OT arrival in the room.  -AC patient in recliner upon OT arrival in the room.  -AC    Row Name 02/01/23 0910 02/01/23 0856       Transfers    Transfers sit-stand transfer;stand-sit transfer  -AC sit-stand transfer;stand-sit transfer  -AC    Row Name  02/01/23 0910 02/01/23 0856       Sit-Stand Transfer    Sit-Stand [place] (Transfers) contact guard;1 person assist;verbal cues  -AC contact guard;1 person assist  -AC    Assistive Device (Sit-Stand Transfers) walker, front-wheeled  -AC walker, front-wheeled  -AC    Comment, (Sit-Stand Transfer) patient was CGA with rolling walker for sit to /from stand x 4 with cues for safety and technique.  -AC --    Row Name 02/01/23 0910 02/01/23 0856       Stand-Sit Transfer    Stand-Sit [place] (Transfers) contact guard;1 person assist;verbal cues  -AC contact guard;1 person assist  -AC    Assistive Device (Stand-Sit Transfers) walker, front-wheeled  -AC walker, front-wheeled  -AC    Row Name 02/01/23 0910 02/01/23 0856       Functional Mobility    Functional Mobility- Ind. Level contact guard assist;1 person;verbal cues required  -AC contact guard assist;1 person  -AC    Functional Mobility- Device walker, front-wheeled  -AC walker, front-wheeled  -AC    Functional Mobility- Comment patient was CGA with rolling walker for recliner to/from sink with cues for safety and technique.  -AC --    Row Name 02/01/23 0910 02/01/23 0856       Activities of Daily Living    BADL Assessment/Intervention upper body dressing;lower body dressing  -AC --  patient is setup for upper body bathing/dressing, setup for grooming, setup for self-feeding, min A for lower body bathing/dressing, CGA for toileting.  -AC    Row Name 02/01/23 0910 02/01/23 0856       Mobility    Extremity Weight-bearing Status right lower extremity  -AC right lower extremity  -AC    Right Lower Extremity (Weight-bearing Status) weight-bearing as tolerated (WBAT)  -AC weight-bearing as tolerated (WBAT)  -    Row Name 02/01/23 0910          Upper Body Dressing Assessment/Training    [place] Level (Upper Body Dressing) upper body dressing skills;doff;don;bra/undergarment;pull-over garment;set up  -AC     Position (Upper Body Dressing) unsupported sitting   -AC     Row Name 02/01/23 0910          Lower Body Dressing Assessment/Training    [place] Level (Lower Body Dressing) lower body dressing skills;doff;don;pants/bottoms;shoes/slippers;socks;minimum assist (75% patient effort)  -AC     Position (Lower Body Dressing) unsupported sitting;supported standing  -AC           User Key  (r) = Recorded By, (t) = Taken By, (c) = Cosigned By    Initials Name Provider Type    AC Marcela Montiel OT Occupational Therapist               Obj/Interventions     Row Name 02/01/23 0913 02/01/23 0858       Sensory Assessment (Somatosensory)    Sensory Assessment (Somatosensory) sensation intact  - sensation intact  -    Row Name 02/01/23 0913 02/01/23 0858       Vision Assessment/Intervention    Visual Impairment/Limitations WFL;corrective lenses full-time  -AC WFL;corrective lenses full-time  -AC    Row Name 02/01/23 0913 02/01/23 0858       Range of Motion Comprehensive    General Range of Motion bilateral upper extremity ROM WNL  -AC bilateral upper extremity ROM WNL  -AC    Row Name 02/01/23 0913 02/01/23 0858       Strength Comprehensive (MMT)    General Manual Muscle Testing (MMT) Assessment no strength deficits identified  -AC no strength deficits identified  -AC    Row Name 02/01/23 0913 02/01/23 0858       Motor Skills    Motor Skills coordination;functional endurance  -AC coordination;functional endurance  -AC    Coordination WFL  -AC WFL  -AC    Functional Endurance fair  -AC fair  -AC    Row Name 02/01/23 0913 02/01/23 0858       Balance    Balance Assessment standing dynamic balance  -AC standing dynamic balance  -AC    Dynamic Standing Balance contact guard;verbal cues;1-person assist  -AC contact guard;1-person assist  -AC    Position/Device Used, Standing Balance supported;walker, front-wheeled  -AC supported;walker, front-wheeled  -AC    Balance Interventions standing;sit to stand;supported;minimal challenge;dynamic;occupation based/functional task  -AC --           User Key  (r) = Recorded By, (t) = Taken By, (c) = Cosigned By    Initials Name Provider Type    Marcela Tenorio OT Occupational Therapist               Goals/Plan     Row Name 02/01/23 0902          Transfer Goal 1 (OT)    Activity/Assistive Device (Transfer Goal 1, OT) transfers, all  -AC     [place] Level/Cues Needed (Transfer Goal 1, OT) modified independence  -AC     Time Frame (Transfer Goal 1, OT) long term goal (LTG);10 days  -     Row Name 02/01/23 0902          Bathing Goal 1 (OT)    Activity/Device (Bathing Goal 1, OT) bathing skills, all  -AC     [place] Level/Cues Needed (Bathing Goal 1, OT) modified independence  -AC     Time Frame (Bathing Goal 1, OT) long term goal (LTG);10 days  -     Row Name 02/01/23 0902          Dressing Goal 1 (OT)    Activity/Device (Dressing Goal 1, OT) dressing skills, all  -AC     [place]/Cues Needed (Dressing Goal 1, OT) modified independence  -AC     Time Frame (Dressing Goal 1, OT) long term goal (LTG);10 days  -     Row Name 02/01/23 0902          Toileting Goal 1 (OT)    Activity/Device (Toileting Goal 1, OT) toileting skills, all  -AC     [place] Level/Cues Needed (Toileting Goal 1, OT) modified independence  -AC     Time Frame (Toileting Goal 1, OT) long term goal (LTG);10 days  -     Row Name 02/01/23 0902          Grooming Goal 1 (OT)    Activity/Device (Grooming Goal 1, OT) grooming skills, all  -AC     [place] (Grooming Goal 1, OT) modified independence  -AC     Time Frame (Grooming Goal 1, OT) long term goal (LTG);10 days  -     Row Name 02/01/23 0902          Problem Specific Goal 1 (OT)    Problem Specific Goal 1 (OT) patient will improve endurance to good for adls.  -AC     Time Frame (Problem Specific Goal 1, OT) long term goal (LTG);10 days  -     Row Name 02/01/23 0902          Therapy Assessment/Plan (OT)    Planned Therapy Interventions (OT) activity tolerance training;BADL retraining;functional balance  retraining;occupation/activity based interventions;patient/caregiver education/training;ROM/therapeutic exercise;transfer/mobility retraining  -           User Key  (r) = Recorded By, (t) = Taken By, (c) = Cosigned By    Initials Name Provider Type     Marcela Montiel, RONALD Occupational Therapist               Clinical Impression     Row Name 02/01/23 0914 02/01/23 0859       Pain Assessment    Pretreatment Pain Rating 0/10 - no pain  -AC 0/10 - no pain  -AC    Posttreatment Pain Rating 0/10 - no pain  -AC 0/10 - no pain  -AC    Row Name 02/01/23 0914 02/01/23 0859       Plan of Care Review    Plan of Care Reviewed With patient  -AC patient  -AC    Progress improving  - no change  -    Outcome Evaluation patient instructed in lower body adaptive dressing technique, weightbearing status, joint protection and safety with adl transfers. patient to continue with therapy services in order to maximize independence with adls.  - Patient presents with limitations that impede his/her ability to perform ADLS. The skills of a therapist are necessary to maximize independence with ADLs.  Recommend home with home health.  -    Row Name 02/01/23 0859          Therapy Assessment/Plan (OT)    Patient/Family Therapy Goal Statement (OT) Less pain.  -     Rehab Potential (OT) good, to achieve stated therapy goals  -     Criteria for Skilled Therapeutic Interventions Met (OT) yes;meets criteria;skilled treatment is necessary  -     Therapy Frequency (OT) 5 times/wk  -     Row Name 02/01/23 0859          Therapy Plan Review/Discharge Plan (OT)    Equipment Needs Upon Discharge (OT) walker, rolling;commode chair;tub bench  -     Anticipated Discharge Disposition (OT) home with assist;home with home health  -     Row Name 02/01/23 0859          Positioning and Restraints    Pre-Treatment Position sitting in chair/recliner  -     Post Treatment Position chair  -AC     In Chair reclined;legs elevated;call light within  reach;encouraged to call for assist;exit alarm on  -AC           User Key  (r) = Recorded By, (t) = Taken By, (c) = Cosigned By    Initials Name Provider Type    Marcela Tenorio OT Occupational Therapist               Outcome Measures     Row Name 02/01/23 0906          How much help from another is currently needed...    Putting on and taking off regular lower body clothing? 3  -AC     Bathing (including washing, rinsing, and drying) 3  -AC     Toileting (which includes using toilet bed pan or urinal) 3  -AC     Putting on and taking off regular upper body clothing 4  -AC     Taking care of personal grooming (such as brushing teeth) 4  -AC     Eating meals 4  -AC     AM-PAC 6 Clicks Score (OT) 21  -AC     Row Name 02/01/23 0906          Functional Assessment    Outcome Measure Options AM-PAC 6 Clicks Daily Activity (OT);Optimal Instrument  -AC     Row Name 02/01/23 0906          Optimal Instrument    Optimal Instrument Optimal - 3  -AC     Bending/Stooping 2  -AC     Standing 2  -AC     Reaching 1  -AC     From the list, choose the 3 activities you would most like to be able to do without any difficulty Standing;Reaching;Bending/stooping  -AC     Total Score Optimal - 3 5  -AC           User Key  (r) = Recorded By, (t) = Taken By, (c) = Cosigned By    Initials Name Provider Type    Marcela Tenorio OT Occupational Therapist                Occupational Therapy Education     Title: PT OT SLP Therapies (Done)     Topic: Occupational Therapy (Done)     Point: ADL training (Done)     Description:   Instruct learner(s) on proper safety adaptation and remediation techniques during self care or transfers.   Instruct in proper use of assistive devices.              Learning Progress Summary           Patient Acceptance, E,TB,D, VU,DU by  at 2/1/2023 0907                   Point: Home exercise program (Done)     Description:   Instruct learner(s) on appropriate technique for monitoring, assisting and/or progressing  therapeutic exercises/activities.              Learning Progress Summary           Patient Acceptance, E,TB,D, VU,DU by  at 2/1/2023 0907                   Point: Precautions (Done)     Description:   Instruct learner(s) on prescribed precautions during self-care and functional transfers.              Learning Progress Summary           Patient Acceptance, E,TB,D, VU,DU by  at 2/1/2023 0907                   Point: Body mechanics (Done)     Description:   Instruct learner(s) on proper positioning and spine alignment during self-care, functional mobility activities and/or exercises.              Learning Progress Summary           Patient Acceptance, E,TB,D, VU,DU by  at 2/1/2023 0907                               User Key     Initials Effective Dates Name Provider Type Discipline     06/16/21 -  Marcela Montiel OT Occupational Therapist OT              OT Recommendation and Plan  Planned Therapy Interventions (OT): activity tolerance training, BADL retraining, functional balance retraining, occupation/activity based interventions, patient/caregiver education/training, ROM/therapeutic exercise, transfer/mobility retraining  Therapy Frequency (OT): 5 times/wk  Plan of Care Review  Plan of Care Reviewed With: patient  Progress: improving  Outcome Evaluation: patient instructed in lower body adaptive dressing technique, weightbearing status, joint protection and safety with adl transfers. patient to continue with therapy services in order to maximize independence with adls.     Time Calculation:    Time Calculation- OT     Row Name 02/01/23 0908             Time Calculation- OT    OT Received On 02/01/23  -      OT Goal Re-Cert Due Date 02/10/23  -         Timed Charges    39914 - OT Therapeutic Activity Minutes 10  -AC      94430 - OT Self Care/Mgmt Minutes 15  -AC         Untimed Charges    OT Eval/Re-eval Minutes 32  -AC         Total Minutes    Timed Charges Total Minutes 25  -AC      Untimed Charges  Total Minutes 32  -AC       Total Minutes 57  -AC            User Key  (r) = Recorded By, (t) = Taken By, (c) = Cosigned By    Initials Name Provider Type    AC Marcela Montiel OT Occupational Therapist              Therapy Charges for Today     Code Description Service Date Service Provider Modifiers Qty    [id number]  OT THERAPEUTIC ACT EA 15 MIN 2/1/2023 Marcela Montiel OT GO 1    [id number]  OT SELF CARE/MGMT/TRAIN EA 15 MIN 2/1/2023 Marcela Montiel OT GO 1    [id number]  OT EVAL LOW COMPLEXITY 3 2/1/2023 Marcela Montiel OT GO 1               Marcela Montiel OT  2/1/2023

## 2023-02-01 NOTE — PLAN OF CARE
Goal Outcome Evaluation:  Plan of Care Reviewed With: patient        Progress: no change  Outcome Evaluation: Patient presents with limitations that impede his/her ability to perform ADLS. The skills of a therapist are necessary to maximize independence with ADLs.  Recommend home with home health.

## 2023-02-01 NOTE — OP NOTE
TOTAL KNEE ARTHROPLASTY WITH DANITA ROBOT  Procedure Report    Patient Name:  Marilin Martinez  YOB: 1955    Date of Surgery:  1/31/2023       Pre-op Diagnosis:   Primary osteoarthritis of right knee [M17.11]       Post-Op Diagnosis Codes:     * Primary osteoarthritis of right knee [M17.11]    Procedure/CPT® Codes:      Procedure(s):  RIGHT TOTAL KNEE ARTHROPLASTY WITH DANITA ROBOT AND SHIREEN    Surgical Approach: Knee Medial Parapatellar        Staff:  Surgeon(s):  Murray Calvillo MD    Assistant: Fadumo Doss    Anesthesia: General with Block    Estimated Blood Loss: 50ml    Implants:    Implant Name Type Inv. Item Serial No.  Lot No. LRB No. Used Action   CMT BONE PALACOS R HI/VISC 1X40 - [id number] Implant CMT BONE PALACOS R HI/VISC 1X40  [facility] [id number] Right 1 Implanted   SCRW HEX PERSONA FML 2.5X25MM PK/2 - [id number] Implant SCRW HEX PERSONA FML 2.5X25MM PK/2  [employer] [id number] Right 1 Implanted   EXT STEM FEM/KN PERSONA TPR [id number] - [id number] Implant EXT STEM FEM/KN PERSONA TPR [id number]  [employer] [id number] Right 1 Implanted   STEM TIB/KN PERSONA CMT 5D SZD RT - [id number] Implant STEM TIB/KN PERSONA CMT 5D SZD RT  [employer] [id number] Right 1 Implanted   COMP FEM PERSONA CORE CMT STD SZ5 RT - [id number] Implant COMP FEM PERSONA CORE CMT STD SZ5 RT  [employer] [id number] Right 1 Implanted   PAT KN PERSONA ALLPOLY CMT 7.5X26MM - [id number] Implant PAT KN PERSONA ALLPOLY CMT 7.5X26MM  [employer] [id number] Right 1 Implanted   ART/SRF KN PERSONA VE CNSTR CRSLNK SZCTOD 3TO5 16MM RT - [id number] Implant ART/SRF KN PERSONA VE CNSTR CRSLNK SZCTOD 3TO5 16MM RT  [employer] [id number] Right 1 Implanted       Specimen:          None    Findings: See description    Complications: None    Description of Procedure: Patient was taken to the operating room placed supine operating table after abductor canal blocks and in preoperative holding.  After general  tracheal anesthesia was established the right knee and lower extremity were prepped and draped in standard surgical fashion using alcohol ChloraPrep.  The Gayatri robot was also draped sterilely and calibrated.  Incision was made 2 fingerbreadth superior superior pole of the patella down to the medial aspect of tibial tubercle the knife and full-thickness skin flaps were raised laterally and medially.  A medial parapatellar arthrotomy was then undertaken and the knee was brought into flexion.  Retractors were placed to protect protect the collateral ligaments.  Soft tissue releases and osteophytes removed as deemed necessary.  Then the tracking device was mounted on the distal femur in a standard fashion as well as through separate stab incisions in the distal tibia 5 fingerbreadths inferior to the tibial tubercle.  Then all the femoral points were mapped out using the Gayatri software the tibial points were mapped out as well.  While the plan for resection was being completed the patella was everted calipered and cut to the appropriate thickness.  The correct size patella was chosen in the locals for the patella were reamed and a trial patella was placed and the knee was brought back into flexion.  The distal femoral cutting block was then brought in using robotic assisted arm and the distal femoral cut was made it was checked and verified and accepted.  Then the external rotation arm of the robot was used to pin the distal femur and the correct external rotation decided by the intraoperative plan using the previously mapped points.  Then the tibia was cut after removing the robotic arm and to the appropriate position to cut the tibia the cutting block was pinned and the proximal tibia cut was made excess bone from the cut was removed and the 4-in-1 cutting block was then used in the distal femur.  The tibial cut was verified and accepted femoral and tibial trials were then placed and the knee was taken through range of  motion verifying flexion extension gaps and extension and flexion range of motion to be acceptable by using the software in a standard fashion.  Locals for the femur were then drilled the trials were removed the bone ends were copiously irrigated with bacitracin Simpulse irrigation.  The tibia was cemented in place according to marked external rotation from the trials the femur was cemented in position second and then the real polychosen was placed.  Excess cement removed from the implant edges the knee was held in extension until the cement dried and the patella was cemented into place and held with a patellar clamp.  After the cement hardened excess management from the implant edges Irrisept was used and wound was copiously irrigated the knee was taken through range of motion and checked 1 last time the patella tracked in the center of the trochlear groove the femur using no thumbs technique.  Then the arthrotomy was closed in 45 degrees of flexion with Ethibond the deep fat was closed 0 Vicryl subcu was closed with 2-0 Vicryl and the skin was closed with staples incision was washed and dried and sterile dressings were applied the patient tolerated the procedure well and was taken to recovery room.       Murray Calvillo MD     Date: 2/1/2023  Time: 07:28 EST

## 2023-02-01 NOTE — PROGRESS NOTES
[facility]   Hospitalist Progress Note  Date: 2023  Patient Name: Marilin Martinez  : 1955  MRN: [id number]  Date of admission: 2023      Subjective   Subjective     Chief Complaint: Diabetes and hypertension    Summary: 68 y.o. female past medical history of obesity with BMI of 38, osteoarthritis, diabetes, neuropathy, stroke, and hypothyroidism who is being admitted status post right knee replacement.  The patient's procedure went well but they did have to place a wound VAC on her right knee due to inability to close the skin.      Interval Followup: No acute events overnight.  Pain is fairly well controlled.  Working with physical therapy.  Otherwise no new complaints.  Denies palpitations or chest discomfort    Review of Systems   Denies fevers or chest pain     Objective   Objective     Vitals:   Temp:  [96.9 °F (36.1 °C)-98.6 °F (37 °C)] 97.3 °F (36.3 °C)  Heart Rate:  [42-60] 45  Resp:  [10-16] 16  BP: ()/(41-76) 97/41  Flow (L/min):  [2] 2  Physical Exam    Constitutional: Awake, alert, no acute distress, pleasant   Respiratory: Clear to auscultation bilaterally, nonlabored respirations    Cardiovascular: Bradycardic, regular, no MRG   Gastrointestinal: Positive bowel sounds, soft, nontender, nondistended   Neurologic: Oriented x 3, wound VAC in place right knee, expected postop ROM of right leg strength symmetric in all extremities, Cranial Nerves grossly intact to confrontation, speech clear    Result Review    Result Review:  I have personally reviewed the results below:  []  Laboratory  []  Microbiology  []  Radiology  [x]  EKG/Telemetry EKG personally reviewed  []  Cardiology/Vascular   []  Pathology  []  Old records  []  Other:  CBC    CBC 3/23/22 1/13/23 2/1/23   WBC 7.61 8.95 14.09 (A)   RBC 4.34 4.07 3.61 (A)   Hemoglobin 13.9 13.5 12.2   Hematocrit 42.2 40.7 35.4   MCV 97.2 (A) 100.0 (A) 98.1 (A)   MCH 32.0 33.2 (A) 33.8 (A)   MCHC 32.9 33.2 34.5   RDW 12.6 13.3 12.3    Platelets 172 176 113 (A)   (A) Abnormal value            CMP    CMP 2/15/22 3/23/22 1/13/23   Glucose 121 (A) 97 83   BUN 13 13 18   Creatinine 0.79 1.00 0.88   eGFR Non African Am 73     eGFR  62.6 72.6   Sodium 137 142 139   Potassium 3.9 4.3 4.4   Chloride 101 105 106   Calcium 9.3 9.6 9.8   Total Protein 7.0 7.1 7.0   Albumin 3.70 4.30 3.9   Globulin 3.3 2.8 3.1   Total Bilirubin 0.3 0.5 0.5   Alkaline Phosphatase 48 56 58   AST (SGOT) 35 (A) 21 16   ALT (SGPT) 18 19 11   Albumin/Globulin Ratio 1.1 1.5 1.3   BUN/Creatinine Ratio 16.5 13.0 20.5   Anion Gap 9.0 10.0 7.3   (A) Abnormal value       Comments are available for some flowsheets but are not being displayed.             Assessment & Plan   Assessment / Plan     Assessment/Plan:  Asymptomatic bradycardia  Right knee replacement requiring wound VAC postop day 0  Hypertension  Type 2 diabetes  but most recent A1c is 5.1  Obesity  Hypothyroidism    Continue to monitor in the hospital for management of the above  Orthopedic surgery following, appreciate assistance  EKG ordered and personally reviewed, no evidence of heart block, showing sinus bradycardia with PAC  Will assess for chronotropic competency after ambulation  Blood pressure stable, will avoid all beta blockers and antihypertensives for now  Continue wound VAC per orthopedic surgery  Continue appropriate home medications  PT/OT consulted  Trend renal function and electrolytes with a.m. BMP  Trend Hgb and WBC with a.m. CBC     Discussed plan with RN, orthopedic surgery    DVT prophylaxis:  Medical and mechanical DVT prophylaxis orders are present.    Electronically signed by Antonio Yeager MD, 02/01/23, 12:16 PM EST.

## 2023-02-01 NOTE — SIGNIFICANT NOTE
Wound Eval / Progress Noted     Radha     Patient Name: Marilin Martinez  : 1955  MRN: [id number]  Today's Date: 2023                 Admit Date: 2023    Visit Dx:    ICD-10-CM ICD-9-CM   1. Decreased activities of daily living (ADL)  Z78.9 V49.89   2. Difficulty in walking  R26.2 719.7       Patient Active Problem List   Diagnosis   • Environmental and seasonal allergies   • Essential hypertension   • Sleep apnea   • Multiple joint pain   • Hypothyroid   • Diabetes mellitus type 2 in obese ([facility])   • Dyslipidemia   • S/P laparoscopic sleeve gastrectomy   • Primary osteoarthritis of right knee   • Primary osteoarthritis of left knee   • Candidal intertrigo   • Excessive and redundant skin and subcutaneous tissue   • Obesity, Class II, BMI 35-39.9   • Osteoarthrosis, localized, primary, knee, right        Past Medical History:   Diagnosis Date   • Arthritis     KNEE PAIN   • Back pain    • Diabetes mellitus ([facility])    • Hyperlipemia    • Hypertension     managed by cardiologist   • Knee pain, bilateral    • Neuropathy    • OAB (overactive bladder)    • Seasonal allergies    • Sleep apnea     CPAP   • 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[facility])     no residual   • Thyroid disorder         Past Surgical History:   Procedure Laterality Date   • COLONOSCOPY     • ENDOSCOPY N/A 2021    Procedure: ESOPHAGOGASTRODUODENOSCOPY WITH COLD BIOPSY;  Surgeon: Mansi Kyle MD;  Location: [facility] ENDOSCOPY;  Service: General;  Laterality: N/A;  PRE: SCREENING FOR BARIATRICS  POST: MODERATE SIZE HIATAL HERNIA   • GALLBLADDER SURGERY     • GASTRIC SLEEVE LAPAROSCOPIC N/A 2022    Procedure: GASTRIC SLEEVE LAPAROSCOPIC With VENTRALHernia Repair AND PARAESOPHAGEAL HERNIA REPAIR AND LYSIS OF ADHESIONS PARTICAL OMENTECTOMY;  Surgeon: Quang Shipley Jr., MD;  Location: [facility] OR [facility];  Service: Bariatric;  Laterality: N/A;   • TONSILLECTOMY     • TOTAL KNEE ARTHROPLASTY Right 2023    Procedure: RIGHT TOTAL KNEE  ARTHROPLASTY WITH DANITA ROBOT AND SHIREEN;  Surgeon: Murray Calvillo MD;  Location: [facility] MAIN OR;  Service: Robotics - Ortho;  Laterality: Right;   • TUBAL ABDOMINAL LIGATION           Physical Assessment:  Wound 01/31/23 1112 Left gluteal Pressure Injury (Active)   Wound Image   02/01/23 1230   Pressure Injury Stage 2 02/01/23 1230   Dressing Appearance intact;dry 02/01/23 1230   Closure None 02/01/23 1230   Base non-blanchable;red;moist 02/01/23 1230   Red (%), Wound Tissue Color 100 02/01/23 1230   Periwound intact;dry 02/01/23 1230   Periwound Temperature warm 02/01/23 1230   Periwound Skin Turgor soft 02/01/23 1230   Edges open 02/01/23 1230   Wound Length (cm) 1.5 cm 02/01/23 1230   Wound Width (cm) 1 cm 02/01/23 1230   Wound Depth (cm) 0.2 cm 02/01/23 1230   Wound Surface Area (cm^2) 1.5 cm^2 02/01/23 1230   Wound Volume (cm^3) 0.3 cm^3 02/01/23 1230   Drainage Amount none 02/01/23 1230   Care, Wound cleansed with;sterile normal saline 02/01/23 1230   Dressing Care dressing applied;silicone;border dressing 02/01/23 1230   Periwound Care absorptive dressing applied 02/01/23 1230       Wound 01/31/23 1426 Right anterior knee Incision (Active)   Dressing Appearance moist drainage;intact 02/01/23 1230   Closure AMALIA 02/01/23 1230   Base dressing in place, unable to visualize 02/01/23 1230   Periwound Temperature warm 02/01/23 1230   Periwound Skin Turgor soft 02/01/23 1230   Edges rolled/closed 02/01/23 1230   Drainage Characteristics/Odor sanguineous 02/01/23 1230   Drainage Amount moderate 02/01/23 1230   Care, Wound negative pressure wound therapy 02/01/23 1230   Dressing Care other (see comments);dressing reinforced 02/01/23 1230       NPWT (Negative Pressure Wound Therapy) 01/31/23 1530 Right knee (Active)   Therapy Setting continuous therapy 02/01/23 1230   Dressing other (see comments) 02/01/23 1230   Pressure Setting 125 mmHg 02/01/23 1230        Wound Check / Follow-up:  Patient was seen today for a  wound consult. Patient is s/p total knee arthroplasty. Prevena incisional wound vac applied in surgery for assistive therapy to decrease edema and erythema. Discussed expectations of the wound vac with patient and when to remove dressing. Discussed alarms and foam compression. Patient verbalized understanding.     Prevena wound vac dressing with sanguineous drainage noted to foam and cannister. Visible periwound with ecchymosis and swelling noted. Cannister full with sanguineous drainage; changed wound cannister. Primary RN present. Discussed contacting surgeon due to the bleeding that is present at this time.     Patient also with a stage II pressure injury to her left gluteal aspect. Tissue is red and moist, non-blanchable. Cleansed with NS. Recommend daily dressing changes with silver impregnated hydrofiber to wound.     Impression: surgical incision with primary closure, Stage II pressure injury    Short term goals:  Regain skin integrity, pressure reduction, NPWT, daily dressing change    Soo Winchester RN    2/1/2023    15:31 EST

## 2023-02-01 NOTE — PLAN OF CARE
Goal Outcome Evaluation:  Plan of Care Reviewed With: patient           Outcome Evaluation: Patient presents with decreased strength, transfers and ambulation.  Skilled physical therapy services will be required to address these mobility deficits.  Recommend follow up with HHPT services upon d/c from the \[facility]\""iatl

## 2023-02-01 NOTE — PLAN OF CARE
Goal Outcome Evaluation:      Pt stable since arrival to the unit following. Pt S/P right total knee replacement. Wound vac in place to right knee, dry and intact. Pain well controlled. ETCO2 monitor in place. Pt ambulated with 1 assist and walker.

## 2023-02-02 PROBLEM — S81.009A OPEN KNEE WOUND: Status: ACTIVE | Noted: 2023-02-02

## 2023-02-02 LAB
GLUCOSE BLDC GLUCOMTR-MCNC: 111 MG/DL (ref 70–99)
GLUCOSE BLDC GLUCOMTR-MCNC: 62 MG/DL (ref 70–99)
GLUCOSE BLDC GLUCOMTR-MCNC: 76 MG/DL (ref 70–99)
GLUCOSE BLDC GLUCOMTR-MCNC: 84 MG/DL (ref 70–99)
GLUCOSE BLDC GLUCOMTR-MCNC: 86 MG/DL (ref 70–99)

## 2023-02-02 PROCEDURE — 82962 GLUCOSE BLOOD TEST: CPT

## 2023-02-02 PROCEDURE — 94799 UNLISTED PULMONARY SVC/PX: CPT

## 2023-02-02 PROCEDURE — 99232 SBSQ HOSP IP/OBS MODERATE 35: CPT | Performed by: INTERNAL MEDICINE

## 2023-02-02 PROCEDURE — A9270 NON-COVERED ITEM OR SERVICE: HCPCS | Performed by: ORTHOPAEDIC SURGERY

## 2023-02-02 PROCEDURE — G0378 HOSPITAL OBSERVATION PER HR: HCPCS

## 2023-02-02 PROCEDURE — 63710000001 ROPINIROLE 0.25 MG TABLET: Performed by: INTERNAL MEDICINE

## 2023-02-02 PROCEDURE — 97535 SELF CARE MNGMENT TRAINING: CPT

## 2023-02-02 PROCEDURE — A9270 NON-COVERED ITEM OR SERVICE: HCPCS | Performed by: INTERNAL MEDICINE

## 2023-02-02 PROCEDURE — 97110 THERAPEUTIC EXERCISES: CPT

## 2023-02-02 PROCEDURE — 63710000001 HYDROCODONE-ACETAMINOPHEN 7.5-325 MG TABLET: Performed by: ORTHOPAEDIC SURGERY

## 2023-02-02 PROCEDURE — 63710000001 GABAPENTIN 400 MG CAPSULE: Performed by: INTERNAL MEDICINE

## 2023-02-02 PROCEDURE — 97150 GROUP THERAPEUTIC PROCEDURES: CPT

## 2023-02-02 PROCEDURE — 63710000001 ASPIRIN EC 325 MG TABLET DELAYED-RELEASE: Performed by: ORTHOPAEDIC SURGERY

## 2023-02-02 PROCEDURE — 63710000001 SENNOSIDES-DOCUSATE 8.6-50 MG TABLET: Performed by: ORTHOPAEDIC SURGERY

## 2023-02-02 PROCEDURE — 97116 GAIT TRAINING THERAPY: CPT

## 2023-02-02 PROCEDURE — 97605 NEG PRS WND THER DME<=50SQCM: CPT

## 2023-02-02 PROCEDURE — 97530 THERAPEUTIC ACTIVITIES: CPT

## 2023-02-02 PROCEDURE — 63710000001 ACETAMINOPHEN 500 MG TABLET: Performed by: INTERNAL MEDICINE

## 2023-02-02 RX ADMIN — ASPIRIN 325 MG: 325 TABLET, COATED ORAL at 09:52

## 2023-02-02 RX ADMIN — ROPINIROLE HYDROCHLORIDE 0.5 MG: 0.25 TABLET, FILM COATED ORAL at 20:51

## 2023-02-02 RX ADMIN — GABAPENTIN 800 MG: 400 CAPSULE ORAL at 06:16

## 2023-02-02 RX ADMIN — GABAPENTIN 800 MG: 400 CAPSULE ORAL at 14:59

## 2023-02-02 RX ADMIN — ACETAMINOPHEN 1000 MG: 500 TABLET ORAL at 15:00

## 2023-02-02 RX ADMIN — HYDROCODONE BITARTRATE AND ACETAMINOPHEN 2 TABLET: 7.5; 325 TABLET ORAL at 20:52

## 2023-02-02 RX ADMIN — HYDROCODONE BITARTRATE AND ACETAMINOPHEN 1 TABLET: 7.5; 325 TABLET ORAL at 06:16

## 2023-02-02 RX ADMIN — GABAPENTIN 800 MG: 400 CAPSULE ORAL at 22:37

## 2023-02-02 RX ADMIN — HYDROCODONE BITARTRATE AND ACETAMINOPHEN 2 TABLET: 7.5; 325 TABLET ORAL at 09:58

## 2023-02-02 RX ADMIN — SENNOSIDES AND DOCUSATE SODIUM 2 TABLET: 8.6; 5 TABLET ORAL at 22:36

## 2023-02-02 NOTE — PLAN OF CARE
Goal Outcome Evaluation:  Plan of Care Reviewed With: patient        Progress: no change  Outcome Evaluation: Pt ambulated 150 feet with one person assistance. Medicated once for pain with relief noted. Wound vac in place, suction secure. Requests sent to [facility] and [facility]. No new changes overnight

## 2023-02-02 NOTE — SIGNIFICANT NOTE
Wound Eval / Progress Noted     Radha     Patient Name: Marilin Martinez  : 1955  MRN: [id number]  Today's Date: 2023                 Admit Date: 2023    Visit Dx:    ICD-10-CM ICD-9-CM   1. Decreased activities of daily living (ADL)  Z78.9 V49.89   2. Difficulty in walking  R26.2 719.7       Patient Active Problem List   Diagnosis   • Environmental and seasonal allergies   • Essential hypertension   • Sleep apnea   • Multiple joint pain   • Hypothyroid   • Diabetes mellitus type 2 in obese ([facility])   • Dyslipidemia   • S/P laparoscopic sleeve gastrectomy   • Primary osteoarthritis of right knee   • Primary osteoarthritis of left knee   • Candidal intertrigo   • Excessive and redundant skin and subcutaneous tissue   • Obesity, Class II, BMI 35-39.9   • Osteoarthrosis, localized, primary, knee, right        Past Medical History:   Diagnosis Date   • Arthritis     KNEE PAIN   • Back pain    • Diabetes mellitus ([facility])    • Hyperlipemia    • Hypertension     managed by cardiologist   • Knee pain, bilateral    • Neuropathy    • OAB (overactive bladder)    • Seasonal allergies    • Sleep apnea     CPAP   • 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[facility])     no residual   • Thyroid disorder         Past Surgical History:   Procedure Laterality Date   • COLONOSCOPY     • ENDOSCOPY N/A 2021    Procedure: ESOPHAGOGASTRODUODENOSCOPY WITH COLD BIOPSY;  Surgeon: Mansi Kyle MD;  Location: [facility] ENDOSCOPY;  Service: General;  Laterality: N/A;  PRE: SCREENING FOR BARIATRICS  POST: MODERATE SIZE HIATAL HERNIA   • GALLBLADDER SURGERY     • GASTRIC SLEEVE LAPAROSCOPIC N/A 2022    Procedure: GASTRIC SLEEVE LAPAROSCOPIC With VENTRALHernia Repair AND PARAESOPHAGEAL HERNIA REPAIR AND LYSIS OF ADHESIONS PARTICAL OMENTECTOMY;  Surgeon: Quang Shipley Jr., MD;  Location: [facility] OR [facility];  Service: Bariatric;  Laterality: N/A;   • TONSILLECTOMY     • TOTAL KNEE ARTHROPLASTY Right 2023    Procedure: RIGHT TOTAL KNEE  ARTHROPLASTY WITH DANITA ROBOT AND SHIREEN;  Surgeon: Murray Calvillo MD;  Location: [facility] MAIN OR;  Service: Robotics - Ortho;  Laterality: Right;   • TUBAL ABDOMINAL LIGATION           Physical Assessment:  Wound 01/31/23 1112 Left gluteal Pressure Injury (Active)   Wound Image   02/01/23 1230   Pressure Injury Stage 2 02/01/23 1230   Dressing Appearance intact;dry 02/01/23 1230   Closure Adhesive bandage 02/01/23 1920   Base non-blanchable;red;moist 02/01/23 1920   Red (%), Wound Tissue Color 100 02/01/23 1230   Periwound intact;dry 02/01/23 1920   Periwound Temperature warm 02/01/23 1920   Periwound Skin Turgor soft 02/01/23 1920   Edges open 02/01/23 1230   Wound Length (cm) 1.5 cm 02/01/23 1230   Wound Width (cm) 1 cm 02/01/23 1230   Wound Depth (cm) 0.2 cm 02/01/23 1230   Wound Surface Area (cm^2) 1.5 cm^2 02/01/23 1230   Wound Volume (cm^3) 0.3 cm^3 02/01/23 1230   Drainage Amount none 02/01/23 1230   Care, Wound cleansed with;sterile normal saline 02/01/23 1230   Dressing Care dressing applied;silicone;border dressing 02/01/23 1230   Periwound Care absorptive dressing applied 02/01/23 1230       Wound 01/31/23 1426 Right anterior knee Incision (Active)   Dressing Appearance moist drainage;intact 02/02/23 0910   Closure AMALIA 02/02/23 0910   Base dressing in place, unable to visualize 02/02/23 0910   Periwound Temperature warm 02/01/23 1230   Periwound Skin Turgor soft 02/01/23 1230   Edges rolled/closed 02/01/23 1230   Drainage Characteristics/Odor sanguineous 02/02/23 0910   Drainage Amount small 02/02/23 0910   Care, Wound negative pressure wound therapy 02/02/23 0910   Dressing Care other (see comments);dressing reinforced 02/01/23 1230       NPWT (Negative Pressure Wound Therapy) 01/31/23 1530 Right knee (Active)   Therapy Setting continuous therapy 02/02/23 0910   Dressing other (see comments) 02/02/23 0910   Pressure Setting 125 mmHg 02/02/23 0910      Wound Check / Follow-up: Patient seen today for a  wound vac check. RN reports that there has not been any further need to change the canister; drainage still present but has decreased. No alarms noted at this time.     Impression: incisional wound vac    Short term goals:  Regain skin integrity, skin protection, NPWT    Soo Winchester RN    2/2/2023    11:42 EST

## 2023-02-02 NOTE — THERAPY TREATMENT NOTE
Patient Name: Marilin Martinez  : 1955    MRN: [id number]                              Today's Date: 2023       Admit Date: 2023    Visit Dx:     ICD-10-CM ICD-9-CM   1. Decreased activities of daily living (ADL)  Z78.9 V49.89   2. Difficulty in walking  R26.2 719.7     Patient Active Problem List   Diagnosis   • Environmental and seasonal allergies   • Essential hypertension   • Sleep apnea   • Multiple joint pain   • Hypothyroid   • Diabetes mellitus type 2 in obese ([facility])   • Dyslipidemia   • S/P laparoscopic sleeve gastrectomy   • Primary osteoarthritis of right knee   • Primary osteoarthritis of left knee   • Candidal intertrigo   • Excessive and redundant skin and subcutaneous tissue   • Obesity, Class II, BMI 35-39.9   • Osteoarthrosis, localized, primary, knee, right     Past Medical History:   Diagnosis Date   • Arthritis     KNEE PAIN   • Back pain    • Diabetes mellitus ([facility])    • Hyperlipemia    • Hypertension     managed by cardiologist   • Knee pain, bilateral    • Neuropathy    • OAB (overactive bladder)    • Seasonal allergies    • Sleep apnea     CPAP   • 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[facility])     no residual   • Thyroid disorder      Past Surgical History:   Procedure Laterality Date   • COLONOSCOPY     • ENDOSCOPY N/A 2021    Procedure: ESOPHAGOGASTRODUODENOSCOPY WITH COLD BIOPSY;  Surgeon: Mansi Kyle MD;  Location: [facility] ENDOSCOPY;  Service: General;  Laterality: N/A;  PRE: SCREENING FOR BARIATRICS  POST: MODERATE SIZE HIATAL HERNIA   • GALLBLADDER SURGERY     • GASTRIC SLEEVE LAPAROSCOPIC N/A 2022    Procedure: GASTRIC SLEEVE LAPAROSCOPIC With VENTRALHernia Repair AND PARAESOPHAGEAL HERNIA REPAIR AND LYSIS OF ADHESIONS PARTICAL OMENTECTOMY;  Surgeon: Quang Shipley Jr., MD;  Location: [facility] OR [facility];  Service: Bariatric;  Laterality: N/A;   • TONSILLECTOMY     • TOTAL KNEE ARTHROPLASTY Right 2023    Procedure: RIGHT TOTAL KNEE ARTHROPLASTY WITH DANITA ROBOT AND  SHIREEN;  Surgeon: Murray Calvillo MD;  Location: [facility] MAIN OR;  Service: Robotics - Ortho;  Laterality: Right;   • TUBAL ABDOMINAL LIGATION        General Information     Row Name 02/02/23 1353          OT Time and Intention    Document Type therapy note (daily note)  -     Mode of Treatment individual therapy;occupational therapy  -           User Key  (r) = Recorded By, (t) = Taken By, (c) = Cosigned By    Initials Name Provider Type     Court Jarquin OT Occupational Therapist                 Mobility/ADL's     Row Name 02/02/23 1353          Transfers    Transfers sit-stand transfer;stand-sit transfer;toilet transfer  -     Row Name 02/02/23 1353          Sit-Stand Transfer    Sit-Stand [place] (Transfers) contact guard  -     Assistive Device (Sit-Stand Transfers) walker, front-wheeled  -     Row Name 02/02/23 1353          Stand-Sit Transfer    Stand-Sit [place] (Transfers) contact guard  -     Assistive Device (Stand-Sit Transfers) walker, front-wheeled  -     Row Name 02/02/23 [facility]3          Toilet Transfer    Type (Toilet Transfer) sit-stand;stand-sit  -     [place] Level (Toilet Transfer) contact guard  -     Assistive Device (Toilet Transfer) raised toilet seat;grab bars/safety frame  -     Row Name 02/02/23 1353          Functional Mobility    Functional Mobility- Ind. Level contact guard assist  -     Functional Mobility- Device walker, front-wheeled  -LF     Functional Mobility-Distance (Feet) 30  -     Functional Mobility- Comment Patient performed functional mobility from recliner to bathroom in preparation with CGA using RW.  -     Row Name 02/02/23 1353          Activities of Daily Living    BADL Assessment/Intervention bathing;upper body dressing;lower body dressing;grooming;toileting  -     Row Name 02/02/23 1353          Mobility    Extremity Weight-bearing Status right lower extremity  -LF     Right Lower Extremity (Weight-bearing Status)  weight-bearing as tolerated (WBAT)  -     Row Name 02/02/23 1353          Upper Body Dressing Assessment/Training    [place] Level (Upper Body Dressing) upper body dressing skills;doff;don;pull-over garment;set up  -LF     Position (Upper Body Dressing) unsupported sitting  -     Row Name 02/02/23 1353          Lower Body Dressing Assessment/Training    [place] Level (Lower Body Dressing) lower body dressing skills;doff;don;pants/bottoms;minimum assist (75% patient effort)  -LF     Position (Lower Body Dressing) unsupported sitting;supported standing  -LF     Comment, (Lower Body Dressing) Educated patient on lower body adaptive dressing technique, verified learning via teachback.  -     Row Name 02/02/23 [facility]3          Bathing Assessment/Intervention    [place] Level (Bathing) bathing skills;upper body;set up;lower body;minimum assist (75% patient effort);contact guard assist  -     Row Name 02/02/23 [facility]3          Grooming Assessment/Training    [place] Level (Grooming) grooming skills;hair care, combing/brushing;oral care regimen;wash face, hands;set up  -LF     Position (Grooming) unsupported sitting  -     Row Name 02/02/23 [facility]3          Toileting Assessment/Training    [place] Level (Toileting) toileting skills;adjust/manage clothing;perform perineal hygiene;minimum assist (75% patient effort);contact guard assist  -LF     Position (Toileting) unsupported sitting;supported standing  -LF           User Key  (r) = Recorded By, (t) = Taken By, (c) = Cosigned By    Initials Name Provider Type     Court Jarquin OT Occupational Therapist               Obj/Interventions     Row Name 02/02/23 [facility]6          Motor Skills    Motor Skills functional endurance  -LF     Functional Endurance Fair  -     Row Name 02/02/23 [facility]6          Balance    Balance Assessment sitting dynamic balance;standing dynamic balance  -LF     Dynamic Sitting Balance independent  -LF     Position, Sitting  Balance unsupported;sitting in chair  -LF     Dynamic Standing Balance contact guard  -     Position/Device Used, Standing Balance supported;walker, front-wheeled  -     Balance Interventions sitting;standing;sit to stand;supported;dynamic;occupation based/functional task;weight shifting activity  -           User Key  (r) = Recorded By, (t) = Taken By, (c) = Cosigned By    Initials Name Provider Type    Court Prater OT Occupational Therapist               Goals/Plan    No documentation.                Clinical Impression     Row Name 02/02/23 1358          Pain Assessment    Additional Documentation Pain Scale: FACES Pre/Post-Treatment (Group)  -     Row Name 02/02/23 1356          Pain Scale: FACES Pre/Post-Treatment    Pain: FACES Scale, Pretreatment 4-->hurts little more  -LF     Posttreatment Pain Rating 4-->hurts little more  -LF     Row Name 02/02/23 1350          Plan of Care Review    Plan of Care Reviewed With patient  -     Progress improving  -     Outcome Evaluation Caroline completed all functional transfers/mobility with CGA and LB dressing with min assist this session. She would benefit from continued OT services to maximize independence.  -     Row Name 02/02/23 135          Vital Signs    O2 Delivery Pre Treatment room air  -LF     O2 Delivery Intra Treatment room air  -LF     O2 Delivery Post Treatment room air  -LF           User Key  (r) = Recorded By, (t) = Taken By, (c) = Cosigned By    Initials Name Provider Type    Court Prater OT Occupational Therapist               Outcome Measures     Row Name 02/02/23 4790          How much help from another is currently needed...    Putting on and taking off regular lower body clothing? 3  -LF     Bathing (including washing, rinsing, and drying) 3  -LF     Toileting (which includes using toilet bed pan or urinal) 3  -LF     Putting on and taking off regular upper body clothing 4  -LF     Taking care of personal grooming  (such as brushing teeth) 4  -LF     Eating meals 4  -LF     AM-PAC 6 Clicks Score (OT) 21  -LF     Row Name 02/02/23 1200          How much help from another person do you currently need...    Turning from your back to your side while in flat bed without using bedrails? 3  -RH     Moving from lying on back to sitting on the side of a flat bed without bedrails? 3  -RH     Moving to and from a bed to a chair (including a wheelchair)? 3  -RH     Standing up from a chair using your arms (e.g., wheelchair, bedside chair)? 3  -RH     Climbing 3-5 steps with a railing? 3  -RH     To walk in hospital room? 3  -RH     AM-PAC 6 Clicks Score (PT) 18  -RH     Highest level of mobility 6 --> Walked 10 steps or more  -RH     Row Name 02/02/23 1358          Functional Assessment    Outcome Measure Options AM-PAC 6 Clicks Daily Activity (OT);Optimal Instrument  -LF     Row Name 02/02/23 1358          Optimal Instrument    Optimal Instrument Optimal - 3  -LF     Bending/Stooping 2  -LF     Standing 2  -LF     Reaching 1  -LF     From the list, choose the 3 activities you would most like to be able to do without any difficulty Bending/stooping;Standing;Reaching  -LF     Total Score Optimal - 3 5  -LF           User Key  (r) = Recorded By, (t) = Taken By, (c) = Cosigned By    Initials Name Provider Type    RH Geo Sanchez, PTA Physical Therapist Assistant    LF Court Jarquin, OT Occupational Therapist                Occupational Therapy Education     Title: PT OT SLP Therapies (Done)     Topic: Occupational Therapy (Done)     Point: ADL training (Done)     Description:   Instruct learner(s) on proper safety adaptation and remediation techniques during self care or transfers.   Instruct in proper use of assistive devices.              Learning Progress Summary           Patient Acceptance, E,TB,D, VU,DU by  at 2/1/2023 0907                   Point: Home exercise program (Done)     Description:   Instruct learner(s) on appropriate  technique for monitoring, assisting and/or progressing therapeutic exercises/activities.              Learning Progress Summary           Patient Acceptance, E,TB,D, VU,DU by  at 2/1/2023 0907                   Point: Precautions (Done)     Description:   Instruct learner(s) on prescribed precautions during self-care and functional transfers.              Learning Progress Summary           Patient Acceptance, E,TB,D, VU,DU by  at 2/1/2023 0907                   Point: Body mechanics (Done)     Description:   Instruct learner(s) on proper positioning and spine alignment during self-care, functional mobility activities and/or exercises.              Learning Progress Summary           Patient Acceptance, E,TB,D, VU,DU by  at 2/1/2023 0907                               User Key     Initials Effective Dates Name Provider Type [facility] 06/16/21 -  Marcela Montiel OT Occupational Therapist OT              OT Recommendation and Plan     Plan of Care Review  Plan of Care Reviewed With: patient  Progress: improving  Outcome Evaluation: Caroline completed all functional transfers/mobility with CGA and LB dressing with min assist this session. She would benefit from continued OT services to maximize independence.     Time Calculation:    Time Calculation- OT     Row Name 02/02/23 1359 02/02/23 1211          Time Calculation- OT    OT Received On 02/02/23  -LF --     OT Goal Re-Cert Due Date 02/10/23  -LF --        Timed Charges    97806 - Gait Training Minutes  -- 7  -RH     87257 - OT Therapeutic Activity Minutes 12  -LF --     26497 - OT Self Care/Mgmt Minutes 18  -LF --        Total Minutes    Timed Charges Total Minutes 30  -LF 7  -RH      Total Minutes 30  -LF 7  -RH           User Key  (r) = Recorded By, (t) = Taken By, (c) = Cosigned By    Initials Name Provider Type     Geo Sanchez PTA Physical Therapist Assistant     Court Jarquin OT Occupational Therapist              Therapy Charges for Today      Code Description Service Date Service Provider Modifiers Qty    75085043529 HC OT THERAPEUTIC ACT EA 15 MIN 2/2/2023 Court Jarquin OT GO 1    61324439551 HC OT SELF CARE/MGMT/TRAIN EA 15 MIN 2/2/2023 Court Jarquin OT GO 1               Court Jarquin OT  2/2/2023

## 2023-02-02 NOTE — THERAPY TREATMENT NOTE
Acute Care - Physical Therapy Treatment Note   Radha     Patient Name: Marilin Martinez  : 1955  MRN: [id number]  Today's Date: 2023 Gait- individual; ther- ex -group setting; 5 participants         Visit Dx:     ICD-10-CM ICD-9-CM   1. Decreased activities of daily living (ADL)  Z78.9 V49.89   2. Difficulty in walking  R26.2 719.7     Patient Active Problem List   Diagnosis   • Environmental and seasonal allergies   • Essential hypertension   • Sleep apnea   • Multiple joint pain   • Hypothyroid   • Diabetes mellitus type 2 in obese (HCC)   • Dyslipidemia   • S/P laparoscopic sleeve gastrectomy   • Primary osteoarthritis of right knee   • Primary osteoarthritis of left knee   • Candidal intertrigo   • Excessive and redundant skin and subcutaneous tissue   • Obesity, Class II, BMI 35-39.9   • Osteoarthrosis, localized, primary, knee, right     Past Medical History:   Diagnosis Date   • Arthritis     KNEE PAIN   • Back pain    • Diabetes mellitus ([facility])    • Hyperlipemia    • Hypertension     managed by cardiologist   • Knee pain, bilateral    • Neuropathy    • OAB (overactive bladder)    • Seasonal allergies    • Sleep apnea     CPAP   • 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[facility])     no residual   • Thyroid disorder      Past Surgical History:   Procedure Laterality Date   • COLONOSCOPY     • ENDOSCOPY N/A 2021    Procedure: ESOPHAGOGASTRODUODENOSCOPY WITH COLD BIOPSY;  Surgeon: Mansi Kyle MD;  Location: [facility] ENDOSCOPY;  Service: General;  Laterality: N/A;  PRE: SCREENING FOR BARIATRICS  POST: MODERATE SIZE HIATAL HERNIA   • GALLBLADDER SURGERY     • GASTRIC SLEEVE LAPAROSCOPIC N/A 2022    Procedure: GASTRIC SLEEVE LAPAROSCOPIC With VENTRALHernia Repair AND PARAESOPHAGEAL HERNIA REPAIR AND LYSIS OF ADHESIONS PARTICAL OMENTECTOMY;  Surgeon: Quang Shipley Jr., MD;  [place] [facility] [place] [facility]  Service: Bariatric;  Laterality: N/A;   • TONSILLECTOMY     • TOTAL KNEE ARTHROPLASTY Right  1/31/2023    Procedure: RIGHT TOTAL KNEE ARTHROPLASTY WITH DANITA ROBOT AND SHIREEN;  Surgeon: Murray Calvillo MD;  Location: [facility] MAIN OR;  Service: Robotics - Ortho;  Laterality: Right;   • TUBAL ABDOMINAL LIGATION       PT Assessment (last 12 hours)     PT Evaluation and Treatment     Row Name 02/02/23 1212          Physical Therapy Time and Intention    Subjective Information complains of;weakness;pain  -     Document Type therapy note (daily note)  -     Mode of Treatment physical therapy;group therapy;individual therapy  -     Patient Effort fair  -     Row Name 02/02/23 1212          Pain Scale: FACES Pre/Post-Treatment    Pain: FACES Scale, Pretreatment 2-->hurts little bit  -     Posttreatment Pain Rating 2-->hurts little bit  -     Row Name 02/02/23 1212          Range of Motion (ROM)    Range of Motion --  Pt R knee AAROM at 86 degrees flex and 9 degrees ext.  -     Row Name 02/02/23 1212          Strength (Manual Muscle Testing)    Strength (Manual Muscle Testing) --  Pt R knee ext strength at 3-/5.  -     Row Name 02/02/23 1212          Transfers    Transfers sit-stand transfer;stand-sit transfer  -[facility] Name 02/02/23 1212          Sit-Stand Transfer    Sit-Stand [place] (Transfers) contact guard  -     Assistive Device (Sit-Stand Transfers) walker, front-wheeled  -     Row Name 02/02/23 1212          Stand-Sit Transfer    Stand-Sit [place] (Transfers) contact guard  -     Assistive Device (Stand-Sit Transfers) walker, front-wheeled  -     Row Name 02/02/23 1212          Gait/Stairs (Locomotion)    Gait/Stairs Locomotion gait/ambulation independence;gait/ambulation assistive device;distance ambulated;gait pattern;gait deviations  -     [place] Level (Gait) contact guard  -     Assistive Device (Gait) walker, front-wheeled  -     Distance in Feet (Gait) 75  -RH     Pattern (Gait) 3-point;step-through  -     Deviations/Abnormal Patterns (Gait) base of  support, wide;antalgic;gait speed decreased;stride length decreased  -RH     Bilateral Gait Deviations forward flexed posture;heel strike decreased  -RH     Gait Assessment/Intervention Pt amb with RW and CGA with cues to increase RLE heel strike  -RH     Row Name 02/02/23 1212          Balance    Dynamic Standing Balance contact guard  -RH     Position/Device Used, Standing Balance walker, front-wheeled  -RH     Row Name             Wound 01/31/23 1112 Left gluteal Pressure Injury    Wound - Properties Group Placement Date: 01/31/23  -EG Placement Time: 1112  -EG Present on Hospital Admission: Y  -EG Side: Left  -EG Location: gluteal  -EG Primary Wound Type: Pressure inj  -EG Additional Comments: PT STATES SHE HAS PRESSURE SORE TO LEFT GLUTEAL AREA DRESSING D/I STATES HOME HEALTH NURSE APPLIED TO AREA YESTERDAY.  NOTED HEALED SCAR TO RIGHT GLUTEAL AREA PT STATES SHE HAD PREVIOUS PRESSURE SORE THERE.  -EG    Retired Wound - Properties Group Placement Date: 01/31/23  -EG Placement Time: 1112  -EG Present on Hospital Admission: Y  -EG Side: Left  -EG Location: gluteal  -EG Primary Wound Type: Pressure inj  -EG Additional Comments: PT STATES SHE HAS PRESSURE SORE TO LEFT GLUTEAL AREA DRESSING D/I STATES HOME HEALTH NURSE APPLIED TO AREA YESTERDAY.  NOTED HEALED SCAR TO RIGHT GLUTEAL AREA PT STATES SHE HAD PREVIOUS PRESSURE SORE THERE.  -EG    Retired Wound - Properties Group Date first assessed: 01/31/23  -EG Time first assessed: 1112  -EG Present on Hospital Admission: Y  -EG Side: Left  -EG Location: gluteal  -EG Primary Wound Type: Pressure inj  -EG Additional Comments: PT STATES SHE HAS PRESSURE SORE TO LEFT GLUTEAL AREA DRESSING D/I STATES HOME HEALTH NURSE APPLIED TO AREA YESTERDAY.  NOTED HEALED SCAR TO RIGHT GLUTEAL AREA PT STATES SHE HAD PREVIOUS PRESSURE SORE THERE.  -EG    Row Name             Wound 01/31/23 1426 Right anterior knee Incision    Wound - Properties Group Placement Date: 01/31/23  -EGA  Placement Time: 1426  -EGA Present on Hospital Admission: N  -EGA Side: Right  -EGA Orientation: anterior  -EGA Location: knee  -EGA Primary Wound Type: Incision  -EGA    Retired Wound - Properties Group Placement Date: 01/31/23  -EGA Placement Time: 1426  -EGA Present on Hospital Admission: N  -EGA Side: Right  -EGA Orientation: anterior  -EGA Location: knee  -EGA Primary Wound Type: Incision  -EGA    Retired Wound - Properties Group Date first assessed: 01/31/23  -EGA Time first assessed: 1426  -EGA Present on Hospital Admission: N  -EGA Side: Right  -EGA Location: knee  -EGA Primary Wound Type: Incision  -EGA    Row Name             NPWT (Negative Pressure Wound Therapy) 01/31/23 1530 Right knee    NPWT (Negative Pressure Wound Therapy) - Properties Group Placement Date: 01/31/23  -MB (r) MW (t) Placement Time: 1530  -MB (r) MW (t) Location: Right knee  -MB (r) MW (t)    Retired NPWT (Negative Pressure Wound Therapy) - Properties Group Placement Date: 01/31/23  -MB (r) MW (t) Placement Time: 1530  -MB (r) MW (t) Location: Right knee  -MB (r) MW (t)    Retired NPWT (Negative Pressure Wound Therapy) - Properties Group Placement Date: 01/31/23  -MB (r) MW (t) Placement Time: 1530  -MB (r) MW (t) Location: Right knee  -MB (r) MW (t)    Row Name 02/02/23 1212          Progress Summary (PT)    Progress Toward Functional Goals (PT) progress toward functional goals is fair  -RH           User Key  (r) = Recorded By, (t) = Taken By, (c) = Cosigned By    Initials Name Provider Type    EG Bree Gonzalez, RN Registered Nurse    Geo Hugo, ANAHI Physical Therapist Assistant    Bita Marsh RN Registered Nurse    Jennyfer Carey, RN Registered Nurse                Right Knee Ther-ex   Exercise  Reps  Sets    Long arc Quads   10 2   Short arc Quads  10 2   Heel Slides  10 2   Ankle Pumps  10 2   Quad sets  10 2   Glut sets  10 2   Straight leg raise  10 2          Physical Therapy Education     Title: PT OT SLP  Therapies (Done)     Topic: Physical Therapy (Done)     Point: Mobility training (Done)     Learning Progress Summary           Patient Acceptance, E,TB, VU by JUAN at 2/1/2023 1120                   Point: Precautions (Done)     Learning Progress Summary           Patient Acceptance, E,TB, VU by JUAN at 2/1/2023 1120                               User Key     Initials Effective Dates Name Provider Type Discipline    JUAN 06/03/21 -  Ben Lentz, PT Physical Therapist PT              PT Recommendation and Plan     Progress Summary (PT)  Progress Toward Functional Goals (PT): progress toward functional goals is fair   Outcome Measures     Row Name 02/02/23 1200 02/01/23 1100          How much help from another person do you currently need...    Turning from your back to your side while in flat bed without using bedrails? 3  -RH 3  -JUAN     Moving from lying on back to sitting on the side of a flat bed without bedrails? 3  -RH 3  -JUAN     Moving to and from a bed to a chair (including a wheelchair)? 3  -RH 3  -JUAN     Standing up from a chair using your arms (e.g., wheelchair, bedside chair)? 3  -RH 3  -JUAN     Climbing 3-5 steps with a railing? 3  -RH 3  -JUAN     To walk in hospital room? 3  -RH 3  -JUAN     AM-PAC 6 Clicks Score (PT) 18  -RH 18  -JUAN        Functional Assessment    Outcome Measure Options -- AM-PAC 6 Clicks Basic Mobility (PT)  -JUAN           User Key  (r) = Recorded By, (t) = Taken By, (c) = Cosigned By    Initials Name Provider Type    RH Geo Sanchez, ANAHI Physical Therapist Assistant    JUANBen Dhaliwal, PT Physical Therapist                 Time Calculation:    PT Charges     Row Name 02/02/23 1211             Time Calculation    PT Received On 02/02/23  -RH         Timed Charges    49373 - Gait Training Minutes  7  -RH      48734 - PT Therapeutic Activity Minutes 5  -RH         Untimed Charges    PT Group Therapy Minutes 30  -RH         Total Minutes    Timed Charges Total Minutes 12  -RH      Untimed  Charges Total Minutes 30  -RH       Total Minutes 42  -RH            User Key  (r) = Recorded By, (t) = Taken By, (c) = Cosigned By    Initials Name Provider Type     Geo Sanchez PTA Physical Therapist Assistant              Therapy Charges for Today     Code Description Service Date Service Provider Modifiers Qty    97322873853 HC PT THER PROC GROUP 2/1/2023 Geo Sanchez, ANAHI GP 1    00997938544 HC GAIT TRAINING EA 15 MIN 2/1/2023 Geo Sanchez, ANAHI GP 1    08755261823 HC GAIT TRAINING EA 15 MIN 2/1/2023 Geo Sanchez, [facility] GP 1    50606314936 HC PT THER PROC GROUP 2/1/2023 Geo Sanchez, ANAHI GP 1    68891874970 HC GAIT TRAINING EA 15 MIN 2/2/2023 Geo Sanchez, ANAHI GP 1    64633970034 HC PT THER PROC GROUP 2/2/2023 Geo Sanchez, ANAHI GP 1          PT G-Codes  Outcome Measure Options: AM-PAC 6 Clicks Basic Mobility (PT)  AM-PAC 6 Clicks Score (PT): 18  AM-PAC 6 Clicks Score (OT): 21    Geo Sanchez PTA  2/2/2023

## 2023-02-02 NOTE — PLAN OF CARE
Goal Outcome Evaluation:      Pt stable throughout the shift, blood pressure and heart rate continues to run low at times. EKG obtained and reviewed by primary MD. Prevena canister filled up, wound RN assessed at bedside and canister changed. Ortho MD also made aware. Pain controlled with oral pain meds. Pt ambulating well with 1x assist and walker. Voiding without difficulty. Pending rehab placement.

## 2023-02-02 NOTE — PROGRESS NOTES
[facility]   Hospitalist Progress Note  Date: 2023  Patient Name: Marilin Martinez  : 1955  MRN: [id number]  Date of admission: 2023      Subjective   Subjective     Chief Complaint: Diabetes and hypertension    Summary: 68 y.o. female past medical history of obesity with BMI of 38, osteoarthritis, diabetes, neuropathy, stroke, and hypothyroidism who is being admitted status post right knee replacement.  The patient's procedure went well but they did have to place a wound VAC on her right knee due to inability to close the skin.      Interval Followup: No acute events overnight.  Doing well with physical therapy.  After reevaluation, she does hope she can go home with home health but she does not have reliable help at home and is requesting a few more days in the hospital to continue to work with therapy.  Pain is currently fairly well controlled.     Review of Systems   Denies fevers or chest pain     Objective   Objective     Vitals:   Temp:  [97.3 °F (36.3 °C)-98.6 °F (37 °C)] 98 °F (36.7 °C)  Heart Rate:  [52-69] 60  Resp:  [16-18] 18  BP: (106-132)/(43-68) 127/59  Physical Exam    Constitutional: Awake, alert, no acute distress   Respiratory: Clear to auscultation bilaterally, nonlabored respirations    Cardiovascular: Regular rate and rhythm, no MRG   Gastrointestinal: Positive bowel sounds, soft, nontender, nondistended   Neurologic: Oriented x 3, wound VAC in place right knee, expected postop ROM of right leg, Cranial Nerves grossly intact to confrontation, speech clear    Result Review    Result Review:  I have personally reviewed the results below:  []  Laboratory  []  Microbiology  []  Radiology  []  EKG/Telemetry   []  Cardiology/Vascular   []  Pathology  []  Old records  []  Other:  CBC    CBC 3/23/22 1/13/23 2/1/23   WBC 7.61 8.95 14.09 (A)   RBC 4.34 4.07 3.61 (A)   Hemoglobin 13.9 13.5 12.2   Hematocrit 42.2 40.7 35.4   MCV 97.2 (A) 100.0 (A) 98.1 (A)   MCH 32.0 33.2 (A)  33.8 (A)   MCHC 32.9 33.2 34.5   RDW 12.6 13.3 12.3   Platelets 172 176 113 (A)   (A) Abnormal value            CMP    CMP 2/15/22 3/23/22 1/13/23   Glucose 121 (A) 97 83   BUN 13 13 18   Creatinine 0.79 1.00 0.88   eGFR Non African Am 73     eGFR  62.6 72.6   Sodium 137 142 139   Potassium 3.9 4.3 4.4   Chloride 101 105 106   Calcium 9.3 9.6 9.8   Total Protein 7.0 7.1 7.0   Albumin 3.70 4.30 3.9   Globulin 3.3 2.8 3.1   Total Bilirubin 0.3 0.5 0.5   Alkaline Phosphatase 48 56 58   AST (SGOT) 35 (A) 21 16   ALT (SGPT) 18 19 11   Albumin/Globulin Ratio 1.1 1.5 1.3   BUN/Creatinine Ratio 16.5 13.0 20.5   Anion Gap 9.0 10.0 7.3   (A) Abnormal value       Comments are available for some flowsheets but are not being displayed.             Assessment & Plan   Assessment / Plan     Assessment/Plan:  Asymptomatic bradycardia  Right knee replacement requiring wound VAC postop day 0  Hypertension  Type 2 diabetes  but most recent A1c is 5.1  Obesity  Hypothyroidism    Continue to monitor in the hospital for management of the above  Orthopedic surgery following, appreciate assistance  Heart rate improved, continue to avoid beta-blockers and antihypertensives  Continue wound VAC, will be set up for home health and wound care  Continue appropriate home medications  DC IV fluids  PT/OT consulted.  Plan is to discharge home with home health when she is working better with therapy  Trend renal function and electrolytes with a.m. BMP  Trend Hgb and WBC with a.m. CBC     Discussed plan with RN, orthopedic surgery    DVT prophylaxis:  Mechanical DVT prophylaxis orders are present.

## 2023-02-02 NOTE — THERAPY TREATMENT NOTE
Acute Care - Physical Therapy Treatment Note   Radha     Patient Name: Marilin Martinez  : 1955  MRN: [id number]  Today's Date: 2023      Visit Dx:     ICD-10-CM ICD-9-CM   1. Decreased activities of daily living (ADL)  Z78.9 V49.89   2. Difficulty in walking  R26.2 719.7     Patient Active Problem List   Diagnosis   • Environmental and seasonal allergies   • Essential hypertension   • Sleep apnea   • Multiple joint pain   • Hypothyroid   • Diabetes mellitus type 2 in obese ([facility])   • Dyslipidemia   • S/P laparoscopic sleeve gastrectomy   • Primary osteoarthritis of right knee   • Primary osteoarthritis of left knee   • Candidal intertrigo   • Excessive and redundant skin and subcutaneous tissue   • Obesity, Class II, BMI 35-39.9   • Osteoarthrosis, localized, primary, knee, right   • Open knee wound     Past Medical History:   Diagnosis Date   • Arthritis     KNEE PAIN   • Back pain    • Diabetes mellitus ([facility])    • Hyperlipemia    • Hypertension     managed by cardiologist   • Knee pain, bilateral    • Neuropathy    • OAB (overactive bladder)    • Seasonal allergies    • Sleep apnea     CPAP   • 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[facility]) 2017    no residual   • Thyroid disorder      Past Surgical History:   Procedure Laterality Date   • COLONOSCOPY     • ENDOSCOPY N/A 2021    Procedure: ESOPHAGOGASTRODUODENOSCOPY WITH COLD BIOPSY;  Surgeon: Mansi Kyle MD;  Location: [facility] ENDOSCOPY;  Service: General;  Laterality: N/A;  PRE: SCREENING FOR BARIATRICS  POST: MODERATE SIZE HIATAL HERNIA   • GALLBLADDER SURGERY     • GASTRIC SLEEVE LAPAROSCOPIC N/A 2022    Procedure: GASTRIC SLEEVE LAPAROSCOPIC With VENTRALHernia Repair AND PARAESOPHAGEAL HERNIA REPAIR AND LYSIS OF ADHESIONS PARTICAL OMENTECTOMY;  Surgeon: Quang Shipley Jr., MD;  Location: [facility] OR [facility];  Service: Bariatric;  Laterality: N/A;   • TONSILLECTOMY     • TOTAL KNEE ARTHROPLASTY Right 2023    Procedure: RIGHT TOTAL KNEE  ARTHROPLASTY WITH DANITA ROBOT AND SHIREEN;  Surgeon: Murray Calvillo MD;  Location: [facility] OR;  Service: Robotics - Ortho;  Laterality: Right;   • TUBAL ABDOMINAL LIGATION       PT Assessment (last 12 hours)     PT Evaluation and Treatment     Row Name 02/02/23 1523 02/02/23 1212       Physical Therapy Time and Intention    Subjective Information complains of;weakness;fatigue  - complains of;weakness;pain  -RH    Document Type therapy note (daily note)  - therapy note (daily note)  -    Mode of Treatment physical therapy;individual therapy  - physical therapy;group therapy;individual therapy  -    Patient Effort good  -RH fair  -    Row Name 02/02/23 1523 02/02/23 1212       Pain Scale: FACES Pre/Post-Treatment    Pain: FACES Scale, Pretreatment 2-->hurts little bit  -RH 2-->hurts little bit  -RH    Posttreatment Pain Rating 2-->hurts little bit  -RH 2-->hurts little bit  -RH    Row Name 02/02/23 1212          Range of Motion (ROM)    Range of Motion --  Pt R knee AAROM at 86 degrees flex and 9 degrees ext.  -     Row Name 02/02/23 1212          Strength (Manual Muscle Testing)    Strength (Manual Muscle Testing) --  Pt R knee ext strength at 3-/5.  -     Row Name 02/02/23 1523 02/02/23 1212       Transfers    Transfers sit-stand transfer;stand-sit transfer  - sit-stand transfer;stand-sit transfer  -    Row Name 02/02/23 1523 02/02/23 1212       Sit-Stand Transfer    Sit-Stand [place] (Transfers) contact guard  - contact guard  -    Assistive Device (Sit-Stand Transfers) walker, front-wheeled  - walker, front-wheeled  -    Row Name 02/02/23 1523 02/02/23 1212       Stand-Sit Transfer    Stand-Sit [place] (Transfers) contact guard  - contact guard  -    Assistive Device (Stand-Sit Transfers) walker, front-wheeled  - walker, front-wheeled  -    Row Name 02/02/23 1523 02/02/23 1212       Gait/Stairs (Locomotion)    Gait/Stairs Locomotion gait/ambulation  independence;gait/ambulation assistive device;distance ambulated;gait pattern;gait deviations  -RH gait/ambulation independence;gait/ambulation assistive device;distance ambulated;gait pattern;gait deviations  -RH    [place] Level (Gait) contact guard  -RH contact guard  -RH    Assistive Device (Gait) walker, front-wheeled  -RH walker, front-wheeled  -RH    Distance in Feet (Gait) 100  -RH 75  -RH    Pattern (Gait) 3-point;step-through  -RH 3-point;step-through  -RH    Deviations/Abnormal Patterns (Gait) gait speed decreased;stride length decreased;base of support, wide  -RH base of support, wide;antalgic;gait speed decreased;stride length decreased  -RH    Bilateral Gait Deviations forward flexed posture  -RH forward flexed posture;heel strike decreased  -RH    Gait Assessment/Intervention Pt amb with RW and CGA with cues for erect posture and proper gait pattern.  -RH Pt amb with RW and CGA with cues to increase RLE heel strike  -RH    Row Name 02/02/23 1523 02/02/23 1212       Balance    Dynamic Standing Balance contact guard  -RH contact guard  -RH    Position/Device Used, Standing Balance walker, front-wheeled  -RH walker, front-wheeled  -RH    Row Name             Wound 01/31/23 1112 Left gluteal Pressure Injury    Wound - Properties Group Placement Date: 01/31/23  -EG Placement Time: 1112  -EG Present on Hospital Admission: Y  -EG Side: Left  -EG Location: gluteal  -EG Primary Wound Type: Pressure inj  -EG Additional Comments: PT STATES SHE HAS PRESSURE SORE TO LEFT GLUTEAL AREA DRESSING D/I STATES HOME HEALTH NURSE APPLIED TO AREA YESTERDAY.  NOTED HEALED SCAR TO RIGHT GLUTEAL AREA PT STATES SHE HAD PREVIOUS PRESSURE SORE THERE.  -EG    Retired Wound - Properties Group Placement Date: 01/31/23  -EG Placement Time: 1112  -EG Present on Hospital Admission: Y  -EG Side: Left  -EG Location: gluteal  -EG Primary Wound Type: Pressure inj  -EG Additional Comments: PT STATES SHE HAS PRESSURE SORE TO LEFT  GLUTEAL AREA DRESSING D/I STATES HOME HEALTH NURSE APPLIED TO AREA YESTERDAY.  NOTED HEALED SCAR TO RIGHT GLUTEAL AREA PT STATES SHE HAD PREVIOUS PRESSURE SORE THERE.  -EG    Retired Wound - Properties Group Date first assessed: 01/31/23  -EG Time first assessed: 1112  -EG Present on Hospital Admission: Y  -EG Side: Left  -EG Location: gluteal  -EG Primary Wound Type: Pressure inj  -EG Additional Comments: PT STATES SHE HAS PRESSURE SORE TO LEFT GLUTEAL AREA DRESSING D/I STATES HOME HEALTH NURSE APPLIED TO AREA YESTERDAY.  NOTED HEALED SCAR TO RIGHT GLUTEAL AREA PT STATES SHE HAD PREVIOUS PRESSURE SORE THERE.  -EG    Row Name             Wound 01/31/23 1426 Right anterior knee Incision    Wound - Properties Group Placement Date: 01/31/23  -EGA Placement Time: 1426  -EGA Present on Hospital Admission: N  -EGA Side: Right  -EGA Orientation: anterior  -EGA Location: knee  -EGA Primary Wound Type: Incision  -EGA    Retired Wound - Properties Group Placement Date: 01/31/23  -EGA Placement Time: 1426  -EGA Present on Hospital Admission: N  -EGA Side: Right  -EGA Orientation: anterior  -EGA Location: knee  -EGA Primary Wound Type: Incision  -EGA    Retired Wound - Properties Group Date first assessed: 01/31/23  -EGA Time first assessed: 1426  -EGA Present on Hospital Admission: N  -EGA Side: Right  -EGA Location: knee  -EGA Primary Wound Type: Incision  -EGA    Row Name             NPWT (Negative Pressure Wound Therapy) 01/31/23 1530 Right knee    NPWT (Negative Pressure Wound Therapy) - Properties Group Placement Date: 01/31/23  -MB (r) MW (t) Placement Time: 1530  -MB (r) MW (t) Location: Right knee  -MB (r) MW (t)    Retired NPWT (Negative Pressure Wound Therapy) - Properties Group Placement Date: 01/31/23  -MB (r) MW (t) Placement Time: 1530  -MB (r) MW (t) Location: Right knee  -MB (r) MW (t)    Retired NPWT (Negative Pressure Wound Therapy) - Properties Group Placement Date: 01/31/23  -MB (r) MW (t) Placement Time:  1530  -MB (r) MW (t) Location: Right knee  -MB (r) MW (t)    Row Name 02/02/23 1212          Progress Summary (PT)    Progress Toward Functional Goals (PT) progress toward functional goals is fair  -RH           User Key  (r) = Recorded By, (t) = Taken By, (c) = Cosigned By    Initials Name Provider Type    Bree Moreno, RN Registered Nurse     Geo Sanchez, ANAHI Physical Therapist Assistant    MW Bita Siddiqi RN Registered Nurse    Jennyfer Carey RN Registered Nurse               Right Hip Ther -ex   Exercise  Reps  Sets    Long arc quads   10 2   Short arc quads  10 2   Heel slides  10 2   Ankle pumps  10 2   Quad sets  10 2   Glut sets  10 2   Abduction/ Adduction  10 2        Physical Therapy Education     Title: PT OT SLP Therapies (Done)     Topic: Physical Therapy (Done)     Point: Mobility training (Done)     Learning Progress Summary           Patient Acceptance, E,TB, VU by JUAN at 2/1/2023 1120                   Point: Precautions (Done)     Learning Progress Summary           Patient Acceptance, E,TB, VU by JUAN at 2/1/2023 1120                               User Key     Initials Effective Dates Name Provider Type Discipline    JUAN 06/03/21 -  Ben Lentz, PT Physical Therapist PT              PT Recommendation and Plan     Progress Summary (PT)  Progress Toward Functional Goals (PT): progress toward functional goals is fair   Outcome Measures     Row Name 02/02/23 1200 02/01/23 1100          How much help from another person do you currently need...    Turning from your back to your side while in flat bed without using bedrails? 3  -RH 3  -JUAN     Moving from lying on back to sitting on the side of a flat bed without bedrails? 3  -RH 3  -JUAN     Moving to and from a bed to a chair (including a wheelchair)? 3  -RH 3  -JUAN     Standing up from a chair using your arms (e.g., wheelchair, bedside chair)? 3  -RH 3  -JUAN     Climbing 3-5 steps with a railing? 3  -RH 3  -JUAN     To walk in hospital room?  3  -RH 3  -JUAN     AM-PAC 6 Clicks Score (PT) 18  -RH 18  -JUAN        Functional Assessment    Outcome Measure Options -- AM-PAC 6 Clicks Basic Mobility (PT)  -JUAN           User Key  (r) = Recorded By, (t) = Taken By, (c) = Cosigned By    Initials Name Provider Type     Geo Sanchez PTA Physical Therapist Assistant    Ben Chun, PT Physical Therapist                 Time Calculation:    PT Charges     Row Name 02/02/23 1522 02/02/23 1211          Time Calculation    PT Received On 02/02/23  -RH 02/02/23  -RH        Timed Charges    75775 - PT Therapeutic Exercise Minutes 24  -RH --     55923 - Gait Training Minutes  7  -RH 7  -RH     52533 - PT Therapeutic Activity Minutes 7  -RH 5  -RH        Untimed Charges    PT Group Therapy Minutes -- 30  -RH        Total Minutes    Timed Charges Total Minutes 38  -RH 12  -RH     Untimed Charges Total Minutes -- 30  -RH      Total Minutes 38  -RH 42  -RH           User Key  (r) = Recorded By, (t) = Taken By, (c) = Cosigned By    Initials Name Provider Type     Geo Sanchez PTA Physical Therapist Assistant              Therapy Charges for Today     Code Description Service Date Service Provider Modifiers Qty    56244270719 HC PT THER PROC GROUP 2/1/2023 Geo Sanchez, PTA GP 1    23563051476 HC GAIT TRAINING EA 15 MIN 2/1/2023 Geo Sanchez, PTA GP 1    72371801811 HC GAIT TRAINING EA 15 MIN 2/1/2023 Geo Sanchez, PTA GP 1    30430323100 HC PT THER PROC GROUP 2/1/2023 Geo Sanchez, PTA GP 1    25805299912 HC GAIT TRAINING EA 15 MIN 2/2/2023 Geo Sanchez, PTA GP 1    47575883794 HC PT THER PROC GROUP 2/2/2023 Geo Sanchez, PTA GP 1    46353219246 HC PT THER PROC EA 15 MIN 2/2/2023 Geo Sanchez PTA GP 2    35388468531 HC GAIT TRAINING EA 15 MIN 2/2/2023 Geo Sanchez, PTA GP 1          PT G-Codes  Outcome Measure Options: AM-PAC 6 Clicks Daily Activity (OT), Optimal Instrument  AM-PAC 6 Clicks Score (PT): 18  AM-PAC 6 Clicks Score (OT): 21    Geo Sanchez  PTA  2/2/2023

## 2023-02-03 LAB
ANION GAP SERPL CALCULATED.3IONS-SCNC: 5.9 MMOL/L (ref 5–15)
BASOPHILS # BLD AUTO: 0.04 10*3/MM3 (ref 0–0.2)
BASOPHILS NFR BLD AUTO: 0.5 % (ref 0–1.5)
BUN SERPL-MCNC: 16 MG/DL (ref 8–23)
BUN/CREAT SERPL: 20 (ref 7–25)
CALCIUM SPEC-SCNC: 8.9 MG/DL (ref 8.6–10.5)
CHLORIDE SERPL-SCNC: 104 MMOL/L (ref 98–107)
CO2 SERPL-SCNC: 26.1 MMOL/L (ref 22–29)
CREAT SERPL-MCNC: 0.8 MG/DL (ref 0.57–1)
DEPRECATED RDW RBC AUTO: 47.9 FL (ref 37–54)
EGFRCR SERPLBLD CKD-EPI 2021: 81.4 ML/MIN/1.73
EOSINOPHIL # BLD AUTO: 0.08 10*3/MM3 (ref 0–0.4)
EOSINOPHIL NFR BLD AUTO: 1.1 % (ref 0.3–6.2)
ERYTHROCYTE [DISTWIDTH] IN BLOOD BY AUTOMATED COUNT: 12.9 % (ref 12.3–15.4)
GLUCOSE BLDC GLUCOMTR-MCNC: 105 MG/DL (ref 70–99)
GLUCOSE BLDC GLUCOMTR-MCNC: 63 MG/DL (ref 70–99)
GLUCOSE BLDC GLUCOMTR-MCNC: 95 MG/DL (ref 70–99)
GLUCOSE BLDC GLUCOMTR-MCNC: 99 MG/DL (ref 70–99)
GLUCOSE SERPL-MCNC: 85 MG/DL (ref 65–99)
HCT VFR BLD AUTO: 33.8 % (ref 34–46.6)
HGB BLD-MCNC: 11.3 G/DL (ref 12–15.9)
IMM GRANULOCYTES # BLD AUTO: 0.03 10*3/MM3 (ref 0–0.05)
IMM GRANULOCYTES NFR BLD AUTO: 0.4 % (ref 0–0.5)
LYMPHOCYTES # BLD AUTO: 1.85 10*3/MM3 (ref 0.7–3.1)
LYMPHOCYTES NFR BLD AUTO: 24.5 % (ref 19.6–45.3)
MCH RBC QN AUTO: 33.5 PG (ref 26.6–33)
MCHC RBC AUTO-ENTMCNC: 33.4 G/DL (ref 31.5–35.7)
MCV RBC AUTO: 100.3 FL (ref 79–97)
MONOCYTES # BLD AUTO: 0.71 10*3/MM3 (ref 0.1–0.9)
MONOCYTES NFR BLD AUTO: 9.4 % (ref 5–12)
NEUTROPHILS NFR BLD AUTO: 4.83 10*3/MM3 (ref 1.7–7)
NEUTROPHILS NFR BLD AUTO: 64.1 % (ref 42.7–76)
NRBC BLD AUTO-RTO: 0 /100 WBC (ref 0–0.2)
PLATELET # BLD AUTO: 106 10*3/MM3 (ref 140–450)
PMV BLD AUTO: 13.3 FL (ref 6–12)
POTASSIUM SERPL-SCNC: 4.1 MMOL/L (ref 3.5–5.2)
RBC # BLD AUTO: 3.37 10*6/MM3 (ref 3.77–5.28)
SODIUM SERPL-SCNC: 136 MMOL/L (ref 136–145)
WBC NRBC COR # BLD: 7.54 10*3/MM3 (ref 3.4–10.8)

## 2023-02-03 PROCEDURE — 97116 GAIT TRAINING THERAPY: CPT

## 2023-02-03 PROCEDURE — 63710000001 ROPINIROLE 0.25 MG TABLET: Performed by: INTERNAL MEDICINE

## 2023-02-03 PROCEDURE — 82962 GLUCOSE BLOOD TEST: CPT

## 2023-02-03 PROCEDURE — 94799 UNLISTED PULMONARY SVC/PX: CPT

## 2023-02-03 PROCEDURE — 97530 THERAPEUTIC ACTIVITIES: CPT

## 2023-02-03 PROCEDURE — 63710000001 SENNOSIDES-DOCUSATE 8.6-50 MG TABLET: Performed by: ORTHOPAEDIC SURGERY

## 2023-02-03 PROCEDURE — 94760 N-INVAS EAR/PLS OXIMETRY 1: CPT

## 2023-02-03 PROCEDURE — 63710000001 HYDROCODONE-ACETAMINOPHEN 7.5-325 MG TABLET: Performed by: ORTHOPAEDIC SURGERY

## 2023-02-03 PROCEDURE — A9270 NON-COVERED ITEM OR SERVICE: HCPCS | Performed by: ORTHOPAEDIC SURGERY

## 2023-02-03 PROCEDURE — 97110 THERAPEUTIC EXERCISES: CPT

## 2023-02-03 PROCEDURE — A9270 NON-COVERED ITEM OR SERVICE: HCPCS | Performed by: INTERNAL MEDICINE

## 2023-02-03 PROCEDURE — G0378 HOSPITAL OBSERVATION PER HR: HCPCS

## 2023-02-03 PROCEDURE — 80048 BASIC METABOLIC PNL TOTAL CA: CPT | Performed by: INTERNAL MEDICINE

## 2023-02-03 PROCEDURE — 99232 SBSQ HOSP IP/OBS MODERATE 35: CPT | Performed by: INTERNAL MEDICINE

## 2023-02-03 PROCEDURE — 97535 SELF CARE MNGMENT TRAINING: CPT

## 2023-02-03 PROCEDURE — 63710000001 ASPIRIN EC 325 MG TABLET DELAYED-RELEASE: Performed by: ORTHOPAEDIC SURGERY

## 2023-02-03 PROCEDURE — 63710000001 GABAPENTIN 400 MG CAPSULE: Performed by: INTERNAL MEDICINE

## 2023-02-03 PROCEDURE — 85025 COMPLETE CBC W/AUTO DIFF WBC: CPT | Performed by: INTERNAL MEDICINE

## 2023-02-03 PROCEDURE — 97150 GROUP THERAPEUTIC PROCEDURES: CPT

## 2023-02-03 RX ORDER — ASPIRIN 325 MG
325 TABLET, DELAYED RELEASE (ENTERIC COATED) ORAL DAILY
Qty: 21 TABLET | Refills: 0 | Status: SHIPPED | OUTPATIENT
Start: 2023-02-03 | End: 2023-03-20

## 2023-02-03 RX ORDER — HYDROCODONE BITARTRATE AND ACETAMINOPHEN 7.5; 325 MG/1; MG/1
1 TABLET ORAL EVERY 4 HOURS PRN
Qty: 45 TABLET | Refills: 0 | Status: SHIPPED | OUTPATIENT
Start: 2023-02-03 | End: 2023-03-20

## 2023-02-03 RX ADMIN — HYDROCODONE BITARTRATE AND ACETAMINOPHEN 2 TABLET: 7.5; 325 TABLET ORAL at 17:48

## 2023-02-03 RX ADMIN — GABAPENTIN 800 MG: 400 CAPSULE ORAL at 06:13

## 2023-02-03 RX ADMIN — HYDROCODONE BITARTRATE AND ACETAMINOPHEN 2 TABLET: 7.5; 325 TABLET ORAL at 12:50

## 2023-02-03 RX ADMIN — HYDROCODONE BITARTRATE AND ACETAMINOPHEN 1 TABLET: 7.5; 325 TABLET ORAL at 08:24

## 2023-02-03 RX ADMIN — ASPIRIN 325 MG: 325 TABLET, COATED ORAL at 08:24

## 2023-02-03 RX ADMIN — SENNOSIDES AND DOCUSATE SODIUM 2 TABLET: 8.6; 5 TABLET ORAL at 20:36

## 2023-02-03 RX ADMIN — GABAPENTIN 800 MG: 400 CAPSULE ORAL at 22:16

## 2023-02-03 RX ADMIN — ROPINIROLE HYDROCHLORIDE 0.5 MG: 0.25 TABLET, FILM COATED ORAL at 20:36

## 2023-02-03 RX ADMIN — GABAPENTIN 800 MG: 400 CAPSULE ORAL at 12:50

## 2023-02-03 NOTE — THERAPY TREATMENT NOTE
Acute Care - Physical Therapy Treatment Note   Radha     Patient Name: Marilin Martinez  : 1955  MRN: [id number]  Today's Date: 2/3/2023      Visit Dx:     ICD-10-CM ICD-9-CM   1. Decreased activities of daily living (ADL)  Z78.9 V49.89   2. Difficulty in walking  R26.2 719.7   3. Osteoarthrosis, localized, primary, knee, right  M17.11 715.16     Patient Active Problem List   Diagnosis   • Environmental and seasonal allergies   • Essential hypertension   • Sleep apnea   • Multiple joint pain   • Hypothyroid   • Diabetes mellitus type 2 in obese (HCC)   • Dyslipidemia   • S/P laparoscopic sleeve gastrectomy   • Primary osteoarthritis of right knee   • Primary osteoarthritis of left knee   • Candidal intertrigo   • Excessive and redundant skin and subcutaneous tissue   • Obesity, Class II, BMI 35-39.9   • Osteoarthrosis, localized, primary, knee, right   • Open knee wound     Past Medical History:   Diagnosis Date   • Arthritis     KNEE PAIN   • Back pain    • Diabetes mellitus ([facility])    • Hyperlipemia    • Hypertension     managed by cardiologist   • Knee pain, bilateral    • Neuropathy    • OAB (overactive bladder)    • Seasonal allergies    • Sleep apnea     CPAP   • 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[facility])     no residual   • Thyroid disorder      Past Surgical History:   Procedure Laterality Date   • COLONOSCOPY     • ENDOSCOPY N/A 2021    Procedure: ESOPHAGOGASTRODUODENOSCOPY WITH COLD BIOPSY;  Surgeon: Mansi Kyle MD;  Location: [facility] ENDOSCOPY;  Service: General;  Laterality: N/A;  PRE: SCREENING FOR BARIATRICS  POST: MODERATE SIZE HIATAL HERNIA   • GALLBLADDER SURGERY     • GASTRIC SLEEVE LAPAROSCOPIC N/A 2022    Procedure: GASTRIC SLEEVE LAPAROSCOPIC With VENTRALHernia Repair AND PARAESOPHAGEAL HERNIA REPAIR AND LYSIS OF ADHESIONS PARTICAL OMENTECTOMY;  Surgeon: Quang Shipley Jr., MD;  Location: [facility] OR [facility];  Service: Bariatric;  Laterality: N/A;   • TONSILLECTOMY     • TOTAL KNEE  ARTHROPLASTY Right 1/31/2023    Procedure: RIGHT TOTAL KNEE ARTHROPLASTY WITH DANITA ROBOT AND SHIREEN;  Surgeon: Murray Calvillo MD;  Location: [facility] MAIN OR;  Service: Robotics - Ortho;  Laterality: Right;   • TUBAL ABDOMINAL LIGATION       PT Assessment (last 12 hours)     PT Evaluation and Treatment     Row Name 02/03/23 1434 02/03/23 1234       Physical Therapy Time and Intention    Subjective Information complains of;weakness;pain  - complains of;weakness  -RH    Document Type therapy note (daily note)  -RH therapy note (daily note)  -    Mode of Treatment physical therapy;individual therapy  -RH physical therapy;group therapy;individual therapy  -RH    Patient Effort fair  -RH fair  -RH    Comment Pt with improved tolerance of therapist AAROM.  -RH --    Row Name 02/03/23 1434 02/03/23 1234       Pain Scale: FACES Pre/Post-Treatment    Pain: FACES Scale, Pretreatment 0-->no hurt  -RH 0-->no hurt  -RH    Posttreatment Pain Rating --  3/10  -RH 2-->hurts little bit  -RH    Pain Location - Side/Orientation Right  -RH Right  -RH    Pain Location - knee  -RH knee  -RH    Row Name 02/03/23 1434 02/03/23 1234       Range of Motion (ROM)    Range of Motion --  Pt R knee AAROM at 90 degrees flex and 8 degrees ext.  -RH --  Pt R knee AAROM at 86 degrees flex and 8 degrees ext.  -    Row Name 02/03/23 1234          Strength (Manual Muscle Testing)    Strength (Manual Muscle Testing) --  Pt R knee ext strength at 3-/5.  -[facility] Name 02/03/23 1434 02/03/23 1234       Transfers    Transfers sit-stand transfer;stand-sit transfer  -RH sit-stand transfer;stand-sit transfer  -    Maintains Weight-bearing Status (Transfers) able to maintain  - --    Row Name 02/03/23 1434 02/03/23 1234       Sit-Stand Transfer    Sit-Stand [place] (Transfers) contact guard  -RH contact guard  -    Assistive Device (Sit-Stand Transfers) walker, front-wheeled  - walker, front-wheeled  -    Row Name 02/03/23 1434 02/03/23  1234       Stand-Sit Transfer    Stand-Sit [place] (Transfers) contact guard  -RH contact guard  -RH    Assistive Device (Stand-Sit Transfers) walker, front-wheeled  -RH walker, front-wheeled  -RH    Comment, (Stand-Sit Transfer) Pt initially having difficulty standing upright upon transfer to stand.  -RH --    Row Name 02/03/23 1434 02/03/23 1234       Gait/Stairs (Locomotion)    Gait/Stairs Locomotion gait/ambulation independence;gait/ambulation assistive device;distance ambulated;gait pattern;gait deviations  -RH gait/ambulation independence;gait/ambulation assistive device;distance ambulated;gait pattern;gait deviations  -RH    [place] Level (Gait) contact guard  -RH contact guard  -RH    Assistive Device (Gait) walker, front-wheeled  -RH walker, front-wheeled  -RH    Distance in Feet (Gait) 100  -RH 75  -RH    Pattern (Gait) 3-point;step-through  -RH 3-point;step-through  -RH    Deviations/Abnormal Patterns (Gait) base of support, wide;antalgic;gait speed decreased;stride length decreased  -RH antalgic;base of support, wide;gait speed decreased;stride length decreased  -RH    Bilateral Gait Deviations forward flexed posture;heel strike decreased  -RH forward flexed posture  -RH    Right Sided Gait Deviations -- heel strike decreased  -RH    Gait Assessment/Intervention Pt amb increased distance with improved tolerance.  -RH Pt cont to exhibit 3 point step-through gait while amb with RW and CGA.  -    Row Name 02/03/23 1434 02/03/23 1234       Balance    Dynamic Standing Balance contact guard  -RH contact guard  -RH    Position/Device Used, Standing Balance walker, front-wheeled  -RH walker, front-wheeled  -RH    Row Name             Wound 01/31/23 1112 Left gluteal Pressure Injury    Wound - Properties Group Placement Date: 01/31/23  -EG Placement Time: 1112  -EG Present on Hospital Admission: Y  -EG Side: Left  -EG Location: gluteal  -EG Primary Wound Type: Pressure inj  -EG Additional Comments: PT  STATES SHE HAS PRESSURE SORE TO LEFT GLUTEAL AREA DRESSING D/I STATES HOME HEALTH NURSE APPLIED TO AREA YESTERDAY.  NOTED HEALED SCAR TO RIGHT GLUTEAL AREA PT STATES SHE HAD PREVIOUS PRESSURE SORE THERE.  -EG    Retired Wound - Properties Group Placement Date: 01/31/23  -EG Placement Time: 1112  -EG Present on Hospital Admission: Y  -EG Side: Left  -EG Location: gluteal  -EG Primary Wound Type: Pressure inj  -EG Additional Comments: PT STATES SHE HAS PRESSURE SORE TO LEFT GLUTEAL AREA DRESSING D/I STATES HOME HEALTH NURSE APPLIED TO AREA YESTERDAY.  NOTED HEALED SCAR TO RIGHT GLUTEAL AREA PT STATES SHE HAD PREVIOUS PRESSURE SORE THERE.  -EG    Retired Wound - Properties Group Date first assessed: 01/31/23  -EG Time first assessed: 1112  -EG Present on Hospital Admission: Y  -EG Side: Left  -EG Location: gluteal  -EG Primary Wound Type: Pressure inj  -EG Additional Comments: PT STATES SHE HAS PRESSURE SORE TO LEFT GLUTEAL AREA DRESSING D/I STATES HOME HEALTH NURSE APPLIED TO AREA YESTERDAY.  NOTED HEALED SCAR TO RIGHT GLUTEAL AREA PT STATES SHE HAD PREVIOUS PRESSURE SORE THERE.  -EG    Row Name             Wound 01/31/23 1426 Right anterior knee Incision    Wound - Properties Group Placement Date: 01/31/23  -EGA Placement Time: 1426  -EGA Present on Hospital Admission: N  -EGA Side: Right  -EGA Orientation: anterior  -EGA Location: knee  -EGA Primary Wound Type: Incision  -EGA    Retired Wound - Properties Group Placement Date: 01/31/23  -EGA Placement Time: 1426  -EGA Present on Hospital Admission: N  -EGA Side: Right  -EGA Orientation: anterior  -EGA Location: knee  -EGA Primary Wound Type: Incision  -EGA    Retired Wound - Properties Group Date first assessed: 01/31/23  -EGA Time first assessed: 1426  -EGA Present on Hospital Admission: N  -EGA Side: Right  -EGA Location: knee  -EGA Primary Wound Type: Incision  -EGA    Row Name             NPWT (Negative Pressure Wound Therapy) 01/31/23 1530 Right knee    NPWT  (Negative Pressure Wound Therapy) - Properties Group Placement Date: 01/31/23  -MB (r) MW (t) Placement Time: 1530  -MB (r) MW (t) Location: Right knee  -MB (r) MW (t)    Retired NPWT (Negative Pressure Wound Therapy) - Properties Group Placement Date: 01/31/23  -MB (r) MW (t) Placement Time: 1530  -MB (r) MW (t) Location: Right knee  -MB (r) MW (t)    Retired NPWT (Negative Pressure Wound Therapy) - Properties Group Placement Date: 01/31/23  -MB (r) MW (t) Placement Time: 1530  -MB (r) MW (t) Location: Right knee  -MB (r) MW (t)    Row Name 02/03/23 1434 02/03/23 1234       Progress Summary (PT)    Progress Toward Functional Goals (PT) progress toward functional goals is fair  -RH progress toward functional goals is fair  -RH          User Key  (r) = Recorded By, (t) = Taken By, (c) = Cosigned By    Initials Name Provider Type    EG Bree Gonzalez, RN Registered Nurse     Geo Sanchez, PTA Physical Therapist Assistant     Bita Siddiqi, RN Registered Nurse    Jennyfer Carey, RN Registered Nurse                Right Knee Ther-ex   Exercise  Reps  Sets    Long arc Quads   10 2   Short arc Quads  10 2   Heel Slides  10 2   Ankle Pumps  10 2   Quad sets  10 2   Glut sets  10 2   Straight leg raise  10 2          Physical Therapy Education     Title: PT OT SLP Therapies (Done)     Topic: Physical Therapy (Done)     Point: Mobility training (Done)     Learning Progress Summary           Patient Acceptance, E,TB, VU by JUAN at 2/1/2023 1120                   Point: Precautions (Done)     Learning Progress Summary           Patient Acceptance, E,TB, VU by JUAN at 2/1/2023 1120                               User Key     Initials Effective Dates Name Provider Type Discipline    JUAN 06/03/21 -  Ben Lentz PT Physical Therapist PT              PT Recommendation and Plan     Progress Summary (PT)  Progress Toward Functional Goals (PT): progress toward functional goals is fair   Outcome Measures     Row Name 02/03/23  1200 02/02/23 1200 02/01/23 1100       How much help from another person do you currently need...    Turning from your back to your side while in flat bed without using bedrails? 3  -RH 3  -RH 3  -JUAN    Moving from lying on back to sitting on the side of a flat bed without bedrails? 3  -RH 3  -RH 3  -JUAN    Moving to and from a bed to a chair (including a wheelchair)? 3  -RH 3  -RH 3  -JUAN    Standing up from a chair using your arms (e.g., wheelchair, bedside chair)? 3  -RH 3  -RH 3  -JUAN    Climbing 3-5 steps with a railing? 3  -RH 3  -RH 3  -JUAN    To walk in hospital room? 4  -RH 3  -RH 3  -JUAN    AM-PAC 6 Clicks Score (PT) 19  -RH 18  -RH 18  -JUAN       Functional Assessment    Outcome Measure Options -- -- AM-PAC 6 Clicks Basic Mobility (PT)  -JUAN          User Key  (r) = Recorded By, (t) = Taken By, (c) = Cosigned By    Initials Name Provider Type    RH Geo Sanchez PTA Physical Therapist Assistant    Ben Chun, PT Physical Therapist                 Time Calculation:    PT Charges     Row Name 02/03/23 1433 02/03/23 1233          Time Calculation    PT Received On 02/03/23  -RH 02/03/23  -RH        Timed Charges    95248 - PT Therapeutic Exercise Minutes 25  -RH --     80129 - Gait Training Minutes  8  -RH 8  -RH     57973 - PT Therapeutic Activity Minutes 5  -RH 4  -RH        Untimed Charges    PT Group Therapy Minutes -- 20  -RH        Total Minutes    Timed Charges Total Minutes 38  -RH 12  -RH     Untimed Charges Total Minutes -- 20  -RH      Total Minutes 38  -RH 32  -RH           User Key  (r) = Recorded By, (t) = Taken By, (c) = Cosigned By    Initials Name Provider Type     Geo Sanchez PTA Physical Therapist Assistant              Therapy Charges for Today     Code Description Service Date Service Provider Modifiers Qty    25715392501 HC GAIT TRAINING EA 15 MIN 2/2/2023 Geo Sanchez PTA GP 1    52291608943 HC PT THER PROC GROUP 2/2/2023 Geo Sanchez PTA GP 1    34756337334 HC PT THER PROC EA  15 MIN 2/2/2023 Geo Sanchez, PTA GP 2    08000040474 HC GAIT TRAINING EA 15 MIN 2/2/2023 Geo Sanchez, PTA GP 1    86360203212 HC GAIT TRAINING EA 15 MIN 2/3/2023 Geo Sanchez, PTA GP 1    05153304251 HC PT THER PROC GROUP 2/3/2023 Geo Sanchez, PTA GP 1    48607689443 HC PT THER PROC EA 15 MIN 2/3/2023 Geo Sanchez, PTA GP 2    90439446443 HC GAIT TRAINING EA 15 MIN 2/3/2023 Geo Sanchez, PTA GP 1          PT G-Codes  Outcome Measure Options: AM-PAC 6 Clicks Daily Activity (OT), Optimal Instrument  AM-PAC 6 Clicks Score (PT): 19  AM-PAC 6 Clicks Score (OT): 21    Geo Sanchez PTA  2/3/2023

## 2023-02-03 NOTE — THERAPY TREATMENT NOTE
Acute Care - Physical Therapy Treatment Note   Radha     Patient Name: Marilin Martinez  : 1955  MRN: [id number]  Today's Date: 2/3/2023 Gait- individual; ther- ex -group setting; 2 participants         Visit Dx:     ICD-10-CM ICD-9-CM   1. Decreased activities of daily living (ADL)  Z78.9 V49.89   2. Difficulty in walking  R26.2 719.7     Patient Active Problem List   Diagnosis   • Environmental and seasonal allergies   • Essential hypertension   • Sleep apnea   • Multiple joint pain   • Hypothyroid   • Diabetes mellitus type 2 in obese (HCC)   • Dyslipidemia   • S/P laparoscopic sleeve gastrectomy   • Primary osteoarthritis of right knee   • Primary osteoarthritis of left knee   • Candidal intertrigo   • Excessive and redundant skin and subcutaneous tissue   • Obesity, Class II, BMI 35-39.9   • Osteoarthrosis, localized, primary, knee, right   • Open knee wound     Past Medical History:   Diagnosis Date   • Arthritis     KNEE PAIN   • Back pain    • Diabetes mellitus ([facility])    • Hyperlipemia    • Hypertension     managed by cardiologist   • Knee pain, bilateral    • Neuropathy    • OAB (overactive bladder)    • Seasonal allergies    • Sleep apnea     CPAP   • 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[facility])     no residual   • Thyroid disorder      Past Surgical History:   Procedure Laterality Date   • COLONOSCOPY     • ENDOSCOPY N/A 2021    Procedure: ESOPHAGOGASTRODUODENOSCOPY WITH COLD BIOPSY;  Surgeon: Mansi Kyle MD;  Location: [facility] ENDOSCOPY;  Service: General;  Laterality: N/A;  PRE: SCREENING FOR BARIATRICS  POST: MODERATE SIZE HIATAL HERNIA   • GALLBLADDER SURGERY     • GASTRIC SLEEVE LAPAROSCOPIC N/A 2022    Procedure: GASTRIC SLEEVE LAPAROSCOPIC With VENTRALHernia Repair AND PARAESOPHAGEAL HERNIA REPAIR AND LYSIS OF ADHESIONS PARTICAL OMENTECTOMY;  Surgeon: Quang Shipley Jr., MD;  [place] [facility] [place] [facility]  Service: Bariatric;  Laterality: N/A;   • TONSILLECTOMY     • TOTAL KNEE  ARTHROPLASTY Right 1/31/2023    Procedure: RIGHT TOTAL KNEE ARTHROPLASTY WITH DANITA ROBOT AND SHIREEN;  Surgeon: Murray Calvillo MD;  Location: [facility];  Service: Robotics - Ortho;  Laterality: Right;   • TUBAL ABDOMINAL LIGATION       PT Assessment (last 12 hours)     PT Evaluation and Treatment     Row Name 02/03/23 1234          Physical Therapy Time and Intention    Subjective Information complains of;weakness  -RH     Document Type therapy note (daily note)  -     Mode of Treatment physical therapy;group therapy;individual therapy  -     Patient Effort fair  -RH     Row Name 02/03/23 1234          Pain Scale: FACES Pre/Post-Treatment    Pain: FACES Scale, Pretreatment 0-->no hurt  -RH     Posttreatment Pain Rating 2-->hurts little bit  -     Pain Location - Side/Orientation Right  -     Pain Location - knee  -     Row Name 02/03/23 1234          Range of Motion (ROM)    Range of Motion --  Pt R knee AAROM at 86 degrees flex and 8 degrees ext.  -[facility] Name 02/03/23 1234          Strength (Manual Muscle Testing)    Strength (Manual Muscle Testing) --  Pt R knee ext strength at 3-/5.  -[facility] Name 02/03/23 1234          Transfers    Transfers sit-stand transfer;stand-sit transfer  -[facility] Name 02/03/23 1234          Sit-Stand Transfer    Sit-Stand [place] (Transfers) contact guard  -     Assistive Device (Sit-Stand Transfers) walker, front-wheeled  -[facility] Name 02/03/23 1234          Stand-Sit Transfer    Stand-Sit [place] (Transfers) contact [facility]  -     Assistive Device (Stand-Sit Transfers) walker, front-wheeled  -[facility] Name 02/03/23 1234          Gait/Stairs (Locomotion)    Gait/Stairs Locomotion gait/ambulation independence;gait/ambulation assistive device;distance ambulated;gait pattern;gait deviations  -     [place] Level (Gait) contact guard  -     Assistive Device (Gait) walker, front-wheeled  [facility]     Distance in Feet (Gait) 75  -RH     Pattern  (Gait) 3-point;step-through  -RH     Deviations/Abnormal Patterns (Gait) antalgic;base of support, wide;gait speed decreased;stride length decreased  -RH     Bilateral Gait Deviations forward flexed posture  -RH     Right Sided Gait Deviations heel strike decreased  -RH     Gait Assessment/Intervention Pt cont to exhibit 3 point step-through gait while amb with RW and CGA.  -RH     Row Name 02/03/23 1234          Balance    Dynamic Standing Balance contact guard  -RH     Position/Device Used, Standing Balance walker, front-wheeled  -RH     Row Name             Wound 01/31/23 1112 Left gluteal Pressure Injury    Wound - Properties Group Placement Date: 01/31/23  -EG Placement Time: 1112  -EG Present on Hospital Admission: Y  -EG Side: Left  -EG Location: gluteal  -EG Primary Wound Type: Pressure inj  -EG Additional Comments: PT STATES SHE HAS PRESSURE SORE TO LEFT GLUTEAL AREA DRESSING D/I STATES HOME HEALTH NURSE APPLIED TO AREA YESTERDAY.  NOTED HEALED SCAR TO RIGHT GLUTEAL AREA PT STATES SHE HAD PREVIOUS PRESSURE SORE THERE.  -EG    Retired Wound - Properties Group Placement Date: 01/31/23  -EG Placement Time: 1112  -EG Present on Hospital Admission: Y  -EG Side: Left  -EG Location: gluteal  -EG Primary Wound Type: Pressure inj  -EG Additional Comments: PT STATES SHE HAS PRESSURE SORE TO LEFT GLUTEAL AREA DRESSING D/I STATES HOME HEALTH NURSE APPLIED TO AREA YESTERDAY.  NOTED HEALED SCAR TO RIGHT GLUTEAL AREA PT STATES SHE HAD PREVIOUS PRESSURE SORE THERE.  -EG    Retired Wound - Properties Group Date first assessed: 01/31/23  -EG Time first assessed: 1112  -EG Present on Hospital Admission: Y  -EG Side: Left  -EG Location: gluteal  -EG Primary Wound Type: Pressure inj  -EG Additional Comments: PT STATES SHE HAS PRESSURE SORE TO LEFT GLUTEAL AREA DRESSING D/I STATES HOME HEALTH NURSE APPLIED TO AREA YESTERDAY.  NOTED HEALED SCAR TO RIGHT GLUTEAL AREA PT STATES SHE HAD PREVIOUS PRESSURE SORE THERE.  -EG    Row Name              Wound 01/31/23 1426 Right anterior knee Incision    Wound - Properties Group Placement Date: 01/31/23  -EGA Placement Time: 1426  -EGA Present on Hospital Admission: N  -EGA Side: Right  -EGA Orientation: anterior  -EGA Location: knee  -EGA Primary Wound Type: Incision  -EGA    Retired Wound - Properties Group Placement Date: 01/31/23  -EGA Placement Time: 1426  -EGA Present on Hospital Admission: N  -EGA Side: Right  -EGA Orientation: anterior  -EGA Location: knee  -EGA Primary Wound Type: Incision  -EGA    Retired Wound - Properties Group Date first assessed: 01/31/23  -EGA Time first assessed: 1426  -EGA Present on Hospital Admission: N  -EGA Side: Right  -EGA Location: knee  -EGA Primary Wound Type: Incision  -EGA    Row Name             NPWT (Negative Pressure Wound Therapy) 01/31/23 1530 Right knee    NPWT (Negative Pressure Wound Therapy) - Properties Group Placement Date: 01/31/23  -MB (r) MW (t) Placement Time: 1530  -MB (r) MW (t) Location: Right knee  -MB (r) MW (t)    Retired NPWT (Negative Pressure Wound Therapy) - Properties Group Placement Date: 01/31/23  -MB (r) MW (t) Placement Time: 1530  -MB (r) MW (t) Location: Right knee  -MB (r) MW (t)    Retired NPWT (Negative Pressure Wound Therapy) - Properties Group Placement Date: 01/31/23  -MB (r) MW (t) Placement Time: 1530  -MB (r) MW (t) Location: Right knee  -MB (r) MW (t)    Row Name 02/03/23 1234          Progress Summary (PT)    Progress Toward Functional Goals (PT) progress toward functional goals is fair  -RH           User Key  (r) = Recorded By, (t) = Taken By, (c) = Cosigned By    Initials Name Provider Type    EG Bree Gonzalez, RN Registered Nurse    Geo Hugo, PTA Physical Therapist Assistant    Bita Marsh RN Registered Nurse    Jennyfer Carey, RN Registered Nurse                Right Knee Ther-ex   Exercise  Reps  Sets    Long arc Quads   10 2   Short arc Quads  10 2   Heel Slides  10 2   Ankle Pumps  10 2    Quad sets  10 2   Glut sets  10 2   Straight leg raise  10 2          Physical Therapy Education     Title: PT OT SLP Therapies (Done)     Topic: Physical Therapy (Done)     Point: Mobility training (Done)     Learning Progress Summary           Patient Acceptance, E,TB, VU by JUAN at 2/1/2023 1120                   Point: Precautions (Done)     Learning Progress Summary           Patient Acceptance, E,TB, VU by JUAN at 2/1/2023 1120                               User Key     Initials Effective Dates Name Provider Type Discipline    JUAN 06/03/21 -  Ben Lentz PT Physical Therapist PT              PT Recommendation and Plan     Progress Summary (PT)  Progress Toward Functional Goals (PT): progress toward functional goals is fair   Outcome Measures     Row Name 02/03/23 1200 02/02/23 1200 02/01/23 1100       How much help from another person do you currently need...    Turning from your back to your side while in flat bed without using bedrails? 3  -RH 3  -RH 3  -JUAN    Moving from lying on back to sitting on the side of a flat bed without bedrails? 3  -RH 3  -RH 3  -JUAN    Moving to and from a bed to a chair (including a wheelchair)? 3  -RH 3  -RH 3  -JUAN    Standing up from a chair using your arms (e.g., wheelchair, bedside chair)? 3  -RH 3  -RH 3  -JUAN    Climbing 3-5 steps with a railing? 3  -RH 3  -RH 3  -JUAN    To walk in hospital room? 4  -RH 3  -RH 3  -JUAN    AM-PAC 6 Clicks Score (PT) 19  -RH 18  -RH 18  -JUAN       Functional Assessment    Outcome Measure Options -- -- AM-PAC 6 Clicks Basic Mobility (PT)  -JUAN          User Key  (r) = Recorded By, (t) = Taken By, (c) = Cosigned By    Initials Name Provider Type    RH Geo Sanchez PTA Physical Therapist Assistant    Ben Chun, PT Physical Therapist                 Time Calculation:    PT Charges     Row Name 02/03/23 1233             Time Calculation    PT Received On 02/03/23  -         Timed Charges    06346 - Gait Training Minutes  8  -       19801 - PT Therapeutic Activity Minutes 4  -RH         Untimed Charges    PT Group Therapy Minutes 20  -RH         Total Minutes    Timed Charges Total Minutes 12  -RH      Untimed Charges Total Minutes 20  -RH       Total Minutes 32  -RH            User Key  (r) = Recorded By, (t) = Taken By, (c) = Cosigned By    Initials Name Provider Type    RH Geo Sanchez PTA Physical Therapist Assistant              Therapy Charges for Today     Code Description Service Date Service Provider Modifiers Qty    03696198335 HC GAIT TRAINING EA 15 MIN 2/2/2023 Geo Sanchez, ANAHI GP 1    26682056295 HC PT THER PROC GROUP 2/2/2023 Geo Sanchez, ANAHI GP 1    34611150910 HC PT THER PROC EA 15 MIN 2/2/2023 Geo Sanchez, ANAHI GP 2    22498216094 HC GAIT TRAINING EA 15 MIN 2/2/2023 Geo Sanchez, ANAHI GP 1    34946265454 HC GAIT TRAINING EA 15 MIN 2/3/2023 Geo Sanchez, PTA GP 1    59914837570 HC PT THER PROC GROUP 2/3/2023 Geo Sanchez, ANAHI GP 1          PT G-Codes  Outcome Measure Options: AM-PAC 6 Clicks Daily Activity (OT), Optimal Instrument  AM-PAC 6 Clicks Score (PT): 19  AM-PAC 6 Clicks Score (OT): 21    Geo Sanchez PTA  2/3/2023

## 2023-02-03 NOTE — NURSING NOTE
Re-visited patient today for Prevena check. Dressing is intact with foam compressed. Sanguinous drainage noted to foam dressing but not resulting in excessive drainage to cannister. Cannister tubing cleared of sanguinous drainage. Primary RN present in room during assessment. No further intervention for incisional wound vac needed at this time.   Teresa Lennon RN

## 2023-02-03 NOTE — PROGRESS NOTES
[facility]   Hospitalist Progress Note  Date: 2/3/2023  Patient Name: Marilin Martinez  : 1955  MRN: [id number]  Date of admission: 2023      Subjective   Subjective     Chief Complaint: Diabetes and hypertension    Summary: 68 y.o. female past medical history of obesity with BMI of 38, osteoarthritis, diabetes, neuropathy, stroke, and hypothyroidism who is being admitted status post right knee replacement.  The patient's procedure went well but they did have to place a wound VAC on her right knee due to inability to close the skin.      Interval Followup: No acute events overnight.  Still having pain and discomfort around the site of the wound VAC.  Working well with physical therapy.  Still does not feel comfortable going home yet.  Plan is for home health with home wound care when she is working well enough with physical therapy to be home alone.    Review of Systems   Denies fevers or chest pain     Objective   Objective     Vitals:   Temp:  [98.1 °F (36.7 °C)-98.4 °F (36.9 °C)] 98.4 °F (36.9 °C)  Heart Rate:  [60-85] 85  Resp:  [16] 16  BP: (108-148)/(46-68) 131/57  Physical Exam    Constitutional: Awake, alert, sitting up in bedside chair   Respiratory: Clear to auscultation bilaterally, nonlabored respirations    Cardiovascular: RRR, no MRG   Gastrointestinal: Positive bowel sounds, soft, nontender, nondistended   Neurologic: Oriented x 3, wound VAC in place right knee, expected postop ROM of right leg, Cranial Nerves grossly intact to confrontation, speech clear    Result Review    Result Review:  I have personally reviewed the results below:  []  Laboratory  []  Microbiology  []  Radiology  []  EKG/Telemetry   []  Cardiology/Vascular   []  Pathology  []  Old records  []  Other:  CBC    CBC 1/13/23 2/1/23 2/3/23   WBC 8.95 14.09 (A) 7.54   RBC 4.07 3.61 (A) 3.37 (A)   Hemoglobin 13.5 12.2 11.3 (A)   Hematocrit 40.7 35.4 33.8 (A)   .0 (A) 98.1 (A) 100.3 (A)   MCH 33.2 (A) 33.8 (A)  33.5 (A)   MCHC 33.2 34.5 33.4   RDW 13.3 12.3 12.9   Platelets 176 113 (A) 106 (A)   (A) Abnormal value            CMP    CMP 3/23/22 1/13/23 2/3/23   Glucose 97 83 85   BUN 13 18 16   Creatinine 1.00 0.88 0.80   eGFR 62.6 72.6 81.4   Sodium 142 139 136   Potassium 4.3 4.4 4.1   Chloride 105 106 104   Calcium 9.6 9.8 8.9   Total Protein 7.1 7.0    Albumin 4.30 3.9    Globulin 2.8 3.1    Total Bilirubin 0.5 0.5    Alkaline Phosphatase 56 58    AST (SGOT) 21 16    ALT (SGPT) 19 11    Albumin/Globulin Ratio 1.5 1.3    BUN/Creatinine Ratio 13.0 20.5 20.0   Anion Gap 10.0 7.3 5.9      Comments are available for some flowsheets but are not being displayed.             Assessment & Plan   Assessment / Plan     Assessment/Plan:  Asymptomatic bradycardia  Right knee replacement requiring wound VAC postop day 0  Hypertension  Type 2 diabetes  but most recent A1c is 5.1  Obesity  Hypothyroidism    Continue to monitor in the hospital for management of the above  Orthopedic surgery following, appreciate assistance  Continue wound VAC, will be set up for home health and wound care  Continue appropriate home medications  Continue to work with PT/OT  Lab holiday tomorrow     Discussed plan with RN, orthopedic surgery    DVT prophylaxis:  Medical and mechanical DVT prophylaxis orders are present.

## 2023-02-03 NOTE — THERAPY TREATMENT NOTE
Patient Name: Marilin Martinez  : 1955    MRN: [id number]                              Today's Date: 2/3/2023       Admit Date: 2023    Visit Dx:     ICD-10-CM ICD-9-CM   1. Decreased activities of daily living (ADL)  Z78.9 V49.89   2. Difficulty in walking  R26.2 719.7     Patient Active Problem List   Diagnosis   • Environmental and seasonal allergies   • Essential hypertension   • Sleep apnea   • Multiple joint pain   • Hypothyroid   • Diabetes mellitus type 2 in obese ([facility])   • Dyslipidemia   • S/P laparoscopic sleeve gastrectomy   • Primary osteoarthritis of right knee   • Primary osteoarthritis of left knee   • Candidal intertrigo   • Excessive and redundant skin and subcutaneous tissue   • Obesity, Class II, BMI 35-39.9   • Osteoarthrosis, localized, primary, knee, right   • Open knee wound     Past Medical History:   Diagnosis Date   • Arthritis     KNEE PAIN   • Back pain    • Diabetes mellitus ([facility])    • Hyperlipemia    • Hypertension     managed by cardiologist   • Knee pain, bilateral    • Neuropathy    • OAB (overactive bladder)    • Seasonal allergies    • Sleep apnea     CPAP   • 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[facility]) 2017    no residual   • Thyroid disorder      Past Surgical History:   Procedure Laterality Date   • COLONOSCOPY     • ENDOSCOPY N/A 2021    Procedure: ESOPHAGOGASTRODUODENOSCOPY WITH COLD BIOPSY;  Surgeon: Mansi Kyle MD;  Location: [facility] ENDOSCOPY;  Service: General;  Laterality: N/A;  PRE: SCREENING FOR BARIATRICS  POST: MODERATE SIZE HIATAL HERNIA   • GALLBLADDER SURGERY     • GASTRIC SLEEVE LAPAROSCOPIC N/A 2022    Procedure: GASTRIC SLEEVE LAPAROSCOPIC With VENTRALHernia Repair AND PARAESOPHAGEAL HERNIA REPAIR AND LYSIS OF ADHESIONS PARTICAL OMENTECTOMY;  Surgeon: Quang Shipley Jr., MD;  [place] [facility] [place] [facility] [place]  Service: Bariatric;  Laterality: N/A;   • TONSILLECTOMY     • TOTAL KNEE ARTHROPLASTY Right 2023    Procedure: RIGHT TOTAL KNEE ARTHROPLASTY  WITH DANITA ROBOT AND SHIREEN;  Surgeon: Murray Calvillo MD;  Location: [facility] MAIN OR;  Service: Robotics - Ortho;  Laterality: Right;   • TUBAL ABDOMINAL LIGATION        General Information     Row Name 02/03/23 1014          OT Time and Intention    Document Type therapy note (daily note)  -     Mode of Treatment individual therapy;occupational therapy  -           User Key  (r) = Recorded By, (t) = Taken By, (c) = Cosigned By    Initials Name Provider Type     Court Jarquin OT Occupational Therapist                 Mobility/ADL's     Row Name 02/03/23 1014          Bed Mobility    Comment, (Bed Mobility) Patient upright and seated in recliner upon therapist's arrival.  -     Row Name 02/03/23 1014          Transfers    Transfers sit-stand transfer;stand-sit transfer  -     Row Name 02/03/23 1014          Sit-Stand Transfer    Sit-Stand [place] (Transfers) contact guard;standby assist  -     Assistive Device (Sit-Stand Transfers) walker, front-wheeled  -     Row Name 02/03/23 1014          Stand-Sit Transfer    Stand-Sit [place] (Transfers) contact guard;standby assist  -     Assistive Device (Stand-Sit Transfers) walker, front-wheeled  -     Row Name 02/03/23 1014          Activities of Daily Living    BADL Assessment/Intervention bathing;upper body dressing;lower body dressing;grooming  -     Row Name 02/03/23 1014          Mobility    Extremity Weight-bearing Status right lower extremity  -     Right Lower Extremity (Weight-bearing Status) weight-bearing as tolerated (WBAT)  -     Row Name 02/03/23 1014          Upper Body Dressing Assessment/Training    W[place]an Level (Upper Body Dressing) upper body dressing skills;doff;don;pull-over garment;set up  -     Position (Upper Body Dressing) unsupported sitting  -     Row Name 02/03/23 1014          Lower Body Dressing Assessment/Training    [place]an Level (Lower Body Dressing) lower body dressing  skills;doff;don;pants/bottoms;minimum assist (75% patient effort)  -LF     Position (Lower Body Dressing) unsupported sitting;supported standing  -LF     Comment, (Lower Body Dressing) Educated patient on lower body adaptive dressing technique, verified learning via teachback.  -LF     Row Name 02/03/23 1014          Bathing Assessment/Intervention    Leon Level (Bathing) bathing skills;upper body;set up;lower body;minimum assist (75% patient effort);contact guard assist  -LF     Position (Bathing) unsupported sitting;supported standing  -LF     Row Name 02/03/23 1014          Grooming Assessment/Training    Leon Level (Grooming) grooming skills;oral care regimen;wash face, hands;set up  -LF     Position (Grooming) unsupported sitting  -           User Key  (r) = Recorded By, (t) = Taken By, (c) = Cosigned By    Initials Name Provider Type     Court Jarquin OT Occupational Therapist               Obj/Interventions     Row Name 02/03/23 1016          Motor Skills    Functional Endurance Fair/Fair+  -[facility] Name 02/03/23 1016          Balance    Balance Assessment sitting dynamic balance;standing dynamic balance  -LF     Dynamic Sitting Balance independent  -LF     Position, Sitting Balance unsupported;sitting in chair  -LF     Dynamic Standing Balance contact guard;standby assist  -     Position/Device Used, Standing Balance supported;walker, front-wheeled  -     Balance Interventions sitting;standing;sit to stand;supported;dynamic;occupation based/functional task;weight shifting activity  -           User Key  (r) = Recorded By, (t) = Taken By, (c) = Cosigned By    Initials Name Provider Type     Court Jarquin OT Occupational Therapist               Goals/Plan    No documentation.                Clinical Impression     Row Name 02/03/23 1016          Pain Assessment    Additional Documentation Pain Scale: FACES Pre/Post-Treatment (Group)  -[facility] Name 02/03/23 1016           Pain Scale: FACES Pre/Post-Treatment    Pain: FACES Scale, Pretreatment 2-->hurts little bit  -LF     Posttreatment Pain Rating 2-->hurts little bit  -LF     Row Name 02/03/23 1016          Plan of Care Review    Plan of Care Reviewed With patient  -LF     Progress improving  -LF     Outcome Evaluation Patient completed all functional transfers with CGA/SBA using RW. She continues to require min assist with LB dressing, demonstrating fair/fair+ endurance with ADL participation. She would benefit from continued OT services to maximize independence.  -LF     Row Name 02/03/23 1016          Vital Signs    O2 Delivery Pre Treatment room air  -LF     O2 Delivery Intra Treatment room air  -LF     O2 Delivery Post Treatment room air  -LF           User Key  (r) = Recorded By, (t) = Taken By, (c) = Cosigned By    Initials Name Provider Type    LF Court Jarquin, OT Occupational Therapist               Outcome Measures     Row Name 02/03/23 1017          How much help from another is currently needed...    Putting on and taking off regular lower body clothing? 3  -LF     Bathing (including washing, rinsing, and drying) 3  -LF     Toileting (which includes using toilet bed pan or urinal) 3  -LF     Putting on and taking off regular upper body clothing 4  -LF     Taking care of personal grooming (such as brushing teeth) 4  -LF     Eating meals 4  -LF     AM-PAC 6 Clicks Score (OT) 21  -LF     Row Name 02/03/23 0740 02/02/23 2236       How much help from another person do you currently need...    Turning from your back to your side while in flat bed without using bedrails? 3  -DB 3  -SV    Moving from lying on back to sitting on the side of a flat bed without bedrails? 3  -DB 3  -SV    Moving to and from a bed to a chair (including a wheelchair)? 3  -DB 3  -SV    Standing up from a chair using your arms (e.g., wheelchair, bedside chair)? 3  -DB 3  -SV    Climbing 3-5 steps with a railing? 3  -DB 3  -SV    To walk in  hospital room? 3  -DB 3  -SV    AM-PAC 6 Clicks Score (PT) 18  -DB 18  -SV    Highest level of mobility 6 --> Walked 10 steps or more  -DB 6 --> Walked 10 steps or more  -SV    Row Name 02/03/23 1017          Functional Assessment    Outcome Measure Options AM-PAC 6 Clicks Daily Activity (OT);Optimal Instrument  -LF     Row Name 02/03/23 1017          Optimal Instrument    Optimal Instrument Optimal - 3  -LF     Bending/Stooping 2  -LF     Standing 2  -LF     Reaching 1  -LF     From the list, choose the 3 activities you would most like to be able to do without any difficulty Bending/stooping;Standing;Reaching  -LF     Total Score Optimal - 3 5  -LF           User Key  (r) = Recorded By, (t) = Taken By, (c) = Cosigned By    Initials Name Provider Type    Lolis Lyles, RN Registered Nurse    LF Court Jarquin OT Occupational Therapist    Kelsey Rosario RN Registered Nurse                Occupational Therapy Education     Title: PT OT SLP Therapies (Done)     Topic: Occupational Therapy (Done)     Point: ADL training (Done)     Description:   Instruct learner(s) on proper safety adaptation and remediation techniques during self care or transfers.   Instruct in proper use of assistive devices.              Learning Progress Summary           Patient Acceptance, E,TB,D, VU,DU by  at 2/1/2023 0907                   Point: Home exercise program (Done)     Description:   Instruct learner(s) on appropriate technique for monitoring, assisting and/or progressing therapeutic exercises/activities.              Learning Progress Summary           Patient Acceptance, E,TB,D, VU,DU by  at 2/1/2023 0907                   Point: Precautions (Done)     Description:   Instruct learner(s) on prescribed precautions during self-care and functional transfers.              Learning Progress Summary           Patient Acceptance, E,TB,D, VU,DU by  at 2/1/2023 0907                   Point: Body mechanics (Done)      Description:   Instruct learner(s) on proper positioning and spine alignment during self-care, functional mobility activities and/or exercises.              Learning Progress Summary           Patient Acceptance, E,TB,D, VU,DU by  at 2/1/2023 0907                               User Key     Initials Effective Dates Name Provider Type [facility] 06/16/21 -  Marcela Montiel OT Occupational Therapist OT              OT Recommendation and Plan     Plan of Care Review  Plan of Care Reviewed With: patient  Progress: improving  Outcome Evaluation: Patient completed all functional transfers with CGA/SBA using RW. She continues to require min assist with LB dressing, demonstrating fair/fair+ endurance with ADL participation. She would benefit from continued OT services to maximize independence.     Time Calculation:    Time Calculation- OT     Row Name 02/03/23 1017             Time Calculation- OT    OT Received On 02/03/23  -LF      OT Goal Re-Cert Due Date 02/10/23  -LF         Timed Charges    75910 - OT Therapeutic Activity Minutes 10  -LF      80358 - OT Self Care/Mgmt Minutes 15  -LF         Total Minutes    Timed Charges Total Minutes 25  -LF       Total Minutes 25  -LF            User Key  (r) = Recorded By, (t) = Taken By, (c) = Cosigned By    Initials Name Provider Type     Court Jarquin OT Occupational Therapist              Therapy Charges for Today     Code Description Service Date Service Provider Modifiers Qty    21662094678 HC OT THERAPEUTIC ACT EA 15 MIN 2/2/2023 Court Jarquin OT GO 1    18237665344 HC OT SELF CARE/MGMT/TRAIN EA 15 MIN 2/2/2023 Court Jarquin OT GO 1    27269537553 HC OT THERAPEUTIC ACT EA 15 MIN 2/3/2023 Court Jarquin OT GO 1    78036887832 HC OT SELF CARE/MGMT/TRAIN EA 15 MIN 2/3/2023 Court Jarquin OT GO 1               Court Jarquin OT  2/3/2023

## 2023-02-03 NOTE — PLAN OF CARE
Goal Outcome Evaluation:  Plan of Care Reviewed With: patient        Progress: no change  Outcome Evaluation: pt. is a one person assist with walker and has ambulated greater than 50 feet this shift.  prevena wound vac container changed this shift with moderate amount of bloody drainage noted.  Pt. Medicated x 1 for pain with relief noted.  Voiding without difficulty.  Pt. Plans on going home with home health at discharge.

## 2023-02-03 NOTE — PLAN OF CARE
Goal Outcome Evaluation:   Voids without difficulties. Prevena wound VAC to right knee has bloody output. Seen by wound care RN this shift. Ambulates x1 assist with a walker. Worked with PT/OT this shift. Will discharge home with home health when ready.

## 2023-02-04 LAB
GLUCOSE BLDC GLUCOMTR-MCNC: 111 MG/DL (ref 70–99)
GLUCOSE BLDC GLUCOMTR-MCNC: 143 MG/DL (ref 70–99)
GLUCOSE BLDC GLUCOMTR-MCNC: 86 MG/DL (ref 70–99)
GLUCOSE BLDC GLUCOMTR-MCNC: 96 MG/DL (ref 70–99)

## 2023-02-04 PROCEDURE — A9270 NON-COVERED ITEM OR SERVICE: HCPCS | Performed by: ORTHOPAEDIC SURGERY

## 2023-02-04 PROCEDURE — 97110 THERAPEUTIC EXERCISES: CPT

## 2023-02-04 PROCEDURE — G0378 HOSPITAL OBSERVATION PER HR: HCPCS

## 2023-02-04 PROCEDURE — 63710000001 ROPINIROLE 0.25 MG TABLET: Performed by: INTERNAL MEDICINE

## 2023-02-04 PROCEDURE — A9270 NON-COVERED ITEM OR SERVICE: HCPCS | Performed by: INTERNAL MEDICINE

## 2023-02-04 PROCEDURE — 63710000001 ASPIRIN EC 325 MG TABLET DELAYED-RELEASE: Performed by: ORTHOPAEDIC SURGERY

## 2023-02-04 PROCEDURE — 63710000001 ACETAMINOPHEN 500 MG TABLET: Performed by: INTERNAL MEDICINE

## 2023-02-04 PROCEDURE — 63710000001 GABAPENTIN 400 MG CAPSULE: Performed by: INTERNAL MEDICINE

## 2023-02-04 PROCEDURE — 63710000001 MAGNESIUM HYDROXIDE 2400 MG/10ML SUSPENSION: Performed by: ORTHOPAEDIC SURGERY

## 2023-02-04 PROCEDURE — 82962 GLUCOSE BLOOD TEST: CPT

## 2023-02-04 PROCEDURE — 63710000001 HYDROCODONE-ACETAMINOPHEN 7.5-325 MG TABLET: Performed by: ORTHOPAEDIC SURGERY

## 2023-02-04 PROCEDURE — 97116 GAIT TRAINING THERAPY: CPT

## 2023-02-04 PROCEDURE — 99232 SBSQ HOSP IP/OBS MODERATE 35: CPT | Performed by: INTERNAL MEDICINE

## 2023-02-04 PROCEDURE — 94799 UNLISTED PULMONARY SVC/PX: CPT

## 2023-02-04 RX ORDER — ACETAMINOPHEN 500 MG
1000 TABLET ORAL 3 TIMES DAILY PRN
Status: DISCONTINUED | OUTPATIENT
Start: 2023-02-04 | End: 2023-02-06 | Stop reason: HOSPADM

## 2023-02-04 RX ADMIN — HYDROCODONE BITARTRATE AND ACETAMINOPHEN 2 TABLET: 7.5; 325 TABLET ORAL at 06:42

## 2023-02-04 RX ADMIN — GABAPENTIN 800 MG: 400 CAPSULE ORAL at 13:53

## 2023-02-04 RX ADMIN — ASPIRIN 325 MG: 325 TABLET, COATED ORAL at 08:55

## 2023-02-04 RX ADMIN — MAGNESIUM HYDROXIDE 10 ML: 2400 SUSPENSION ORAL at 22:57

## 2023-02-04 RX ADMIN — GABAPENTIN 800 MG: 400 CAPSULE ORAL at 05:23

## 2023-02-04 RX ADMIN — ACETAMINOPHEN 1000 MG: 500 TABLET ORAL at 08:55

## 2023-02-04 RX ADMIN — GABAPENTIN 800 MG: 400 CAPSULE ORAL at 21:09

## 2023-02-04 RX ADMIN — HYDROCODONE BITARTRATE AND ACETAMINOPHEN 1 TABLET: 7.5; 325 TABLET ORAL at 21:09

## 2023-02-04 RX ADMIN — ROPINIROLE HYDROCHLORIDE 0.5 MG: 0.25 TABLET, FILM COATED ORAL at 21:39

## 2023-02-04 NOTE — THERAPY TREATMENT NOTE
Acute Care - Physical Therapy Treatment Note   Radha     Patient Name: Marilin Martinez  : 1955  MRN: [id number]  Today's Date: 2023      Visit Dx:     ICD-10-CM ICD-9-CM   1. Decreased activities of daily living (ADL)  Z78.9 V49.89   2. Difficulty in walking  R26.2 719.7   3. Osteoarthrosis, localized, primary, knee, right  M17.11 715.16     Patient Active Problem List   Diagnosis   • Environmental and seasonal allergies   • Essential hypertension   • Sleep apnea   • Multiple joint pain   • Hypothyroid   • Diabetes mellitus type 2 in obese (HCC)   • Dyslipidemia   • S/P laparoscopic sleeve gastrectomy   • Primary osteoarthritis of right knee   • Primary osteoarthritis of left knee   • Candidal intertrigo   • Excessive and redundant skin and subcutaneous tissue   • Obesity, Class II, BMI 35-39.9   • Osteoarthrosis, localized, primary, knee, right   • Open knee wound     Past Medical History:   Diagnosis Date   • Arthritis     KNEE PAIN   • Back pain    • Diabetes mellitus ([facility])    • Hyperlipemia    • Hypertension     managed by cardiologist   • Knee pain, bilateral    • Neuropathy    • OAB (overactive bladder)    • Seasonal allergies    • Sleep apnea     CPAP   • 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[facility])     no residual   • Thyroid disorder      Past Surgical History:   Procedure Laterality Date   • COLONOSCOPY     • ENDOSCOPY N/A 2021    Procedure: ESOPHAGOGASTRODUODENOSCOPY WITH COLD BIOPSY;  Surgeon: Mansi Kyle MD;  Location: [facility] ENDOSCOPY;  Service: General;  Laterality: N/A;  PRE: SCREENING FOR BARIATRICS  POST: MODERATE SIZE HIATAL HERNIA   • GALLBLADDER SURGERY     • GASTRIC SLEEVE LAPAROSCOPIC N/A 2022    Procedure: GASTRIC SLEEVE LAPAROSCOPIC With VENTRALHernia Repair AND PARAESOPHAGEAL HERNIA REPAIR AND LYSIS OF ADHESIONS PARTICAL OMENTECTOMY;  Surgeon: Quang Shipley Jr., MD;  Location: [facility] OR [facility];  Service: Bariatric;  Laterality: N/A;   • TONSILLECTOMY     • TOTAL KNEE  ARTHROPLASTY Right 1/31/2023    Procedure: RIGHT TOTAL KNEE ARTHROPLASTY WITH DANITA ROBOT AND SHIREEN;  Surgeon: Murray Calvillo MD;  Location: [facility] MAIN OR;  Service: Robotics - Ortho;  Laterality: Right;   • TUBAL ABDOMINAL LIGATION       PT Assessment (last 12 hours)     PT Evaluation and Treatment     Row Name 02/04/23 0854          Physical Therapy Time and Intention    Subjective Information complains of;pain  -TS     Document Type therapy note (daily note)  -TS     Mode of Treatment physical therapy;individual therapy  -TS     Row Name 02/04/23 0854          Pain    Pretreatment Pain Rating 7/10  -TS     Pain Location - Side/Orientation Right  -TS     Pain Location - knee  -TS     Row Name 02/04/23 0854          Cognition    Affect/Mental Status (Cognition) WNL  -TS     Row Name 02/04/23 0854          Range of Motion (ROM)    Range of Motion right lower extremity ROM  10-85  -TS     Row Name 02/04/23 0854          Mobility    Extremity Weight-bearing Status right lower extremity  -TS     Right Lower Extremity (Weight-bearing Status) weight-bearing as tolerated (WBAT)  -TS     Row Name 02/04/23 0854          Transfers    Transfers sit-stand transfer;stand-sit transfer  -TS     Row Name 02/04/23 0854          Sit-Stand Transfer    Sit-Stand [place] (Transfers) contact guard;verbal cues  -TS     Assistive Device (Sit-Stand Transfers) walker, front-wheeled  -TS     Row Name 02/04/23 0854          Stand-Sit Transfer    Stand-Sit [place] (Transfers) contact guard;verbal cues  -TS     Assistive Device (Stand-Sit Transfers) walker, front-wheeled  -TS     Row Name 02/04/23 0854          Gait/Stairs (Locomotion)    Gait/Stairs Locomotion gait/ambulation independence;gait/ambulation assistive device;distance ambulated;gait pattern;gait deviations  -TS     [place] Level (Gait) contact guard;verbal cues  -TS     Assistive Device (Gait) walker, front-wheeled  -TS     Distance in Feet (Gait) 85  -TS      Pattern (Gait) 3-point;step-through  -TS     Deviations/Abnormal Patterns (Gait) base of support, wide;antalgic;gait speed decreased;stride length decreased  -TS     Bilateral Gait Deviations forward flexed posture;heel strike decreased  -TS     Row Name 02/04/23 0854          Safety Issues, Functional Mobility    Impairments Affecting Function (Mobility) balance;endurance/activity tolerance;pain  -TS     Row Name 02/04/23 0854          Motor Skills    Therapeutic Exercise hip;knee;ankle  AAROM RLE x20 reps, recumbant position  -TS     Row Name             Wound 01/31/23 1112 Left gluteal Pressure Injury    Wound - Properties Group Placement Date: 01/31/23  -EG Placement Time: 1112  -EG Present on Hospital Admission: Y  -EG Side: Left  -EG Location: gluteal  -EG Primary Wound Type: Pressure inj  -EG Additional Comments: PT STATES SHE HAS PRESSURE SORE TO LEFT GLUTEAL AREA DRESSING D/I STATES HOME HEALTH NURSE APPLIED TO AREA YESTERDAY.  NOTED HEALED SCAR TO RIGHT GLUTEAL AREA PT STATES SHE HAD PREVIOUS PRESSURE SORE THERE.  -EG    Retired Wound - Properties Group Placement Date: 01/31/23  -EG Placement Time: 1112  -EG Present on Hospital Admission: Y  -EG Side: Left  -EG Location: gluteal  -EG Primary Wound Type: Pressure inj  -EG Additional Comments: PT STATES SHE HAS PRESSURE SORE TO LEFT GLUTEAL AREA DRESSING D/I STATES HOME HEALTH NURSE APPLIED TO AREA YESTERDAY.  NOTED HEALED SCAR TO RIGHT GLUTEAL AREA PT STATES SHE HAD PREVIOUS PRESSURE SORE THERE.  -EG    Retired Wound - Properties Group Date first assessed: 01/31/23  -EG Time first assessed: 1112  -EG Present on Hospital Admission: Y  -EG Side: Left  -EG Location: gluteal  -EG Primary Wound Type: Pressure inj  -EG Additional Comments: PT STATES SHE HAS PRESSURE SORE TO LEFT GLUTEAL AREA DRESSING D/I STATES HOME HEALTH NURSE APPLIED TO AREA YESTERDAY.  NOTED HEALED SCAR TO RIGHT GLUTEAL AREA PT STATES SHE HAD PREVIOUS PRESSURE SORE THERE.  -EG    Row Name              Wound 01/31/23 1426 Right anterior knee Incision    Wound - Properties Group Placement Date: 01/31/23  -EGA Placement Time: 1426  -EGA Present on Hospital Admission: N  -EGA Side: Right  -EGA Orientation: anterior  -EGA Location: knee  -EGA Primary Wound Type: Incision  -EGA    Retired Wound - Properties Group Placement Date: 01/31/23  -EGA Placement Time: 1426  -EGA Present on Hospital Admission: N  -EGA Side: Right  -EGA Orientation: anterior  -EGA Location: knee  -EGA Primary Wound Type: Incision  -EGA    Retired Wound - Properties Group Date first assessed: 01/31/23  -EGA Time first assessed: 1426  -EGA Present on Hospital Admission: N  -EGA Side: Right  -EGA Location: knee  -EGA Primary Wound Type: Incision  -EGA    Row Name             NPWT (Negative Pressure Wound Therapy) 01/31/23 1530 Right knee    NPWT (Negative Pressure Wound Therapy) - Properties Group Placement Date: 01/31/23  -MB (r) MW (t) Placement Time: 1530  -MB (r) MW (t) Location: Right knee  -MB (r) MW (t)    Retired NPWT (Negative Pressure Wound Therapy) - Properties Group Placement Date: 01/31/23  -MB (r) MW (t) Placement Time: 1530  -MB (r) MW (t) Location: Right knee  -MB (r) MW (t)    Retired NPWT (Negative Pressure Wound Therapy) - Properties Group Placement Date: 01/31/23  -MB (r) MW (t) Placement Time: 1530  -MB (r) MW (t) Location: Right knee  -MB (r) MW (t)    Row Name 02/04/23 0854          Positioning and Restraints    Pre-Treatment Position sitting in chair/recliner  -TS     Post Treatment Position chair  -TS     In Chair reclined;call light within reach;exit alarm on;heels elevated  -TS     Row Name 02/04/23 0854          Progress Summary (PT)    Progress Toward Functional Goals (PT) progress toward functional goals is fair  -TS           User Key  (r) = Recorded By, (t) = Taken By, (c) = Cosigned By    Initials Name Provider Type    Manny Herrera PTA Physical Therapist Assistant    Bree Moreno RN Registered  Nurse    Bita Marsh, RN Registered Nurse    Jennyfer Carey, RN Registered Nurse            Right Knee Ther-ex   Exercise  Reps  Sets    Long arc Quads   10 2   Short arc Quads  10 2   Heel Slides  10 2   Ankle Pumps  10 2   Quad sets  10 2   Glut sets  10 2   Straight leg raise  10 2            Physical Therapy Education     Title: PT OT SLP Therapies (Done)     Topic: Physical Therapy (Done)     Point: Mobility training (Done)     Learning Progress Summary           Patient Acceptance, E,TB, VU by FARRAH at 2/3/2023 2100    Acceptance, E,TB, VU by JUAN at 2/1/2023 1120                   Point: Precautions (Done)     Learning Progress Summary           Patient Acceptance, E,TB, VU by FARRAH at 2/3/2023 2100    Acceptance, E,TB, VU by JUAN at 2/1/2023 1120                               User Key     Initials Effective Dates Name Provider Type Discipline     06/16/21 -  Soo Sarkar, RN Registered Nurse Nurse    JUAN 06/03/21 -  Ben Lentz, PT Physical Therapist PT              PT Recommendation and Plan     Progress Summary (PT)  Progress Toward Functional Goals (PT): progress toward functional goals is fair   Outcome Measures     Row Name 02/04/23 0859 02/03/23 1200 02/02/23 1200       How much help from another person do you currently need...    Turning from your back to your side while in flat bed without using bedrails? 3  -TS 3  -RH 3  -RH    Moving from lying on back to sitting on the side of a flat bed without bedrails? 3  -TS 3  -RH 3  -RH    Moving to and from a bed to a chair (including a wheelchair)? 3  -TS 3  -RH 3  -RH    Standing up from a chair using your arms (e.g., wheelchair, bedside chair)? 3  -TS 3  -RH 3  -RH    Climbing 3-5 steps with a railing? 3  -TS 3  -RH 3  -RH    To walk in hospital room? 4  -TS 4  -RH 3  -RH    AM-PAC 6 Clicks Score (PT) 19  -TS 19  -RH 18  -RH    Row Name 02/01/23 1100             How much help from another person do you currently need...    Turning from your  back to your side while in flat bed without using bedrails? 3  -JUAN      Moving from lying on back to sitting on the side of a flat bed without bedrails? 3  -JUAN      Moving to and from a bed to a chair (including a wheelchair)? 3  -JUAN      Standing up from a chair using your arms (e.g., wheelchair, bedside chair)? 3  -JUAN      Climbing 3-5 steps with a railing? 3  -JUAN      To walk in hospital room? 3  -JUAN      AM-PAC 6 Clicks Score (PT) 18  -JUAN         Functional Assessment    Outcome Measure Options AM-PAC 6 Clicks Basic Mobility (PT)  -JUAN            User Key  (r) = Recorded By, (t) = Taken By, (c) = Cosigned By    Initials Name Provider Type    Manny Herrera PTA Physical Therapist Assistant    Geo Hugo PTA Physical Therapist Assistant    Ben Chun, PT Physical Therapist                 Time Calculation:    PT Charges     Row Name 02/04/23 0851             Time Calculation    PT Received On 02/04/23  -TS      PT Goal Re-Cert Due Date 02/10/23  -TS         Timed Charges    86908 - PT Therapeutic Exercise Minutes 17  -TS      77385 - Gait Training Minutes  8  -TS      87121 - PT Therapeutic Activity Minutes 3  -TS         Total Minutes    Timed Charges Total Minutes 28  -TS       Total Minutes 28  -TS            User Key  (r) = Recorded By, (t) = Taken By, (c) = Cosigned By    Initials Name Provider Type    Manny Herrera PTA Physical Therapist Assistant              Therapy Charges for Today     Code Description Service Date Service Provider Modifiers Qty    21536399299 HC PT THER PROC EA 15 MIN 2/4/2023 Manny Yepez PTA GP 1    36343568168 HC GAIT TRAINING EA 15 MIN 2/4/2023 Manny Yepez PTA GP 1          PT G-Codes  Outcome Measure Options: AM-PAC 6 Clicks Daily Activity (OT), Optimal Instrument  AM-PAC 6 Clicks Score (PT): 19  AM-PAC 6 Clicks Score (OT): 21    Manny Yepez PTA  2/4/2023

## 2023-02-04 NOTE — PROGRESS NOTES
[facility]   Hospitalist Progress Note  Date: 2023  Patient Name: Marilin Martinez  : 1955  MRN: [id number]  Date of admission: 2023      Subjective   Subjective     Chief Complaint: Diabetes and hypertension    Summary: 68 y.o. female past medical history of obesity with BMI of 38, osteoarthritis, diabetes, neuropathy, stroke, and hypothyroidism who is being admitted status post right knee replacement.  The patient's procedure went well but they did have to place a wound VAC on her right knee due to inability to close the skin.   Worked with PT/OT during her hospital stay.  Ultimate plan was for home with home health and home wound care.    Interval Followup: No acute events overnight.  No changes.    Review of Systems   Denies fevers or chest pain     Objective   Objective     Vitals:   Temp:  [97.3 °F (36.3 °C)-98.6 °F (37 °C)] 98.4 °F (36.9 °C)  Heart Rate:  [61-85] 85  Resp:  [16-18] 16  BP: (119-154)/(48-67) 147/65  Physical Exam    Constitutional: Awake, alert, NAD   Respiratory: Clear to auscultation bilaterally, nonlabored respirations    Cardiovascular: RRR, no MRG   Gastrointestinal: Positive bowel sounds, soft, nontender, nondistended   Neurologic: Oriented x 3, wound VAC in place right knee, Cranial Nerves grossly intact to confrontation, speech clear    Result Review    Result Review:  I have personally reviewed the results below:  []  Laboratory  []  Microbiology  []  Radiology  []  EKG/Telemetry   []  Cardiology/Vascular   []  Pathology  []  Old records  []  Other:  CBC    CBC 1/13/23 2/1/23 2/3/23   WBC 8.95 14.09 (A) 7.54   RBC 4.07 3.61 (A) 3.37 (A)   Hemoglobin 13.5 12.2 11.3 (A)   Hematocrit 40.7 35.4 33.8 (A)   .0 (A) 98.1 (A) 100.3 (A)   MCH 33.2 (A) 33.8 (A) 33.5 (A)   MCHC 33.2 34.5 33.4   RDW 13.3 12.3 12.9   Platelets 176 113 (A) 106 (A)   (A) Abnormal value            CMP    CMP 3/23/22 1/13/23 2/3/23   Glucose 97 83 85   BUN 13 18 16   Creatinine 1.00  0.88 0.80   eGFR 62.6 72.6 81.4   Sodium 142 139 136   Potassium 4.3 4.4 4.1   Chloride 105 106 104   Calcium 9.6 9.8 8.9   Total Protein 7.1 7.0    Albumin 4.30 3.9    Globulin 2.8 3.1    Total Bilirubin 0.5 0.5    Alkaline Phosphatase 56 58    AST (SGOT) 21 16    ALT (SGPT) 19 11    Albumin/Globulin Ratio 1.5 1.3    BUN/Creatinine Ratio 13.0 20.5 20.0   Anion Gap 10.0 7.3 5.9      Comments are available for some flowsheets but are not being displayed.             Assessment & Plan   Assessment / Plan     Assessment/Plan:  Asymptomatic bradycardia  Right knee replacement requiring wound VAC   Hypertension  Type 2 diabetes  but most recent A1c is 5.1  Obesity  Hypothyroidism    Continue to monitor in the hospital for management of the above  Orthopedic surgery following, appreciate assistance  Continue wound VAC, will be set up for home health and wound care  Continue appropriate home medications  Continue to work with PT/OT  Trend renal function and electrolytes with a.m. BMP  Trend Hgb and WBC with a.m. CBC     Discussed plan with RN, orthopedic surgery    DVT prophylaxis:  Medical and mechanical DVT prophylaxis orders are present.

## 2023-02-04 NOTE — PLAN OF CARE
Goal Outcome Evaluation:   Ambulates x1 assist with a walker. Medicated for pain per MD orders. See MAR. Voids without difficulties. Prevena wound VAC with moderate amount of bloody drainage. Will discharge home with home health when ready.

## 2023-02-04 NOTE — THERAPY TREATMENT NOTE
Acute Care - Physical Therapy Treatment Note   Radha     Patient Name: Marilin Martinez  : 1955  MRN: [id number]  Today's Date: 2023      Visit Dx:     ICD-10-CM ICD-9-CM   1. Decreased activities of daily living (ADL)  Z78.9 V49.89   2. Difficulty in walking  R26.2 719.7   3. Osteoarthrosis, localized, primary, knee, right  M17.11 715.16     Patient Active Problem List   Diagnosis   • Environmental and seasonal allergies   • Essential hypertension   • Sleep apnea   • Multiple joint pain   • Hypothyroid   • Diabetes mellitus type 2 in obese (HCC)   • Dyslipidemia   • S/P laparoscopic sleeve gastrectomy   • Primary osteoarthritis of right knee   • Primary osteoarthritis of left knee   • Candidal intertrigo   • Excessive and redundant skin and subcutaneous tissue   • Obesity, Class II, BMI 35-39.9   • Osteoarthrosis, localized, primary, knee, right   • Open knee wound     Past Medical History:   Diagnosis Date   • Arthritis     KNEE PAIN   • Back pain    • Diabetes mellitus ([facility]ly [facility])    • Hyperlipemia    • Hypertension     managed by cardiologist   • Knee pain, bilateral    • Neuropathy    • OAB (overactive bladder)    • Seasonal allergies    • Sleep apnea     CPAP   • 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Formerly [facility])     no residual   • Thyroid disorder      Past Surgical History:   Procedure Laterality Date   • COLONOSCOPY     • ENDOSCOPY N/A 2021    Procedure: ESOPHAGOGASTRODUODENOSCOPY WITH COLD BIOPSY;  Surgeon: Mansi Kyle MD;  Location: [facility] ENDOSCOPY;  Service: General;  Laterality: N/A;  PRE: SCREENING FOR BARIATRICS  POST: MODERATE SIZE HIATAL HERNIA   • GALLBLADDER SURGERY     • GASTRIC SLEEVE LAPAROSCOPIC N/A 2022    Procedure: GASTRIC SLEEVE LAPAROSCOPIC With VENTRALHernia Repair AND PARAESOPHAGEAL HERNIA REPAIR AND LYSIS OF ADHESIONS PARTICAL OMENTECTOMY;  Surgeon: Quang Shipley Jr., MD;  Location: [facility] OR [facility];  Service: Bariatric;  Laterality: N/A;   • TONSILLECTOMY     • TOTAL KNEE  ARTHROPLASTY Right 1/31/2023    Procedure: RIGHT TOTAL KNEE ARTHROPLASTY WITH DANITA ROBOT AND SHIREEN;  Surgeon: Murray Calvillo MD;  Location: [facility] MAIN OR;  Service: Robotics - Ortho;  Laterality: Right;   • TUBAL ABDOMINAL LIGATION       PT Assessment (last 12 hours)     PT Evaluation and Treatment     Row Name 02/04/23 1400 02/04/23 0854       Physical Therapy Time and Intention    Subjective Information complains of;pain  -TS complains of;pain  -TS    Document Type therapy note (daily note)  -TS therapy note (daily note)  -TS    Mode of Treatment physical therapy;individual therapy  -TS physical therapy;individual therapy  -TS    Row Name 02/04/23 1400 02/04/23 0854       Pain    Pretreatment Pain Rating 4/10  -TS 7/10  -TS    Posttreatment Pain Rating 5/10  -TS --    Pain Location - Side/Orientation Right  -TS Right  -TS    Pain Location - knee  -TS knee  -TS    Row Name 02/04/23 1400 02/04/23 0854       Cognition    Affect/Mental Status (Cognition) WNL  -TS WNL  -TS    Row Name 02/04/23 0854          Range of Motion (ROM)    Range of Motion right lower extremity ROM  10-85  -TS     Row Name 02/04/23 1400 02/04/23 0854       Mobility    Extremity Weight-bearing Status right lower extremity  -TS right lower extremity  -TS    Right Lower Extremity (Weight-bearing Status) weight-bearing as tolerated (WBAT)  -TS weight-bearing as tolerated (WBAT)  -TS    Row Name 02/04/23 1400 02/04/23 0854       Transfers    Transfers sit-stand transfer;stand-sit transfer  -TS sit-stand transfer;stand-sit transfer  -TS    Row Name 02/04/23 1400 02/04/23 0854       Sit-Stand Transfer    Sit-Stand [place] (Transfers) contact guard;verbal cues  -TS contact guard;verbal cues  -TS    Assistive Device (Sit-Stand Transfers) walker, front-wheeled  -TS walker, front-wheeled  -TS    Row Name 02/04/23 1400 02/04/23 0854       Stand-Sit Transfer    Stand-Sit [place] (Transfers) contact guard;verbal cues  -TS contact guard;verbal  cues  -TS    Assistive Device (Stand-Sit Transfers) walker, front-wheeled  -TS walker, front-wheeled  -TS    Row Name 02/04/23 1400 02/04/23 0854       Gait/Stairs (Locomotion)    Gait/Stairs Locomotion gait/ambulation independence;gait/ambulation assistive device;distance ambulated;gait pattern;gait deviations  -TS gait/ambulation independence;gait/ambulation assistive device;distance ambulated;gait pattern;gait deviations  -TS    [place] Level (Gait) contact guard;verbal cues  -TS contact guard;verbal cues  -TS    Assistive Device (Gait) walker, front-wheeled  -TS walker, front-wheeled  -TS    Distance in Feet (Gait) 90  -TS 85  -TS    Pattern (Gait) 3-point;step-through  -TS 3-point;step-through  -TS    Deviations/Abnormal Patterns (Gait) base of support, wide;antalgic;gait speed decreased;stride length decreased  -TS base of support, wide;antalgic;gait speed decreased;stride length decreased  -TS    Bilateral Gait Deviations forward flexed posture;heel strike decreased  -TS forward flexed posture;heel strike decreased  -TS    Row Name 02/04/23 1400 02/04/23 0854       Safety Issues, Functional Mobility    Impairments Affecting Function (Mobility) balance;endurance/activity tolerance;pain  -TS balance;endurance/activity tolerance;pain  -TS    Row Name 02/04/23 1400 02/04/23 0854       Motor Skills    Therapeutic Exercise hip;knee;ankle  AAROM RLE x20 reps, recumbant position  -TS hip;knee;ankle  AAROM RLE x20 reps, recumbant position  -TS    Row Name             Wound 01/31/23 1112 Left gluteal Pressure Injury    Wound - Properties Group Placement Date: 01/31/23  -EG Placement Time: 1112  -EG Present on Hospital Admission: Y  -EG Side: Left  -EG Location: gluteal  -EG Primary Wound Type: Pressure inj  -EG Additional Comments: PT STATES SHE HAS PRESSURE SORE TO LEFT GLUTEAL AREA DRESSING D/I STATES HOME HEALTH NURSE APPLIED TO AREA YESTERDAY.  NOTED HEALED SCAR TO RIGHT GLUTEAL AREA PT STATES SHE HAD PREVIOUS  PRESSURE SORE THERE.  -EG    Retired Wound - Properties Group Placement Date: 01/31/23  -EG Placement Time: 1112  -EG Present on Hospital Admission: Y  -EG Side: Left  -EG Location: gluteal  -EG Primary Wound Type: Pressure inj  -EG Additional Comments: PT STATES SHE HAS PRESSURE SORE TO LEFT GLUTEAL AREA DRESSING D/I STATES HOME HEALTH NURSE APPLIED TO AREA YESTERDAY.  NOTED HEALED SCAR TO RIGHT GLUTEAL AREA PT STATES SHE HAD PREVIOUS PRESSURE SORE THERE.  -EG    Retired Wound - Properties Group Date first assessed: 01/31/23  -EG Time first assessed: 1112  -EG Present on Hospital Admission: Y  -EG Side: Left  -EG Location: gluteal  -EG Primary Wound Type: Pressure inj  -EG Additional Comments: PT STATES SHE HAS PRESSURE SORE TO LEFT GLUTEAL AREA DRESSING D/I STATES HOME HEALTH NURSE APPLIED TO AREA YESTERDAY.  NOTED HEALED SCAR TO RIGHT GLUTEAL AREA PT STATES SHE HAD PREVIOUS PRESSURE SORE THERE.  -EG    Row Name             Wound 01/31/23 1426 Right anterior knee Incision    Wound - Properties Group Placement Date: 01/31/23  -EGA Placement Time: 1426  -EGA Present on Hospital Admission: N  -EGA Side: Right  -EGA Orientation: anterior  -EGA Location: knee  -EGA Primary Wound Type: Incision  -EGA    Retired Wound - Properties Group Placement Date: 01/31/23  -EGA Placement Time: 1426  -EGA Present on Hospital Admission: N  -EGA Side: Right  -EGA Orientation: anterior  -EGA Location: knee  -EGA Primary Wound Type: Incision  -EGA    Retired Wound - Properties Group Date first assessed: 01/31/23  -EGA Time first assessed: 1426  -EGA Present on Hospital Admission: N  -EGA Side: Right  -EGA Location: knee  -EGA Primary Wound Type: Incision  -EGA    Row Name             NPWT (Negative Pressure Wound Therapy) 01/31/23 1530 Right knee    NPWT (Negative Pressure Wound Therapy) - Properties Group Placement Date: 01/31/23  -MB (r) MW (t) Placement Time: 1530  -MB (r) MW (t) Location: Right knee  -MB (r) MW (t)    Retired NPWT  (Negative Pressure Wound Therapy) - Properties Group Placement Date: 01/31/23  -MB (r) MW (t) Placement Time: 1530  -MB (r) MW (t) Location: Right knee  -MB (r) MW (t)    Retired NPWT (Negative Pressure Wound Therapy) - Properties Group Placement Date: 01/31/23  -MB (r) MW (t) Placement Time: 1530  -MB (r) MW (t) Location: Right knee  -MB (r) MW (t)    Row Name 02/04/23 1400 02/04/23 0854       Positioning and Restraints    Pre-Treatment Position sitting in chair/recliner  -TS sitting in chair/recliner  -TS    Post Treatment Position chair  -TS chair  -TS    In Chair reclined;call light within reach;exit alarm on;heels elevated  -TS reclined;call light within reach;exit alarm on;heels elevated  -TS    Row Name 02/04/23 1400 02/04/23 0854       Progress Summary (PT)    Progress Toward Functional Goals (PT) progress toward functional goals is fair  -TS progress toward functional goals is fair  -TS          User Key  (r) = Recorded By, (t) = Taken By, (c) = Cosigned By    Initials Name Provider Type    TS Manny Yepez, PTA Physical Therapist Assistant    Bree Moreno, RN Registered Nurse    Bita Marsh, RN Registered Nurse    Jennyfer Carey, RN Registered Nurse            Right Knee Ther-ex   Exercise  Reps  Sets    Long arc Quads   10 2   Short arc Quads  10 2   Heel Slides  10 2   Ankle Pumps  10 2   Quad sets  10 2   Glut sets  10 2   Straight leg raise  10 2            Physical Therapy Education     Title: PT OT SLP Therapies (Done)     Topic: Physical Therapy (Done)     Point: Mobility training (Done)     Learning Progress Summary           Patient Acceptance, E, VU by MT at 2/4/2023 1113    Acceptance, E,TB, VU by FARRAH at 2/3/2023 2100    Acceptance, E,TB, VU by JUAN at 2/1/2023 1120                   Point: Precautions (Done)     Learning Progress Summary           Patient Acceptance, E, VU by MT at 2/4/2023 1113    Acceptance, E,TB, VU by FARRAH at 2/3/2023 2100    Acceptance, E,TB, VU by JUAN at 2/1/2023  1120                               User Key     Initials Effective Dates Name Provider Type Discipline    JN 06/16/21 -  Soo Sarkar, RN Registered Nurse Nurse    MT 09/07/21 -  Bita Mike LPN Licensed Nurse Nurse    JUAN 06/03/21 -  Ben Lentz, PT Physical Therapist PT              PT Recommendation and Plan     Progress Summary (PT)  Progress Toward Functional Goals (PT): progress toward functional goals is fair   Outcome Measures     Row Name 02/04/23 0859 02/03/23 1200 02/02/23 1200       How much help from another person do you currently need...    Turning from your back to your side while in flat bed without using bedrails? 3  -TS 3  -RH 3  -RH    Moving from lying on back to sitting on the side of a flat bed without bedrails? 3  -TS 3  -RH 3  -RH    Moving to and from a bed to a chair (including a wheelchair)? 3  -TS 3  -RH 3  -RH    Standing up from a chair using your arms (e.g., wheelchair, bedside chair)? 3  -TS 3  -RH 3  -RH    Climbing 3-5 steps with a railing? 3  -TS 3  -RH 3  -RH    To walk in hospital room? 4  -TS 4  -RH 3  -RH    AM-PAC 6 Clicks Score (PT) 19  -TS 19  -RH 18  -RH          User Key  (r) = Recorded By, (t) = Taken By, (c) = Cosigned By    Initials Name Provider Type    Manny Herrera, ANAHI Physical Therapist Assistant    RH Geo Sanchez PTA Physical Therapist Assistant                 Time Calculation:    PT Charges     Row Name 02/04/23 1416 02/04/23 0851          Time Calculation    PT Received On -- 02/04/23  -TS     PT Goal Re-Cert Due Date -- 02/10/23  -TS        Timed Charges    06876 - PT Therapeutic Exercise Minutes 16  -TS 17  -TS     85204 - Gait Training Minutes  8  -TS 8  -TS     46697 - PT Therapeutic Activity Minutes 2  -TS 3  -TS        Total Minutes    Timed Charges Total Minutes 26  -TS 28  -TS      Total Minutes 26  -TS 28  -TS           User Key  (r) = Recorded By, (t) = Taken By, (c) = Cosigned By    Initials Name Provider Type    TS Lucho  ANAHI Bryant Physical Therapist Assistant              Therapy Charges for Today     Code Description Service Date Service Provider Modifiers Qty    36467361942 HC PT THER PROC EA 15 MIN 2/4/2023 Manny Yepez, PTA GP 1    86468030182 HC GAIT TRAINING EA 15 MIN 2/4/2023 Manny Yepez, PTA GP 1    21915313759 HC PT THER PROC EA 15 MIN 2/4/2023 Manny Yepez, PTA GP 1    69012986091 HC GAIT TRAINING EA 15 MIN 2/4/2023 Manny Yepez, PTA GP 1          PT G-Codes  Outcome Measure Options: AM-PAC 6 Clicks Daily Activity (OT), Optimal Instrument  AM-PAC 6 Clicks Score (PT): 19  AM-PAC 6 Clicks Score (OT): 21    Manny Yepez PTA  2/4/2023

## 2023-02-04 NOTE — NURSING NOTE
PT is AAOx4, VSS. In stable condition at time of transfer. PT to transfer from room 235 to 516. Report called to JOHAN Hamilton.

## 2023-02-04 NOTE — PLAN OF CARE
Goal Outcome Evaluation:     Pt has no complaints of pain this shift. Ambulates to the bathroom x1 assist. Prevena wound vac to right knee intact. Prevena cannalfreda changed this shift with moderate amount of bloody drainage. Plan to discharge home with [facility] for wound care and PT needs when ready.

## 2023-02-05 LAB
ANION GAP SERPL CALCULATED.3IONS-SCNC: 7.5 MMOL/L (ref 5–15)
BASOPHILS # BLD AUTO: 0.02 10*3/MM3 (ref 0–0.2)
BASOPHILS NFR BLD AUTO: 0.3 % (ref 0–1.5)
BUN SERPL-MCNC: 14 MG/DL (ref 8–23)
BUN/CREAT SERPL: 21.2 (ref 7–25)
CALCIUM SPEC-SCNC: 8.7 MG/DL (ref 8.6–10.5)
CHLORIDE SERPL-SCNC: 102 MMOL/L (ref 98–107)
CO2 SERPL-SCNC: 27.5 MMOL/L (ref 22–29)
CREAT SERPL-MCNC: 0.66 MG/DL (ref 0.57–1)
DEPRECATED RDW RBC AUTO: 45.6 FL (ref 37–54)
EGFRCR SERPLBLD CKD-EPI 2021: 96.9 ML/MIN/1.73
EOSINOPHIL # BLD AUTO: 0.13 10*3/MM3 (ref 0–0.4)
EOSINOPHIL NFR BLD AUTO: 2.1 % (ref 0.3–6.2)
ERYTHROCYTE [DISTWIDTH] IN BLOOD BY AUTOMATED COUNT: 12.6 % (ref 12.3–15.4)
GLUCOSE BLDC GLUCOMTR-MCNC: 102 MG/DL (ref 70–99)
GLUCOSE BLDC GLUCOMTR-MCNC: 146 MG/DL (ref 70–99)
GLUCOSE BLDC GLUCOMTR-MCNC: 81 MG/DL (ref 70–99)
GLUCOSE BLDC GLUCOMTR-MCNC: 99 MG/DL (ref 70–99)
GLUCOSE SERPL-MCNC: 86 MG/DL (ref 65–99)
HCT VFR BLD AUTO: 29.1 % (ref 34–46.6)
HGB BLD-MCNC: 9.9 G/DL (ref 12–15.9)
IMM GRANULOCYTES # BLD AUTO: 0.01 10*3/MM3 (ref 0–0.05)
IMM GRANULOCYTES NFR BLD AUTO: 0.2 % (ref 0–0.5)
LYMPHOCYTES # BLD AUTO: 1.93 10*3/MM3 (ref 0.7–3.1)
LYMPHOCYTES NFR BLD AUTO: 30.6 % (ref 19.6–45.3)
MAGNESIUM SERPL-MCNC: 2 MG/DL (ref 1.6–2.4)
MCH RBC QN AUTO: 33.4 PG (ref 26.6–33)
MCHC RBC AUTO-ENTMCNC: 34 G/DL (ref 31.5–35.7)
MCV RBC AUTO: 98.3 FL (ref 79–97)
MONOCYTES # BLD AUTO: 0.55 10*3/MM3 (ref 0.1–0.9)
MONOCYTES NFR BLD AUTO: 8.7 % (ref 5–12)
NEUTROPHILS NFR BLD AUTO: 3.67 10*3/MM3 (ref 1.7–7)
NEUTROPHILS NFR BLD AUTO: 58.1 % (ref 42.7–76)
NRBC BLD AUTO-RTO: 0 /100 WBC (ref 0–0.2)
PHOSPHATE SERPL-MCNC: 3.3 MG/DL (ref 2.5–4.5)
PLATELET # BLD AUTO: 125 10*3/MM3 (ref 140–450)
PMV BLD AUTO: 12 FL (ref 6–12)
POTASSIUM SERPL-SCNC: 4.3 MMOL/L (ref 3.5–5.2)
RBC # BLD AUTO: 2.96 10*6/MM3 (ref 3.77–5.28)
SODIUM SERPL-SCNC: 137 MMOL/L (ref 136–145)
WBC NRBC COR # BLD: 6.31 10*3/MM3 (ref 3.4–10.8)

## 2023-02-05 PROCEDURE — 94799 UNLISTED PULMONARY SVC/PX: CPT

## 2023-02-05 PROCEDURE — 63710000001 ROPINIROLE 0.25 MG TABLET: Performed by: INTERNAL MEDICINE

## 2023-02-05 PROCEDURE — A9270 NON-COVERED ITEM OR SERVICE: HCPCS | Performed by: ORTHOPAEDIC SURGERY

## 2023-02-05 PROCEDURE — G0378 HOSPITAL OBSERVATION PER HR: HCPCS

## 2023-02-05 PROCEDURE — 83735 ASSAY OF MAGNESIUM: CPT | Performed by: INTERNAL MEDICINE

## 2023-02-05 PROCEDURE — 85025 COMPLETE CBC W/AUTO DIFF WBC: CPT | Performed by: INTERNAL MEDICINE

## 2023-02-05 PROCEDURE — A9270 NON-COVERED ITEM OR SERVICE: HCPCS | Performed by: INTERNAL MEDICINE

## 2023-02-05 PROCEDURE — 97116 GAIT TRAINING THERAPY: CPT

## 2023-02-05 PROCEDURE — 63710000001 HYDROCODONE-ACETAMINOPHEN 7.5-325 MG TABLET: Performed by: ORTHOPAEDIC SURGERY

## 2023-02-05 PROCEDURE — 82962 GLUCOSE BLOOD TEST: CPT

## 2023-02-05 PROCEDURE — 99232 SBSQ HOSP IP/OBS MODERATE 35: CPT | Performed by: INTERNAL MEDICINE

## 2023-02-05 PROCEDURE — 84100 ASSAY OF PHOSPHORUS: CPT | Performed by: INTERNAL MEDICINE

## 2023-02-05 PROCEDURE — 80048 BASIC METABOLIC PNL TOTAL CA: CPT | Performed by: INTERNAL MEDICINE

## 2023-02-05 PROCEDURE — 63710000001 ASPIRIN EC 325 MG TABLET DELAYED-RELEASE: Performed by: ORTHOPAEDIC SURGERY

## 2023-02-05 PROCEDURE — 63710000001 GABAPENTIN 400 MG CAPSULE: Performed by: INTERNAL MEDICINE

## 2023-02-05 RX ORDER — POLYETHYLENE GLYCOL 3350 17 G/17G
17 POWDER, FOR SOLUTION ORAL DAILY
Status: DISCONTINUED | OUTPATIENT
Start: 2023-02-05 | End: 2023-02-06 | Stop reason: HOSPADM

## 2023-02-05 RX ORDER — AMOXICILLIN 250 MG
1 CAPSULE ORAL 2 TIMES DAILY
Status: DISCONTINUED | OUTPATIENT
Start: 2023-02-05 | End: 2023-02-06 | Stop reason: HOSPADM

## 2023-02-05 RX ADMIN — GABAPENTIN 800 MG: 400 CAPSULE ORAL at 06:13

## 2023-02-05 RX ADMIN — GABAPENTIN 800 MG: 400 CAPSULE ORAL at 13:47

## 2023-02-05 RX ADMIN — GABAPENTIN 800 MG: 400 CAPSULE ORAL at 21:26

## 2023-02-05 RX ADMIN — ASPIRIN 325 MG: 325 TABLET, COATED ORAL at 08:50

## 2023-02-05 RX ADMIN — ROPINIROLE HYDROCHLORIDE 0.5 MG: 0.25 TABLET, FILM COATED ORAL at 20:18

## 2023-02-05 RX ADMIN — HYDROCODONE BITARTRATE AND ACETAMINOPHEN 1 TABLET: 7.5; 325 TABLET ORAL at 20:25

## 2023-02-05 NOTE — PLAN OF CARE
Goal Outcome Evaluation:  Plan of Care Reviewed With: patient        Progress: improving  Outcome Evaluation: pt medicated x 1 for right knee pain with relief voiced. prevena wound vac intact to right knee. pt up to bathroom with assistance of 1/gait belt/walker. voiding without difficulty. no changes in pt's status.

## 2023-02-05 NOTE — PROGRESS NOTES
[facility]   Hospitalist Progress Note  Date: 2023  Patient Name: Marilin Martinez  : 1955  MRN: [id number]  Date of admission: 2023      Subjective   Subjective     Chief Complaint: Diabetes and hypertension    Summary: 68 y.o. female past medical history of obesity with BMI of 38, osteoarthritis, diabetes, neuropathy, stroke, and hypothyroidism who is being admitted status post right knee replacement.  The patient's procedure went well but they did have to place a wound VAC on her right knee due to inability to close the skin.   Worked with PT/OT during her hospital stay.  Ultimate plan was for home with home health and home wound care.    Interval Followup: No acute events overnight.  Pain is better controlled.  Working with therapy well.  Hopeful to go home tomorrow.    Review of Systems   Denies fevers or chest pain     Objective   Objective     Vitals:   Temp:  [97.9 °F (36.6 °C)-99.3 °F (37.4 °C)] 98.5 °F (36.9 °C)  Heart Rate:  [65-78] 68  Resp:  [12-20] 18  BP: (132-153)/(40-82) 134/45  Physical Exam    Constitutional: Awake, alert, NAD   Respiratory: Clear to auscultation bilaterally, nonlabored respirations    Cardiovascular: RRR, no MRG   Gastrointestinal: Positive bowel sounds, soft, nontender, nondistended   Neurologic: Oriented x 3, wound VAC on right knee, Cranial Nerves grossly intact to confrontation, speech clear    Result Review    Result Review:  I have personally reviewed the results below:  []  Laboratory  []  Microbiology  []  Radiology  []  EKG/Telemetry   []  Cardiology/Vascular   []  Pathology  []  Old records  []  Other:  CBC    CBC 2/1/23 2/3/23 2/5/23   WBC 14.09 (A) 7.54 6.31   RBC 3.61 (A) 3.37 (A) 2.96 (A)   Hemoglobin 12.2 11.3 (A) 9.9 (A)   Hematocrit 35.4 33.8 (A) 29.1 (A)   MCV 98.1 (A) 100.3 (A) 98.3 (A)   MCH 33.8 (A) 33.5 (A) 33.4 (A)   MCHC 34.5 33.4 34.0   RDW 12.3 12.9 12.6   Platelets 113 (A) 106 (A) 125 (A)   (A) Abnormal value            CMP     CMP 1/13/23 2/3/23 2/5/23   Glucose 83 85 86   BUN 18 16 14   Creatinine 0.88 0.80 0.66   eGFR 72.6 81.4 96.9   Sodium 139 136 137   Potassium 4.4 4.1 4.3   Chloride 106 104 102   Calcium 9.8 8.9 8.7   Total Protein 7.0     Albumin 3.9     Globulin 3.1     Total Bilirubin 0.5     Alkaline Phosphatase 58     AST (SGOT) 16     ALT (SGPT) 11     Albumin/Globulin Ratio 1.3     BUN/Creatinine Ratio 20.5 20.0 21.2   Anion Gap 7.3 5.9 7.5      Comments are available for some flowsheets but are not being displayed.             Assessment & Plan   Assessment / Plan     Assessment/Plan:  Asymptomatic bradycardia  Right knee replacement requiring wound VAC   Hypertension  Type 2 diabetes  but most recent A1c is 5.1  Obesity  Hypothyroidism    Continue to monitor in the hospital for management of the above  Orthopedic surgery following, appreciate assistance  Continue wound VAC, will be set up for home health and wound care  Continue appropriate home medications  Continue to work with PT/OT,  plan to discharge home tomorrow  Trend renal function and electrolytes with a.m. BMP  Trend Hgb and WBC with a.m. CBC     Discussed plan with RN    DVT prophylaxis:  Medical and mechanical DVT prophylaxis orders are present.

## 2023-02-05 NOTE — PLAN OF CARE
Problem: Adult Inpatient Plan of Care  Goal: Plan of Care Review  Outcome: Ongoing, Progressing  Goal: Patient-Specific Goal (Individualized)  Outcome: Ongoing, Progressing  Goal: Absence of Hospital-Acquired Illness or Injury  Outcome: Ongoing, Progressing  Intervention: Identify and Manage Fall Risk  Recent Flowsheet Documentation  Taken 2/5/2023 1210 by Gifty Connelly RN  Safety Promotion/Fall Prevention: safety round/check completed  Taken 2/5/2023 1004 by Gifty Connelly RN  Safety Promotion/Fall Prevention: safety round/check completed  Taken 2/5/2023 0820 by Gifty Connelly RN  Safety Promotion/Fall Prevention: safety round/check completed  Taken 2/5/2023 0710 by Gifty Connelly RN  Safety Promotion/Fall Prevention: safety round/check completed  Intervention: Prevent Skin Injury  Recent Flowsheet Documentation  Taken 2/5/2023 0710 by Gifty Connelly RN  Body Position: position changed independently  Skin Protection:   adhesive use limited   tubing/devices free from skin contact  Intervention: Prevent and Manage VTE (Venous Thromboembolism) Risk  Recent Flowsheet Documentation  Taken 2/5/2023 1300 by Gifty Connelly RN  Activity Management:   activity adjusted per tolerance   ambulated in room   ambulated outside room   ambulated to bathroom  Taken 2/5/2023 0710 by Gifty Connelly RN  Activity Management: activity adjusted per tolerance  VTE Prevention/Management:   bilateral   sequential compression devices off  Range of Motion: ROM (range of motion) performed  Intervention: Prevent Infection  Recent Flowsheet Documentation  Taken 2/5/2023 0710 by Gifty Connelly RN  Infection Prevention:   environmental surveillance performed   hand hygiene promoted   rest/sleep promoted   single patient room provided   visitors restricted/screened  Goal: Optimal Comfort and Wellbeing  Outcome: Ongoing, Progressing  Intervention: Monitor Pain and Promote Comfort  Recent Flowsheet Documentation  Taken  2/5/2023 0710 by Gifty Connelly RN  Pain Management Interventions:   see MAR   quiet environment facilitated   cold applied   care clustered  Intervention: Provide Person-Centered Care  Recent Flowsheet Documentation  Taken 2/5/2023 0710 by Gifty Connelly RN  Trust Relationship/Rapport:   care explained   emotional support provided   questions answered   thoughts/feelings acknowledged  Goal: Readiness for Transition of Care  Outcome: Ongoing, Progressing     Problem: Adjustment to Surgery (Knee Arthroplasty)  Goal: Optimal Coping  Outcome: Ongoing, Progressing  Intervention: Support Psychosocial Response to Surgery and Mobility Changes  Recent Flowsheet Documentation  Taken 2/5/2023 0710 by Gifty Connelly RN  Supportive Measures: active listening utilized     Problem: Bleeding (Knee Arthroplasty)  Goal: Absence of Bleeding  Outcome: Ongoing, Progressing  Intervention: Monitor and Manage Bleeding  Recent Flowsheet Documentation  Taken 2/5/2023 0710 by Gifty Connelly RN  Bleeding Management: dressing monitored     Problem: Bowel Motility Impaired (Knee Arthroplasty)  Goal: Effective Bowel Elimination  Outcome: Ongoing, Progressing  Intervention: Enhance Bowel Motility and Elimination  Recent Flowsheet Documentation  Taken 2/5/2023 0710 by Gifty Connelly RN  Bowel Elimination Management: toileting offered  Bowel Elimination Promotion:   adequate fluid intake promoted   ambulation promoted     Problem: Fluid and Electrolyte Imbalance (Knee Arthroplasty)  Goal: Fluid and Electrolyte Balance  Outcome: Ongoing, Progressing     Problem: Functional Ability Impaired (Knee Arthroplasty)  Goal: Optimal Functional Ability  Outcome: Ongoing, Progressing  Intervention: Promote Optimal Functional Status  Recent Flowsheet Documentation  Taken 2/5/2023 1300 by Gifty Connelly RN  Activity Management:   activity adjusted per tolerance   ambulated in room   ambulated outside room   ambulated to bathroom  Assistive  Device Utilized:   gait belt   walker  Taken 2/5/2023 0710 by Gifty Connelly RN  Activity Management: activity adjusted per tolerance  Assistive Device Utilized:   gait belt   walker  Self-Care Promotion: independence encouraged     Problem: Infection (Knee Arthroplasty)  Goal: Absence of Infection Signs and Symptoms  Outcome: Ongoing, Progressing  Intervention: Prevent or Manage Infection  Recent Flowsheet Documentation  Taken 2/5/2023 0710 by Gifty Connelly RN  Infection Prevention:   environmental surveillance performed   hand hygiene promoted   rest/sleep promoted   single patient room provided   visitors restricted/screened     Problem: Neurovascular Compromise (Knee Arthroplasty)  Goal: Intact Neurovascular Status  Outcome: Ongoing, Progressing  Intervention: Prevent or Manage Neurovascular Compromise  Recent Flowsheet Documentation  Taken 2/5/2023 0710 by Gifty Connelly RN  Compartment Syndrome Management: active flexion/extension encouraged  Compartment Syndrome Surveillance: no pain with passive muscle stretch     Problem: Ongoing Anesthesia Effects (Knee Arthroplasty)  Goal: Anesthesia/Sedation Recovery  Outcome: Ongoing, Progressing  Intervention: Optimize Anesthesia Recovery  Recent Flowsheet Documentation  Taken 2/5/2023 1345 by Gifty Connelly RN  Patient Tolerance (IS):   good   no adverse signs/symptoms present  Administration (IS): self-administered  Level Incentive Spirometer (mL): 2000  Number of Repetitions (IS): 10  Taken 2/5/2023 1210 by Gifty Connelly RN  Safety Promotion/Fall Prevention: safety round/check completed  Taken 2/5/2023 1157 by Gifty Connelly RN  Patient Tolerance (IS):   good   no adverse signs/symptoms present  Administration (IS): self-administered  Level Incentive Spirometer (mL): 2000  Number of Repetitions (IS): 10  Taken 2/5/2023 1004 by Gifty Connelly RN  Safety Promotion/Fall Prevention: safety round/check completed  Taken 2/5/2023 0945 by Godwin  JOHAN Durbin  Patient Tolerance (IS):   good   no adverse signs/symptoms present  Administration (IS): self-administered  Level Incentive Spirometer (mL): 2000  Number of Repetitions (IS): 10  Taken 2/5/2023 0820 by Gifty Connelly RN  Safety Promotion/Fall Prevention: safety round/check completed  Taken 2/5/2023 0730 by Gifty Connelly RN  Patient Tolerance (IS):   good   no adverse signs/symptoms present  Administration (IS):   self-administered   instruction provided, follow-up  Level Incentive Spirometer (mL): 2000  Number of Repetitions (IS): 10  Taken 2/5/2023 0710 by Gifty Connelly RN  Safety Promotion/Fall Prevention: safety round/check completed  Reorientation Measures: clock in view     Problem: Pain (Knee Arthroplasty)  Goal: Acceptable Pain Control  Outcome: Ongoing, Progressing  Intervention: Prevent or Manage Pain  Recent Flowsheet Documentation  Taken 2/5/2023 0710 by Gifty Connelly RN  Pain Management Interventions:   see MAR   quiet environment facilitated   cold applied   care clustered  Diversional Activities:   television   smartphone     Problem: Postoperative Nausea and Vomiting (Knee Arthroplasty)  Goal: Nausea and Vomiting Relief  Outcome: Ongoing, Progressing     Problem: Postoperative Urinary Retention (Knee Arthroplasty)  Goal: Effective Urinary Elimination  Outcome: Ongoing, Progressing  Intervention: Monitor and Manage Urinary Retention  Recent Flowsheet Documentation  Taken 2/5/2023 0710 by Gifty Connelly RN  Urinary Elimination Promotion: toileting offered     Problem: Respiratory Compromise (Knee Arthroplasty)  Goal: Effective Oxygenation and Ventilation  Outcome: Ongoing, Progressing  Intervention: Optimize Oxygenation and Ventilation  Recent Flowsheet Documentation  Taken 2/5/2023 1345 by Gifty Connelly RN  Patient Tolerance (IS):   good   no adverse signs/symptoms present  Administration (IS): self-administered  Level Incentive Spirometer (mL): 2000  Number of  Repetitions (IS): 10  Taken 2/5/2023 1157 by Gifty Connelly RN  Patient Tolerance (IS):   good   no adverse signs/symptoms present  Administration (IS): self-administered  Level Incentive Spirometer (mL): 2000  Number of Repetitions (IS): 10  Taken 2/5/2023 0945 by Gifty Connelly RN  Patient Tolerance (IS):   good   no adverse signs/symptoms present  Administration (IS): self-administered  Level Incentive Spirometer (mL): 2000  Number of Repetitions (IS): 10  Taken 2/5/2023 0730 by Gifty Connelly RN  Patient Tolerance (IS):   good   no adverse signs/symptoms present  Administration (IS):   self-administered   instruction provided, follow-up  Level Incentive Spirometer (mL): 2000  Number of Repetitions (IS): 10  Taken 2/5/2023 0710 by Gifty Connelly RN  Head of Bed (HOB) Positioning: HOB elevated     Problem: Fall Injury Risk  Goal: Absence of Fall and Fall-Related Injury  Outcome: Ongoing, Progressing  Intervention: Identify and Manage Contributors  Recent Flowsheet Documentation  Taken 2/5/2023 1210 by Gifty Connelly RN  Medication Review/Management: medications reviewed  Taken 2/5/2023 1004 by Gifty Connelly RN  Medication Review/Management: medications reviewed  Taken 2/5/2023 0820 by Gifty Connelly RN  Medication Review/Management: medications reviewed  Taken 2/5/2023 0710 by Gifty Connelly RN  Medication Review/Management: medications reviewed  Self-Care Promotion: independence encouraged  Intervention: Promote Injury-Free Environment  Recent Flowsheet Documentation  Taken 2/5/2023 1210 by Gifty Connelly RN  Safety Promotion/Fall Prevention: safety round/check completed  Taken 2/5/2023 1004 by Gifty Connelly RN  Safety Promotion/Fall Prevention: safety round/check completed  Taken 2/5/2023 0820 by Gifty Connelly RN  Safety Promotion/Fall Prevention: safety round/check completed  Taken 2/5/2023 0710 by Gifty Connelly RN  Safety Promotion/Fall Prevention: safety round/check  completed     Problem: Skin Injury Risk Increased  Goal: Skin Health and Integrity  Outcome: Ongoing, Progressing  Intervention: Optimize Skin Protection  Recent Flowsheet Documentation  Taken 2/5/2023 0710 by Gifty Connelly RN  Pressure Reduction Techniques:   frequent weight shift encouraged   heels elevated off bed   positioned off wounds   sit time limited to 2 hours  Head of Bed (HOB) Positioning: HOB elevated  Pressure Reduction Devices:   pressure-redistributing mattress utilized   positioning supports utilized   heel offloading device utilized  Skin Protection:   adhesive use limited   tubing/devices free from skin contact     Problem: Pain Acute  Goal: Acceptable Pain Control and Functional Ability  Outcome: Ongoing, Progressing  Intervention: Prevent or Manage Pain  Recent Flowsheet Documentation  Taken 2/5/2023 1210 by Gifty Connelly RN  Medication Review/Management: medications reviewed  Taken 2/5/2023 1004 by Gifty Connelly RN  Medication Review/Management: medications reviewed  Taken 2/5/2023 0820 by Gifty Connelly RN  Medication Review/Management: medications reviewed  Taken 2/5/2023 0710 by Gifty Connelly RN  Bowel Elimination Promotion:   adequate fluid intake promoted   ambulation promoted  Medication Review/Management: medications reviewed  Intervention: Develop Pain Management Plan  Recent Flowsheet Documentation  Taken 2/5/2023 0710 by Gifty Connelly RN  Pain Management Interventions:   see MAR   quiet environment facilitated   cold applied   care clustered  Intervention: Optimize Psychosocial Wellbeing  Recent Flowsheet Documentation  Taken 2/5/2023 0710 by Gifty Connelly RN  Supportive Measures: active listening utilized  Diversional Activities:   television   smartphone   Goal Outcome Evaluation:   Pt has had no new changes throughout shift and continues to remain stable. Pt was medicated for a BM on night shift and successfully had one this afternoon. Pt has had no  complaints of pain. Pt ambulated to the nurse's station and back with PT. Pt possibly will D/C tomorrow with home health and prevena wound vac. Pt is a stand by assist with walker.

## 2023-02-05 NOTE — THERAPY TREATMENT NOTE
Acute Care - Physical Therapy Treatment Note   Radha     Patient Name: Marilin Martinez  : 1955  MRN: [id number]  Today's Date: 2023      Visit Dx:     ICD-10-CM ICD-9-CM   1. Decreased activities of daily living (ADL)  Z78.9 V49.89   2. Difficulty in walking  R26.2 719.7   3. Osteoarthrosis, localized, primary, knee, right  M17.11 715.16   4. Difficulty walking  R26.2 719.7     Patient Active Problem List   Diagnosis   • Environmental and seasonal allergies   • Essential hypertension   • Sleep apnea   • Multiple joint pain   • Hypothyroid   • Diabetes mellitus type 2 in obese (HCC)   • Dyslipidemia   • S/P laparoscopic sleeve gastrectomy   • Primary osteoarthritis of right knee   • Primary osteoarthritis of left knee   • Candidal intertrigo   • Excessive and redundant skin and subcutaneous tissue   • Obesity, Class II, BMI 35-39.9   • Osteoarthrosis, localized, primary, knee, right   • Open knee wound     Past Medical History:   Diagnosis Date   • Arthritis     KNEE PAIN   • Back pain    • Diabetes mellitus (HCC)    • Hyperlipemia    • Hypertension     managed by cardiologist   • Knee pain, bilateral    • Neuropathy    • OAB (overactive bladder)    • Seasonal allergies    • Sleep apnea     CPAP   • 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   • Thyroid disorder      Past Surgical History:   Procedure Laterality Date   • COLONOSCOPY     • ENDOSCOPY N/A 2021    Procedure: ESOPHAGOGASTRODUODENOSCOPY WITH COLD BIOPSY;  Surgeon: Mansi Klye MD;  Location: [facility] ENDOSCOPY;  Service: General;  Laterality: N/A;  PRE: SCREENING FOR BARIATRICS  POST: MODERATE SIZE HIATAL HERNIA   • GALLBLADDER SURGERY     • GASTRIC SLEEVE LAPAROSCOPIC N/A 2022    Procedure: GASTRIC SLEEVE LAPAROSCOPIC With VENTRALHernia Repair AND PARAESOPHAGEAL HERNIA REPAIR AND LYSIS OF ADHESIONS PARTICAL OMENTECTOMY;  Surgeon: Quang Shipley Jr., MD;  [place] [facility] [place] [facility] [place]  Service: Bariatric;  Laterality:  N/A;   • TONSILLECTOMY     • TOTAL KNEE ARTHROPLASTY Right 1/31/2023    Procedure: RIGHT TOTAL KNEE ARTHROPLASTY WITH DANITA ROBOT AND SHIREEN;  Surgeon: Murray Calvillo MD;  Location: [facility] MAIN OR;  Service: Robotics - Ortho;  Laterality: Right;   • TUBAL ABDOMINAL LIGATION       PT Assessment (last 12 hours)     PT Evaluation and Treatment     Row Name 02/05/23 1100          Physical Therapy Time and Intention    Subjective Information no complaints (P)   -JL     Document Type therapy note (daily note) (P)   -JL     Mode of Treatment individual therapy (P)   -JL     Patient Effort good (P)   -JL     Row Name 02/05/23 1100          General Information    Patient Profile Reviewed yes (P)   -JL     Patient Observations alert;cooperative;agree to therapy (P)   -JL     Row Name 02/05/23 1100          Gait/Stairs (Locomotion)    Gait/Stairs Locomotion gait/ambulation assistive device (P)   -JL     Stark Level (Gait) contact guard (P)   -JL     Assistive Device (Gait) walker, front-wheeled (P)   -JL     Distance in Feet (Gait) 300 (P)   -JL     Pattern (Gait) step-through (P)   -JL     Row Name             Wound 01/31/23 1112 Left gluteal Pressure Injury    Wound - Properties Group Placement Date: 01/31/23  -EG Placement Time: 1112  -EG Present on Hospital Admission: Y  -EG Side: Left  -EG Location: gluteal  -EG Primary Wound Type: Pressure inj  -EG Additional Comments: PT STATES SHE HAS PRESSURE SORE TO LEFT GLUTEAL AREA DRESSING D/I STATES HOME HEALTH NURSE APPLIED TO AREA YESTERDAY.  NOTED HEALED SCAR TO RIGHT GLUTEAL AREA PT STATES SHE HAD PREVIOUS PRESSURE SORE THERE.  -EG    Retired Wound - Properties Group Placement Date: 01/31/23  -EG Placement Time: 1112  -EG Present on Hospital Admission: Y  -EG Side: Left  -EG Location: gluteal  -EG Primary Wound Type: Pressure inj  -EG Additional Comments: PT STATES SHE HAS PRESSURE SORE TO LEFT GLUTEAL AREA DRESSING D/I STATES HOME HEALTH NURSE APPLIED TO AREA  YESTERDAY.  NOTED HEALED SCAR TO RIGHT GLUTEAL AREA PT STATES SHE HAD PREVIOUS PRESSURE SORE THERE.  -EG    Retired Wound - Properties Group Date first assessed: 01/31/23  -EG Time first assessed: 1112  -EG Present on Hospital Admission: Y  -EG Side: Left  -EG Location: gluteal  -EG Primary Wound Type: Pressure inj  -EG Additional Comments: PT STATES SHE HAS PRESSURE SORE TO LEFT GLUTEAL AREA DRESSING D/I STATES HOME HEALTH NURSE APPLIED TO AREA YESTERDAY.  NOTED HEALED SCAR TO RIGHT GLUTEAL AREA PT STATES SHE HAD PREVIOUS PRESSURE SORE THERE.  -EG    Row Name             Wound 01/31/23 1426 Right anterior knee Incision    Wound - Properties Group Placement Date: 01/31/23  -EGA Placement Time: 1426  -EGA Present on Hospital Admission: N  -EGA Side: Right  -EGA Orientation: anterior  -EGA Location: knee  -EGA Primary Wound Type: Incision  -EGA    Retired Wound - Properties Group Placement Date: 01/31/23  -EGA Placement Time: 1426  -EGA Present on Hospital Admission: N  -EGA Side: Right  -EGA Orientation: anterior  -EGA Location: knee  -EGA Primary Wound Type: Incision  -EGA    Retired Wound - Properties Group Date first assessed: 01/31/23  -EGA Time first assessed: 1426  -EGA Present on Hospital Admission: N  -EGA Side: Right  -EGA Location: knee  -EGA Primary Wound Type: Incision  -EGA    Row Name             NPWT (Negative Pressure Wound Therapy) 01/31/23 1530 Right knee    NPWT (Negative Pressure Wound Therapy) - Properties Group Placement Date: 01/31/23  -MB (r) MW (t) Placement Time: 1530  -MB (r) MW (t) Location: Right knee  -MB (r) MW (t)    Retired NPWT (Negative Pressure Wound Therapy) - Properties Group Placement Date: 01/31/23  -MB (r) MW (t) Placement Time: 1530  -MB (r) MW (t) Location: Right knee  -MB (r) MW (t)    Retired NPWT (Negative Pressure Wound Therapy) - Properties Group Placement Date: 01/31/23  -MB (r) MW (t) Placement Time: 1530  -MB (r) MW (t) Location: Right knee  -MB (r) MW (t)    Bruno  Name 02/05/23 1100          Positioning and Restraints    Pre-Treatment Position sitting in chair/recliner (P)   -JL     Post Treatment Position chair (P)   -JL     In Chair sitting;call light within reach (P)   -JL     Row Name 02/05/23 1100          Progress Summary (PT)    Daily Progress Summary (PT) Pt. ambulated 300 ft with RW and CGA. Pt. demonstrated safety with step-through gait pattern and has improved ambulatory endurance. No abnormal signs or symptoms noted, pt. expecting discharge home tomorrow. (P)   -JL           User Key  (r) = Recorded By, (t) = Taken By, (c) = Cosigned By    Initials Name Provider Type    Bree Moreno, RN Registered Nurse    Bita Marsh, RN Registered Nurse    Jennyfer Carey, RN Registered Nurse    Kane Byrne, PT Student PT Student                Physical Therapy Education     Title: PT OT SLP Therapies (Done)     Topic: Physical Therapy (Done)     Point: Mobility training (Done)     Learning Progress Summary           Patient Acceptance, E,TB, VU,NR by CHEYENNE at 2/4/2023 2254    Acceptance, E, VU by MT at 2/4/2023 1113    Acceptance, E,TB, VU by FARRAH at 2/3/2023 2100    Acceptance, E,TB, VU by JUAN at 2/1/2023 1120                   Point: Precautions (Done)     Learning Progress Summary           Patient Acceptance, E,TB, VU,NR by CHEYENNE at 2/4/2023 2254    Acceptance, E, VU by MT at 2/4/2023 1113    Acceptance, E,TB, VU by FARRAH at 2/3/2023 2100    Acceptance, E,TB, VU by JUAN at 2/1/2023 1120                               User Key     Initials Effective Dates Name Provider Type Discipline    FARRAH 06/16/21 -  Soo Sarkar RN Registered Nurse Nurse    CHEYENNE 06/16/21 -  Jia Olivarez RN Registered Nurse Nurse    MT 09/07/21 -  Bita Mike LPN Licensed Nurse Nurse    JUAN 06/03/21 -  Ben Lentz PT Physical Therapist PT              PT Recommendation and Plan     Progress Summary (PT)  Daily Progress Summary (PT): (P) Pt. ambulated 300 ft with RW and CGA. Pt.  demonstrated safety with step-through gait pattern and has improved ambulatory endurance. No abnormal signs or symptoms noted, pt. expecting discharge home tomorrow.   Outcome Measures     Row Name 02/05/23 1100 02/04/23 0859 02/03/23 1200       How much help from another person do you currently need...    Turning from your back to your side while in flat bed without using bedrails? 3 (P)   -JL 3  -TS 3  -RH    Moving from lying on back to sitting on the side of a flat bed without bedrails? 3 (P)   -JL 3  -TS 3  -RH    Moving to and from a bed to a chair (including a wheelchair)? 3 (P)   -JL 3  -TS 3  -RH    Standing up from a chair using your arms (e.g., wheelchair, bedside chair)? 4 (P)   -JL 3  -TS 3  -RH    Climbing 3-5 steps with a railing? 3 (P)   -JL 3  -TS 3  -RH    To walk in hospital room? 3 (P)   -JL 4  -TS 4  -RH    AM-PAC 6 Clicks Score (PT) 19 (P)   -JL 19  -TS 19  -RH       Functional Assessment    Outcome Measure Options AM-PAC 6 Clicks Basic Mobility (PT) (P)   -JL -- --    Row Name 02/02/23 1200             How much help from another person do you currently need...    Turning from your back to your side while in flat bed without using bedrails? 3  -RH      Moving from lying on back to sitting on the side of a flat bed without bedrails? 3  -RH      Moving to and from a bed to a chair (including a wheelchair)? 3  -RH      Standing up from a chair using your arms (e.g., wheelchair, bedside chair)? 3  -RH      Climbing 3-5 steps with a railing? 3  -RH      To walk in hospital room? 3  -RH      AM-PAC 6 Clicks Score (PT) 18  -RH            User Key  (r) = Recorded By, (t) = Taken By, (c) = Cosigned By    Initials Name Provider Type    Manny Herrera, ANAHI Physical Therapist Assistant    Geo Hugo PTA Physical Therapist Assistant    Kane Byrne, PT Student PT Student                 Time Calculation:    PT Charges     Row Name 02/05/23 1151             Time Calculation    PT Received On  02/05/23 (P)   -JL         Timed Charges    68582 - Gait Training Minutes  12 (P)   -JL         Total Minutes    Timed Charges Total Minutes 12 (P)   -JL       Total Minutes 12 (P)   -JL            User Key  (r) = Recorded By, (t) = Taken By, (c) = Cosigned By    Initials Name Provider Type    Kane Byrne, PT Student PT Student              Therapy Charges for Today     Code Description Service Date Service Provider Modifiers Qty    [id number] HC GAIT TRAINING EA 15 MIN 2/5/2023 Kane Bhatt, PT Student GP 1          PT G-Codes  Outcome Measure Options: (P) AM-PAC 6 Clicks Basic Mobility (PT)  AM-PAC 6 Clicks Score (PT): (P) 19  AM-PAC 6 Clicks Score (OT): 21    Kane Bhatt PT Student  2/5/2023

## 2023-02-06 VITALS
SYSTOLIC BLOOD PRESSURE: 150 MMHG | TEMPERATURE: 97.7 F | DIASTOLIC BLOOD PRESSURE: 64 MMHG | BODY MASS INDEX: 39.03 KG/M2 | HEIGHT: 62 IN | OXYGEN SATURATION: 95 % | HEART RATE: 70 BPM | RESPIRATION RATE: 16 BRPM | WEIGHT: 212.08 LBS

## 2023-02-06 LAB
ANION GAP SERPL CALCULATED.3IONS-SCNC: 7.8 MMOL/L (ref 5–15)
BASOPHILS # BLD AUTO: 0.03 10*3/MM3 (ref 0–0.2)
BASOPHILS NFR BLD AUTO: 0.4 % (ref 0–1.5)
BUN SERPL-MCNC: 17 MG/DL (ref 8–23)
BUN/CREAT SERPL: 25.4 (ref 7–25)
CALCIUM SPEC-SCNC: 8.7 MG/DL (ref 8.6–10.5)
CHLORIDE SERPL-SCNC: 103 MMOL/L (ref 98–107)
CO2 SERPL-SCNC: 27.2 MMOL/L (ref 22–29)
CREAT SERPL-MCNC: 0.67 MG/DL (ref 0.57–1)
DEPRECATED RDW RBC AUTO: 46 FL (ref 37–54)
EGFRCR SERPLBLD CKD-EPI 2021: 96.5 ML/MIN/1.73
EOSINOPHIL # BLD AUTO: 0.22 10*3/MM3 (ref 0–0.4)
EOSINOPHIL NFR BLD AUTO: 3.3 % (ref 0.3–6.2)
ERYTHROCYTE [DISTWIDTH] IN BLOOD BY AUTOMATED COUNT: 12.8 % (ref 12.3–15.4)
GLUCOSE BLDC GLUCOMTR-MCNC: 128 MG/DL (ref 70–99)
GLUCOSE SERPL-MCNC: 86 MG/DL (ref 65–99)
HCT VFR BLD AUTO: 29.8 % (ref 34–46.6)
HGB BLD-MCNC: 9.9 G/DL (ref 12–15.9)
IMM GRANULOCYTES # BLD AUTO: 0.05 10*3/MM3 (ref 0–0.05)
IMM GRANULOCYTES NFR BLD AUTO: 0.7 % (ref 0–0.5)
LYMPHOCYTES # BLD AUTO: 1.96 10*3/MM3 (ref 0.7–3.1)
LYMPHOCYTES NFR BLD AUTO: 29.3 % (ref 19.6–45.3)
MAGNESIUM SERPL-MCNC: 2.1 MG/DL (ref 1.6–2.4)
MCH RBC QN AUTO: 33 PG (ref 26.6–33)
MCHC RBC AUTO-ENTMCNC: 33.2 G/DL (ref 31.5–35.7)
MCV RBC AUTO: 99.3 FL (ref 79–97)
MONOCYTES # BLD AUTO: 0.65 10*3/MM3 (ref 0.1–0.9)
MONOCYTES NFR BLD AUTO: 9.7 % (ref 5–12)
NEUTROPHILS NFR BLD AUTO: 3.79 10*3/MM3 (ref 1.7–7)
NEUTROPHILS NFR BLD AUTO: 56.6 % (ref 42.7–76)
NRBC BLD AUTO-RTO: 0 /100 WBC (ref 0–0.2)
PHOSPHATE SERPL-MCNC: 3.6 MG/DL (ref 2.5–4.5)
PLATELET # BLD AUTO: 142 10*3/MM3 (ref 140–450)
PMV BLD AUTO: 12.5 FL (ref 6–12)
POTASSIUM SERPL-SCNC: 4.2 MMOL/L (ref 3.5–5.2)
RBC # BLD AUTO: 3 10*6/MM3 (ref 3.77–5.28)
SODIUM SERPL-SCNC: 138 MMOL/L (ref 136–145)
WBC NRBC COR # BLD: 6.7 10*3/MM3 (ref 3.4–10.8)

## 2023-02-06 PROCEDURE — 84100 ASSAY OF PHOSPHORUS: CPT | Performed by: INTERNAL MEDICINE

## 2023-02-06 PROCEDURE — 99238 HOSP IP/OBS DSCHRG MGMT 30/<: CPT | Performed by: INTERNAL MEDICINE

## 2023-02-06 PROCEDURE — 80048 BASIC METABOLIC PNL TOTAL CA: CPT | Performed by: INTERNAL MEDICINE

## 2023-02-06 PROCEDURE — 63710000001 HYDROCODONE-ACETAMINOPHEN 7.5-325 MG TABLET: Performed by: ORTHOPAEDIC SURGERY

## 2023-02-06 PROCEDURE — 63710000001 ASPIRIN EC 325 MG TABLET DELAYED-RELEASE: Performed by: ORTHOPAEDIC SURGERY

## 2023-02-06 PROCEDURE — 63710000001 GABAPENTIN 400 MG CAPSULE: Performed by: INTERNAL MEDICINE

## 2023-02-06 PROCEDURE — 82962 GLUCOSE BLOOD TEST: CPT

## 2023-02-06 PROCEDURE — G0378 HOSPITAL OBSERVATION PER HR: HCPCS

## 2023-02-06 PROCEDURE — 83735 ASSAY OF MAGNESIUM: CPT | Performed by: INTERNAL MEDICINE

## 2023-02-06 PROCEDURE — 85025 COMPLETE CBC W/AUTO DIFF WBC: CPT | Performed by: INTERNAL MEDICINE

## 2023-02-06 PROCEDURE — A9270 NON-COVERED ITEM OR SERVICE: HCPCS | Performed by: INTERNAL MEDICINE

## 2023-02-06 PROCEDURE — 94799 UNLISTED PULMONARY SVC/PX: CPT

## 2023-02-06 PROCEDURE — A9270 NON-COVERED ITEM OR SERVICE: HCPCS | Performed by: ORTHOPAEDIC SURGERY

## 2023-02-06 RX ORDER — AMOXICILLIN 250 MG
1 CAPSULE ORAL DAILY PRN
Qty: 20 TABLET | Refills: 0 | Status: SHIPPED | OUTPATIENT
Start: 2023-02-06 | End: 2023-03-20

## 2023-02-06 RX ORDER — HYDROCODONE BITARTRATE AND ACETAMINOPHEN 10; 325 MG/1; MG/1
1 TABLET ORAL EVERY 8 HOURS PRN
Qty: 18 TABLET | Refills: 0 | Status: SHIPPED | OUTPATIENT
Start: 2023-02-06

## 2023-02-06 RX ADMIN — GABAPENTIN 800 MG: 400 CAPSULE ORAL at 05:48

## 2023-02-06 RX ADMIN — HYDROCODONE BITARTRATE AND ACETAMINOPHEN 1 TABLET: 7.5; 325 TABLET ORAL at 09:23

## 2023-02-06 RX ADMIN — ASPIRIN 325 MG: 325 TABLET, COATED ORAL at 09:48

## 2023-02-06 NOTE — DISCHARGE SUMMARY
[facility]         HOSPITALIST  DISCHARGE SUMMARY    Patient Name: Marilin Martinez  : 1955  MRN: [id number]    Date of Admission: 2023  Date of Discharge: 2023  Primary Care Physician: Reyes, Rosenberg Acosta, MD    Consults     Date and Time Order Name Status Description    2023  6:00 PM Inpatient Hospitalist Consult            Active and Resolved Hospital Problems:  Active Hospital Problems    Diagnosis POA   • **Primary osteoarthritis of right knee [M17.11] Unknown   • Open knee wound [S81.009A] Yes   • Osteoarthrosis, localized, primary, knee, right [M17.11] Yes      Resolved Hospital Problems   No resolved problems to display.   Right knee replacement requiring wound VAC   Asymptomatic bradycardia, resolved  Hypertension  Type 2 diabetes but most recent A1c is 5.1  Obesity  Hypothyroidism    Hospital Course     Hospital Course:  Marilin Martinez is a 68 y.o. female past medical history of obesity with BMI of 38, osteoarthritis, diabetes, neuropathy, stroke, and hypothyroidism who is being admitted status post right knee replacement.  The patient's procedure went well but they did have to place a wound VAC on her right knee due to inability to close the skin.   Worked with PT/OT during her hospital stay.  Ultimate plan was for home with home health and home wound care. She was discharged home with home health in stable condition on 23.     Day of Discharge     Vital Signs:  Temp:  [98 °F (36.7 °C)-98.5 °F (36.9 °C)] 98.1 °F (36.7 °C)  Heart Rate:  [68-80] 74  Resp:  [16-18] 16  BP: (134-154)/(45-61) 151/48  Physical Exam:   Gen: NAD, WDWN  ENT: PERRL, EOMI   CV: RRR no MRG  Pulm: CTAB, no w/r/r  GI: Abd soft, NTND, +bs  Neuro: Moving all extremities spontaneously,, right knee with wound VAC in place, expected postop ROM, CN II-XII grossly intact   Psych: A&O*3, normal mood and affect  Skin: No lesions or rashes noted    Discharge Details        Discharge  Medications      New Medications      Instructions Start Date   apixaban 2.5 MG tablet tablet  Commonly known as: ELIQUIS   2.5 mg, Oral, Every 12 Hours Scheduled      aspirin  MG tablet   325 mg, Oral, Daily      sennosides-docusate 8.6-50 MG per tablet  Commonly known as: PERICOLACE   1 tablet, Oral, Daily PRN         Changes to Medications      Instructions Start Date   HYDROcodone-acetaminophen 7.5-325 MG per tablet  Commonly known as: Tamiko  What changed: You were already taking a medication with the same name, and this prescription was added. Make sure you understand how and when to take each.   1 tablet, Oral, Every 4 Hours PRN      HYDROcodone-acetaminophen  MG per tablet  Commonly known as: NORCO  What changed: reasons to take this   1 tablet, Oral, Every 8 Hours PRN         Continue These Medications      Instructions Start Date   ALPRAZolam 1 MG tablet  Commonly known as: XANAX   1 mg, Oral, 3 Times Daily PRN      amLODIPine-benazepril 5-40 MG per capsule  Commonly known as: LOTREL   1 capsule, Oral, Daily      bumetanide 1 MG tablet  Commonly known as: BUMEX   1 mg, Oral, Daily PRN      Farxiga 10 MG tablet  Generic drug: dapagliflozin Propanediol   10 mg, Oral, Daily      gabapentin 800 MG tablet  Commonly known as: NEURONTIN   800 mg, Oral, 3 Times Daily      hydroCHLOROthiazide 12.5 MG capsule  Commonly known as: MICROZIDE   12.5 mg, Oral, Daily      Kerendia 10 MG tablet  Generic drug: Finerenone   10 mg, Oral, Daily      levothyroxine 100 MCG tablet  Commonly known as: SYNTHROID, LEVOTHROID   100 mcg, Oral, Take As Directed, 3 times a week      montelukast 10 MG tablet  Commonly known as: SINGULAIR   10 mg, Oral, Nightly      Myrbetriq 25 MG tablet sustained-release 24 hour 24 hr tablet  Generic drug: Mirabegron ER   25 mg, Oral, Nightly      nystatin [id number] UNIT/GM powder  Commonly known as: MYCOSTATIN   Topical, 3 Times Daily      rOPINIRole 0.5 MG tablet  Commonly known as: REQUIP    0.5 mg, Oral, Nightly      Vyzulta 0.024 % solution  Generic drug: Latanoprostene Bunod   1 drop, Both Eyes, Nightly         Stop These Medications    cefdinir 300 MG capsule  Commonly known as: OMNICEF        ASK your doctor about these medications      Instructions Start Date   Pitavastatin Calcium 4 MG tablet   4 mg, Oral, Every 3 Days             No Known Allergies    Discharge Disposition:  Home or Self Care    Diet:  Hospital:  Diet Order   Procedures   • Diet: Regular/House Diet; Texture: Regular Texture (IDDSI 7); Fluid Consistency: Thin (IDDSI 0)       Discharge Activity:   Activity Instructions     Activity as Tolerated            CODE STATUS:  There are no questions and answers to display.         Future Appointments   Date Time Provider Department Center   2/13/2023  9:00 AM [place]ydia Baker [place] [facility] [place] [facility]   7/24/2023 10:45 AM Quang Love [place] [facility] [place] [facility]       Additional Instructions for the Follow-ups that You Need to Schedule     Discharge Follow-up with PCP   As directed       Currently Documented PCP:    Reyes, Rosenberg Acosta, MD    PCP Phone Number:    [phone]     Follow Up Details: 3-5 days         Discharge Follow-up with Specified Provider: Orthopedic surgery; 2 Weeks   As directed      To: Orthopedic surgery    Follow Up: 2 Weeks               Pertinent  and/or Most Recent Results     PROCEDURES:   Right knee replacement     LAB RESULTS:      Lab 02/06/23 0356 02/05/23 0435 02/03/23 0519 02/01/23  0337   WBC 6.70 6.31 7.54 14.09*   HEMOGLOBIN 9.9* 9.9* 11.3* 12.2   HEMATOCRIT 29.8* 29.1* 33.8* 35.4   PLATELETS 142 125* 106* 113*   NEUTROS ABS 3.79 3.67 4.83  --    IMMATURE GRANS (ABS) 0.05 0.01 0.03  --    LYMPHS ABS 1.96 1.93 1.85  --    MONOS ABS 0.65 0.55 0.71  --    EOS ABS 0.22 0.13 0.08  --    MCV 99.3* 98.3* 100.3* 98.1*         Lab 02/06/23  0356 02/05/23 0435 02/03/23 0519   SODIUM 138 137 136   POTASSIUM 4.2 4.3 4.1   CHLORIDE 103 102 104   CO2 27.2 27.5 26.1    ANION GAP 7.8 7.5 5.9   BUN 17 14 16   CREATININE 0.67 0.66 0.80   EGFR 96.5 96.9 81.4   GLUCOSE 86 86 85   CALCIUM 8.7 8.7 8.9   MAGNESIUM 2.1 2.0  --    PHOSPHORUS 3.6 3.3  --                          Brief Urine Lab Results     None        Microbiology Results (last 10 days)     ** No results found for the last 240 hours. **          XR Knee 1 or 2 View Right    Result Date: 1/31/2023  Impression:  Status post total knee arthroplasty in near anatomic alignment, with no evidence of immediate complication.      BLAINE HANSON MD       Electronically Signed and Approved By: BLAINE HANSON MD on 1/31/2023 at 16:34                       Results for orders placed in visit on 08/17/21    Adult Transthoracic Echo Complete W/ Cont if Necessary Per Protocol    Interpretation Summary  · Estimated left ventricular EF = 55% Left ventricular systolic function is normal.  · Left atrial enlargement mild  · Trace pericardial effusion  · Estimated right ventricular systolic pressure from tricuspid regurgitation is mildly elevated (35-45 mmHg).      Labs Pending at Discharge:        Time spent on Discharge including face to face service: 25 minutes    Electronically signed by Antonio Yeager MD, 02/06/23, 7:57 AM EST.

## 2023-02-06 NOTE — PLAN OF CARE
"Goal Outcome Evaluation:      Patient to go home today with home health. Patient states she is having \"some\" pain in right knee but does not want any pain meds at this time. Patient has ice pack to right knee available and has been using. VSS. Patient has wound vac in place and set on continuous suction at 125mmHg, CDI. Patient agreeable to going home today with home health.            "

## 2023-02-06 NOTE — PLAN OF CARE
Goal Outcome Evaluation:  Plan of Care Reviewed With: patient        Progress: improving  Outcome Evaluation: Patient stable throughout shift and resting. Medicated for pain x1 with PRN pain medication with relief voiced. Patient ambulated with nursing staff standby assist with walker. Prevena wound vac intact to right knee. Voiding without difficulty. Anticipates discharge today with home health and wound vac. Continuing POC.

## 2023-02-06 NOTE — SIGNIFICANT NOTE
Wound Eval / Progress Noted     [facility]     Patient Name: Marilin Martinez  : 1955  MRN: [id number]  Today's Date: 2023                 Admit Date: 2023    Visit Dx:    ICD-10-CM ICD-9-CM   1. Decreased activities of daily living (ADL)  Z78.9 V49.89   2. Difficulty in walking  R26.2 719.7   3. Osteoarthrosis, localized, primary, knee, right  M17.11 715.16   4. Difficulty walking  R26.2 719.7       Patient Active Problem List   Diagnosis    Environmental and seasonal allergies    Essential hypertension    Sleep apnea    Multiple joint pain    Hypothyroid    Diabetes mellitus type 2 in obese (HCC)    Dyslipidemia    S/P laparoscopic sleeve gastrectomy    Primary osteoarthritis of right knee    Primary osteoarthritis of left knee    Candidal intertrigo    Excessive and redundant skin and subcutaneous tissue    Obesity, Class II, BMI 35-39.9    Osteoarthrosis, localized, primary, knee, right    Open knee wound        Past Medical History:   Diagnosis Date    Arthritis     KNEE PAIN    Back pain     Diabetes mellitus (HCC)     Hyperlipemia     Hypertension     managed by cardiologist    Knee pain, bilateral     Neuropathy     OAB (overactive bladder)     Seasonal allergies     Sleep apnea     CPAP    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[facility]) 2017    no residual    Thyroid disorder         Past Surgical History:   Procedure Laterality Date    COLONOSCOPY      ENDOSCOPY N/A 2021    Procedure: ESOPHAGOGASTRODUODENOSCOPY WITH COLD BIOPSY;  Surgeon: Mansi Kyle MD;  Location: [facility] ENDOSCOPY;  Service: General;  Laterality: N/A;  PRE: SCREENING FOR BARIATRICS  POST: MODERATE SIZE HIATAL HERNIA    GALLBLADDER SURGERY      GASTRIC SLEEVE LAPAROSCOPIC N/A 2022    Procedure: GASTRIC SLEEVE LAPAROSCOPIC With VENTRALHernia Repair AND PARAESOPHAGEAL HERNIA REPAIR AND LYSIS OF ADHESIONS PARTICAL OMENTECTOMY;  Surgeon: Quang Shipley Jr., MD;  [place]  [facility] [place]  Service: Bariatric;  Laterality: N/A;     TONSILLECTOMY      TOTAL KNEE ARTHROPLASTY Right 1/31/2023    Procedure: RIGHT TOTAL KNEE ARTHROPLASTY WITH DANITA ROBOT AND SHIREEN;  Surgeon: Murray Calvillo MD;  Location: [facility];  Service: Robotics - Ortho;  Laterality: Right;    TUBAL ABDOMINAL LIGATION           Physical Assessment:  Wound 01/31/23 1426 Right anterior knee Incision (Active)   Dressing Appearance dry;intact 02/06/23 0815   Closure AMALIA 02/06/23 0815   Base dressing in place, unable to visualize 02/06/23 0815   Care, Wound negative pressure wound therapy 02/06/23 0805       NPWT (Negative Pressure Wound Therapy) 01/31/23 1530 Right knee (Active)   Therapy Setting continuous therapy 02/06/23 0815   Dressing other (see comments) 02/06/23 0815   Pressure Setting 125 mmHg 02/06/23 0815        Wound Check / Follow-up:  Patient seen today for follow-up for Prevena incisional wound vac. Removal date of 02/07/23 marked on dressing already. Dressing is intact with purple foam compressed. No alarms noted. Serosanguinous drainage noted within tubing and to cannister. According to patient, cannister has not been changed since last week.   Ecchymosis and erythema noted to right knee.   Discussed with patient again the process for removal of vac on designated day. Explained that if Home Health removes dressing, they will place a dressing appropriate for wound.   Patient has no additional questions or concerns at this time.   Educated primary RN not to remove incisional wound vac to right knee upon discharge.     Impression:  Surgical incision to right knee with incisional negative pressure wound therapy    Short term goals:  Regain skin integrity. Negative pressure wound therapy for a week.    Teresa Lennon RN    2/6/2023    09:02 EST

## 2023-02-07 ENCOUNTER — READMISSION MANAGEMENT (OUTPATIENT)
Dept: CALL CENTER | Facility: HOSPITAL | Age: 68
End: 2023-02-07
Payer: MEDICARE

## 2023-02-07 DIAGNOSIS — Z47.1 AFTERCARE FOLLOWING RIGHT KNEE JOINT REPLACEMENT SURGERY: Primary | ICD-10-CM

## 2023-02-07 DIAGNOSIS — Z96.651 AFTERCARE FOLLOWING RIGHT KNEE JOINT REPLACEMENT SURGERY: Primary | ICD-10-CM

## 2023-02-07 RX ORDER — CEPHALEXIN 500 MG/1
500 CAPSULE ORAL EVERY 6 HOURS
Qty: 40 CAPSULE | Refills: 0 | Status: SHIPPED | OUTPATIENT
Start: 2023-02-07 | End: 2023-02-15 | Stop reason: SDUPTHER

## 2023-02-07 NOTE — OUTREACH NOTE
Prep Survey    Flowsheet Row Responses   [facility] discharged from? [facility]   Is LACE score < 7 ? No   Eligibility Readm Mgmt   Discharge diagnosis **Primary osteoarthritis of right knee    Does the patient have one of the following disease processes/diagnoses(primary or secondary)? Total Joint Replacement   Does the patient have Home health ordered? Yes   What is the Home health agency?  [facility]   Is there a DME ordered? No   Medication alerts for this patient Eliquis, ASA    Prep survey completed? Yes          MARCOS ROE - Registered Nurse

## 2023-02-08 ENCOUNTER — READMISSION MANAGEMENT (OUTPATIENT)
Dept: CALL CENTER | Facility: HOSPITAL | Age: 68
End: 2023-02-08
Payer: MEDICARE

## 2023-02-08 NOTE — OUTREACH NOTE
Total Joint Week 1 Survey    Flowsheet Row Responses   [facility] patient discharged from? [facility]   Does the patient have one of the following disease processes/diagnoses(primary or secondary)? Total Joint Replacement   Joint surgery performed? Knee   Week 1 attempt successful? Yes   Call start time 1238   Call end time 1243   Discharge diagnosis **Primary osteoarthritis of right knee    Person spoke with today (if not patient) and relationship patient   Does the patient have all medications related to this admission filled (includes all antibiotics, pain medications, etc.) Yes   Is the patient taking all medications as directed (includes completed medication regime)? Yes   Is the patient able to teach back alternate methods of pain control? Ice, Knee-elevation/no pillow under knee, Correct alignment   Does the patient have a follow up appointment with their surgeon? Yes   Has the patient kept scheduled appointments due by today? Yes   Comments Sees Dr smith tomorrow. 2/8/2023   What is the Home health agency?  [facility]   Has home health visited the patient within 72 hours of discharge? Yes   Psychosocial issues? No   Has the patient began therapy sessions (either in the home or as an out patient)? Yes   If the patient has started attending therapy, what post op day did they begin to attend (either in home or as an out patient)?   Patient started therapy yesterday 2/7/2023   Does the patient have a wound vac in place? No   Date wound vac should be removed --  [Wound vac was removed before she left hospital ]   Has the patient fallen since discharge? No   Did the patient receive a copy of their discharge instructions? Yes   Nursing interventions Reviewed instructions with patient   What is the patient's perception of their functional status since discharge? Improving  [Having some pain to side of incison. She will discuss with Dr Smith tomorrow. ]   Is the patient able to teach back signs and symptoms of infection?  Temp >100.4 for 24h or longer, Increased swelling or redness around incision (not associated with surgical edema), Severe discomfort or pain, Changes in mobility   Is the patient able to teach back how to prevent infection? No tub baths, hot tub or swimming, Shower only as directed by surgeon, Check incision daily   Is the patient able to teach back signs and symptoms of DVT? Redness in calf, Area hot to touch, Shortness of breath or chest pain, Swelling in calf, Severe pain in calf   Is the patient able to teach back home safety measures? Ability to shower   Did the patient implement home safety suggestions from pre-surgery classes if attended? N/A   If the patient is a current smoker, are they able to teach back resources for cessation? Not a smoker   Is the patient/caregiver able to teach back the hierarchy of who to call/visit for symptoms/problems? PCP, Specialist, Home health nurse, Urgent Care, ED, 911 Yes   Week 1 call completed? Yes          Daniel ROE - Registered Nurse

## 2023-02-09 ENCOUNTER — OFFICE VISIT (OUTPATIENT)
Dept: ORTHOPEDIC SURGERY | Facility: CLINIC | Age: 68
End: 2023-02-09
Payer: MEDICARE

## 2023-02-09 VITALS — HEIGHT: 62 IN | OXYGEN SATURATION: 94 % | WEIGHT: 212.08 LBS | BODY MASS INDEX: 39.03 KG/M2 | HEART RATE: 98 BPM

## 2023-02-09 DIAGNOSIS — Z47.1 AFTERCARE FOLLOWING RIGHT KNEE JOINT REPLACEMENT SURGERY: Primary | ICD-10-CM

## 2023-02-09 DIAGNOSIS — Z96.651 AFTERCARE FOLLOWING RIGHT KNEE JOINT REPLACEMENT SURGERY: Primary | ICD-10-CM

## 2023-02-09 PROCEDURE — 99024 POSTOP FOLLOW-UP VISIT: CPT | Performed by: ORTHOPAEDIC SURGERY

## 2023-02-09 RX ORDER — BLOOD SUGAR DIAGNOSTIC
STRIP MISCELLANEOUS
COMMUNITY
Start: 2023-01-17

## 2023-02-09 RX ORDER — FLUTICASONE PROPIONATE 50 MCG
1 SPRAY, SUSPENSION (ML) NASAL AS NEEDED
COMMUNITY
Start: 2023-01-25

## 2023-02-10 ENCOUNTER — TELEPHONE (OUTPATIENT)
Dept: ORTHOPEDIC SURGERY | Facility: CLINIC | Age: 68
End: 2023-02-10

## 2023-02-10 NOTE — TELEPHONE ENCOUNTER
CALLED PATIENT  AND LEFT MESSAGE ON MACHINE FOR PATIENT TO CALL BE SO I CAN GIVE DRESSING INSTRUCTIONS.

## 2023-02-10 NOTE — PROGRESS NOTES
"Chief Complaint  Pain and Follow-up of the Right Knee and Wound Check     Subjective      Marilin Martinez presents to [facility] for a follow-up of right knee. Patient had a right total knee arthroplasty performed on 2023. She has been having some wound issues and she presents today for an early check. I started patient on antibiotics for precaution because of the image she sent the other day.     No Known Allergies     Social History     Socioeconomic History   • Marital status:    Tobacco Use   • Smoking status: Former     Types: Cigarettes     Quit date: 1997     Years since quittin.4   • Smokeless tobacco: Never   Vaping Use   • Vaping Use: Never used   Substance and Sexual Activity   • Alcohol use: Not Currently   • Drug use: No   • Sexual activity: Defer        Review of Systems     Objective   Vital Signs:   Pulse 98   Ht 157.5 cm (62\")   Wt 96.2 kg (212 lb 1.3 oz)   SpO2 94%   BMI 38.79 kg/m²       Physical Exam  Constitutional:       Appearance: Normal appearance. Patient is well-developed and normal weight.   HENT:      Head: Normocephalic.      Right Ear: Hearing and external ear normal.      Left Ear: Hearing and external ear normal.      Nose: Nose normal.   Eyes:      Conjunctiva/sclera: Conjunctivae normal.   Cardiovascular:      Rate and Rhythm: Normal rate.   Pulmonary:      Effort: Pulmonary effort is normal.      Breath sounds: No wheezing or rales.   Abdominal:      Palpations: Abdomen is soft.      Tenderness: There is no abdominal tenderness.   Musculoskeletal:      Cervical back: Normal range of motion.   Skin:     Findings: No rash.   Neurological:      Mental Status: Patient  is alert and oriented to person, place, and time.   Psychiatric:         Mood and Affect: Mood and affect normal.         Judgment: Judgment normal.       Ortho Exam      RIGHT KNEE: Well healing incision. Erythema on the inferior portion of the patella, most " likely due to degloving of her fat around the area of the proximal tibia. Calf supple, non-tender, no signs of DVT. Dorsal Pedal Pulse 2+, posterior tibialis pulse 2+. Patient able to perform a straight leg raise. Flexion to 90 degrees.       Procedures        Imaging Results (Most Recent)     None           Result Review :       XR Knee 1 or 2 View Right    Result Date: 1/31/2023  Narrative: PROCEDURE: XR KNEE 1 OR 2 VW RIGHT  COMPARISON: [facility], XR KNEE 3 VW RIGHT, 1/09/2023, 13:42.  INDICATIONS: Post-Op Knee Arthoplasty  FINDINGS:  There has been interval placement of a total knee arthroplasty in near-anatomic alignment. No periprosthetic fracture is identified. Postoperative soft tissue gas is noted. Multiple overlying skin staples are noted.      Impression:  Status post total knee arthroplasty in near anatomic alignment, with no evidence of immediate complication.      BLAINE HANSON MD       Electronically Signed and Approved By: BLAINE HANSON MD on 1/31/2023 at 16:34             Peripheral Block    Result Date: 1/31/2023  Narrative: Shakir Cruz MD     1/31/2023  1:37 PM Peripheral Block Pre-sedation assessment completed: 1/31/2023 1:27 PM Patient reassessed immediately prior to procedure Patient location during procedure: pre-op Start time: 1/31/2023 1:27 PM Stop time: 1/31/2023 1:33 PM Reason for block: at surgeon's request and post-op pain management Performed by Anesthesiologist: Shakir Cruz MD Preanesthetic Checklist Completed: patient identified, IV checked, site marked, risks and benefits discussed, surgical consent, monitors and equipment checked, pre-op evaluation and timeout performed Prep: Pt Position: supine Sterile barriers:alcohol skin prep, partial drape, cap, washed/disinfected hands, mask and gloves Prep: ChloraPrep Patient monitoring: blood pressure monitoring, continuous pulse oximetry and EKG Procedure Sedation: yes Performed under: local infiltration  Guidance:ultrasound guided and nerve stimulator ULTRASOUND INTERPRETATION.  Using ultrasound guidance a 20 G gauge needle was placed in close proximity to the nerve, at which point, under ultrasound guidance anesthetic was injected in the area of the nerve and spread of the anesthesia was seen on ultrasound in close proximity thereto.  There were no abnormalities seen on ultrasound; a digital image was taken; and the patient tolerated the procedure with no complications. Images:still images obtained, printed/placed on chart Laterality:right Block Type:adductor canal block Injection Technique:single-shot Needle Type:echogenic Needle Gauge:20 G (4in) Resistance on Injection: none Medications Used: bupivacaine-EPINEPHrine PF (MARCAINE w/EPI) 0.5% -1:200000 injection - Injection, Adductor Canal  40 mL - 1/31/2023 1:33:00 PM Medications Comment:Precedex 50mcg added to above solution Post Assessment Injection Assessment: negative aspiration for heme, no paresthesia on injection and incremental injection Patient Tolerance:comfortable throughout block Complications:no Additional Notes The block or continuous infusion is requested by the referring physician for management of postoperative pain, or pain related to a procedure. Ultrasound guidance (deemed medically necessary). Painless injection, pt was awake and conversant during the procedure without complications. Needle and surrounding structures visualized throughout procedure. No adverse reactions or complications seen during this period. Post-procedure image showed no signs of complication, and anatomy was consistent with an uncomplicated nerve blockade.              Assessment and Plan     Diagnoses and all orders for this visit:    1. Aftercare following right knee joint replacement surgery (Primary)        Patient advised to continue antibiotics and physical therapy. Follow-up on initial appointment date of 2/16/23.    Call or return if worsening symptoms.    Follow  Up     2/16/23      Patient was given instructions and counseling regarding her condition or for health maintenance advice. Please see specific information pulled into the AVS if appropriate.     Scribed for Murray Calvillo MD by Lydia Fay.  02/10/23   12:23 EST    I have personally performed the services described in this document as scribed by the above individual and it is both accurate and complete. Murray Calvillo MD 02/10/23

## 2023-02-10 NOTE — TELEPHONE ENCOUNTER
Caller: Marilin Martinez    Relationship: Self    Best call back number:     What was the call regarding: HOW OFTEN THE PATIENT NEEDS TO CHANGE HER DRESSING.   SHE HAD A RIGHT TOTAL KNEE ARTHROPLASTY WITH DANITA BREEN ON 1/31/23    Do you require a callback: YES

## 2023-02-15 ENCOUNTER — OFFICE VISIT (OUTPATIENT)
Dept: ORTHOPEDIC SURGERY | Facility: CLINIC | Age: 68
End: 2023-02-15
Payer: MEDICARE

## 2023-02-15 VITALS — OXYGEN SATURATION: 97 % | HEIGHT: 62 IN | HEART RATE: 68 BPM | BODY MASS INDEX: 39.03 KG/M2 | WEIGHT: 212.08 LBS

## 2023-02-15 DIAGNOSIS — Z47.1 AFTERCARE FOLLOWING RIGHT KNEE JOINT REPLACEMENT SURGERY: Primary | ICD-10-CM

## 2023-02-15 DIAGNOSIS — Z96.651 AFTERCARE FOLLOWING RIGHT KNEE JOINT REPLACEMENT SURGERY: Primary | ICD-10-CM

## 2023-02-15 LAB — QT INTERVAL: 482 MS

## 2023-02-15 PROCEDURE — 99024 POSTOP FOLLOW-UP VISIT: CPT | Performed by: PHYSICIAN ASSISTANT

## 2023-02-15 RX ORDER — CEPHALEXIN 500 MG/1
500 CAPSULE ORAL EVERY 6 HOURS
Qty: 40 CAPSULE | Refills: 0 | Status: SHIPPED | OUTPATIENT
Start: 2023-02-15

## 2023-02-15 NOTE — PATIENT INSTRUCTIONS
X-rays taken and reviewed, showing intact hardware. Single remaining staple viewed on x-ray was reviewed.     Staples removed in office and Steri-Strips applied.  Patient educated on incision care.  Please keep incision clean and dry.  Do not soak or submerge in water until incision is fully healed.  Do not apply creams or lotions over the incision.  Please allow Steri-Strips to fall off on their own within 7 to 10 days.    Continue icing and elevation of the knee as needed to help with pain and swelling.  Ice knee up to 3 or 4 times daily for no longer than 15 to 20 minutes at a time.    Continue PT to progress ROM, strength, and weightbearing status. Updated home health PT placed today.     Refill for an additional 7 days of keflex sent to pharmacy. Advised use of probiotic. Please call our office immediately or contact [facility] with any change in the appearance of the wound - increased redness, drainage, opening, etc.     Follow-up in 4 weeks. Repeat x-rays not needed at this visit.  Please call with questions or concerns.

## 2023-02-15 NOTE — PROGRESS NOTES
"Chief Complaint  Pain and Follow-up of the Right Knee and Suture / Staple Removal    Subjective      Marilin Martinez presents to [facility] for a follow up for her right knee. Patient underwent right knee arthroplasty with Gayatri done on 01/31/23 performed by Dr. Calvillo. She was seen on 02/10/23 by Dr. Calvillo for a wound check where she was prescribed antibiotics. She had a wound vac placed on the bottom of her incision and was removed on 02/07/23. She presents today for her 2 week postop appointment where her staples were removed today in the office. She repots she has been taking Keflex 500 mg 4 times a day since the wound vac was removed. She is ambulating today with a walker. She reports she has been doing Home Health physical therapy and would like to continue doing Home Health at this point. She reports discomfort at night time and states that her hamstring feels tight and stiff.       Objective   No Known Allergies    Vital Signs:   Pulse 68   Ht 157.5 cm (62\")   Wt 96.2 kg (212 lb 1.3 oz)   SpO2 97%   BMI 38.79 kg/m²       Physical Exam    Constitutional: Awake, alert. Well nourished appearance.    Integumentary: Warm, dry, intact. No obvious rashes.    HENT: Atraumatic, normocephalic.   Respiratory: Non labored respirations .   Cardiovascular: Intact peripheral pulses.    Psychiatric: Normal mood and affect. A&O X3    Ortho Exam    Right lower extremity: Skin is warm and dry.  Patient noted with intact surgical incision, however, there is significant scab formation overlying the most superior and distal aspects of the incision.  There were several superficial areas of skin tearing noted.  Surgical incision was intact without evidence of wound dehiscence.  There is no surrounding erythema, warmth, or drainage.  Staples were removed today in the office and steri stripes were applied, full extension, 85 degrees flexion, neurovascularly intact, sensation intact     Imaging " Results (Most Recent)     Procedure Component Value Units Date/Time    XR Knee 3 View Right [[id number]] Resulted: 02/15/23 1244     Updated: 02/15/23 1246    Narrative:      X-Ray Report:  Study: X-rays ordered, taken in the office, and reviewed today.   Site: Right knee Xray  Indication: TKA  View: AP/Lateral, Standing and Sunrise view(s)  Findings: Intact right total knee arthroplasty. No evidence of hardware   malfunction.  There is a single retained suture visualized on lateral   view, which was removed prior to discharge.  Prior studies available for comparison: yes                     Assessment and Plan   Problem List Items Addressed This Visit    None  Visit Diagnoses     Aftercare following right knee joint replacement surgery    -  Primary    Relevant Medications    cephalexin (KEFLEX) 500 MG capsule    Other Relevant Orders    XR Knee 3 View Right (Completed)    Ambulatory Referral to Home Health (Outpatient) (Completed)        Follow Up   Return in about 4 weeks (around 3/15/2023).  Patient is a former smoker.  Encouraged continued tobacco cessation.  Did not discuss options for smoking cessation.    Patient Instructions   X-rays taken and reviewed, showing intact hardware. Single remaining staple viewed on x-ray was reviewed.     Staples removed in office and Steri-Strips applied.  Patient educated on incision care.  Please keep incision clean and dry.  Do not soak or submerge in water until incision is fully healed.  Do not apply creams or lotions over the incision.  Please allow Steri-Strips to fall off on their own within 7 to 10 days.    Continue icing and elevation of the knee as needed to help with pain and swelling.  Ice knee up to 3 or 4 times daily for no longer than 15 to 20 minutes at a time.    Continue PT to progress ROM, strength, and weightbearing status. Updated home health PT placed today.     Refill for an additional 7 days of keflex sent to pharmacy. Advised use of probiotic. Please  call our office immediately or contact [facility] with any change in the appearance of the wound - increased redness, drainage, opening, etc.     Follow-up in 4 weeks. Repeat x-rays not needed at this visit.  Please call with questions or concerns.      Patient was given instructions and counseling regarding her condition or for health maintenance advice. Please see specific information pulled into the AVS if appropriate.

## 2023-02-21 ENCOUNTER — READMISSION MANAGEMENT (OUTPATIENT)
Dept: CALL CENTER | Facility: HOSPITAL | Age: 68
End: 2023-02-21
Payer: MEDICARE

## 2023-02-21 NOTE — OUTREACH NOTE
Total Joint Week 2 Survey    Flowsheet Row Responses   [facility] facility patient discharged from? [facility]   Does the patient have one of the following disease processes/diagnoses(primary or secondary)? Total Joint Replacement   Joint surgery performed? Knee   Week 2 attempt successful? No   Unsuccessful attempts Attempt 1          Daniel ROE - Registered Nurse

## 2023-02-28 ENCOUNTER — TELEPHONE (OUTPATIENT)
Dept: ORTHOPEDIC SURGERY | Facility: CLINIC | Age: 68
End: 2023-02-28
Payer: MEDICARE

## 2023-02-28 NOTE — TELEPHONE ENCOUNTER
RECEIVED CALL FROM LEO KEITH WITH [facility]. SHE STATES THE PATIENT HAS BEEN TAKING HER ANTIBIOTICS AS PRESCRIBED BUT HER INCISION IS STILL DRAINING, SHE ALSO REPORTED SOME NECROTIC TISSUE AND ESCHAR. SHE WOULD LIKE THE PATIENT TO BE EVALUATED SOONER THAN APPT THAT IS SCHEDULED FOR 03/03/23. APPOINTMENT GIVEN FOR TOMORROW 03/01/23 AT 10AM WITH NEO GUTIERREZ PA-C AT OUR [place] OFFICE.

## 2023-03-01 ENCOUNTER — OFFICE VISIT (OUTPATIENT)
Dept: ORTHOPEDIC SURGERY | Facility: CLINIC | Age: 68
End: 2023-03-01
Payer: MEDICARE

## 2023-03-01 VITALS — BODY MASS INDEX: 39.01 KG/M2 | HEIGHT: 62 IN | WEIGHT: 212 LBS

## 2023-03-01 DIAGNOSIS — Z96.651 AFTERCARE FOLLOWING RIGHT KNEE JOINT REPLACEMENT SURGERY: Primary | ICD-10-CM

## 2023-03-01 DIAGNOSIS — Z47.1 AFTERCARE FOLLOWING RIGHT KNEE JOINT REPLACEMENT SURGERY: Primary | ICD-10-CM

## 2023-03-01 PROCEDURE — 99024 POSTOP FOLLOW-UP VISIT: CPT | Performed by: PHYSICIAN ASSISTANT

## 2023-03-01 RX ORDER — SULFAMETHOXAZOLE AND TRIMETHOPRIM 400; 80 MG/1; MG/1
1 TABLET ORAL 2 TIMES DAILY
Qty: 7 TABLET | Refills: 0 | Status: SHIPPED | OUTPATIENT
Start: 2023-03-01 | End: 2023-03-20

## 2023-03-01 NOTE — PATIENT INSTRUCTIONS
The majority of the remaining Steri-Strips were removed in office today.  Wound was cleansed with peroxide and thoroughly dried.  Dry dressing was applied in office.  Patient was advised on need for continued wound care.  No soaking or submerging of the wound.  No creams or lotions directly over the wound.  Patient may shower, although please ensure wound is clean and dry afterwards.    Continue Keflex.  Add Bactrim.  Advised on use of probiotic.    Continue physical therapy to tolerance.    Follow-up in 1 week for wound check at [address] location. No imaging.

## 2023-03-01 NOTE — PROGRESS NOTES
"Chief Complaint  Pain and Follow-up of the Right Knee    Subjective      Marilin Martinez presents to [facility] for follow-up of right total knee arthroplasty with Gayatri robot performed on 1/31/2023 by Dr. MARGO Calvillo.  Patient was initially seen on 2/10/2023 by Dr. MARGO Calvillo for wound check and prescribed Keflex, which she has remained on.  She did have the wound VAC to the inferior aspect of her incision, which was removed on 2/7/2023.  She has remained under care of home health services since her last appointment on 2/15/2023 where remaining sutures and staples were removed.  Steri-Strips were applied.  Patient presents today for follow-up due to wound concern.  Patient reports she has had significant drainage from the lower aspect of her wound.  She presents today for follow-up with Steri-Strips in place.    Objective   No Known Allergies    Vital Signs:   Ht 157.5 cm (62\")   Wt 96.2 kg (212 lb)   BMI 38.78 kg/m²       Physical Exam    Constitutional: Awake, alert. Well nourished appearance.    Integumentary: Warm, dry, intact. No obvious rashes.    HENT: Atraumatic, normocephalic.   Respiratory: Non labored respirations .   Cardiovascular: Intact peripheral pulses.    Psychiatric: Normal mood and affect. A&O X3    Ortho Exam  Right knee: There is moderate edema.  Significant scab formation to surgical incision with all initially placed Steri-Strips remaining in place.  There is mild to moderate surrounding erythema and drainage from the inferior aspect of the wound, at site of initial wound VAC placement.  Otherwise, no evidence of wound dehiscence.  Patella is well tracking.  Knee is stable to varus and valgus stress.  -3 degrees of knee extension and flexion to 100 degrees.  Full plantarflexion dorsiflexion of the ankle.  Sensation intact light touch.  Distal neurovascular intact.    Imaging Results (Most Recent)     None                    Assessment and Plan   Problem List Items " Addressed This Visit    None  Visit Diagnoses     Aftercare following right knee joint replacement surgery    -  Primary        Follow Up   Return in about 1 week (around 3/8/2023).  Patient is a former smoker.  Encouraged continued tobacco cessation.  Did not discuss options for smoking cessation.    Patient Instructions   The majority of the remaining Steri-Strips were removed in office today.  Wound was cleansed with peroxide and thoroughly dried.  Dry dressing was applied in office.  Patient was advised on need for continued wound care.  No soaking or submerging of the wound.  No creams or lotions directly over the wound.  Patient may shower, although please ensure wound is clean and dry afterwards.    Continue Keflex.  Add Bactrim.  Advised on use of probiotic.    Continue physical therapy to tolerance.    Follow-up in 1 week for wound check at [address] location. No imaging.     Patient was given instructions and counseling regarding her condition or for health maintenance advice. Please see specific information pulled into the AVS if appropriate.

## 2023-03-02 ENCOUNTER — TELEPHONE (OUTPATIENT)
Dept: ORTHOPEDIC SURGERY | Facility: CLINIC | Age: 68
End: 2023-03-02
Payer: MEDICARE

## 2023-03-02 ENCOUNTER — READMISSION MANAGEMENT (OUTPATIENT)
Dept: CALL CENTER | Facility: HOSPITAL | Age: 68
End: 2023-03-02
Payer: MEDICARE

## 2023-03-02 NOTE — OUTREACH NOTE
Total Joint Month 1 Survey    Flowsheet Row Responses   [facility] patient discharged from? [facility]   Does the patient have one of the following disease processes/diagnoses(primary or secondary)? Total Joint Replacement   Joint surgery performed? Knee   Month 1 attempt successful? Yes   Call start time 1502   Call end time 1509   Has the patient been back in either the hospital or Emergency Department since discharge? No   Person spoke with today (if not patient) and relationship patient   Prescription comments Pt has been on antibiotics.  She is now on Bactrim for skin infection/wound healing   Is the patient taking all medications as directed (includes completed medication regime)? Yes   Has the patient kept scheduled appointments due by today? Yes   Comments Sees Dr been tomorrow. 2/8/2023   Is the patient still receiving Home Health Services? No   Home health comments [facility], pt states provider wants her to continue to HH vs outpt due to wound infection.  She states insurance has not yet approved.  Encouraged patient to call her insurance to update.  She states she will.   Is the patient still attending therapy sessions(either in the home or as an outpatient)? No   Has the patient fallen since discharge? No   What is the patient's perception of their functional status since discharge? Improving   If the patient is a current smoker, are they able to teach back resources for cessation? Not a smoker   Is the patient/caregiver able to teach back the hierarchy of who to call/visit for symptoms/problems? PCP, Specialist, Home health nurse, Urgent Care, ED, 911 Yes   Month 1 call completed? Yes   Is the patient interested in additional calls from an ambulatory ?  NOTE:  applies to high risk patients requiring additional follow-up. No   Wrap up additional comments Pt is doing ok at this time. no questions at end of call.          DESIREE PASTRANA - Registered Nurse

## 2023-03-02 NOTE — TELEPHONE ENCOUNTER
PATIENT CALLED THIS MORNING WANTING TO SEND IMAGE OF HER RIGHT KNEE INCISION FOR REVIEW. SHE STATES IT LOOKS WORSE THAT IT DID WHEN SHE WAS SEEN YESTERDAY. IMAGE REVIEWED BY NEO GUTIERREZ PA-C. PER NEO PATIENT IS TO CONTINUE ANTIBIOTICS AND KEEP THE INCISION CLEAN AND DRY. PATIENT VOICED UNDERSTANDING.

## 2023-03-06 ENCOUNTER — OFFICE VISIT (OUTPATIENT)
Dept: ORTHOPEDIC SURGERY | Facility: CLINIC | Age: 68
End: 2023-03-06
Payer: MEDICARE

## 2023-03-06 VITALS — WEIGHT: 212 LBS | BODY MASS INDEX: 39.01 KG/M2 | HEIGHT: 62 IN

## 2023-03-06 DIAGNOSIS — Z47.1 AFTERCARE FOLLOWING RIGHT KNEE JOINT REPLACEMENT SURGERY: Primary | ICD-10-CM

## 2023-03-06 DIAGNOSIS — Z96.651 AFTERCARE FOLLOWING RIGHT KNEE JOINT REPLACEMENT SURGERY: Primary | ICD-10-CM

## 2023-03-06 DIAGNOSIS — Z96.651 HISTORY OF TOTAL RIGHT KNEE REPLACEMENT: Primary | ICD-10-CM

## 2023-03-06 PROCEDURE — 99024 POSTOP FOLLOW-UP VISIT: CPT | Performed by: ORTHOPAEDIC SURGERY

## 2023-03-06 RX ORDER — CEPHALEXIN 500 MG/1
500 CAPSULE ORAL 3 TIMES DAILY
Qty: 30 CAPSULE | Refills: 0 | Status: SHIPPED | OUTPATIENT
Start: 2023-03-06 | End: 2023-03-16

## 2023-03-06 RX ORDER — SULFAMETHOXAZOLE AND TRIMETHOPRIM 800; 160 MG/1; MG/1
1 TABLET ORAL 2 TIMES DAILY
Qty: 20 TABLET | Refills: 0 | Status: SHIPPED | OUTPATIENT
Start: 2023-03-06 | End: 2023-03-20

## 2023-03-06 NOTE — PROGRESS NOTES
"Chief Complaint  Follow-up of the Right Knee     Subjective      Marilin Martinez presents to [facility] for follow up of the right knee.for follow-up of right total knee arthroplasty with Gayatri robot performed on 2023 by Dr. MARGO Calvillo.  Patient was initially seen on 2/10/2023 by Dr. MARGO Calvillo for wound check and prescribed Keflex, which she has remained on.  She did have the wound VAC to the inferior aspect of her incision, which was removed on 2023.  She has remained under care of home health services since her last appointment on 3/1//2023 Patient presents today for follow-up due to wound concern.  Patient reports she has no drainage from the lower aspect of her wound.  She presents today for follow-up with Steri-Strips in place. Home health had packed her wound and was looked at today and has an odor.     No Known Allergies     Social History     Socioeconomic History   • Marital status:    Tobacco Use   • Smoking status: Former     Types: Cigarettes     Quit date: 1997     Years since quittin.5   • Smokeless tobacco: Never   Vaping Use   • Vaping Use: Never used   Substance and Sexual Activity   • Alcohol use: Not Currently   • Drug use: No   • Sexual activity: Defer        Review of Systems     Objective   Vital Signs:   Ht 157.5 cm (62\")   Wt 96.2 kg (212 lb)   BMI 38.78 kg/m²       Physical Exam  Constitutional:       Appearance: Normal appearance. Patient is well-developed and normal weight.   HENT:      Head: Normocephalic.      Right Ear: Hearing and external ear normal.      Left Ear: Hearing and external ear normal.      Nose: Nose normal.   Eyes:      Conjunctiva/sclera: Conjunctivae normal.   Cardiovascular:      Rate and Rhythm: Normal rate.   Pulmonary:      Effort: Pulmonary effort is normal.      Breath sounds: No wheezing or rales.   Abdominal:      Palpations: Abdomen is soft.      Tenderness: There is no abdominal tenderness. "   Musculoskeletal:      Cervical back: Normal range of motion.   Skin:     Findings: No rash.   Neurological:      Mental Status: Patient  is alert and oriented to person, place, and time.   Psychiatric:         Mood and Affect: Mood and affect normal.         Judgment: Judgment normal.       Ortho Exam      Right knee: There is moderate edema.  Significant scab formation to surgical incision with all initially placed Steri-Strips remaining in place.  There is mild to moderate surrounding erythema and no drainage from the inferior aspect of the wound. She has moderate redness to the incision.  She has a wound mid incision that is 3.5x4 with some odor coming from the wound.  Otherwise, no evidence of wound dehiscence.  Patella is well tracking.  Knee is stable to varus and valgus stress.  -3 degrees of knee extension and flexion to 100 degrees.  Full plantarflexion dorsiflexion of the ankle.  Sensation intact light touch.  Distal neurovascular intact.    Procedures        Imaging Results (Most Recent)     None           Result Review :       XR Knee 3 View Right    Result Date: 2/15/2023  Narrative: X-Ray Report: Study: X-rays ordered, taken in the office, and reviewed today. Site: Right knee Xray Indication: TKA View: AP/Lateral, Standing and Sunrise view(s) Findings: Intact right total knee arthroplasty. No evidence of hardware malfunction.  There is a single retained suture visualized on lateral view, which was removed prior to discharge. Prior studies available for comparison: yes              Assessment and Plan     Diagnoses and all orders for this visit:    1. Aftercare following right knee joint replacement surgery (Primary)        Discussed the treatment plan with the patient. She is on Cephalexin Prescribed bactrim. Needs referral to wound clinic for wound vac.  Her appointment is 3/7/23. Continue physical therapy.     Call or return if worsening symptoms.    Follow Up     2-3 weeks.       Patient was given  instructions and counseling regarding her condition or for health maintenance advice. Please see specific information pulled into the AVS if appropriate.     Scribed for Murray Calvillo MD by Esther Avila MA.  03/06/23   13:37 EST      I have personally performed the services described in this document as scribed by the above individual and it is both accurate and complete. Murray Calvillo MD 03/06/23

## 2023-03-07 ENCOUNTER — HOSPITAL ENCOUNTER (OUTPATIENT)
Dept: INFUSION THERAPY | Facility: HOSPITAL | Age: 68
Discharge: HOME | End: 2023-03-07
Admitting: ORTHOPAEDIC SURGERY
Payer: MEDICARE

## 2023-03-07 VITALS
RESPIRATION RATE: 16 BRPM | SYSTOLIC BLOOD PRESSURE: 141 MMHG | DIASTOLIC BLOOD PRESSURE: 56 MMHG | OXYGEN SATURATION: 100 % | HEART RATE: 76 BPM | TEMPERATURE: 97.6 F

## 2023-03-07 DIAGNOSIS — S81.001A OPEN WOUND OF RIGHT KNEE, INITIAL ENCOUNTER: Primary | ICD-10-CM

## 2023-03-07 PROCEDURE — G0463 HOSPITAL OUTPT CLINIC VISIT: HCPCS

## 2023-03-07 PROCEDURE — 97602 WOUND(S) CARE NON-SELECTIVE: CPT

## 2023-03-07 RX ORDER — SODIUM HYPOCHLORITE 1.25 MG/ML
1 SOLUTION TOPICAL AS NEEDED
Status: CANCELLED | OUTPATIENT
Start: 2023-03-07

## 2023-03-07 NOTE — SIGNIFICANT NOTE
Wound Eval / Progress Noted     Radha     Patient Name: Marilin Martinez  : 1955  MRN: [id number]  Today's Date: 3/7/2023                 Admit Date: 3/7/2023    Visit Dx:    ICD-10-CM ICD-9-CM   1. Open wound of right knee, initial encounter  S81.001A 891.0       Patient Active Problem List   Diagnosis   • Environmental and seasonal allergies   • Essential hypertension   • Sleep apnea   • Multiple joint pain   • Hypothyroid   • Diabetes mellitus type 2 in obese ([facility])   • Dyslipidemia   • S/P laparoscopic sleeve gastrectomy   • Primary osteoarthritis of right knee   • Primary osteoarthritis of left knee   • Candidal intertrigo   • Excessive and redundant skin and subcutaneous tissue   • Obesity, Class II, BMI 35-39.9   • Osteoarthrosis, localized, primary, knee, right   • Open knee wound        Past Medical History:   Diagnosis Date   • Arthritis     KNEE PAIN   • Back pain    • Diabetes mellitus ([facility])    • Hyperlipemia    • Hypertension     managed by cardiologist   • Knee pain, bilateral    • Neuropathy    • OAB (overactive bladder)    • Seasonal allergies    • Sleep apnea     CPAP   • 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[facility])     no residual   • Thyroid disorder         Past Surgical History:   Procedure Laterality Date   • COLONOSCOPY     • ENDOSCOPY N/A 2021    Procedure: ESOPHAGOGASTRODUODENOSCOPY WITH COLD BIOPSY;  Surgeon: Mansi Kyle MD;  Location: [facility] ENDOSCOPY;  Service: General;  Laterality: N/A;  PRE: SCREENING FOR BARIATRICS  POST: MODERATE SIZE HIATAL HERNIA   • GALLBLADDER SURGERY     • GASTRIC SLEEVE LAPAROSCOPIC N/A 2022    Procedure: GASTRIC SLEEVE LAPAROSCOPIC With VENTRALHernia Repair AND PARAESOPHAGEAL HERNIA REPAIR AND LYSIS OF ADHESIONS PARTICAL OMENTECTOMY;  Surgeon: Quang Shipley Jr., MD;  Location: [facility] OR [facility];  Service: Bariatric;  Laterality: N/A;   • TONSILLECTOMY     • TOTAL KNEE ARTHROPLASTY Right 2023    Procedure: RIGHT TOTAL KNEE ARTHROPLASTY WITH  DANITA ROBOT AND SHIREEN;  Surgeon: Murray Calvillo MD;  Location: [facility] MAIN OR;  Service: Robotics - Ortho;  Laterality: Right;   • TUBAL ABDOMINAL LIGATION           Physical Assessment:  Wound 01/31/23 1426 Right anterior knee Incision (Active)   Wound Image   03/07/23 1645   Dressing Appearance intact;dry 03/07/23 1645   Closure None 03/07/23 1645   Base yeast;slough;subcutaneous;dry 03/07/23 1645   Yellow (%), Wound Tissue Color 100 03/07/23 1645   Periwound intact;dry;edematous;redness 03/07/23 1645   Periwound Temperature warm 03/07/23 1645   Periwound Skin Turgor soft 03/07/23 1645   Edges open 03/07/23 1645   Wound Length (cm) 4.7 cm 03/07/23 1645   Wound Width (cm) 3.1 cm 03/07/23 1645   Wound Depth (cm) 1.5 cm 03/07/23 1645   Wound Surface Area (cm^2) 14.57 cm^2 03/07/23 1645   Wound Volume (cm^3) 21.855 cm^3 03/07/23 1645   Drainage Characteristics/Odor yellow 03/07/23 1645   Drainage Amount small 03/07/23 1645   Care, Wound cleansed with;irrigated with;sterile normal saline;antimicrobial agent applied 03/07/23 1645   Dressing Care dressing applied;antimicrobial agent applied;gauze, [id number];packed with;abdominal pad;gauze, dry;tubular wrap 03/07/23 1645   Periwound Care absorptive dressing applied 03/07/23 1645       Wound 03/07/23 1649 Right anterior knee Incision (Active)   Wound Image   03/07/23 1645   Dressing Appearance intact;dry 03/07/23 1645   Closure None 03/07/23 1645   Base scab;dry 03/07/23 1645   Black (%), Wound Tissue Color 100 03/07/23 1645   Periwound intact;dry;redness;edematous 03/07/23 1645   Periwound Temperature warm 03/07/23 1645   Periwound Skin Turgor firm 03/07/23 1645   Edges rolled/closed 03/07/23 1645   Drainage Amount none 03/07/23 1645   Care, Wound cleansed with;sterile normal saline 03/07/23 1645   Dressing Care dressing applied;non-adherent;petroleum-based;gauze;abdominal pad;gauze, dry;tubular wrap 03/07/23 1645   Periwound Care absorptive dressing applied 03/07/23  [id number]        Wound Check / Follow-up:  Patient seen today as a referral from the orthopedic surgeon office to have a wound vac placed to right anterior knee. Patient had a total arthroplasty performed on 2/1, where she had a prevena wound vac placed along her surgical incision. Patient has home health with [facility]; patient reports that the incision came open once the staples were removed. She reports noticing a small and increased brown to serosanguineous drainage for the last 3 weeks. Patient reports that she is currently taking an antibiotic.    Wound base at this time with yellow tissue as well as an area from 7-8 o'clock with gray tissue; subcutaneous tissue is present in the wound bed. Periwound is reddened with thick crusted areas present as well. Right lower leg is reddened and edematous. Cleansed and irrigated the wound base with NS and 1/4 strength Dakin's. Filled wound bed with 1/4 strength Dakins moistened fluffed gauze and secured with an ABD pad and dry gauze. Waiting for wound vac approval at this time; in the meantime recommend 1/4 strength Dakins moistened fluffed gauze to wound.    At 12 o'clock form the area of dehiscence is a thick dark scab. No drainage noted at this time. Cleansed with NS. Applied Non-adherent petroleum gauze to scab and secured with an ABD pad and gauze roll.     Impression: dehisced surgical incision    Short term goals:  Regain skin integrity, daily dressing changes,     Soo Winchester RN    3/7/2023    16:55 EST

## 2023-03-09 ENCOUNTER — HOSPITAL ENCOUNTER (OUTPATIENT)
Dept: INFUSION THERAPY | Facility: HOSPITAL | Age: 68
Discharge: HOME | End: 2023-03-09
Admitting: ORTHOPAEDIC SURGERY
Payer: MEDICARE

## 2023-03-09 VITALS
SYSTOLIC BLOOD PRESSURE: 134 MMHG | OXYGEN SATURATION: 100 % | RESPIRATION RATE: 20 BRPM | TEMPERATURE: 98.3 F | DIASTOLIC BLOOD PRESSURE: 72 MMHG | HEART RATE: 58 BPM

## 2023-03-09 DIAGNOSIS — S81.001A OPEN WOUND OF RIGHT KNEE, INITIAL ENCOUNTER: Primary | ICD-10-CM

## 2023-03-09 PROCEDURE — 97605 NEG PRS WND THER DME<=50SQCM: CPT

## 2023-03-09 PROCEDURE — G0463 HOSPITAL OUTPT CLINIC VISIT: HCPCS

## 2023-03-09 RX ORDER — SODIUM HYPOCHLORITE 1.25 MG/ML
1 SOLUTION TOPICAL AS NEEDED
OUTPATIENT
Start: 2023-03-09

## 2023-03-09 NOTE — SIGNIFICANT NOTE
Wound Eval / Progress Noted     Radha     Patient Name: Marilin Martinez  : 1955  MRN: [id number]  Today's Date: 3/9/2023                 Admit Date: 3/9/2023    Visit Dx:    ICD-10-CM ICD-9-CM   1. Open wound of right knee, initial encounter  S81.001A 891.0       Patient Active Problem List   Diagnosis   • Environmental and seasonal allergies   • Essential hypertension   • Sleep apnea   • Multiple joint pain   • Hypothyroid   • Diabetes mellitus type 2 in obese ([facility])   • Dyslipidemia   • S/P laparoscopic sleeve gastrectomy   • Primary osteoarthritis of right knee   • Primary osteoarthritis of left knee   • Candidal intertrigo   • Excessive and redundant skin and subcutaneous tissue   • Obesity, Class II, BMI 35-39.9   • Osteoarthrosis, localized, primary, knee, right   • Open knee wound        Past Medical History:   Diagnosis Date   • Arthritis     KNEE PAIN   • Back pain    • Diabetes mellitus ([facility])    • Hyperlipemia    • Hypertension     managed by cardiologist   • Knee pain, bilateral    • Neuropathy    • OAB (overactive bladder)    • Seasonal allergies    • Sleep apnea     CPAP   • 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[facility])     no residual   • Thyroid disorder         Past Surgical History:   Procedure Laterality Date   • COLONOSCOPY     • ENDOSCOPY N/A 2021    Procedure: ESOPHAGOGASTRODUODENOSCOPY WITH COLD BIOPSY;  Surgeon: Mansi Kyle MD;  Location: [facility] ENDOSCOPY;  Service: General;  Laterality: N/A;  PRE: SCREENING FOR BARIATRICS  POST: MODERATE SIZE HIATAL HERNIA   • GALLBLADDER SURGERY     • GASTRIC SLEEVE LAPAROSCOPIC N/A 2022    Procedure: GASTRIC SLEEVE LAPAROSCOPIC With VENTRALHernia Repair AND PARAESOPHAGEAL HERNIA REPAIR AND LYSIS OF ADHESIONS PARTICAL OMENTECTOMY;  Surgeon: Quang Shipley Jr., MD;  Location: [facility] OR [facility];  Service: Bariatric;  Laterality: N/A;   • TONSILLECTOMY     • TOTAL KNEE ARTHROPLASTY Right 2023    Procedure: RIGHT TOTAL KNEE ARTHROPLASTY WITH  DANITA ROBOT AND SHIREEN;  Surgeon: Murray Calvillo MD;  Location: [facility] MAIN OR;  Service: Robotics - Ortho;  Laterality: Right;   • TUBAL ABDOMINAL LIGATION           Physical Assessment:  Wound 01/31/23 1426 Right anterior knee Incision (Active)   Dressing Appearance intact;dry 03/09/23 1100   Closure None 03/09/23 1100   Base subcutaneous;dry;moist;red 03/09/23 1100   Periwound intact;dry;edematous;redness 03/09/23 1100   Periwound Temperature warm 03/09/23 1100   Periwound Skin Turgor soft 03/09/23 1100   Edges open 03/09/23 1100   Drainage Amount none 03/09/23 1100   Care, Wound cleansed with;irrigated with;sterile normal saline;negative pressure wound therapy 03/09/23 1100   Dressing Care dressing applied;foam;transparent film 03/09/23 1100   Periwound Care absorptive dressing applied 03/09/23 1100       Wound 03/07/23 1649 Right anterior knee Incision (Active)   Dressing Appearance intact;dry 03/09/23 1100   Closure AMALIA 03/09/23 1100   Base scab;dry 03/09/23 1100   Black (%), Wound Tissue Color 100 03/09/23 1100   Periwound intact;dry;redness;edematous 03/09/23 1100   Periwound Temperature warm 03/09/23 1100   Periwound Skin Turgor firm 03/09/23 1100   Edges rolled/closed 03/09/23 1100   Drainage Amount none 03/09/23 1100   Care, Wound cleansed with;sterile normal saline 03/09/23 1100   Dressing Care dressing applied;non-adherent;petroleum-based;gauze;silicone;border dressing 03/09/23 1100   Periwound Care absorptive dressing applied 03/09/23 1100       NPWT (Negative Pressure Wound Therapy) 01/31/23 1530 Right knee (Active)   Therapy Setting continuous therapy 03/09/23 1100   Dressing foam, black 03/09/23 1100   Pressure Setting 125 mmHg 03/09/23 1100   Sponges Inserted 2 03/09/23 1100   Finger sweep complete Yes 03/09/23 1100        Patient seen today for a wound check and home wound vac placement. Patient is to have a home wound vac unit placed and [facility] home health with take over care and assist with  dressing changes.  Wound base with some red tissue present, subcutaneous tissue surrounding the tissue. Periwound is reddened and dry; patient reports wound edges are tender. No areas of redness or induration noted to intact tissue. No odor detected to wound. Filled wound bed with black foam and secured with transparent drape. Applied a second black foam as a bridge and connected wound vac. Foam compressed, no alarms noted. Patient tolerating wound vac with no issues. Wound vac set to 125 mmHg.  Thick crusting noted to upper knee. Cleansed with NS. Applied non-adherent petroleum gauze and secured with a silicone border dressing.   Discussed wound vac settings and how to trouble shoot wound vac if the wound vac were to alarm. Discussed if loss of suction is greater than 2 hours to remove the wound vac dressing and apply a NS moistened gauze to the wound base and call the home health nurse. All questions answered at this time.    Soo Winchester RN    3/9/2023    15:19 EST

## 2023-03-09 NOTE — SIGNIFICANT NOTE
Katelin with care coordination reports that she spoke with Marcia at [facility]; Marcia reports that the nurse would be able to come to the patients house on Sunday to have the wound vac dressing placed.     Spoke with Esa at [facility] about dressing changes for this patient. Esa reports that she was told by her manager that it she would not be able to come out on Sunday to do the dressing change that they are only able to do MWF dressing changes. Esa states that she will be out on Friday 3/10/23 to change the patients dressing. Soo Winchester RN

## 2023-03-15 ENCOUNTER — TELEPHONE (OUTPATIENT)
Dept: ORTHOPEDIC SURGERY | Facility: CLINIC | Age: 68
End: 2023-03-15
Payer: MEDICARE

## 2023-03-15 NOTE — TELEPHONE ENCOUNTER
EDEN FROM [facility] CALLED AND SENT PICTURES FOR DR JUAN TO REVIEW OF PATIENT INCISION LINE AND WOUND.  WOUND VAC TO BE PLACED BACK ON KNEE, CONTINUE ANTIBIOTICS, AND KEEP APPOINTMENT FOR Monday AS SCHEDULED.  NO ADDITIONAL ORDERS.

## 2023-03-20 ENCOUNTER — PREP FOR SURGERY (OUTPATIENT)
Dept: OTHER | Facility: HOSPITAL | Age: 68
End: 2023-03-20
Payer: MEDICARE

## 2023-03-20 ENCOUNTER — OFFICE VISIT (OUTPATIENT)
Dept: ORTHOPEDIC SURGERY | Facility: CLINIC | Age: 68
End: 2023-03-20
Payer: MEDICARE

## 2023-03-20 VITALS — WEIGHT: 212 LBS | BODY MASS INDEX: 39.01 KG/M2 | HEIGHT: 62 IN

## 2023-03-20 DIAGNOSIS — Z47.1 AFTERCARE FOLLOWING RIGHT KNEE JOINT REPLACEMENT SURGERY: Primary | ICD-10-CM

## 2023-03-20 DIAGNOSIS — Z96.651 AFTERCARE FOLLOWING RIGHT KNEE JOINT REPLACEMENT SURGERY: Primary | ICD-10-CM

## 2023-03-20 DIAGNOSIS — S81.001A OPEN WOUND OF RIGHT KNEE, INITIAL ENCOUNTER: Primary | ICD-10-CM

## 2023-03-20 DIAGNOSIS — T81.49XA WOUND INFECTION AFTER SURGERY: ICD-10-CM

## 2023-03-20 PROCEDURE — 99024 POSTOP FOLLOW-UP VISIT: CPT | Performed by: ORTHOPAEDIC SURGERY

## 2023-03-20 RX ORDER — CEFAZOLIN SODIUM 2 G/100ML
2 INJECTION, SOLUTION INTRAVENOUS ONCE
Status: CANCELLED | OUTPATIENT
Start: 2023-03-20 | End: 2023-03-20

## 2023-03-20 RX ORDER — CEFAZOLIN SODIUM IN 0.9 % NACL 3 G/100 ML
3 INTRAVENOUS SOLUTION, PIGGYBACK (ML) INTRAVENOUS ONCE
Status: CANCELLED | OUTPATIENT
Start: 2023-03-20 | End: 2023-03-20

## 2023-03-20 NOTE — PROGRESS NOTES
"Chief Complaint  Follow-up of the Right Knee and Wound Check     Subjective      Marilin Martinez presents to [facility] for follow up of the right knee.for follow-up of right total knee arthroplasty with Gayatri robot performed on 2023 Patient was initially seen on 2/10/2023  for wound check and prescribed Keflex, which she has remained on.  She did have the wound VAC to the inferior aspect of her incision, which was removed on 2023.  She has remained under care of home health services.  She was referred for a wound vac on 3/6/23 as the wound has worsened.  Subsequently the wound has opened and dehisced.  On 3/6/23 she was placed on Cephalexin and Bactrim.  Her wound has an odor also. She is here today with her wound vac in place.  It was taken off and assessed and home health called and nursing will come out this afternoon to reapply the wound VAC.  She has eschar of the incision as well.     No Known Allergies     Social History     Socioeconomic History   • Marital status:    Tobacco Use   • Smoking status: Former     Types: Cigarettes     Quit date: 1997     Years since quittin.5   • Smokeless tobacco: Never   Vaping Use   • Vaping Use: Never used   Substance and Sexual Activity   • Alcohol use: Not Currently   • Drug use: No   • Sexual activity: Defer        Review of Systems     Objective   Vital Signs:   Ht 157.5 cm (62\")   Wt 96.2 kg (212 lb)   BMI 38.78 kg/m²       Physical Exam  Constitutional:       Appearance: Normal appearance. Patient is well-developed and normal weight.   HENT:      Head: Normocephalic.      Right Ear: Hearing and external ear normal.      Left Ear: Hearing and external ear normal.      Nose: Nose normal.   Eyes:      Conjunctiva/sclera: Conjunctivae normal.   Cardiovascular:      Rate and Rhythm: Normal rate.   Pulmonary:      Effort: Pulmonary effort is normal.      Breath sounds: No wheezing or rales.   Abdominal:      " Palpations: Abdomen is soft.      Tenderness: There is no abdominal tenderness.   Musculoskeletal:      Cervical back: Normal range of motion.   Skin:     Findings: No rash.   Neurological:      Mental Status: Patient  is alert and oriented to person, place, and time.   Psychiatric:         Mood and Affect: Mood and affect normal.         Judgment: Judgment normal.       Ortho Exam      Right knee: There is moderate edema.  There is mild to moderate surrounding erythema and with drainage from the inferior aspect of the wound. She has moderate redness to the incision.  She has a wound mid incision that is 3.5x4 with some odor coming from the wound. She has a wound vac.  Patella is well tracking.  Knee is stable to varus and valgus stress.  -3 degrees of knee extension and flexion to 100 degrees.  Full plantarflexion dorsiflexion of the ankle.  Sensation intact light touch.  Distal neurovascular intact.      Procedures        Imaging Results (Most Recent)     None           Result Review :               Assessment and Plan     Diagnoses and all orders for this visit:    1. Aftercare following right knee joint replacement surgery (Primary)    2. Wound infection after surgery        Discussed the treatment plan with the patient. I have placed her on the Operative Schedule for tomorrow for irrigation and debridement of the wound.  Home health and wound care to follow with wound vac.    Educated on risk of elevated BMI.  Discussed options for weight loss/decreasing BMI prior to procedure including dietician consult, weight loss options and exercise program., Discussed surgery., Risks/benefits discussed with patient including, but not limited to: infection, bleeding, neurovascular damage, re-rupture, aesthetic deformity, need for further surgery, and death., Surgery pamphlet given., DME order for a 3 in 1 given today due to patient will be confined to one room/level of the home that does not offer a toilet during postop  recovery.  and Call or return if worsening symptoms.    Follow Up     1 week postoperatively       Patient was given instructions and counseling regarding her condition or for health maintenance advice. Please see specific information pulled into the AVS if appropriate.     Scribed for Murray Calvillo MD by Esther Avila MA.  03/20/23   11:01 EDT    I have personally performed the services described in this document as scribed by the above individual and it is both accurate and complete. Murray Calvillo MD 03/21/23

## 2023-03-20 NOTE — PRE-PROCEDURE INSTRUCTIONS
Patient instructed to have no food past midnight, clears up to 2 hours prior to arrival time. Patient instructed to wear no lotions, jewelry or piercing's day of surgery. Patient to shower with surgical soap am of surgery.  Patient to take xanax, keflex, gabapentin, norco, levothryoxine am of surgery.

## 2023-03-21 ENCOUNTER — ANESTHESIA EVENT (OUTPATIENT)
Dept: PERIOP | Facility: HOSPITAL | Age: 68
End: 2023-03-21
Payer: MEDICARE

## 2023-03-21 ENCOUNTER — ANESTHESIA (OUTPATIENT)
Dept: PERIOP | Facility: HOSPITAL | Age: 68
End: 2023-03-21
Payer: MEDICARE

## 2023-03-21 ENCOUNTER — HOSPITAL ENCOUNTER (OUTPATIENT)
Facility: HOSPITAL | Age: 68
Discharge: HOME | End: 2023-03-21
Attending: ORTHOPAEDIC SURGERY | Admitting: ORTHOPAEDIC SURGERY
Payer: MEDICARE

## 2023-03-21 VITALS
SYSTOLIC BLOOD PRESSURE: 135 MMHG | RESPIRATION RATE: 12 BRPM | DIASTOLIC BLOOD PRESSURE: 61 MMHG | HEART RATE: 54 BPM | HEIGHT: 63 IN | TEMPERATURE: 97.6 F | OXYGEN SATURATION: 95 % | WEIGHT: 216.71 LBS | BODY MASS INDEX: 38.4 KG/M2

## 2023-03-21 DIAGNOSIS — S81.001A OPEN WOUND OF RIGHT KNEE, INITIAL ENCOUNTER: ICD-10-CM

## 2023-03-21 DIAGNOSIS — T81.49XA WOUND INFECTION AFTER SURGERY: Primary | ICD-10-CM

## 2023-03-21 LAB
GLUCOSE BLDC GLUCOMTR-MCNC: 87 MG/DL (ref 70–99)
GLUCOSE BLDC GLUCOMTR-MCNC: 95 MG/DL (ref 70–99)

## 2023-03-21 PROCEDURE — 63710000001 ACETAMINOPHEN 500 MG TABLET: Performed by: ANESTHESIOLOGY

## 2023-03-21 PROCEDURE — 25010000002 HYDROMORPHONE 1 MG/ML SOLUTION: Performed by: NURSE ANESTHETIST, CERTIFIED REGISTERED

## 2023-03-21 PROCEDURE — 25010000002 CEFAZOLIN IN DEXTROSE 2-4 GM/100ML-% SOLUTION: Performed by: ORTHOPAEDIC SURGERY

## 2023-03-21 PROCEDURE — 25010000002 METOCLOPRAMIDE PER 10 MG: Performed by: ANESTHESIOLOGY

## 2023-03-21 PROCEDURE — 25010000002 FENTANYL CITRATE (PF) 50 MCG/ML SOLUTION: Performed by: NURSE ANESTHETIST, CERTIFIED REGISTERED

## 2023-03-21 PROCEDURE — 25010000002 ONDANSETRON PER 1 MG: Performed by: NURSE ANESTHETIST, CERTIFIED REGISTERED

## 2023-03-21 PROCEDURE — 25010000002 PROPOFOL 10 MG/ML EMULSION: Performed by: NURSE ANESTHETIST, CERTIFIED REGISTERED

## 2023-03-21 PROCEDURE — A9270 NON-COVERED ITEM OR SERVICE: HCPCS | Performed by: NURSE ANESTHETIST, CERTIFIED REGISTERED

## 2023-03-21 PROCEDURE — S0260 H&P FOR SURGERY: HCPCS | Performed by: ORTHOPAEDIC SURGERY

## 2023-03-21 PROCEDURE — 63710000001 OXYCODONE 5 MG TABLET: Performed by: NURSE ANESTHETIST, CERTIFIED REGISTERED

## 2023-03-21 PROCEDURE — A9270 NON-COVERED ITEM OR SERVICE: HCPCS | Performed by: ANESTHESIOLOGY

## 2023-03-21 PROCEDURE — 10180 I&D COMPLEX PO WOUND INFCTJ: CPT | Performed by: ORTHOPAEDIC SURGERY

## 2023-03-21 PROCEDURE — 82962 GLUCOSE BLOOD TEST: CPT

## 2023-03-21 PROCEDURE — 25010000002 MIDAZOLAM PER 1MG: Performed by: ANESTHESIOLOGY

## 2023-03-21 RX ORDER — PROMETHAZINE HYDROCHLORIDE 12.5 MG/1
25 TABLET ORAL ONCE AS NEEDED
Status: DISCONTINUED | OUTPATIENT
Start: 2023-03-21 | End: 2023-03-21 | Stop reason: HOSPADM

## 2023-03-21 RX ORDER — OXYCODONE HYDROCHLORIDE 5 MG/1
5 TABLET ORAL
Status: DISCONTINUED | OUTPATIENT
Start: 2023-03-21 | End: 2023-03-21 | Stop reason: HOSPADM

## 2023-03-21 RX ORDER — LIDOCAINE HYDROCHLORIDE 20 MG/ML
INJECTION, SOLUTION EPIDURAL; INFILTRATION; INTRACAUDAL; PERINEURAL AS NEEDED
Status: DISCONTINUED | OUTPATIENT
Start: 2023-03-21 | End: 2023-03-21 | Stop reason: SURG

## 2023-03-21 RX ORDER — PROMETHAZINE HYDROCHLORIDE 25 MG/1
25 SUPPOSITORY RECTAL ONCE AS NEEDED
Status: DISCONTINUED | OUTPATIENT
Start: 2023-03-21 | End: 2023-03-21 | Stop reason: HOSPADM

## 2023-03-21 RX ORDER — MIDAZOLAM HYDROCHLORIDE 2 MG/2ML
2 INJECTION, SOLUTION INTRAMUSCULAR; INTRAVENOUS ONCE
Status: COMPLETED | OUTPATIENT
Start: 2023-03-21 | End: 2023-03-21

## 2023-03-21 RX ORDER — HYDROCODONE BITARTRATE AND ACETAMINOPHEN 7.5; 325 MG/1; MG/1
1 TABLET ORAL EVERY 4 HOURS PRN
Qty: 25 TABLET | Refills: 0 | Status: SHIPPED | OUTPATIENT
Start: 2023-03-21

## 2023-03-21 RX ORDER — METOCLOPRAMIDE HYDROCHLORIDE 5 MG/ML
10 INJECTION INTRAMUSCULAR; INTRAVENOUS ONCE AS NEEDED
Status: COMPLETED | OUTPATIENT
Start: 2023-03-21 | End: 2023-03-21

## 2023-03-21 RX ORDER — CEFAZOLIN SODIUM IN 0.9 % NACL 3 G/100 ML
3 INTRAVENOUS SOLUTION, PIGGYBACK (ML) INTRAVENOUS ONCE
Status: DISCONTINUED | OUTPATIENT
Start: 2023-03-21 | End: 2023-03-21 | Stop reason: DRUGHIGH

## 2023-03-21 RX ORDER — ACETAMINOPHEN 500 MG
1000 TABLET ORAL ONCE
Status: COMPLETED | OUTPATIENT
Start: 2023-03-21 | End: 2023-03-21

## 2023-03-21 RX ORDER — FENTANYL CITRATE 50 UG/ML
INJECTION, SOLUTION INTRAMUSCULAR; INTRAVENOUS AS NEEDED
Status: DISCONTINUED | OUTPATIENT
Start: 2023-03-21 | End: 2023-03-21 | Stop reason: SURG

## 2023-03-21 RX ORDER — MEPERIDINE HYDROCHLORIDE 25 MG/ML
12.5 INJECTION INTRAMUSCULAR; INTRAVENOUS; SUBCUTANEOUS
Status: DISCONTINUED | OUTPATIENT
Start: 2023-03-21 | End: 2023-03-21 | Stop reason: HOSPADM

## 2023-03-21 RX ORDER — PROPOFOL 10 MG/ML
VIAL (ML) INTRAVENOUS AS NEEDED
Status: DISCONTINUED | OUTPATIENT
Start: 2023-03-21 | End: 2023-03-21 | Stop reason: SURG

## 2023-03-21 RX ORDER — ONDANSETRON 2 MG/ML
INJECTION INTRAMUSCULAR; INTRAVENOUS AS NEEDED
Status: DISCONTINUED | OUTPATIENT
Start: 2023-03-21 | End: 2023-03-21 | Stop reason: SURG

## 2023-03-21 RX ORDER — GLYCOPYRROLATE 0.2 MG/ML
INJECTION INTRAMUSCULAR; INTRAVENOUS AS NEEDED
Status: DISCONTINUED | OUTPATIENT
Start: 2023-03-21 | End: 2023-03-21 | Stop reason: SURG

## 2023-03-21 RX ORDER — CEFAZOLIN SODIUM 2 G/100ML
2 INJECTION, SOLUTION INTRAVENOUS ONCE
Status: COMPLETED | OUTPATIENT
Start: 2023-03-21 | End: 2023-03-21

## 2023-03-21 RX ORDER — ONDANSETRON 2 MG/ML
4 INJECTION INTRAMUSCULAR; INTRAVENOUS ONCE AS NEEDED
Status: DISCONTINUED | OUTPATIENT
Start: 2023-03-21 | End: 2023-03-21 | Stop reason: HOSPADM

## 2023-03-21 RX ORDER — SODIUM CHLORIDE, SODIUM LACTATE, POTASSIUM CHLORIDE, CALCIUM CHLORIDE 600; 310; 30; 20 MG/100ML; MG/100ML; MG/100ML; MG/100ML
9 INJECTION, SOLUTION INTRAVENOUS CONTINUOUS PRN
Status: DISCONTINUED | OUTPATIENT
Start: 2023-03-21 | End: 2023-03-21 | Stop reason: HOSPADM

## 2023-03-21 RX ADMIN — HYDROMORPHONE HYDROCHLORIDE 0.25 MG: 1 INJECTION, SOLUTION INTRAMUSCULAR; INTRAVENOUS; SUBCUTANEOUS at 14:28

## 2023-03-21 RX ADMIN — FENTANYL CITRATE 50 MCG: 50 INJECTION, SOLUTION INTRAMUSCULAR; INTRAVENOUS at 13:40

## 2023-03-21 RX ADMIN — ACETAMINOPHEN 1000 MG: 500 TABLET ORAL at 11:21

## 2023-03-21 RX ADMIN — ONDANSETRON 4 MG: 2 INJECTION INTRAMUSCULAR; INTRAVENOUS at 13:29

## 2023-03-21 RX ADMIN — MIDAZOLAM HYDROCHLORIDE 2 MG: 1 INJECTION, SOLUTION INTRAMUSCULAR; INTRAVENOUS at 12:52

## 2023-03-21 RX ADMIN — LIDOCAINE HYDROCHLORIDE 50 MG: 20 INJECTION, SOLUTION EPIDURAL; INFILTRATION; INTRACAUDAL; PERINEURAL at 13:08

## 2023-03-21 RX ADMIN — GLYCOPYRROLATE 0.2 MG: 0.2 INJECTION INTRAMUSCULAR; INTRAVENOUS at 13:29

## 2023-03-21 RX ADMIN — METOCLOPRAMIDE HYDROCHLORIDE 10 MG: 5 INJECTION INTRAMUSCULAR; INTRAVENOUS at 12:54

## 2023-03-21 RX ADMIN — PROPOFOL 150 MG: 10 INJECTION, EMULSION INTRAVENOUS at 13:08

## 2023-03-21 RX ADMIN — CEFAZOLIN SODIUM 2 G: 2 INJECTION, SOLUTION INTRAVENOUS at 13:03

## 2023-03-21 RX ADMIN — SODIUM CHLORIDE, POTASSIUM CHLORIDE, SODIUM LACTATE AND CALCIUM CHLORIDE 9 ML/HR: 600; 310; 30; 20 INJECTION, SOLUTION INTRAVENOUS at 11:22

## 2023-03-21 RX ADMIN — FENTANYL CITRATE 25 MCG: 50 INJECTION, SOLUTION INTRAMUSCULAR; INTRAVENOUS at 13:31

## 2023-03-21 RX ADMIN — FENTANYL CITRATE 25 MCG: 50 INJECTION, SOLUTION INTRAMUSCULAR; INTRAVENOUS at 13:15

## 2023-03-21 RX ADMIN — OXYCODONE HYDROCHLORIDE 5 MG: 5 TABLET ORAL at 14:30

## 2023-03-21 NOTE — H&P
"h and p      Chief Complaint  Follow-up of the Right Knee and Wound Check       Subjective          Marilin Martinez presents to [facility] for follow up of the right knee.for follow-up of right total knee arthroplasty with Gayatri robot performed on 2023 Patient was initially seen on 2/10/2023  for wound check and prescribed Keflex, which she has remained on.  She did have the wound VAC to the inferior aspect of her incision, which was removed on 2023.  She has remained under care of home health services.  She was referred for a wound vac on 3/6/23 as the wound has worsened.  Subsequently the wound has opened and dehisced.  On 3/6/23 she was placed on Cephalexin and Bactrim.  Her wound has an odor also. She is here today with her wound vac in place.  It was taken off and assessed and home health called and nursing will come out this afternoon to reapply the wound VAC.  She has eschar of the incision as well.      No Known Allergies      Social History            Socioeconomic History   • Marital status:    Tobacco Use   • Smoking status: Former       Types: Cigarettes       Quit date: 1997       Years since quittin.5   • Smokeless tobacco: Never   Vaping Use   • Vaping Use: Never used   Substance and Sexual Activity   • Alcohol use: Not Currently   • Drug use: No   • Sexual activity: Defer         Review of Systems            Objective      Vital Signs:   Ht 157.5 cm (62\")   Wt 96.2 kg (212 lb)   BMI 38.78 kg/m²        Physical Exam  Constitutional:       Appearance: Normal appearance. Patient is well-developed and normal weight.   HENT:      Head: Normocephalic.      Right Ear: Hearing and external ear normal.      Left Ear: Hearing and external ear normal.      Nose: Nose normal.   Eyes:      Conjunctiva/sclera: Conjunctivae normal.   Cardiovascular:      Rate and Rhythm: Normal rate.   Pulmonary:      Effort: Pulmonary effort is normal.      Breath " sounds: No wheezing or rales.   Abdominal:      Palpations: Abdomen is soft.      Tenderness: There is no abdominal tenderness.   Musculoskeletal:      Cervical back: Normal range of motion.   Skin:     Findings: No rash.   Neurological:      Mental Status: Patient  is alert and oriented to person, place, and time.   Psychiatric:         Mood and Affect: Mood and affect normal.         Judgment: Judgment normal.         Ortho Exam       Right knee: There is moderate edema.  There is mild to moderate surrounding erythema and with drainage from the inferior aspect of the wound. She has moderate redness to the incision.  She has a wound mid incision that is 3.5x4 with some odor coming from the wound. She has a wound vac.  Patella is well tracking.  Knee is stable to varus and valgus stress.  -3 degrees of knee extension and flexion to 100 degrees.  Full plantarflexion dorsiflexion of the ankle.  Sensation intact light touch.  Distal neurovascular intact.        Procedures               Imaging Results (Most Recent)      None                   Result Review    :                     Assessment      Assessment and Plan      Diagnoses and all orders for this visit:     1. Aftercare following right knee joint replacement surgery (Primary)     2. Wound infection after surgery           Discussed the treatment plan with the patient. I have placed her on the Operative Schedule for tomorrow for irrigation and debridement of the wound.  Home health and wound care to follow with wound vac.     Educated on risk of elevated BMI.  Discussed options for weight loss/decreasing BMI prior to procedure including dietician consult, weight loss options and exercise program., Discussed surgery., Risks/benefits discussed with patient including, but not limited to: infection, bleeding, neurovascular damage, re-rupture, aesthetic deformity, need for further surgery, and death., Surgery pamphlet given., DME order for a 3 in 1 given today due to  patient will be confined to one room/level of the home that does not offer a toilet during postop recovery.  and Call or return if worsening symptoms.     Follow Up      1 week postoperatively         Murray Calvillo MD  03/21/23

## 2023-03-21 NOTE — DISCHARGE INSTRUCTIONS
DISCHARGE INSTRUCTIONS  ORTHOPEDICS      For your surgery you had:  General anesthesia (you may have a sore throat for the first 24 hours)  You may experience dizziness, drowsiness, or light-headedness for several hours following surgery  Do not stay alone today or tonight.  Limit your activity for 24 hours.  Resume your diet slowly.  Follow whatever special dietary instructions you may have been given by the doctor.  You should not drive or operate machinery or drink alcohol for 24 hours or while you are taking pain medication.  You should not sign any legally binding documents.  If you had an axillary or regional block, you will not have control of the involved limb for up to 12 hours.  Protect the arm with a sling or follow your physician's specific instructions.  You may remove dressing:  [x] Other: Wound vac to be applied by [facility] Nurse    You may shower or bathe: keep site clean and dry    Sleep with the injured part elevated on a pillow.  Medications per physician's instructions as indicated on Discharge Medication Information Sheet.  Follow verbal instructions of your doctor.  Sit with the lower leg propped up on a footstool or chair with pillows.  Exercise toes for 10 minutes every hour while awake. Ice bag to injured area for 72 hours.  Apply 20 minutes on - 20 minutes off.  Never place ice directly on skin or cast.    Avoid getting cast or dressing wet.  In addition to these instructions, follow the discharge instructions on postoperative arthroscopic surgery.  SPECIAL INSTRUCTIONS:  Notify Dr. Calvillo's office if additional pain medication is needed.         Last dose of pain medication was given at: tylenol 11:21, oxyir 2:31   NOTIFY THE PHYSICIAN IF YOU EXPERIENCE:  Numbness of fingers or toes.  Inability to move fingers or toes.  Extreme coldness, paleness or blue dis-coloration of toes.  Excessive swelling of affected surgical site or swelling that causes the cast to rub or cut into  skin.  Pain unrelieved by pain medication  Nausea/vomiting not relieved by prescribed medication  Unable to urinate in 6 hours after surgery  Temperature greater than 101 degree Fahrenheit or chills  If unable to reach your doctor, please go to the closest emergency room

## 2023-03-21 NOTE — ANESTHESIA POSTPROCEDURE EVALUATION
Patient: Marilin Martinez    Procedure Summary     Date: 03/21/23 Room / Location: [facility] OR 06 / [facility] MAIN OR    Anesthesia Start: 1259 Anesthesia Stop: 1404    Procedure: IRRIGATION & DEBRIDEMENT of right knee wound (Right: Knee) Diagnosis:       Open wound of right knee, initial encounter      (Open wound of right knee, initial encounter [S81.001A])    Surgeons: Murray Calvillo MD Provider: Shakir Cruz MD    Anesthesia Type: general ASA Status: 3          Anesthesia Type: general    Vitals  Vitals Value Taken Time   /57 03/21/23 1412   Temp 36.1 °C (97 °F) 03/21/23 1400   Pulse 64 03/21/23 1413   Resp 16 03/21/23 1410   SpO2 99 % 03/21/23 1413   Vitals shown include unvalidated device data.        Post Anesthesia Care and Evaluation    Patient location during evaluation: bedside  Patient participation: complete - patient participated  Level of consciousness: awake  Pain score: 2  Pain management: adequate    Airway patency: patent  PONV Status: none  Cardiovascular status: acceptable and stable  Respiratory status: acceptable  Hydration status: acceptable    Comments: An Anesthesiologist personally participated in the most demanding procedures (including induction and emergence if applicable) in the anesthesia plan, monitored the course of anesthesia administration at frequent intervals and remained physically present and available for immediate diagnosis and treatment of emergencies.

## 2023-03-21 NOTE — ANESTHESIA PREPROCEDURE EVALUATION
Anesthesia Evaluation     Patient summary reviewed and Nursing notes reviewed   no history of anesthetic complications:  NPO Solid Status: > 8 hours  NPO Liquid Status: > 2 hours           Airway   Mallampati: I  TM distance: >3 FB  Neck ROM: full  No difficulty expected  Dental    (+) poor dentition    Pulmonary - normal exam    breath sounds clear to auscultation  (+) sleep apnea,   Cardiovascular - normal exam  Exercise tolerance: good (4-7 METS)    ECG reviewed  Rhythm: regular  Rate: normal    (+) hypertension, hyperlipidemia,       Neuro/Psych  (+) CVA,    GI/Hepatic/Renal/Endo    (+)   diabetes mellitus type 2 well controlled, thyroid problem hypothyroidism    Musculoskeletal     (+) back pain,   Abdominal    Substance History - negative use     OB/GYN negative ob/gyn ROS         Other   arthritis,      ROS/Med Hx Other: PAT Nursing Notes unavailable.        ABNORMAL ECG - 2/1/23  Sinus bradycardia  Atrial premature complex  Prolonged ID interval  Incomplete left bundle branch block    Interpretation Summary  Echo 8/17/21    • Estimated left ventricular EF = 55% Left ventricular systolic function is normal.  • Left atrial enlargement mild  • Trace pericardial effusion  • Estimated right ventricular systolic pressure from tricuspid regurgitation is mildly elevated (35-45 mmHg).    Interpretation Summary  9/2/21    • Left ventricular ejection fraction is normal. (Calculated EF = 50%).  • Findings consistent with a normal ECG stress test.  • Myocardial perfusion imaging indicates a small-sized infarct located in the apex with no significant ischemia noted.  • Impressions are consistent with a low risk study.           Anesthesia Plan    ASA 3     general     (Patient understands anesthesia not responsible for dental damage.)  intravenous induction     Anesthetic plan, risks, benefits, and alternatives have been provided, discussed and informed consent has been obtained with: patient.    Use of blood products  discussed with patient .   Plan discussed with CRNA.        CODE STATUS:

## 2023-03-24 ENCOUNTER — TELEPHONE (OUTPATIENT)
Dept: ORTHOPEDIC SURGERY | Facility: CLINIC | Age: 68
End: 2023-03-24
Payer: MEDICARE

## 2023-03-24 DIAGNOSIS — Z96.651 AFTERCARE FOLLOWING RIGHT KNEE JOINT REPLACEMENT SURGERY: Primary | ICD-10-CM

## 2023-03-24 DIAGNOSIS — Z47.1 AFTERCARE FOLLOWING RIGHT KNEE JOINT REPLACEMENT SURGERY: Primary | ICD-10-CM

## 2023-03-24 NOTE — TELEPHONE ENCOUNTER
TIFFANYA CALLED AND STATED PATIENT WILL NEED A NEW ORDER FAXED OVER FOR PHYSICAL THERAPY.     FAX ORDER TO: [phone]

## 2023-03-29 ENCOUNTER — OFFICE VISIT (OUTPATIENT)
Dept: ORTHOPEDIC SURGERY | Facility: CLINIC | Age: 68
End: 2023-03-29
Payer: MEDICARE

## 2023-03-29 ENCOUNTER — TELEPHONE (OUTPATIENT)
Dept: ORTHOPEDIC SURGERY | Facility: CLINIC | Age: 68
End: 2023-03-29

## 2023-03-29 VITALS — HEIGHT: 63 IN | OXYGEN SATURATION: 98 % | WEIGHT: 215 LBS | HEART RATE: 80 BPM | BODY MASS INDEX: 38.09 KG/M2

## 2023-03-29 DIAGNOSIS — Z96.651 AFTERCARE FOLLOWING RIGHT KNEE JOINT REPLACEMENT SURGERY: Primary | ICD-10-CM

## 2023-03-29 DIAGNOSIS — Z47.1 AFTERCARE FOLLOWING RIGHT KNEE JOINT REPLACEMENT SURGERY: Primary | ICD-10-CM

## 2023-03-29 DIAGNOSIS — Z47.89 ORTHOPEDIC AFTERCARE: ICD-10-CM

## 2023-03-29 RX ORDER — DOXYCYCLINE HYCLATE 100 MG/1
100 CAPSULE ORAL 2 TIMES DAILY
Qty: 20 CAPSULE | Refills: 0 | Status: SHIPPED | OUTPATIENT
Start: 2023-03-29

## 2023-03-29 NOTE — OP NOTE
INCISION AND DRAINAGE WOUND  Procedure Report    Patient Name:  Marilin Martinez  YOB: 1955    Date of Surgery:  3/21/2023     Indications: See H&P    Pre-op Diagnosis:   Open wound of right knee, initial encounter [S81.001A]   Right total knee wound dehiscence       Post-Op Diagnosis Codes:     * Open wound of right knee, initial encounter [S81.001A]   Same    Procedure/CPT® Codes:      Procedure(s):  IRRIGATION & DEBRIDEMENT of right knee wound          Staff:  Surgeon(s):  Murray Calvillo MD    Assistant: Venice Nino    Anesthesia: Choice    Estimated Blood Loss: minimal    Implants:    Nothing was implanted during the procedure    Specimen:          None        Findings: See description    Complications: None    Description of Procedure: Patient was taken operating room placed supine on the operating table and after general anesthesia established the left lower extremities prepped and draped in standard surgical fashion using Betadine.  An elliptical incision was made over the proximal aspect of the wound which was approximately 5 cm where had dehisced.  A debridement was done of this area including skin and fat.  The wound was copiously irrigated with Irrisept and Simpulse irrigation.  Then the defect was closed with Prolene as well as the skin.  The incision was washed and dry sterile dressings applied patient tolerated procedure well was extubated to recovery room.        Murray Calvillo MD     Date: 3/29/2023  Time: 13:04 EDT

## 2023-03-30 ENCOUNTER — TELEPHONE (OUTPATIENT)
Dept: ORTHOPEDIC SURGERY | Facility: CLINIC | Age: 68
End: 2023-03-30
Payer: MEDICARE

## 2023-03-30 NOTE — TELEPHONE ENCOUNTER
Caller: EDEN    Relationship: BIB     Best call back number:[phone]    What is the best time to reach you: ANY TIME    Who are you requesting to speak with (clinical staff, provider,  specific staff member): GRAYSON      What was the call regarding: IN REGARDS TO A WOUND CENTER.     Do you require a callback: YES

## 2023-04-03 ENCOUNTER — OFFICE VISIT (OUTPATIENT)
Dept: ORTHOPEDIC SURGERY | Facility: CLINIC | Age: 68
End: 2023-04-03
Payer: MEDICARE

## 2023-04-03 VITALS — HEART RATE: 66 BPM | WEIGHT: 215 LBS | OXYGEN SATURATION: 99 % | HEIGHT: 63 IN | BODY MASS INDEX: 38.09 KG/M2

## 2023-04-03 DIAGNOSIS — Z96.651 AFTERCARE FOLLOWING RIGHT KNEE JOINT REPLACEMENT SURGERY: ICD-10-CM

## 2023-04-03 DIAGNOSIS — Z47.1 AFTERCARE FOLLOWING RIGHT KNEE JOINT REPLACEMENT SURGERY: ICD-10-CM

## 2023-04-03 DIAGNOSIS — Z47.89 ORTHOPEDIC AFTERCARE: Primary | ICD-10-CM

## 2023-04-03 PROCEDURE — 1159F MED LIST DOCD IN RCRD: CPT | Performed by: PHYSICIAN ASSISTANT

## 2023-04-03 PROCEDURE — 99024 POSTOP FOLLOW-UP VISIT: CPT | Performed by: PHYSICIAN ASSISTANT

## 2023-04-03 PROCEDURE — 1160F RVW MEDS BY RX/DR IN RCRD: CPT | Performed by: PHYSICIAN ASSISTANT

## 2023-04-04 NOTE — PATIENT INSTRUCTIONS
Patient advised to continue antibiotics as prescribed and to completion.  Continue wound packing and wound VAC under direction of wound care.  Continue gentle ROM efforts.  Patient may ice and elevate knee as needed for pain or swelling.    Follow-up in 2 weeks for wound check.  Repeat x-rays needed.  Call with changes or concerns.

## 2023-04-14 ENCOUNTER — TELEPHONE (OUTPATIENT)
Dept: ORTHOPEDIC SURGERY | Facility: CLINIC | Age: 68
End: 2023-04-14
Payer: MEDICARE

## 2023-04-14 DIAGNOSIS — Z96.651 AFTERCARE FOLLOWING RIGHT KNEE JOINT REPLACEMENT SURGERY: ICD-10-CM

## 2023-04-14 DIAGNOSIS — Z47.89 ORTHOPEDIC AFTERCARE: ICD-10-CM

## 2023-04-14 DIAGNOSIS — Z47.1 AFTERCARE FOLLOWING RIGHT KNEE JOINT REPLACEMENT SURGERY: ICD-10-CM

## 2023-04-14 RX ORDER — DOXYCYCLINE HYCLATE 100 MG/1
100 CAPSULE ORAL 2 TIMES DAILY
Qty: 20 CAPSULE | Refills: 0 | Status: SHIPPED | OUTPATIENT
Start: 2023-04-14

## 2023-04-14 NOTE — TELEPHONE ENCOUNTER
CALLED AND TALKED TO TARUN AT [facility].  DRAINAGE COMING FROM AREA AROUND WOUND VAC.   SHE WILL SEND PICTURES TO REVIEW.  I WILL TALK TO DR JUAN

## 2023-04-14 NOTE — TELEPHONE ENCOUNTER
TALKED TO DR JUAN, PICTURES REVIEWED.  ANTIBIOTICS SENT INTO PHARMACY.  PATIENT TO KEEP APPOINTMENT AS SCHEDULED NEXT WEEK.  HOME [facility]ALTH NOTIFIED

## 2023-04-14 NOTE — TELEPHONE ENCOUNTER
Relationship to Patient: [facility]   Phone Number: [phone]  Reason for Call: HOME HEALTH CALLING STATING THAT THE PATIENT HAS A PUSSY AREA BESIDE HER POST OP WOUND, ATTEMPTED TO WARM TRANSFER

## 2023-04-19 ENCOUNTER — TELEPHONE (OUTPATIENT)
Dept: ORTHOPEDIC SURGERY | Facility: CLINIC | Age: 68
End: 2023-04-19

## 2023-04-19 ENCOUNTER — OFFICE VISIT (OUTPATIENT)
Dept: ORTHOPEDIC SURGERY | Facility: CLINIC | Age: 68
End: 2023-04-19
Payer: MEDICARE

## 2023-04-19 VITALS — HEART RATE: 75 BPM | BODY MASS INDEX: 38.09 KG/M2 | OXYGEN SATURATION: 95 % | HEIGHT: 63 IN | WEIGHT: 214.95 LBS

## 2023-04-19 DIAGNOSIS — Z47.89 ORTHOPEDIC AFTERCARE: ICD-10-CM

## 2023-04-19 DIAGNOSIS — Z96.651 AFTERCARE FOLLOWING RIGHT KNEE JOINT REPLACEMENT SURGERY: Primary | ICD-10-CM

## 2023-04-19 DIAGNOSIS — Z47.1 AFTERCARE FOLLOWING RIGHT KNEE JOINT REPLACEMENT SURGERY: Primary | ICD-10-CM

## 2023-04-19 PROCEDURE — 1160F RVW MEDS BY RX/DR IN RCRD: CPT | Performed by: PHYSICIAN ASSISTANT

## 2023-04-19 PROCEDURE — 1159F MED LIST DOCD IN RCRD: CPT | Performed by: PHYSICIAN ASSISTANT

## 2023-04-19 PROCEDURE — 99024 POSTOP FOLLOW-UP VISIT: CPT | Performed by: PHYSICIAN ASSISTANT

## 2023-04-19 NOTE — PATIENT INSTRUCTIONS
Patient continues to progress from a wound care standpoint. Continue wound/dressing changes as per wound care. Patient educated on incision care.  Please keep incision clean and dry.  Do not soak or submerge in water until incision is fully healed.  Do not apply creams or lotions over the incision.      Continue icing as needed to help with pain and swelling.  Ice knee up to 3 or 4 times daily for no longer than 15 to 20 minutes at a time.    Continue PT to progress ROM, strength, and weightbearing status. Updated home health PT order placed today.     Follow-up in 2 weeks. Repeat x-rays needed at this visit.  Please call with questions or concerns.      Continue icing knee as needed up to 4 times daily for no longer than 15-20 mins at a time.     Follow-up in 6 weeks. Repeat x-rays needed. Call with changes or concerns.

## 2023-04-19 NOTE — PROGRESS NOTES
"Chief Complaint  Pain and Follow-up of the Right Knee and Wound Check    Subjective      Marilin Martinez presents to [facility] for follow-up of her right knee.  She underwent right total knee arthroplasty with Gayatri robot performed on 1/31/2023 followed by subsequent irrigation and debridement of her right knee wound on 3/21/2023.  She presents today with use of walker.  She has had continued home health skilled nursing/wound care services with RN coming Mondays, Wednesdays, and Fridays for exchange of wound packing and wound VAC care/placement.  Does present today for follow-up without wound VAC in place, reporting she had difficulties maintaining proper seal overnight and this was discontinued.  Reports she has had continued issues with insurance approval for home health PT.    Objective   Allergies   Allergen Reactions   • Latex Rash     blisters       Vital Signs:   Pulse 75   Ht 160 cm (63\")   Wt 97.5 kg (214 lb 15.2 oz)   SpO2 95%   BMI 38.08 kg/m²       Physical Exam    Constitutional: Awake, alert. Well nourished appearance.    Integumentary: Warm, dry, intact. No obvious rashes.    HENT: Atraumatic, normocephalic.   Respiratory: Non labored respirations .   Cardiovascular: Intact peripheral pulses.    Psychiatric: Normal mood and affect. A&O X3    Ortho Exam  Right knee: Skin is warm and dry.  There is significant improvement in wound appearance since previous exam.  She does have to continued areas of wound packing noted at the most superior and middle aspects of the incision.  Wound VAC was previously in place to the lower aspect of the wound, which is currently not in place.  This does show continued improvement in wound healing.  There is currently no evidence of infection with surrounding erythema, warmth, or drainage.  Patella is well tracking and knee is stable to varus and valgus stress.  -5 degrees of extension and flexion to 115 degrees.  Full plantarflexion " and dorsiflexion of the ankle.  Sensation intact light touch.  Distal neurovascular intact.    Imaging Results (Most Recent)     None                    Assessment and Plan   Problem List Items Addressed This Visit        Musculoskeletal and Injuries     I&D of right knee wound 3/21/23    Relevant Orders    Ambulatory Referral to Home Health (Outpatient) (Completed)    Aftercare following right knee joint replacement surgery - Primary    Relevant Orders    Ambulatory Referral to Home Health (Outpatient) (Completed)     Follow Up   Return in about 2 years (around 4/19/2025).  Patient is a former smoker.  Encouraged continued tobacco cessation.  Did not discuss options for smoking cessation.    Patient Instructions   Patient continues to progress from a wound care standpoint. Continue wound/dressing changes as per wound care. Patient educated on incision care.  Please keep incision clean and dry.  Do not soak or submerge in water until incision is fully healed.  Do not apply creams or lotions over the incision.      Continue icing as needed to help with pain and swelling.  Ice knee up to 3 or 4 times daily for no longer than 15 to 20 minutes at a time.    Continue PT to progress ROM, strength, and weightbearing status. Updated home health PT order placed today.     Follow-up in 2 weeks. Repeat x-rays needed at this visit.  Please call with questions or concerns.      Continue icing knee as needed up to 4 times daily for no longer than 15-20 mins at a time.     Follow-up in 6 weeks. Repeat x-rays needed. Call with changes or concerns.       Patient was given instructions and counseling regarding her condition or for health maintenance advice. Please see specific information pulled into the AVS if appropriate.

## 2023-04-19 NOTE — TELEPHONE ENCOUNTER
EDEN NURSE WITH [facility] CALLED TO CONFIRM WOUND CARE ORDERS FOR PATIENT. WOUND VAC IS NOT ADHERING WELL TO THE INCISION DUE TO PROGRESSION OF HEALING. PER NEO GUTIERREZ WOUND VAC CAN BE DISCONTINUED AT THIS TIME AND AQUACEL DRESSING CAN BE PLACED OVER INCISION. AQUACEL CAN BE CHANGED EVERY 3-5 DAYS.

## 2023-05-03 ENCOUNTER — OFFICE VISIT (OUTPATIENT)
Dept: ORTHOPEDIC SURGERY | Facility: CLINIC | Age: 68
End: 2023-05-03
Payer: MEDICARE

## 2023-05-03 VITALS — HEIGHT: 63 IN | HEART RATE: 48 BPM | OXYGEN SATURATION: 99 % | BODY MASS INDEX: 38.09 KG/M2 | WEIGHT: 214.95 LBS

## 2023-05-03 DIAGNOSIS — Z96.651 AFTERCARE FOLLOWING RIGHT KNEE JOINT REPLACEMENT SURGERY: Primary | ICD-10-CM

## 2023-05-03 DIAGNOSIS — Z47.1 AFTERCARE FOLLOWING RIGHT KNEE JOINT REPLACEMENT SURGERY: Primary | ICD-10-CM

## 2023-05-03 NOTE — PROGRESS NOTES
"Chief Complaint  Pain and Follow-up of the Right Knee    Subjective      Marilin Martinez presents to [facility] for follow-up of right knee.  She underwent right TKA with Gayatri robot on 1/31/2023 followed by subsequent irrigation and debridement of right knee wound on 3/21/2023.  She was treated postoperatively with use of wound VAC, which was discontinued after her last office visit on 4/19/2023.  She has remained in home health therapy, participating once weekly with continued home wound care services.  She did have 2 areas of packing, however, one has since fully closed and she reports that the second area is near fully closed as well, requiring less and less packing.  She is no longer on antibiotics and feels wound is well-healing.  She presents today with use of walker, stating that she is \"doing good, a lot better\".    Objective   Allergies   Allergen Reactions   • Latex Rash     blisters       Vital Signs:   Pulse (!) 48   Ht 160 cm (63\")   Wt 97.5 kg (214 lb 15.2 oz)   SpO2 99%   BMI 38.08 kg/m²       Physical Exam    Constitutional: Awake, alert. Well nourished appearance.    Integumentary: Warm, dry, intact. No obvious rashes.    HENT: Atraumatic, normocephalic.   Respiratory: Non labored respirations .   Cardiovascular: Intact peripheral pulses.    Psychiatric: Normal mood and affect. A&O X3    Ortho Exam  Right knee: Moderate edema.  There is significant improvement in the appearance of the patient's right knee incision and wound.  Very superficial wound noted to the most distal aspect of the incision, where wound VAC placement was previously.  Very small area at the top of the incision with scant amount of residual packing in place.  There is no surrounding erythema, warmth, or drainage.  Otherwise, incision is very well-healing.  She does have moderate edema.  Patella is well tracking and knee is stable to varus and valgus stress.  Full knee extension and flexion " to 110 degrees.  Full plantarflexion and dorsiflexion of the ankle.  Sensation intact light touch.  Distal neurovascular intact.    Imaging Results (Most Recent)     Procedure Component Value Units Date/Time    XR Knee 3 View Right [[id number]] Resulted: 05/04/23 0816     Updated: 05/04/23 0817    Narrative:      X-Ray Report:  Study: X-rays ordered, taken in the office, and reviewed today.   Site: Right knee Xray  Indication: TKA  View: AP/Lateral, Standing and Sunrise view(s)  Findings: Intact right total knee arthroplasty. No evidence of hardware   malfunction.   Prior studies available for comparison: yes                     Assessment and Plan   Problem List Items Addressed This Visit        Musculoskeletal and Injuries    Aftercare following right knee joint replacement surgery - Primary    Relevant Orders    XR Knee 3 View Right (Completed)       Follow Up   Return in about 4 weeks (around 5/31/2023).  Patient is a former smoker.  Encouraged continued tobacco cessation.  Did not discuss options for smoking cessation.    Patient Instructions   X-rays reviewed, showing hardware intact. Awaiting additional approval for home health PT. Continue home exercise program to progress strength and ROM. Continue icing knee as needed up to 3-4 times daily for no longer than 15 to 20 minutes at a time.    Continue with lifelong antibiotic prophylaxis with dental procedures following total joint replacement.    Wound is noted with significant improvement in healing and has no longer required wound vac. She is down to one area of very small packing. Advised continue wound care. Patient educated on incision care.  Please keep incision clean and dry.  Do not soak or submerge in water until incision is fully healed.  Do not apply creams or lotions over the incision.     Follow-up in 4 weeks. Call sooner, if needed with any changes or concerns. No imaging.       Patient was given instructions and counseling regarding her  condition or for health maintenance advice. Please see specific information pulled into the AVS if appropriate.

## 2023-05-03 NOTE — PATIENT INSTRUCTIONS
X-rays reviewed, showing hardware intact. Awaiting additional approval for home health PT. Continue home exercise program to progress strength and ROM. Continue icing knee as needed up to 3-4 times daily for no longer than 15 to 20 minutes at a time.    Continue with lifelong antibiotic prophylaxis with dental procedures following total joint replacement.    Wound is noted with significant improvement in healing and has no longer required wound vac. She is down to one area of very small packing. Advised continue wound care. Patient educated on incision care.  Please keep incision clean and dry.  Do not soak or submerge in water until incision is fully healed.  Do not apply creams or lotions over the incision.     Follow-up in 4 weeks. Call sooner, if needed with any changes or concerns. No imaging.

## 2023-05-12 ENCOUNTER — TELEPHONE (OUTPATIENT)
Dept: ORTHOPEDIC SURGERY | Facility: CLINIC | Age: 68
End: 2023-05-12
Payer: MEDICARE

## 2023-05-12 NOTE — TELEPHONE ENCOUNTER
Caller: MICHELL HAWKINS    Relationship to patient: SELF    Best call back number: [phone]    Chief complaint: LEFT LEG INJECTION    Type of visit: LEFT LEG INJECTION    Requested date: MORNING    If rescheduling, when is the original appointment: NA    Additional notes: NA           stated

## 2023-05-17 ENCOUNTER — OFFICE VISIT (OUTPATIENT)
Dept: ORTHOPEDIC SURGERY | Facility: CLINIC | Age: 68
End: 2023-05-17
Payer: MEDICARE

## 2023-05-17 VITALS — WEIGHT: 214 LBS | BODY MASS INDEX: 37.92 KG/M2 | HEIGHT: 63 IN

## 2023-05-17 DIAGNOSIS — M17.12 OSTEOARTHRITIS OF LEFT KNEE, UNSPECIFIED OSTEOARTHRITIS TYPE: Primary | ICD-10-CM

## 2023-05-17 RX ADMIN — TRIAMCINOLONE ACETONIDE 40 MG: 40 INJECTION, SUSPENSION INTRA-ARTICULAR; INTRAMUSCULAR at 11:39

## 2023-05-17 RX ADMIN — LIDOCAINE HYDROCHLORIDE 5 ML: 10 INJECTION, SOLUTION EPIDURAL; INFILTRATION; INTRACAUDAL; PERINEURAL at 11:39

## 2023-05-17 NOTE — PROGRESS NOTES
"Chief Complaint  Follow-up of the Left Knee     Subjective      Marilin Martinez presents to [facility] for followup of the left knee. She had a right total knee arthroplasty that is healed.  She is having pain in the left knee and has been treating that conservatively at this time.  She is here for a left knee steroid injection.        Allergies   Allergen Reactions   • Latex Rash     blisters        Social History     Socioeconomic History   • Marital status:    Tobacco Use   • Smoking status: Former     Packs/day: 1.00     Years: 20.00     Pack years: 20.00     Types: Cigarettes     Quit date: 1997     Years since quittin.7   • Smokeless tobacco: Never   Vaping Use   • Vaping Use: Never used   Substance and Sexual Activity   • Alcohol use: Not Currently   • Drug use: No   • Sexual activity: Defer        Review of Systems     Objective   Vital Signs:   Ht 160 cm (63\")   Wt 97.1 kg (214 lb)   BMI 37.91 kg/m²       Physical Exam  Constitutional:       Appearance: Normal appearance. Patient is well-developed and normal weight.   HENT:      Head: Normocephalic.      Right Ear: Hearing and external ear normal.      Left Ear: Hearing and external ear normal.      Nose: Nose normal.   Eyes:      Conjunctiva/sclera: Conjunctivae normal.   Cardiovascular:      Rate and Rhythm: Normal rate.   Pulmonary:      Effort: Pulmonary effort is normal.      Breath sounds: No wheezing or rales.   Abdominal:      Palpations: Abdomen is soft.      Tenderness: There is no abdominal tenderness.   Musculoskeletal:      Cervical back: Normal range of motion.   Skin:     Findings: No rash.   Neurological:      Mental Status: Patient  is alert and oriented to person, place, and time.   Psychiatric:         Mood and Affect: Mood and affect normal.         Judgment: Judgment normal.       Ortho Exam      LEFT KNEE Flexion 105. Extension 0. Stable to varus/valgus stress. Stable to " anterior/posterior drawer. Neurovascularly intact. Negative Beti. Negative Lachman. Positive EHL, FHL, HS and TA. Sensation intact to light touch all 5 nerves of the foot. Ambulates with Antalgic gait. Patella is well tracking. Calf supple, non-tender. Positive tenderness to the medial joint line. Positive tenderness to the lateral joint line. Positive Crepitus. Good strength to hamstrings, quadriceps, dorsiflexors, and plantar flexors.  Knee Extensor Mechanism intact          Left knee : L knee  Date/Time: 5/17/2023 11:39 AM  Consent given by: patient  Site marked: site marked  Timeout: Immediately prior to procedure a time out was called to verify the correct patient, procedure, equipment, support staff and site/side marked as required   Supporting Documentation  Indications: pain   Procedure Details  Location: knee - L knee  Needle size: 22 G  Medications administered: 5 mL lidocaine PF 1% 1 %; 40 mg triamcinolone acetonide 40 MG/ML  Patient tolerance: patient tolerated the procedure well with no immediate complications              Imaging Results (Most Recent)     None           Result Review :            Assessment and Plan     Diagnoses and all orders for this visit:    1. Osteoarthritis of left knee, unspecified osteoarthritis type (Primary)        Discussed the treatment plan with the patient.  Discussed the risks and benefits of conservative measures.  The patient expressed understanding and wished to proceed with a left knee steroid injection.  She tolerated the injection well.       Call or return if worsening symptoms.    Follow Up     PRN      Patient was given instructions and counseling regarding her condition or for health maintenance advice. Please see specific information pulled into the AVS if appropriate.     Scribed for Murray Calvillo MD by Esther Avila MA.  05/17/23   11:13 EDT      I have personally performed the services described in this document as scribed by the above individual  and it is both accurate and complete. Murray Calvillo MD 05/19/23

## 2023-05-19 RX ORDER — TRIAMCINOLONE ACETONIDE 40 MG/ML
40 INJECTION, SUSPENSION INTRA-ARTICULAR; INTRAMUSCULAR
Status: COMPLETED | OUTPATIENT
Start: 2023-05-17 | End: 2023-05-17

## 2023-05-19 RX ORDER — LIDOCAINE HYDROCHLORIDE 10 MG/ML
5 INJECTION, SOLUTION EPIDURAL; INFILTRATION; INTRACAUDAL; PERINEURAL
Status: COMPLETED | OUTPATIENT
Start: 2023-05-17 | End: 2023-05-17

## 2023-05-24 ENCOUNTER — TELEPHONE (OUTPATIENT)
Dept: ORTHOPEDIC SURGERY | Facility: CLINIC | Age: 68
End: 2023-05-24

## 2023-05-24 NOTE — TELEPHONE ENCOUNTER
"  Caller: Marilin Martinez \"Kym\"    Relationship to patient: Self    Best call back number: [phone]    Patient is needing: EDEN IS NEEDING  BEEN PERMISSION TO DISCHARGED PATIENT TODAY IF POSSIBLE. EDEN SAID PATIENT WOUND IS BETTER AND SHE CAN BE CONTACTED REGARDING THIS [phone]        "

## 2023-06-07 ENCOUNTER — OFFICE VISIT (OUTPATIENT)
Dept: ORTHOPEDIC SURGERY | Facility: CLINIC | Age: 68
End: 2023-06-07
Payer: MEDICARE

## 2023-06-07 VITALS — BODY MASS INDEX: 37.93 KG/M2 | HEART RATE: 52 BPM | OXYGEN SATURATION: 96 % | HEIGHT: 63 IN | WEIGHT: 214.07 LBS

## 2023-06-07 DIAGNOSIS — Z47.89 ORTHOPEDIC AFTERCARE: Primary | ICD-10-CM

## 2023-06-07 DIAGNOSIS — Z96.651 AFTERCARE FOLLOWING RIGHT KNEE JOINT REPLACEMENT SURGERY: ICD-10-CM

## 2023-06-07 DIAGNOSIS — Z47.1 AFTERCARE FOLLOWING RIGHT KNEE JOINT REPLACEMENT SURGERY: ICD-10-CM

## 2023-06-07 RX ORDER — AMLODIPINE BESYLATE AND BENAZEPRIL HYDROCHLORIDE 5; 40 MG/1; MG/1
CAPSULE ORAL
Qty: 30 CAPSULE | Refills: 2 | Status: SHIPPED | OUTPATIENT
Start: 2023-06-07

## 2023-06-07 NOTE — PATIENT INSTRUCTIONS
Wound is now fully healed. Insurance has denied additional home PT. Patient declines outpatient PT. Continue home exercise program to progress strength and ROM. Continue icing knee as needed up to 3-4 times daily for no longer than 15 to 20 minutes at a time.    Continue with lifelong antibiotic prophylaxis with dental procedures following total joint replacement.    Follow-up in 7 months. Call sooner, if needed with any changes or concerns. Repeat x-rays.

## 2023-06-07 NOTE — PROGRESS NOTES
"Chief Complaint  Pain and Follow-up of the Right Knee    Subjective      Marilin Martinez presents to [facility] for follow-up of right TKA with Gayatri calvert on 1/31/2023 followed by subsequent irrigation and debridement of right knee wound on 3/21/2023 by Dr. Calvillo.  She presents today for follow-up with use of walker.  She reports she uses walker outside of the house primarily, otherwise cane around the house as needed.  She states insurance has continued to deny additional home health PT and she is not interested in outpatient PT at this time.  She does report that her right knee wound has now fully healed and she is very pleased with this progress.    Objective   Allergies   Allergen Reactions    Latex Rash     blisters       Vital Signs:   Pulse 52   Ht 160 cm (63\")   Wt 97.1 kg (214 lb 1.1 oz)   SpO2 96%   BMI 37.92 kg/m²       Physical Exam    Constitutional: Awake, alert. Well nourished appearance.    Integumentary: Warm, dry, intact. No obvious rashes.    HENT: Atraumatic, normocephalic.   Respiratory: Non labored respirations .   Cardiovascular: Intact peripheral pulses.    Psychiatric: Normal mood and affect. A&O X3    Ortho Exam  Right knee: Skin is warm, dry, and intact.  Complete wound healing noted with well-healed surgical scar.  Patella is well tracking and knee is stable to varus and valgus stress.  Full knee extension and flexion to 110 degrees.  Full plantarflexion and dorsiflexion of the ankle.  Sensation intact light touch.  Distal neurovascular intact.  Patient is ambulatory with assistance of a walker.    Imaging Results (Most Recent)       None                      Assessment and Plan   Problem List Items Addressed This Visit          Musculoskeletal and Injuries     I&D of right knee wound 3/21/23 - Primary    Aftercare following right knee joint replacement surgery       Follow Up   Return in about 7 months (around 1/7/2024).    Tobacco Use: Medium Risk "    Smoking Tobacco Use: Former    Smokeless Tobacco Use: Never    Passive Exposure: Not on file     Patient is a former smoker.  Encouraged continued tobacco cessation.  Did not discuss options for smoking cessation.    Patient Instructions   Wound is now fully healed. Insurance has denied additional home PT. Patient declines outpatient PT. Continue home exercise program to progress strength and ROM. Continue icing knee as needed up to 3-4 times daily for no longer than 15 to 20 minutes at a time.    Continue with lifelong antibiotic prophylaxis with dental procedures following total joint replacement.    Follow-up in 7 months. Call sooner, if needed with any changes or concerns. Repeat x-rays.     Patient was given instructions and counseling regarding her condition or for health maintenance advice. Please see specific information pulled into the AVS if appropriate.

## 2023-07-24 ENCOUNTER — OFFICE VISIT (OUTPATIENT)
Dept: CARDIOLOGY | Facility: CLINIC | Age: 68
End: 2023-07-24
Payer: MEDICARE

## 2023-07-24 VITALS
DIASTOLIC BLOOD PRESSURE: 61 MMHG | HEIGHT: 63 IN | WEIGHT: 215 LBS | SYSTOLIC BLOOD PRESSURE: 145 MMHG | BODY MASS INDEX: 38.09 KG/M2 | HEART RATE: 49 BPM

## 2023-07-24 DIAGNOSIS — I10 ESSENTIAL HYPERTENSION: Primary | ICD-10-CM

## 2023-07-24 PROCEDURE — 3078F DIAST BP <80 MM HG: CPT | Performed by: INTERNAL MEDICINE

## 2023-07-24 PROCEDURE — 3077F SYST BP >= 140 MM HG: CPT | Performed by: INTERNAL MEDICINE

## 2023-07-24 PROCEDURE — 99214 OFFICE O/P EST MOD 30 MIN: CPT | Performed by: INTERNAL MEDICINE

## 2023-07-24 RX ORDER — AMLODIPINE AND BENAZEPRIL HYDROCHLORIDE 10; 40 MG/1; MG/1
1 CAPSULE ORAL DAILY
Qty: 30 CAPSULE | Refills: 11 | Status: SHIPPED | OUTPATIENT
Start: 2023-07-24 | End: 2024-07-23

## 2023-07-24 RX ORDER — POTASSIUM CHLORIDE 20 MEQ/1
20 TABLET, EXTENDED RELEASE ORAL DAILY
COMMUNITY
Start: 2023-06-28

## 2023-07-24 NOTE — PROGRESS NOTES
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    Thyroid disorder          Current Outpatient Medications:     Accu-Chek Isamar Plus test strip, , Disp: , Rfl:     ALPRAZolam (XANAX) 1 MG tablet, Take 1 tablet by mouth 3 (Three) Times a Day As Needed for Anxiety., Disp: , Rfl:     bumetanide (BUMEX) 1 MG tablet, Take 1 tablet by mouth Daily As Needed (for lower extremity swelling)., Disp: 30 tablet, Rfl: 3    dapagliflozin Propanediol (Farxiga) 10 MG tablet, Take 10 mg by mouth Daily., Disp: , Rfl:     Finerenone (Kerendia) 10 MG tablet, Take 1 tablet by mouth Daily., Disp: , Rfl:     fluticasone (FLONASE) 50 MCG/ACT nasal spray, 1 spray into the nostril(s) as directed by provider As Needed., Disp: , Rfl:     gabapentin (NEURONTIN) 800 MG tablet, Take 1 tablet by mouth 3 (Three) Times a Day., Disp: , Rfl:     hydroCHLOROthiazide (MICROZIDE) 12.5 MG capsule, Take 1 capsule by mouth Daily., Disp: 90 capsule, Rfl: 3    HYDROcodone-acetaminophen (NORCO)  MG per tablet, Take 1 tablet by mouth Every 8 (Eight) Hours As Needed for Moderate Pain., Disp: 18 tablet, Rfl: 0    levothyroxine (SYNTHROID, LEVOTHROID) 100 MCG tablet, Take 1 tablet by mouth Take As Directed. 3 times a week, Disp: , Rfl:     mupirocin (BACTROBAN) 2 % ointment, , Disp: , Rfl:     Myrbetriq 25 MG tablet sustained-release 24 hour 24 hr tablet, Take 1 tablet by mouth Every Night., Disp: , Rfl:     nystatin (MYCOSTATIN)  "797334 UNIT/GM powder, Apply  topically to the appropriate area as directed 3 (Three) Times a Day. (Patient taking differently: Apply  topically to the appropriate area as directed As Needed.), Disp: 60 g, Rfl: 2    Pitavastatin Calcium 4 MG tablet, Take 1 tablet by mouth Every 3 (Three) Days. (Patient taking differently: Take 1 tablet by mouth Take As Directed. Takes three times a week), Disp: 30 tablet, Rfl: 3    potassium chloride (K-DUR,KLOR-CON) 20 MEQ CR tablet, Take 1 tablet by mouth Daily., Disp: , Rfl:     rOPINIRole (REQUIP) 0.5 MG tablet, Take 1 tablet by mouth Every Night., Disp: , Rfl:     Vyzulta 0.024 % solution, Administer 1 drop to both eyes Every Night., Disp: , Rfl:     amLODIPine-benazepril (LOTREL) 10-40 MG per capsule, Take 1 capsule by mouth Daily., Disp: 30 capsule, Rfl: 11    Medications Discontinued During This Encounter   Medication Reason    cephalexin (KEFLEX) 500 MG capsule *Therapy completed    doxycycline (VIBRAMYCIN) 100 MG capsule *Therapy completed    HYDROcodone-acetaminophen (Norco) 7.5-325 MG per tablet *Re-Entry    amLODIPine-benazepril (LOTREL) 5-40 MG per capsule      Allergies   Allergen Reactions    Latex Rash     blisters        Social History     Tobacco Use    Smoking status: Former     Packs/day: 1.00     Years: 20.00     Pack years: 20.00     Types: Cigarettes     Quit date: 1997     Years since quittin.8    Smokeless tobacco: Never   Vaping Use    Vaping Use: Never used   Substance Use Topics    Alcohol use: Not Currently    Drug use: No       Family History   Problem Relation Age of Onset    Obesity Mother     Hypertension Mother     Cancer Mother     Arthritis Mother     Diabetes Mother     Obesity Sister     Hypertension Sister     Cancer Sister     Arthritis Sister     Diabetes Father     Diabetes Daughter     Malig Hyperthermia Neg Hx         Objective     /61   Pulse (!) 49   Ht 160 cm (63\")   Wt 97.5 kg (215 lb)   BMI 38.09 kg/m²   "     Physical Exam    General Appearance:   no acute distress  Alert and oriented x3  HENT:   lips not cyanotic  Atraumatic  Neck:  No jvd   supple  Respiratory:  no respiratory distress  normal breath sounds  no rales  Cardiovascular:  Regular rate and rhythm  no S3, no S4   no murmur  no rub  Extremities  No cyanosis  lower extremity edema: none    Skin:   warm, dry  No rashes      Result Review :     No results found for: PROBNP  CMP          2/3/2023    05:19 2/5/2023    04:35 2/6/2023    03:56   CMP   Glucose 85  86  86    BUN 16  14  17    Creatinine 0.80  0.66  0.67    EGFR 81.4  96.9  96.5    Sodium 136  137  138    Potassium 4.1  4.3  4.2    Chloride 104  102  103    Calcium 8.9  8.7  8.7    BUN/Creatinine Ratio 20.0  21.2  25.4    Anion Gap 5.9  7.5  7.8      CBC w/diff          2/3/2023    05:19 2/5/2023    04:35 2/6/2023    03:56   CBC w/Diff   WBC 7.54  6.31  6.70    RBC 3.37  2.96  3.00    Hemoglobin 11.3  9.9  9.9    Hematocrit 33.8  29.1  29.8    .3  98.3  99.3    MCH 33.5  33.4  33.0    MCHC 33.4  34.0  33.2    RDW 12.9  12.6  12.8    Platelets 106  125  142    Neutrophil Rel % 64.1  58.1  56.6    Immature Granulocyte Rel % 0.4  0.2  0.7    Lymphocyte Rel % 24.5  30.6  29.3    Monocyte Rel % 9.4  8.7  9.7    Eosinophil Rel % 1.1  2.1  3.3    Basophil Rel % 0.5  0.3  0.4       Lab Results   Component Value Date    TSH 4.850 (H) 11/08/2021      No results found for: FREET4   No results found for: DDIMERQUANT  Magnesium   Date Value Ref Range Status   02/06/2023 2.1 1.6 - 2.4 mg/dL Final      No results found for: DIGOXIN   No results found for: TROPONINT          No results found for: POCTROP    Results for orders placed in visit on 08/17/21    Adult Transthoracic Echo Complete W/ Cont if Necessary Per Protocol    Interpretation Summary  · Estimated left ventricular EF = 55% Left ventricular systolic function is normal.  · Left atrial enlargement mild  · Trace pericardial effusion  · Estimated  right ventricular systolic pressure from tricuspid regurgitation is mildly elevated (35-45 mmHg).                 Diagnoses and all orders for this visit:    1. Essential hypertension (Primary)  Assessment & Plan:  Blood pressure above goal increasing Lotrel to 10/40 daily  Counseled patient on  low-sodium diet of less than 2 g  Aerobic activity 30 minutes a day 5 times a week  Weight loss        Orders:  -     amLODIPine-benazepril (LOTREL) 10-40 MG per capsule; Take 1 capsule by mouth Daily.  Dispense: 30 capsule; Refill: 11            Follow Up     Return in about 6 months (around 1/24/2024) for Follow with Yamel Torres.          Patient was given instructions and counseling regarding her condition or for health maintenance advice. Please see specific information pulled into the AVS if appropriate.

## 2023-07-24 NOTE — ASSESSMENT & PLAN NOTE
Blood pressure above goal increasing Lotrel to 10/40 daily  Counseled patient on  low-sodium diet of less than 2 g  Aerobic activity 30 minutes a day 5 times a week  Weight loss

## 2023-09-01 ENCOUNTER — OFFICE VISIT (OUTPATIENT)
Dept: BARIATRICS/WEIGHT MGMT | Facility: CLINIC | Age: 68
End: 2023-09-01
Payer: MEDICARE

## 2023-09-01 VITALS
TEMPERATURE: 98.1 F | HEART RATE: 66 BPM | WEIGHT: 216 LBS | BODY MASS INDEX: 38.27 KG/M2 | DIASTOLIC BLOOD PRESSURE: 71 MMHG | HEIGHT: 63 IN | SYSTOLIC BLOOD PRESSURE: 130 MMHG

## 2023-09-01 DIAGNOSIS — G47.30 SLEEP APNEA, UNSPECIFIED TYPE: ICD-10-CM

## 2023-09-01 DIAGNOSIS — E66.9 DIABETES MELLITUS TYPE 2 IN OBESE: ICD-10-CM

## 2023-09-01 DIAGNOSIS — I10 ESSENTIAL HYPERTENSION: ICD-10-CM

## 2023-09-01 DIAGNOSIS — Z98.84 S/P LAPAROSCOPIC SLEEVE GASTRECTOMY: ICD-10-CM

## 2023-09-01 DIAGNOSIS — E66.9 OBESITY, CLASS II, BMI 35-39.9: Primary | ICD-10-CM

## 2023-09-01 DIAGNOSIS — E11.69 DIABETES MELLITUS TYPE 2 IN OBESE: ICD-10-CM

## 2023-09-01 PROBLEM — Z47.1 AFTERCARE FOLLOWING RIGHT KNEE JOINT REPLACEMENT SURGERY: Status: RESOLVED | Noted: 2023-03-29 | Resolved: 2023-09-01

## 2023-09-01 PROBLEM — Z96.651 AFTERCARE FOLLOWING RIGHT KNEE JOINT REPLACEMENT SURGERY: Status: RESOLVED | Noted: 2023-03-29 | Resolved: 2023-09-01

## 2023-09-01 PROCEDURE — 1159F MED LIST DOCD IN RCRD: CPT | Performed by: NURSE PRACTITIONER

## 2023-09-01 PROCEDURE — 3078F DIAST BP <80 MM HG: CPT | Performed by: NURSE PRACTITIONER

## 2023-09-01 PROCEDURE — 99214 OFFICE O/P EST MOD 30 MIN: CPT | Performed by: NURSE PRACTITIONER

## 2023-09-01 PROCEDURE — 3075F SYST BP GE 130 - 139MM HG: CPT | Performed by: NURSE PRACTITIONER

## 2023-09-01 PROCEDURE — 1160F RVW MEDS BY RX/DR IN RCRD: CPT | Performed by: NURSE PRACTITIONER

## 2023-09-01 RX ORDER — TOPIRAMATE 25 MG/1
TABLET ORAL
Qty: 166 TABLET | Refills: 0 | Status: SHIPPED | OUTPATIENT
Start: 2023-09-01 | End: 2023-11-29

## 2023-09-01 RX ORDER — NYSTATIN 100000 [USP'U]/G
POWDER TOPICAL 3 TIMES DAILY
Qty: 60 G | Refills: 2 | Status: SHIPPED | OUTPATIENT
Start: 2023-09-01

## 2023-09-01 NOTE — PROGRESS NOTES
[facility]GERY  [address] [facility] [address]  [facility] [address]  [phone]  [address] [facility] [address]  [facility] [address]  [phone]  Dept: [phone]  9/1/2023      Marilin Martinez.  [id number]  [id number]  1955  female      Chief Complaint   Patient presents with    Follow-up     Fup sleeve       BH Post-Op Bariatric Surgery:   Marilin Martinez is status post Laparoscopic Sleeve procedure, performed on 2/14/22     HPI:     Today's weight is 98 kg (216 lb) pounds, today's BMI is Body mass index is 38.27 kg/mý., she has a loss of 8 pounds since the last visit and her weight loss since surgery is 60 pounds. The patient reports a decreased portion size and loss of appetite.    Marilin Martinez denies nausea, vomiting, reflux and reports that she is doing well other than loose skin and sugar cravings.      Diet and Exercise: Diet history reviewed and discussed with the patient. Weight loss/gains to date discussed with the patient. The patient states they are eating 60 grams of protein per day. She reports eating 2 meals per day, a typical portion size of 1 cup, eating 1-2 snacks per day, drinking 5-6 or more 8-oz. glasses of water per day, no carbonated beverage consumption and exercising regularly- senior fitness classes and [employer]    She can comfortably tolerate one chicken thigh and a 3/4 cup of veggies or starch on the side. She is primarily drinking water, occasionally may have a sip of soda. She has coffee in the mornings with mabel and artificial sweetener. She is bothered by the redundant skin under her upper arms. She notes rashes, under the skin overhanging on the backs of her knees at the bend that burns and gets raw and also itching, moisture, under her abdominal pannus in an ongoing way and within skin folds of the upper arms occasionally.     Supplements: celebrate [employer] with iron and calcium.     Review of Systems    Constitutional:  Positive for appetite change. Negative for fatigue and unexpected weight change.   HENT: Negative.     Eyes: Negative.    Respiratory: Negative.     Cardiovascular: Negative.  Negative for leg swelling.   Gastrointestinal:  Positive for diarrhea. Negative for abdominal distention, abdominal pain, constipation, nausea and vomiting.   Genitourinary:  Negative for difficulty urinating, frequency and urgency.   Musculoskeletal:  Negative for back pain.   Skin:  Positive for rash and wound (rashes, itching and burning under skin folds from abdominal pannus).        Redundant skin of upper arms, back of legs, and under abdominal pannus   Psychiatric/Behavioral: Negative.     All other systems reviewed and are negative.    Patient Active Problem List   Diagnosis    Environmental and seasonal allergies    Essential hypertension    Sleep apnea    Multiple joint pain    Hypothyroid    Diabetes mellitus type 2 in obese    Dyslipidemia    S/P laparoscopic sleeve gastrectomy    Primary osteoarthritis of right knee    Primary osteoarthritis of left knee    Candidal intertrigo    Excessive and redundant skin and subcutaneous tissue    Obesity, Class II, BMI 35-39.9    Osteoarthrosis, localized, primary, knee, right    Open knee wound    Wound infection after surgery     I&D of right knee wound 3/21/23    Aftercare following right knee joint replacement surgery       Past Medical History:   Diagnosis Date    Arthritis     KNEE PAIN    Back pain     Diabetes mellitus     Hyperlipemia     Hypertension     managed by cardiologist    Knee pain, bilateral     Neuropathy     OAB (overactive bladder)     Seasonal allergies     Sleep apnea     CPAP    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    Vitals:    09/01/23 1110   BP: 130/71   Pulse: 66   Temp: 98.1 øF (36.7 øC)       Physical Exam  Vitals and nursing note reviewed.   Constitutional:       Appearance: She is well-developed.   Neck:      Thyroid: No thyromegaly.   Cardiovascular:      Rate and Rhythm: Normal rate.   Pulmonary:      Effort: Pulmonary effort is normal. No respiratory distress.   Abdominal:      Palpations: Abdomen is soft.   Musculoskeletal:         General: No tenderness.   Skin:     General: Skin is warm and dry.   Neurological:      Mental Status: She is alert.   Psychiatric:         Behavior: Behavior normal.       Assessment:   Post-op, the patient is doing well but would like to lose more weight before having her other knee replaced and would like to look into skin removal in the future if she is able to lose enough weight.    Encounter Diagnoses   Name Primary?    Obesity, Class II, BMI 35-39.9 Yes    Diabetes mellitus type 2 in obese     Essential hypertension     S/P laparoscopic sleeve gastrectomy     Sleep apnea, unspecified type        Plan:   Will start topirimate in the afternoons to help with satiety and sugar cravings. Most recent CMP was WNL, she is getting plenty of water.  She denies a history of seizures or kidney stones.  Per summary from last visit with her primary care her A1c is controlled but she still has signs of insulin resistance and he feels that treatment with a GLP-1 drug would benefit her they will likely discuss this plan further at her next follow-up appointment and I feel that this path forward would be helpful to her as well.  We will continue nystatin topically to help prevent progression of candidal infections to intertrigo.  Did have a detailed discussion about intestinal motility and avoiding sugar cravings by avoiding eating simple carbohydrates on an empty stomach.  Encouraged to moderate intake of these and when having occasionally to make sure that they are paired with  high-protein high-fiber foods to avoid sharp spikes in sugar.  We will hold on blood work today as PCP just checked a marked panel this last month and CBC and CMP were within normal limits,  Encouraged patient to be sure to get plenty of lean protein per day through small frequent meals all with a protein source.   Activity restrictions: none.   Recommended patient be sure to get at least 70 grams of protein per day by eating small, frequent meals all with high lean protein choices. Be sure to limit/cut back on daily carbohydrate intake. Discussed with the patient the recommended amount of water per day to intake- half of body weight in ounces. Reviewed vitamin requirements. Be sure to do routine exercise, 150 minutes per week minimum, including both cardio and strength training.     Instructions / Recommendations: dietary counseling recommended, recommended a daily protein intake of  grams, vitamin supplement(s) recommended, recommended exercising at least 150 minutes per week, behavior modifications recommended and instructed to call the office for concerns, questions, or problems.     The patient was instructed to follow up in 3 months .     Total time spent during this encounter today was 35 minutes

## 2023-09-27 ENCOUNTER — OFFICE VISIT (OUTPATIENT)
Dept: ORTHOPEDIC SURGERY | Facility: CLINIC | Age: 68
End: 2023-09-27
Payer: MEDICARE

## 2023-09-27 ENCOUNTER — PREP FOR SURGERY (OUTPATIENT)
Dept: OTHER | Facility: HOSPITAL | Age: 68
End: 2023-09-27
Payer: MEDICARE

## 2023-09-27 VITALS — HEART RATE: 88 BPM | OXYGEN SATURATION: 98 % | WEIGHT: 217.6 LBS | HEIGHT: 62 IN | BODY MASS INDEX: 40.04 KG/M2

## 2023-09-27 DIAGNOSIS — Z47.1 AFTERCARE FOLLOWING LEFT KNEE JOINT REPLACEMENT SURGERY: Primary | ICD-10-CM

## 2023-09-27 DIAGNOSIS — Z96.652 AFTERCARE FOLLOWING LEFT KNEE JOINT REPLACEMENT SURGERY: Primary | ICD-10-CM

## 2023-09-27 DIAGNOSIS — M17.12 OSTEOARTHRITIS OF LEFT KNEE, UNSPECIFIED OSTEOARTHRITIS TYPE: ICD-10-CM

## 2023-09-27 DIAGNOSIS — M25.562 LEFT KNEE PAIN, UNSPECIFIED CHRONICITY: Primary | ICD-10-CM

## 2023-09-27 DIAGNOSIS — M17.12 OSTEOARTHRITIS OF LEFT KNEE, UNSPECIFIED OSTEOARTHRITIS TYPE: Primary | ICD-10-CM

## 2023-09-27 RX ORDER — CEFAZOLIN SODIUM IN 0.9 % NACL 3 G/100 ML
3 INTRAVENOUS SOLUTION, PIGGYBACK (ML) INTRAVENOUS ONCE
OUTPATIENT
Start: 2023-09-27 | End: 2023-09-27

## 2023-09-27 RX ORDER — CEFAZOLIN SODIUM 2 G/100ML
2 INJECTION, SOLUTION INTRAVENOUS ONCE
OUTPATIENT
Start: 2023-09-27 | End: 2023-09-27

## 2023-09-27 RX ORDER — TRANEXAMIC ACID 10 MG/ML
1000 INJECTION, SOLUTION INTRAVENOUS ONCE
OUTPATIENT
Start: 2023-09-27 | End: 2023-09-27

## 2023-09-27 NOTE — PROGRESS NOTES
"Chief Complaint  Initial Evaluation of the Left Knee     Subjective      Marilin Martinez presents to [facility] for initial evaluation of the left knee. She has had osteo arthritis for years and treated conservatively in the past.  She has had injections, therapy and anti inflammatory.  She has difficulty with prolonged standing, ambulation and stairs.     Allergies   Allergen Reactions    Latex Rash     blisters        Social History     Socioeconomic History    Marital status:    Tobacco Use    Smoking status: Former     Packs/day: 1.00     Years: 20.00     Pack years: 20.00     Types: Cigarettes     Quit date: 1997     Years since quittin.0    Smokeless tobacco: Never   Vaping Use    Vaping Use: Never used   Substance and Sexual Activity    Alcohol use: Not Currently    Drug use: No    Sexual activity: Defer        I reviewed the patient's chief complaint, history of present illness, review of systems, past medical history, surgical history, family history, social history, medications, and allergy list.     Review of Systems     Constitutional: Denies fevers, chills, weight loss  Cardiovascular: Denies chest pain, shortness of breath  Skin: Denies rashes, acute skin changes  Neurologic: Denies headache, loss of consciousness        Vital Signs:   Pulse 88   Ht 157.5 cm (62\")   Wt 98.7 kg (217 lb 9.6 oz)   SpO2 98%   BMI 39.80 kg/m²          Physical Exam  General: Alert. No acute distress    Ortho Exam        LEFT KNEE Flexion 105. Extension -5. Stable to varus/valgus stress. Stable to anterior/posterior drawer. Neurovascularly intact. Negative Beti. Negative Lachman. Positive EHL, FHL, HS and TA. Sensation intact to light touch all 5 nerves of the foot. Ambulates with Antalgic gait. Patella is well tracking. Calf supple, non-tender. Positive tenderness to the medial joint line. Positive tenderness to the lateral joint line. Positive Crepitus. Good " strength to hamstrings, quadriceps, dorsiflexors, and plantar flexors.  Knee Extensor Mechanism intact      Procedures        Imaging Results (Most Recent)       Procedure Component Value Units Date/Time    XR Knee 3 View Left [[id number]] Resulted: 09/27/23 1100     Updated: 09/27/23 1104             Result Review :     X-Ray Report:  Left knee X-Ray  Indication: Evaluation of the left knee  AP/Lateral and Hector view(s)  Findings: Advanced degenerative aortitis. Bone on bone.    Prior studies available for comparison: yes             Assessment and Plan     Diagnoses and all orders for this visit:    1. Left knee pain, unspecified chronicity (Primary)  -     XR Knee 3 View Left    2. Osteoarthritis of left knee, unspecified osteoarthritis type        Discussed the treatment plan with the patient.     Discussed the treatment options with the patient, operative vs non-operative.     The patient expressed understanding and wished to proceed with a left total knee arthroplasty with computer navigation.         Discussed surgery., Risks/benefits discussed with patient including, but not limited to: infection, bleeding, neurovascular damage, re-rupture, aesthetic deformity, need for further surgery, and death., Discussed with patient the implant type being used during surgery and patient understands., Surgery pamphlet given., Call or return if worsening symptoms., and DME order for a 3 in 1 given today due to patient will be confined to one room/level of the home that does not offer a toilet during postop recovery.     Follow Up     2 weeks postoperatively       Patient was given instructions and counseling regarding her condition or for health maintenance advice. Please see specific information pulled into the AVS if appropriate.     Scribed for Murray Calvillo MD by Esther Avila MA.  09/27/23   11:09 EDT      I have personally performed the services described in this document as scribed by the above individual and  it is both accurate and complete. Murray Calvillo MD 09/27/23

## 2023-10-03 ENCOUNTER — TELEPHONE (OUTPATIENT)
Dept: CARDIOLOGY | Facility: CLINIC | Age: 68
End: 2023-10-03
Payer: MEDICARE

## 2023-10-10 ENCOUNTER — PRE-ADMISSION TESTING (OUTPATIENT)
Dept: PREADMISSION TESTING | Facility: HOSPITAL | Age: 68
End: 2023-10-10
Payer: MEDICARE

## 2023-10-10 VITALS — HEART RATE: 56 BPM | OXYGEN SATURATION: 100 %

## 2023-10-10 DIAGNOSIS — M17.12 OSTEOARTHRITIS OF LEFT KNEE, UNSPECIFIED OSTEOARTHRITIS TYPE: ICD-10-CM

## 2023-10-10 LAB
ALBUMIN SERPL-MCNC: 4 G/DL (ref 3.5–5.2)
ALBUMIN/GLOB SERPL: 1.2 G/DL
ALP SERPL-CCNC: 69 U/L (ref 39–117)
ALT SERPL W P-5'-P-CCNC: 7 U/L (ref 1–33)
ANION GAP SERPL CALCULATED.3IONS-SCNC: 8.6 MMOL/L (ref 5–15)
AST SERPL-CCNC: 16 U/L (ref 1–32)
BASOPHILS # BLD AUTO: 0.03 10*3/MM3 (ref 0–0.2)
BASOPHILS NFR BLD AUTO: 0.5 % (ref 0–1.5)
BILIRUB SERPL-MCNC: 0.5 MG/DL (ref 0–1.2)
BUN SERPL-MCNC: 16 MG/DL (ref 8–23)
BUN/CREAT SERPL: 17.8 (ref 7–25)
CALCIUM SPEC-SCNC: 9.4 MG/DL (ref 8.6–10.5)
CHLORIDE SERPL-SCNC: 103 MMOL/L (ref 98–107)
CO2 SERPL-SCNC: 25.4 MMOL/L (ref 22–29)
CREAT SERPL-MCNC: 0.9 MG/DL (ref 0.57–1)
DEPRECATED RDW RBC AUTO: 45.7 FL (ref 37–54)
EGFRCR SERPLBLD CKD-EPI 2021: 70.7 ML/MIN/1.73
EOSINOPHIL # BLD AUTO: 0.11 10*3/MM3 (ref 0–0.4)
EOSINOPHIL NFR BLD AUTO: 1.9 % (ref 0.3–6.2)
ERYTHROCYTE [DISTWIDTH] IN BLOOD BY AUTOMATED COUNT: 12.7 % (ref 12.3–15.4)
GLOBULIN UR ELPH-MCNC: 3.3 GM/DL
GLUCOSE SERPL-MCNC: 83 MG/DL (ref 65–99)
HBA1C MFR BLD: 5.4 % (ref 4.8–5.6)
HCT VFR BLD AUTO: 39.7 % (ref 34–46.6)
HGB BLD-MCNC: 12.6 G/DL (ref 12–15.9)
IMM GRANULOCYTES # BLD AUTO: 0.02 10*3/MM3 (ref 0–0.05)
IMM GRANULOCYTES NFR BLD AUTO: 0.3 % (ref 0–0.5)
INR PPP: 1.1 (ref 0.86–1.15)
LYMPHOCYTES # BLD AUTO: 1.38 10*3/MM3 (ref 0.7–3.1)
LYMPHOCYTES NFR BLD AUTO: 23.5 % (ref 19.6–45.3)
MCH RBC QN AUTO: 30.9 PG (ref 26.6–33)
MCHC RBC AUTO-ENTMCNC: 31.7 G/DL (ref 31.5–35.7)
MCV RBC AUTO: 97.3 FL (ref 79–97)
MONOCYTES # BLD AUTO: 0.41 10*3/MM3 (ref 0.1–0.9)
MONOCYTES NFR BLD AUTO: 7 % (ref 5–12)
NEUTROPHILS NFR BLD AUTO: 3.93 10*3/MM3 (ref 1.7–7)
NEUTROPHILS NFR BLD AUTO: 66.8 % (ref 42.7–76)
NRBC BLD AUTO-RTO: 0 /100 WBC (ref 0–0.2)
PLATELET # BLD AUTO: 168 10*3/MM3 (ref 140–450)
PMV BLD AUTO: 12.5 FL (ref 6–12)
POTASSIUM SERPL-SCNC: 4.1 MMOL/L (ref 3.5–5.2)
PROT SERPL-MCNC: 7.3 G/DL (ref 6–8.5)
PROTHROMBIN TIME: 14.3 SECONDS (ref 11.8–14.9)
RBC # BLD AUTO: 4.08 10*6/MM3 (ref 3.77–5.28)
SODIUM SERPL-SCNC: 137 MMOL/L (ref 136–145)
WBC NRBC COR # BLD: 5.88 10*3/MM3 (ref 3.4–10.8)

## 2023-10-10 PROCEDURE — 36415 COLL VENOUS BLD VENIPUNCTURE: CPT

## 2023-10-10 PROCEDURE — 80053 COMPREHEN METABOLIC PANEL: CPT

## 2023-10-10 PROCEDURE — 85610 PROTHROMBIN TIME: CPT

## 2023-10-10 PROCEDURE — 83036 HEMOGLOBIN GLYCOSYLATED A1C: CPT

## 2023-10-10 PROCEDURE — 85025 COMPLETE CBC W/AUTO DIFF WBC: CPT

## 2023-10-10 NOTE — SIGNIFICANT NOTE
GOAL IS TO WALK BETTER.  PATIENT DESIRES TO HAVE HOME HEALTH AT FIRST THEN GO TO OUTPATIENT THERAPY.   HAS DME FROM PREVIOUS TOTAL KNEE SURGERY.

## 2023-10-10 NOTE — DISCHARGE INSTRUCTIONS
"IMPORTANT INSTRUCTIONS - PRE-ADMISSION TESTING  DO NOT EAT OR CHEW anything after midnight the night before your procedure.    You may have CLEAR liquids up to __3__ hours prior to ARRIVAL time.   Take the following medications the morning of your procedure with JUST A SIP OF WATER:  GABAPENTIN, POTASSIUM, LEVOTHYROXINE, XANAX, HYDROCODONE. _____________    DO NOT BRING your medications to the hospital with you, UNLESS something has changed since your PRE-Admission Testing appointment.  Hold all vitamins, supplements, and NSAIDS (Non- steroidal anti-inflammatory meds) for one week prior to surgery (you MAY take Tylenol or Acetaminophen).  If you are diabetic, check your blood sugar the morning of your procedure. If it is less than 70 or if you are feeling symptomatic, call the following number for further instructions: [phone]__.  Use your inhalers/nebulizers as usual, the morning of your procedure. BRING YOUR INHALERS with you.   Bring your CPAP or BIPAP to hospital, ONLY IF YOU WILL BE SPENDING THE NIGHT.   Make sure you have a ride home and have someone who will stay with you the day of your procedure after you go home.  If you have any questions, please call your Pre-Admission Testing Nurse, ___MARIA LUISA __ at [phone]__.   Per anesthesia request, do not smoke for 24 hours before your procedure or as instructed by your surgeon.    Clear Liquid Diet        Find out when you need to start a clear liquid diet.   Think of "clear liquids" as anything you could read a newspaper through. This includes things like water, broth, sports drinks, or tea WITHOUT any kind of milk or cream.           Once you are told to start a clear liquid diet, only drink these things until 3 hours before arrival to the hospital or when the hospital says to stop. Total volume limitation: 8 oz.       Clear liquids you CAN drink:   Water   Clear broth: beef, chicken, vegetable, or bone broth with nothing in it   Gatorade   Lemonade or " Edgardo-aid   Soda   Tea, coffee (NO cream or honey)   Jell-O (without fruit)   Popsicles (without fruit or cream)   Italian ices   Juice without pulp: apple, white, grape   You may use salt, pepper, and sugar    Do NOT drink:   Milk or cream   Soy milk, almond milk, coconut milk, or other non-dairy drinks and   creamers   Milkshakes or smoothies   Tomato juice   Orange juice   Grapefruit juice   Cream soups or any other than broth         Clear Liquid Diet:  Do NOT eat any solid food.  Do NOT eat or suck on mints or candy.  Do NOT chew gum.  Do NOT drink thick liquids like milk or juice with pulp in it.  Do NOT add milk, cream, or anything like soy milk or almond milk to coffee or tea.       PREOPERATIVE (BEFORE SURGERY)              BATHING INSTRUCTIONS  Instructions:    You will need to shower 1 2 3 separate times utilizing the soap provided; at the times indicated   below:     -10/15/23 PM   - 10/16/23 AM   - 10/16/23 PM      Wash your hair and face with normal shampoo and soap, rinse it well before using the surgical soap.      In the shower, wet the skin completely with water from your neck to your feet. Apply the cleanser to your   body ONLY FROM THE NECK TO YOUR FEET.     Do NOT USE THE CLEANSER ON YOUR FACE, HEAD, OR GENITAL (PRIVATE) AREAS.   Keep it out of your eyes, ears, and mouth because of the risk of injury to those areas.      Scrub with a clean washcloth for each bath utilizing the soap provided from the top of your body to the   bottom starting at the neck area.      Pay close attention to your armpits, groin area, and the site of surgery.      Wash your body gently for 5 minutes. Stand outside the stream or turn off the water while scrubbing your   body. Do NOT wash with your regular soap after the surgical cleanser is used.      RINSE THE CLEANSER OFF COMPLETELY with plenty of water. Rinse the area again thoroughly.      Dry off with a clean towel. The surgical soap can cause dryness; however do  NOT APPLY LOTION,   CREAM, POWDER, and/or DEODORANT AFTER SHOWERING.     Be sure to where clean clothes after showering.      Ensure CLEAN BED LINENS AFTER FIRST wash with the surgical soap.      NO PETS ALLOWED IN THE BED with you after utilizing the surgical soap.

## 2023-10-11 ENCOUNTER — ANESTHESIA EVENT (OUTPATIENT)
Dept: PERIOP | Facility: HOSPITAL | Age: 68
End: 2023-10-11
Payer: MEDICARE

## 2023-10-17 ENCOUNTER — APPOINTMENT (OUTPATIENT)
Dept: GENERAL RADIOLOGY | Facility: HOSPITAL | Age: 68
End: 2023-10-17
Payer: MEDICARE

## 2023-10-17 ENCOUNTER — ANESTHESIA EVENT CONVERTED (OUTPATIENT)
Dept: ANESTHESIOLOGY | Facility: HOSPITAL | Age: 68
End: 2023-10-17
Payer: MEDICARE

## 2023-10-17 ENCOUNTER — ANESTHESIA (OUTPATIENT)
Dept: PERIOP | Facility: HOSPITAL | Age: 68
End: 2023-10-17
Payer: MEDICARE

## 2023-10-17 ENCOUNTER — HOSPITAL ENCOUNTER (OUTPATIENT)
Facility: HOSPITAL | Age: 68
Discharge: HOME | End: 2023-10-19
Attending: ORTHOPAEDIC SURGERY | Admitting: ORTHOPAEDIC SURGERY
Payer: MEDICARE

## 2023-10-17 DIAGNOSIS — M17.12 PRIMARY OSTEOARTHRITIS OF LEFT KNEE: ICD-10-CM

## 2023-10-17 DIAGNOSIS — M17.12 OSTEOARTHRITIS OF LEFT KNEE, UNSPECIFIED OSTEOARTHRITIS TYPE: ICD-10-CM

## 2023-10-17 DIAGNOSIS — R26.2 DIFFICULTY WALKING: Primary | ICD-10-CM

## 2023-10-17 DIAGNOSIS — Z78.9 DECREASED ACTIVITIES OF DAILY LIVING (ADL): ICD-10-CM

## 2023-10-17 LAB
GLUCOSE BLDC GLUCOMTR-MCNC: 145 MG/DL (ref 70–99)
GLUCOSE BLDC GLUCOMTR-MCNC: 81 MG/DL (ref 70–99)

## 2023-10-17 PROCEDURE — 25010000002 DEXAMETHASONE PER 1 MG: Performed by: ANESTHESIOLOGY

## 2023-10-17 PROCEDURE — 25010000002 ONDANSETRON PER 1 MG: Performed by: ORTHOPAEDIC SURGERY

## 2023-10-17 PROCEDURE — 25010000002 PROPOFOL 10 MG/ML EMULSION: Performed by: NURSE ANESTHETIST, CERTIFIED REGISTERED

## 2023-10-17 PROCEDURE — 25010000002 CEFAZOLIN IN DEXTROSE 2-4 GM/100ML-% SOLUTION: Performed by: ORTHOPAEDIC SURGERY

## 2023-10-17 PROCEDURE — 27447 TOTAL KNEE ARTHROPLASTY: CPT | Performed by: ORTHOPAEDIC SURGERY

## 2023-10-17 PROCEDURE — 94799 UNLISTED PULMONARY SVC/PX: CPT

## 2023-10-17 PROCEDURE — 63710000001 ACETAMINOPHEN EXTRA STRENGTH 500 MG TABLET: Performed by: ORTHOPAEDIC SURGERY

## 2023-10-17 PROCEDURE — 25010000002 ROPIVACAINE PER 1 MG: Performed by: ORTHOPAEDIC SURGERY

## 2023-10-17 PROCEDURE — 93005 ELECTROCARDIOGRAM TRACING: CPT | Performed by: INTERNAL MEDICINE

## 2023-10-17 PROCEDURE — 25010000002 ONDANSETRON PER 1 MG: Performed by: NURSE ANESTHETIST, CERTIFIED REGISTERED

## 2023-10-17 PROCEDURE — A9270 NON-COVERED ITEM OR SERVICE: HCPCS | Performed by: INTERNAL MEDICINE

## 2023-10-17 PROCEDURE — A9270 NON-COVERED ITEM OR SERVICE: HCPCS | Performed by: ANESTHESIOLOGY

## 2023-10-17 PROCEDURE — 63710000001 TOPIRAMATE PER 25 MG: Performed by: INTERNAL MEDICINE

## 2023-10-17 PROCEDURE — 25010000002 MORPHINE PER 10 MG: Performed by: ORTHOPAEDIC SURGERY

## 2023-10-17 PROCEDURE — C1713 ANCHOR/SCREW BN/BN,TIS/BN: HCPCS | Performed by: ORTHOPAEDIC SURGERY

## 2023-10-17 PROCEDURE — 63710000001 ACETAMINOPHEN EXTRA STRENGTH 500 MG TABLET: Performed by: ANESTHESIOLOGY

## 2023-10-17 PROCEDURE — S0260 H&P FOR SURGERY: HCPCS | Performed by: ORTHOPAEDIC SURGERY

## 2023-10-17 PROCEDURE — 25010000002 CEFAZOLIN IN DEXTROSE 2000 MG/ 100 ML SOLUTION: Performed by: ORTHOPAEDIC SURGERY

## 2023-10-17 PROCEDURE — 25810000003 LACTATED RINGERS PER 1000 ML: Performed by: ANESTHESIOLOGY

## 2023-10-17 PROCEDURE — 82948 REAGENT STRIP/BLOOD GLUCOSE: CPT

## 2023-10-17 PROCEDURE — 20985 CPTR-ASST DIR MS PX: CPT | Performed by: ORTHOPAEDIC SURGERY

## 2023-10-17 PROCEDURE — C1776 JOINT DEVICE (IMPLANTABLE): HCPCS | Performed by: ORTHOPAEDIC SURGERY

## 2023-10-17 PROCEDURE — 25010000002 EPINEPHRINE 1 MG/ML SOLUTION: Performed by: ORTHOPAEDIC SURGERY

## 2023-10-17 PROCEDURE — 25010000002 HYDROMORPHONE 1 MG/ML SOLUTION: Performed by: NURSE ANESTHETIST, CERTIFIED REGISTERED

## 2023-10-17 PROCEDURE — 25010000002 MIDAZOLAM PER 1MG: Performed by: ANESTHESIOLOGY

## 2023-10-17 PROCEDURE — 25010000002 MIDAZOLAM PER 1MG

## 2023-10-17 PROCEDURE — A9270 NON-COVERED ITEM OR SERVICE: HCPCS | Performed by: ORTHOPAEDIC SURGERY

## 2023-10-17 PROCEDURE — 73560 X-RAY EXAM OF KNEE 1 OR 2: CPT

## 2023-10-17 PROCEDURE — 25010000002 FENTANYL CITRATE (PF) 50 MCG/ML SOLUTION: Performed by: NURSE ANESTHETIST, CERTIFIED REGISTERED

## 2023-10-17 PROCEDURE — 25810000003 LACTATED RINGERS PER 1000 ML: Performed by: ORTHOPAEDIC SURGERY

## 2023-10-17 PROCEDURE — 25010000002 KETOROLAC TROMETHAMINE PER 15 MG: Performed by: ORTHOPAEDIC SURGERY

## 2023-10-17 PROCEDURE — 93010 ELECTROCARDIOGRAM REPORT: CPT | Performed by: INTERNAL MEDICINE

## 2023-10-17 PROCEDURE — 63710000001 CELECOXIB 100 MG CAPSULE: Performed by: ANESTHESIOLOGY

## 2023-10-17 PROCEDURE — 25010000002 SUGAMMADEX 200 MG/2ML SOLUTION: Performed by: MARRIAGE & FAMILY THERAPIST

## 2023-10-17 PROCEDURE — 25010000002 HYDRALAZINE PER 20 MG: Performed by: MARRIAGE & FAMILY THERAPIST

## 2023-10-17 DEVICE — CAP TOTL KN CMT PRIMARY: Type: IMPLANTABLE DEVICE | Site: KNEE | Status: FUNCTIONAL

## 2023-10-17 DEVICE — COMP FEM/KN PERSONA CR CMT COCR STD SZ5 LT: Type: IMPLANTABLE DEVICE | Site: KNEE | Status: FUNCTIONAL

## 2023-10-17 DEVICE — ART/SRF KN PERSONA/VE PS CD/CR 4TO5 12MM LT: Type: IMPLANTABLE DEVICE | Site: KNEE | Status: FUNCTIONAL

## 2023-10-17 DEVICE — CAP EXT STEM KN UPCHRG: Type: IMPLANTABLE DEVICE | Site: KNEE | Status: FUNCTIONAL

## 2023-10-17 DEVICE — CMT BONE PALACOS R HI/VISC 1X40: Type: IMPLANTABLE DEVICE | Site: KNEE | Status: FUNCTIONAL

## 2023-10-17 DEVICE — EXT STEM FEM/KN PERSONA TPR 14XPLS30MM: Type: IMPLANTABLE DEVICE | Site: KNEE | Status: FUNCTIONAL

## 2023-10-17 DEVICE — PAT KN PERSONA ALLPOLY CMT 7.5X26MM: Type: IMPLANTABLE DEVICE | Site: KNEE | Status: FUNCTIONAL

## 2023-10-17 DEVICE — STEM TIB/KN PERSONA CMT 5D SZD LT: Type: IMPLANTABLE DEVICE | Site: KNEE | Status: FUNCTIONAL

## 2023-10-17 RX ORDER — HYDROCODONE BITARTRATE AND ACETAMINOPHEN 5; 325 MG/1; MG/1
1 TABLET ORAL EVERY 6 HOURS PRN
Status: DISCONTINUED | OUTPATIENT
Start: 2023-10-17 | End: 2023-10-19 | Stop reason: HOSPADM

## 2023-10-17 RX ORDER — FENTANYL CITRATE 50 UG/ML
INJECTION, SOLUTION INTRAMUSCULAR; INTRAVENOUS AS NEEDED
Status: DISCONTINUED | OUTPATIENT
Start: 2023-10-17 | End: 2023-10-17 | Stop reason: SURG

## 2023-10-17 RX ORDER — BISACODYL 10 MG
10 SUPPOSITORY, RECTAL RECTAL DAILY PRN
Status: DISCONTINUED | OUTPATIENT
Start: 2023-10-17 | End: 2023-10-19 | Stop reason: HOSPADM

## 2023-10-17 RX ORDER — PROMETHAZINE HYDROCHLORIDE 25 MG/1
25 SUPPOSITORY RECTAL ONCE AS NEEDED
Status: DISCONTINUED | OUTPATIENT
Start: 2023-10-17 | End: 2023-10-17 | Stop reason: HOSPADM

## 2023-10-17 RX ORDER — HYDROCODONE BITARTRATE AND ACETAMINOPHEN 5; 325 MG/1; MG/1
1 TABLET ORAL EVERY 6 HOURS PRN
Status: DISCONTINUED | OUTPATIENT
Start: 2023-10-17 | End: 2023-10-17 | Stop reason: SDUPTHER

## 2023-10-17 RX ORDER — GLYCOPYRROLATE 0.2 MG/ML
INJECTION INTRAMUSCULAR; INTRAVENOUS AS NEEDED
Status: DISCONTINUED | OUTPATIENT
Start: 2023-10-17 | End: 2023-10-17 | Stop reason: SURG

## 2023-10-17 RX ORDER — SODIUM CHLORIDE 9 MG/ML
40 INJECTION, SOLUTION INTRAVENOUS AS NEEDED
Status: DISCONTINUED | OUTPATIENT
Start: 2023-10-17 | End: 2023-10-19 | Stop reason: HOSPADM

## 2023-10-17 RX ORDER — MIDAZOLAM HYDROCHLORIDE 2 MG/2ML
2 INJECTION, SOLUTION INTRAMUSCULAR; INTRAVENOUS ONCE
Status: COMPLETED | OUTPATIENT
Start: 2023-10-17 | End: 2023-10-17

## 2023-10-17 RX ORDER — ACETAMINOPHEN 500 MG
1000 TABLET ORAL ONCE
Status: COMPLETED | OUTPATIENT
Start: 2023-10-17 | End: 2023-10-17

## 2023-10-17 RX ORDER — LEVOTHYROXINE SODIUM 0.1 MG/1
100 TABLET ORAL
Status: DISCONTINUED | OUTPATIENT
Start: 2023-10-18 | End: 2023-10-19 | Stop reason: HOSPADM

## 2023-10-17 RX ORDER — ONDANSETRON 2 MG/ML
4 INJECTION INTRAMUSCULAR; INTRAVENOUS EVERY 6 HOURS PRN
Status: DISCONTINUED | OUTPATIENT
Start: 2023-10-17 | End: 2023-10-19 | Stop reason: HOSPADM

## 2023-10-17 RX ORDER — SODIUM CHLORIDE 0.9 % (FLUSH) 0.9 %
10 SYRINGE (ML) INJECTION EVERY 12 HOURS SCHEDULED
Status: DISCONTINUED | OUTPATIENT
Start: 2023-10-17 | End: 2023-10-19 | Stop reason: HOSPADM

## 2023-10-17 RX ORDER — SODIUM CHLORIDE 0.9 % (FLUSH) 0.9 %
10 SYRINGE (ML) INJECTION AS NEEDED
Status: DISCONTINUED | OUTPATIENT
Start: 2023-10-17 | End: 2023-10-19 | Stop reason: HOSPADM

## 2023-10-17 RX ORDER — DEXAMETHASONE SODIUM PHOSPHATE 4 MG/ML
INJECTION, SOLUTION INTRA-ARTICULAR; INTRALESIONAL; INTRAMUSCULAR; INTRAVENOUS; SOFT TISSUE AS NEEDED
Status: DISCONTINUED | OUTPATIENT
Start: 2023-10-17 | End: 2023-10-17

## 2023-10-17 RX ORDER — ROCURONIUM BROMIDE 10 MG/ML
INJECTION, SOLUTION INTRAVENOUS AS NEEDED
Status: DISCONTINUED | OUTPATIENT
Start: 2023-10-17 | End: 2023-10-17 | Stop reason: SURG

## 2023-10-17 RX ORDER — CEFAZOLIN SODIUM 2 G/100ML
2 INJECTION, SOLUTION INTRAVENOUS EVERY 8 HOURS
Qty: 200 ML | Refills: 0 | Status: COMPLETED | OUTPATIENT
Start: 2023-10-17 | End: 2023-10-18

## 2023-10-17 RX ORDER — CEFAZOLIN SODIUM IN 0.9 % NACL 3 G/100 ML
3 INTRAVENOUS SOLUTION, PIGGYBACK (ML) INTRAVENOUS ONCE
Status: DISCONTINUED | OUTPATIENT
Start: 2023-10-17 | End: 2023-10-17

## 2023-10-17 RX ORDER — TRANEXAMIC ACID 10 MG/ML
1000 INJECTION, SOLUTION INTRAVENOUS ONCE
Status: COMPLETED | OUTPATIENT
Start: 2023-10-17 | End: 2023-10-17

## 2023-10-17 RX ORDER — LIDOCAINE HYDROCHLORIDE 20 MG/ML
INJECTION, SOLUTION EPIDURAL; INFILTRATION; INTRACAUDAL; PERINEURAL AS NEEDED
Status: DISCONTINUED | OUTPATIENT
Start: 2023-10-17 | End: 2023-10-17 | Stop reason: SURG

## 2023-10-17 RX ORDER — CEFAZOLIN SODIUM 2 G/100ML
2 INJECTION, SOLUTION INTRAVENOUS ONCE
Status: COMPLETED | OUTPATIENT
Start: 2023-10-17 | End: 2023-10-17

## 2023-10-17 RX ORDER — PROPOFOL 10 MG/ML
VIAL (ML) INTRAVENOUS AS NEEDED
Status: DISCONTINUED | OUTPATIENT
Start: 2023-10-17 | End: 2023-10-17 | Stop reason: SURG

## 2023-10-17 RX ORDER — ACETAMINOPHEN 500 MG
1000 TABLET ORAL EVERY 6 HOURS
Status: DISCONTINUED | OUTPATIENT
Start: 2023-10-17 | End: 2023-10-19 | Stop reason: HOSPADM

## 2023-10-17 RX ORDER — BISACODYL 5 MG/1
10 TABLET, DELAYED RELEASE ORAL DAILY PRN
Status: DISCONTINUED | OUTPATIENT
Start: 2023-10-17 | End: 2023-10-19 | Stop reason: HOSPADM

## 2023-10-17 RX ORDER — ALPRAZOLAM 1 MG/1
1 TABLET ORAL 3 TIMES DAILY PRN
Status: DISCONTINUED | OUTPATIENT
Start: 2023-10-17 | End: 2023-10-19 | Stop reason: HOSPADM

## 2023-10-17 RX ORDER — HYDRALAZINE HYDROCHLORIDE 20 MG/ML
INJECTION INTRAMUSCULAR; INTRAVENOUS AS NEEDED
Status: DISCONTINUED | OUTPATIENT
Start: 2023-10-17 | End: 2023-10-17 | Stop reason: SURG

## 2023-10-17 RX ORDER — GABAPENTIN 400 MG/1
800 CAPSULE ORAL EVERY 8 HOURS SCHEDULED
Status: DISCONTINUED | OUTPATIENT
Start: 2023-10-17 | End: 2023-10-19 | Stop reason: HOSPADM

## 2023-10-17 RX ORDER — SODIUM CHLORIDE 9 MG/ML
40 INJECTION, SOLUTION INTRAVENOUS AS NEEDED
Status: DISCONTINUED | OUTPATIENT
Start: 2023-10-17 | End: 2023-10-17 | Stop reason: HOSPADM

## 2023-10-17 RX ORDER — NALOXONE HCL 0.4 MG/ML
0.4 VIAL (ML) INJECTION
Status: DISCONTINUED | OUTPATIENT
Start: 2023-10-17 | End: 2023-10-19 | Stop reason: HOSPADM

## 2023-10-17 RX ORDER — MAGNESIUM HYDROXIDE 1200 MG/15ML
LIQUID ORAL AS NEEDED
Status: DISCONTINUED | OUTPATIENT
Start: 2023-10-17 | End: 2023-10-17 | Stop reason: HOSPADM

## 2023-10-17 RX ORDER — SODIUM CHLORIDE, SODIUM LACTATE, POTASSIUM CHLORIDE, CALCIUM CHLORIDE 600; 310; 30; 20 MG/100ML; MG/100ML; MG/100ML; MG/100ML
80 INJECTION, SOLUTION INTRAVENOUS CONTINUOUS
Status: DISCONTINUED | OUTPATIENT
Start: 2023-10-17 | End: 2023-10-18

## 2023-10-17 RX ORDER — PROMETHAZINE HYDROCHLORIDE 12.5 MG/1
25 TABLET ORAL ONCE AS NEEDED
Status: DISCONTINUED | OUTPATIENT
Start: 2023-10-17 | End: 2023-10-17 | Stop reason: HOSPADM

## 2023-10-17 RX ORDER — MIDAZOLAM HYDROCHLORIDE 2 MG/2ML
INJECTION, SOLUTION INTRAMUSCULAR; INTRAVENOUS
Status: COMPLETED
Start: 2023-10-17 | End: 2023-10-17

## 2023-10-17 RX ORDER — BUPIVACAINE HYDROCHLORIDE AND EPINEPHRINE 5; 5 MG/ML; UG/ML
INJECTION, SOLUTION EPIDURAL; INTRACAUDAL; PERINEURAL
Status: COMPLETED | OUTPATIENT
Start: 2023-10-17 | End: 2023-10-17

## 2023-10-17 RX ORDER — DEXAMETHASONE SODIUM PHOSPHATE 4 MG/ML
INJECTION, SOLUTION INTRA-ARTICULAR; INTRALESIONAL; INTRAMUSCULAR; INTRAVENOUS; SOFT TISSUE
Status: COMPLETED | OUTPATIENT
Start: 2023-10-17 | End: 2023-10-17

## 2023-10-17 RX ORDER — MEPERIDINE HYDROCHLORIDE 25 MG/ML
12.5 INJECTION INTRAMUSCULAR; INTRAVENOUS; SUBCUTANEOUS
Status: DISCONTINUED | OUTPATIENT
Start: 2023-10-17 | End: 2023-10-17 | Stop reason: HOSPADM

## 2023-10-17 RX ORDER — ONDANSETRON 4 MG/1
4 TABLET, FILM COATED ORAL EVERY 6 HOURS PRN
Status: DISCONTINUED | OUTPATIENT
Start: 2023-10-17 | End: 2023-10-19 | Stop reason: HOSPADM

## 2023-10-17 RX ORDER — CELECOXIB 100 MG/1
200 CAPSULE ORAL ONCE
Status: COMPLETED | OUTPATIENT
Start: 2023-10-17 | End: 2023-10-17

## 2023-10-17 RX ORDER — KETOROLAC TROMETHAMINE 15 MG/ML
15 INJECTION, SOLUTION INTRAMUSCULAR; INTRAVENOUS EVERY 6 HOURS
Status: DISCONTINUED | OUTPATIENT
Start: 2023-10-17 | End: 2023-10-17

## 2023-10-17 RX ORDER — ROPINIROLE 0.25 MG/1
0.5 TABLET, FILM COATED ORAL NIGHTLY
Status: DISCONTINUED | OUTPATIENT
Start: 2023-10-17 | End: 2023-10-19 | Stop reason: HOSPADM

## 2023-10-17 RX ORDER — SODIUM CHLORIDE, SODIUM LACTATE, POTASSIUM CHLORIDE, CALCIUM CHLORIDE 600; 310; 30; 20 MG/100ML; MG/100ML; MG/100ML; MG/100ML
9 INJECTION, SOLUTION INTRAVENOUS CONTINUOUS PRN
Status: DISCONTINUED | OUTPATIENT
Start: 2023-10-17 | End: 2023-10-17 | Stop reason: HOSPADM

## 2023-10-17 RX ORDER — SODIUM CHLORIDE 0.9 % (FLUSH) 0.9 %
10 SYRINGE (ML) INJECTION AS NEEDED
Status: DISCONTINUED | OUTPATIENT
Start: 2023-10-17 | End: 2023-10-17 | Stop reason: HOSPADM

## 2023-10-17 RX ORDER — AMOXICILLIN 250 MG
2 CAPSULE ORAL 2 TIMES DAILY PRN
Status: DISCONTINUED | OUTPATIENT
Start: 2023-10-17 | End: 2023-10-19 | Stop reason: HOSPADM

## 2023-10-17 RX ORDER — ONDANSETRON 2 MG/ML
4 INJECTION INTRAMUSCULAR; INTRAVENOUS ONCE AS NEEDED
Status: DISCONTINUED | OUTPATIENT
Start: 2023-10-17 | End: 2023-10-17 | Stop reason: HOSPADM

## 2023-10-17 RX ORDER — OXYCODONE HYDROCHLORIDE 5 MG/1
5 TABLET ORAL
Status: DISCONTINUED | OUTPATIENT
Start: 2023-10-17 | End: 2023-10-17 | Stop reason: HOSPADM

## 2023-10-17 RX ORDER — DEXMEDETOMIDINE HYDROCHLORIDE 100 UG/ML
INJECTION, SOLUTION INTRAVENOUS AS NEEDED
Status: DISCONTINUED | OUTPATIENT
Start: 2023-10-17 | End: 2023-10-17 | Stop reason: SURG

## 2023-10-17 RX ORDER — METOPROLOL TARTRATE 5 MG/5ML
INJECTION INTRAVENOUS AS NEEDED
Status: DISCONTINUED | OUTPATIENT
Start: 2023-10-17 | End: 2023-10-17 | Stop reason: SURG

## 2023-10-17 RX ORDER — TOPIRAMATE 25 MG/1
50 TABLET ORAL NIGHTLY
Status: DISCONTINUED | OUTPATIENT
Start: 2023-10-17 | End: 2023-10-19 | Stop reason: HOSPADM

## 2023-10-17 RX ADMIN — ACETAMINOPHEN 1000 MG: 500 TABLET ORAL at 23:43

## 2023-10-17 RX ADMIN — SODIUM CHLORIDE, POTASSIUM CHLORIDE, SODIUM LACTATE AND CALCIUM CHLORIDE 9 ML/HR: 600; 310; 30; 20 INJECTION, SOLUTION INTRAVENOUS at 13:12

## 2023-10-17 RX ADMIN — METOPROLOL TARTRATE 3 MG: 1 INJECTION, SOLUTION INTRAVENOUS at 15:04

## 2023-10-17 RX ADMIN — MIDAZOLAM HYDROCHLORIDE 2 MG: 2 INJECTION, SOLUTION INTRAMUSCULAR; INTRAVENOUS at 13:52

## 2023-10-17 RX ADMIN — SODIUM CHLORIDE, POTASSIUM CHLORIDE, SODIUM LACTATE AND CALCIUM CHLORIDE 80 ML/HR: 600; 310; 30; 20 INJECTION, SOLUTION INTRAVENOUS at 22:44

## 2023-10-17 RX ADMIN — BUPIVACAINE HYDROCHLORIDE AND EPINEPHRINE BITARTRATE 40 ML: 5; .005 INJECTION, SOLUTION EPIDURAL; INTRACAUDAL; PERINEURAL at 13:59

## 2023-10-17 RX ADMIN — ONDANSETRON 4 MG: 2 INJECTION INTRAMUSCULAR; INTRAVENOUS at 15:15

## 2023-10-17 RX ADMIN — ONDANSETRON 4 MG: 2 INJECTION INTRAMUSCULAR; INTRAVENOUS at 17:26

## 2023-10-17 RX ADMIN — PROPOFOL 40 MG: 10 INJECTION, EMULSION INTRAVENOUS at 15:03

## 2023-10-17 RX ADMIN — HYDROMORPHONE HYDROCHLORIDE 0.25 MG: 1 INJECTION, SOLUTION INTRAMUSCULAR; INTRAVENOUS; SUBCUTANEOUS at 16:10

## 2023-10-17 RX ADMIN — MIDAZOLAM HYDROCHLORIDE 2 MG: 1 INJECTION, SOLUTION INTRAMUSCULAR; INTRAVENOUS at 13:27

## 2023-10-17 RX ADMIN — FENTANYL CITRATE 50 MCG: 50 INJECTION, SOLUTION INTRAMUSCULAR; INTRAVENOUS at 14:49

## 2023-10-17 RX ADMIN — METOPROLOL TARTRATE 2 MG: 1 INJECTION, SOLUTION INTRAVENOUS at 15:09

## 2023-10-17 RX ADMIN — SODIUM CHLORIDE, POTASSIUM CHLORIDE, SODIUM LACTATE AND CALCIUM CHLORIDE: 600; 310; 30; 20 INJECTION, SOLUTION INTRAVENOUS at 15:50

## 2023-10-17 RX ADMIN — ACETAMINOPHEN 1000 MG: 500 TABLET ORAL at 13:11

## 2023-10-17 RX ADMIN — LIDOCAINE HYDROCHLORIDE 80 MG: 20 INJECTION, SOLUTION EPIDURAL; INFILTRATION; INTRACAUDAL; PERINEURAL at 14:23

## 2023-10-17 RX ADMIN — DEXAMETHASONE SODIUM PHOSPHATE 8 MG: 4 INJECTION, SOLUTION INTRAMUSCULAR; INTRAVENOUS at 13:59

## 2023-10-17 RX ADMIN — ROCURONIUM BROMIDE 10 MG: 50 INJECTION INTRAVENOUS at 15:12

## 2023-10-17 RX ADMIN — PROPOFOL 20 MG: 10 INJECTION, EMULSION INTRAVENOUS at 15:13

## 2023-10-17 RX ADMIN — TRANEXAMIC ACID 1000 MG: 10 INJECTION, SOLUTION INTRAVENOUS at 13:27

## 2023-10-17 RX ADMIN — FENTANYL CITRATE 50 MCG: 50 INJECTION, SOLUTION INTRAMUSCULAR; INTRAVENOUS at 14:23

## 2023-10-17 RX ADMIN — TRANEXAMIC ACID 1000 MG: 10 INJECTION, SOLUTION INTRAVENOUS at 15:33

## 2023-10-17 RX ADMIN — KETOROLAC TROMETHAMINE 15 MG: 15 INJECTION, SOLUTION INTRAMUSCULAR; INTRAVENOUS at 17:26

## 2023-10-17 RX ADMIN — PROPOFOL 160 MG: 10 INJECTION, EMULSION INTRAVENOUS at 14:23

## 2023-10-17 RX ADMIN — ACETAMINOPHEN 1000 MG: 500 TABLET ORAL at 17:25

## 2023-10-17 RX ADMIN — CEFAZOLIN SODIUM 2 G: 2 INJECTION, SOLUTION INTRAVENOUS at 14:18

## 2023-10-17 RX ADMIN — DEXMEDETOMIDINE 40 MCG: 100 INJECTION, SOLUTION INTRAVENOUS at 13:50

## 2023-10-17 RX ADMIN — HYDRALAZINE HYDROCHLORIDE 5 MG: 20 INJECTION, SOLUTION INTRAMUSCULAR; INTRAVENOUS at 15:10

## 2023-10-17 RX ADMIN — GLYCOPYRROLATE 0.2 MG: 0.2 INJECTION INTRAMUSCULAR; INTRAVENOUS at 14:23

## 2023-10-17 RX ADMIN — ONDANSETRON 4 MG: 2 INJECTION INTRAMUSCULAR; INTRAVENOUS at 23:47

## 2023-10-17 RX ADMIN — CEFAZOLIN SODIUM 2 G: 2 INJECTION, SOLUTION INTRAVENOUS at 21:36

## 2023-10-17 RX ADMIN — SUGAMMADEX 200 MG: 100 INJECTION, SOLUTION INTRAVENOUS at 15:45

## 2023-10-17 RX ADMIN — MIDAZOLAM HYDROCHLORIDE 2 MG: 1 INJECTION, SOLUTION INTRAMUSCULAR; INTRAVENOUS at 13:52

## 2023-10-17 RX ADMIN — HYDROMORPHONE HYDROCHLORIDE 0.5 MG: 1 INJECTION, SOLUTION INTRAMUSCULAR; INTRAVENOUS; SUBCUTANEOUS at 14:55

## 2023-10-17 RX ADMIN — TOPIRAMATE 50 MG: 25 TABLET, FILM COATED ORAL at 21:36

## 2023-10-17 RX ADMIN — PROPOFOL 60 MG: 10 INJECTION, EMULSION INTRAVENOUS at 14:57

## 2023-10-17 RX ADMIN — PROPOFOL 70 MG: 10 INJECTION, EMULSION INTRAVENOUS at 15:10

## 2023-10-17 RX ADMIN — CELECOXIB 200 MG: 100 CAPSULE ORAL at 13:11

## 2023-10-17 NOTE — H&P
"H and p        \Chief Complaint  Initial Evaluation of the Left Knee        Subjective   Marilin Martinez presents to [facility] for initial evaluation of the left knee. She has had osteo arthritis for years and treated conservatively in the past.  She has had injections, therapy and anti inflammatory.  She has difficulty with prolonged standing, ambulation and stairs.            Allergies   Allergen Reactions    Latex Rash       blisters         Social History   Social History            Socioeconomic History    Marital status:    Tobacco Use    Smoking status: Former       Packs/day: 1.00       Years: 20.00       Pack years: 20.00       Types: Cigarettes       Quit date: 1997       Years since quittin.0    Smokeless tobacco: Never   Vaping Use    Vaping Use: Never used   Substance and Sexual Activity    Alcohol use: Not Currently    Drug use: No    Sexual activity: Defer            I reviewed the patient's chief complaint, history of present illness, review of systems, past medical history, surgical history, family history, social history, medications, and allergy list.      Review of Systems      Constitutional: Denies fevers, chills, weight loss  Cardiovascular: Denies chest pain, shortness of breath  Skin: Denies rashes, acute skin changes  Neurologic: Denies headache, loss of consciousness           Vital Signs:   Pulse 88   Ht 157.5 cm (62\")   Wt 98.7 kg (217 lb 9.6 oz)   SpO2 98%   BMI 39.80 kg/m²           Physical Exam  General: Alert. No acute distress     Ortho Exam          LEFT KNEE Flexion 105. Extension -5. Stable to varus/valgus stress. Stable to anterior/posterior drawer. Neurovascularly intact. Negative Beti. Negative Lachman. Positive EHL, FHL, HS and TA. Sensation intact to light touch all 5 nerves of the foot. Ambulates with Antalgic gait. Patella is well tracking. Calf supple, non-tender. Positive tenderness to the medial joint line. " Positive tenderness to the lateral joint line. Positive Crepitus. Good strength to hamstrings, quadriceps, dorsiflexors, and plantar flexors.  Knee Extensor Mechanism intact        Procedures           Imaging Results (Most Recent)         Procedure Component Value Units Date/Time     XR Knee 3 View Left [[id number]] Resulted: 09/27/23 1100       Updated: 09/27/23 1104                      Result Review   :      X-Ray Report:  Left knee X-Ray  Indication: Evaluation of the left knee  AP/Lateral and [place] view(s)  Findings: Advanced degenerative aortitis. Bone on bone.    Prior studies available for comparison: yes                  Assessment   Assessment and Plan      Diagnoses and all orders for this visit:     1. Left knee pain, unspecified chronicity (Primary)  -     XR Knee 3 View Left     2. Osteoarthritis of left knee, unspecified osteoarthritis type           Discussed the treatment plan with the patient.      Discussed the treatment options with the patient, operative vs non-operative.      The patient expressed understanding and wished to proceed with a left total knee arthroplasty with computer navigation.            Discussed surgery., Risks/benefits discussed with patient including, but not limited to: infection, bleeding, neurovascular damage, re-rupture, aesthetic deformity, need for further surgery, and death., Discussed with patient the implant type being used during surgery and patient understands., Surgery pamphlet given., Call or return if worsening symptoms., and DME order for a 3 in 1 given today due to patient will be confined to one room/level of the home that does not offer a toilet during postop recovery.      Follow Up      2 weeks postoperatively    Murray Calvillo MD  10/17/23

## 2023-10-17 NOTE — ANESTHESIA POSTPROCEDURE EVALUATION
Patient: Marilin Martinez    Procedure Summary       Date: 10/17/23 Room / Location: [facility] OR 06 / [facility] MAIN OR    Anesthesia Start: 1418 Anesthesia Stop: 1558    Procedure: LEFT TOTAL KNEE ARTHROPLASTY WITH JUAN LUIS NAVIGATION. (Left: Knee) Diagnosis:       Osteoarthritis of left knee, unspecified osteoarthritis type      (Osteoarthritis of left knee, unspecified osteoarthritis type [M17.12])    Surgeons: Murray Calvillo MD Provider: Jim Gonzalez MD    Anesthesia Type: general ASA Status: 4            Anesthesia Type: general    Vitals  Vitals Value Taken Time   /61 10/17/23 1625   Temp 36.5 °C (97.7 °F) 10/17/23 1620   Pulse 59 10/17/23 1625   Resp 13 10/17/23 1620   SpO2 93 % 10/17/23 1625           Post Anesthesia Care and Evaluation    Patient location during evaluation: bedside  Patient participation: complete - patient participated  Level of consciousness: awake  Pain management: adequate    Airway patency: patent  PONV Status: none  Cardiovascular status: acceptable and stable  Respiratory status: acceptable  Hydration status: acceptable    Comments: An Anesthesiologist personally participated in the most demanding procedures (including induction and emergence if applicable) in the anesthesia plan, monitored the course of anesthesia administration at frequent intervals and remained physically present and available for immediate diagnosis and treatment of emergencies.

## 2023-10-17 NOTE — ANESTHESIA PREPROCEDURE EVALUATION
Anesthesia Evaluation     Patient summary reviewed and Nursing notes reviewed                Airway   Mallampati: I  TM distance: >3 FB  Neck ROM: full  No difficulty expected  Dental    (+) poor dentition    Pulmonary - normal exam    breath sounds clear to auscultation  (+) ,sleep apnea  Cardiovascular - normal exam    Rhythm: regular  Rate: normal    (+) hypertension, hyperlipidemia      Neuro/Psych  (+) CVA  GI/Hepatic/Renal/Endo    (+) morbid obesity, diabetes mellitus, thyroid problem hypothyroidism    Musculoskeletal     (+) back pain  Abdominal    Substance History - negative use     OB/GYN negative ob/gyn ROS         Other   arthritis,                 Anesthesia Plan    ASA 4     general     intravenous induction     Anesthetic plan, risks, benefits, and alternatives have been provided, discussed and informed consent has been obtained with: patient.    CODE STATUS:

## 2023-10-17 NOTE — H&P
[facility]   HOSPITALIST HISTORY AND PHYSICAL  Date: 10/17/2023   Patient Name: Marilin Martinez  : 1955  MRN: [id number]  Primary Care Physician:  Reyes, Rosenberg Acosta, MD  Date of admission: 10/17/2023    Subjective   Subjective     Chief Complaint: left knee pain    HPI:    Marilin Martinez is a 68 y.o. female with HTN, HLD, T2DM, hypothyroidism, left knee OA who underwent elective left knee replacement on 10/17/2023.  She was seen postoperatively by hospitalist service.  She is feeling fine.  She had a little bit of nausea but no vomiting and has been tolerating oral intake.  She denies uncontrolled pain.  She has no acute complaints.    Personal History     Past Medical History:  HTN  T2DM  Hypothyroidism  HLD  OA  Anxiety  Peripheral neuropathy      Past Surgical History:  Past Surgical History:   Procedure Laterality Date    COLONOSCOPY      ENDOSCOPY N/A 2021    Procedure: ESOPHAGOGASTRODUODENOSCOPY WITH COLD BIOPSY;  Surgeon: Mansi Kyle MD;  Location: [facility] ENDOSCOPY;  Service: General;  Laterality: N/A;  PRE: SCREENING FOR BARIATRICS  POST: MODERATE SIZE HIATAL HERNIA    GALLBLADDER SURGERY      GASTRIC SLEEVE LAPAROSCOPIC N/A 2022    Procedure: GASTRIC SLEEVE LAPAROSCOPIC With VENTRALHernia Repair AND PARAESOPHAGEAL HERNIA REPAIR AND LYSIS OF ADHESIONS PARTICAL OMENTECTOMY;  Surgeon: Quang Shipley Jr., MD;  Location: [facility] OR [facility] [place];  Service: Bariatric;  Laterality: N/A;    INCISION AND DRAINAGE OF WOUND Right 3/21/2023    Procedure: IRRIGATION & DEBRIDEMENT of right knee wound;  Surgeon: Murray Calvillo MD;  Location: [facility] OR;  Service: Orthopedics;  Laterality: Right;    TONSILLECTOMY      TOTAL KNEE ARTHROPLASTY Right 2023    Procedure: RIGHT TOTAL KNEE ARTHROPLASTY WITH DANITA ROBOT AND SHIREEN;  Surgeon: Murray Calvillo MD;  Location: [facility] OR;  Service: Robotics - Ortho;  Laterality: Right;    TOTAL KNEE ARTHROPLASTY Left 10/17/2023     Procedure: LEFT TOTAL KNEE ARTHROPLASTY WITH JUAN LUIS NAVIGATION.;  Surgeon: Murray Calvillo MD;  Location: [facility] MAIN OR;  Service: Robotics - Ortho;  Laterality: Left;    TUBAL ABDOMINAL LIGATION         Family History:   Family History   Problem Relation Age of Onset    Obesity Mother     Hypertension Mother     Cancer Mother     Arthritis Mother     Diabetes Mother     Obesity Sister     Hypertension Sister     Cancer Sister     Arthritis Sister     Diabetes Father     Diabetes Daughter     Malig Hyperthermia Neg Hx      Social History:   Social History     Socioeconomic History    Marital status:    Tobacco Use    Smoking status: Former     Packs/day: 1.00     Years: 20.00     Additional pack years: 0.00     Total pack years: 20.00     Types: Cigarettes     Quit date: 1997     Years since quittin.1    Smokeless tobacco: Never   Vaping Use    Vaping Use: Never used   Substance and Sexual Activity    Alcohol use: Not Currently    Drug use: No    Sexual activity: Defer         Home Medications:  ALPRAZolam, Finerenone, HYDROcodone-acetaminophen, Latanoprostene Bunod, Mirabegron ER, Pitavastatin Calcium, amLODIPine-benazepril, bumetanide, dapagliflozin Propanediol, fluticasone, gabapentin, glucose blood, levothyroxine, nystatin, potassium chloride, rOPINIRole, and topiramate    Allergies:  Allergies   Allergen Reactions    Adhesive Tape Rash     SKIN BREAKDOWN     Latex Rash     blisters       Review of Systems  Constitutional:  No Fever, No Chills, +Fatigue  HEENT:  Denied complaints  Cardiovascular:  Denied complaints  Respiratory:  No Cough, No Dyspnea  Gastrointestinal:  Denied complaints  Musculoskeletal:  +left knee pain      Objective   Objective     Vitals:   Temp:  [97.2 °F (36.2 °C)-98.4 °F (36.9 °C)] 97.5 °F (36.4 °C)  Heart Rate:  [49-78] 51  Resp:  [13-18] 14  BP: (105-170)/(47-65) 124/58  Flow (L/min):  [6] 6  FiO2 (%):  [41 %-56 %] 41 %    Physical Exam    Constitutional: Awake,  conversant, no acute distress   Neck: Supple, trachea midline   Respiratory: Equal and clear to auscultation bilaterally, nonlabored respirations    Cardiovascular: RRR, no murmurs, no pedal edema   Gastrointestinal: Positive bowel sounds, soft, nontender, nondistended   Musculoskeletal: No gross joint deformities, no clubbing or cyanosis to extremities   Neurologic: Alert, Cranial Nerves grossly intact, speech clear   Skin: Warm, no rashes or wounds     Result Review    Result Review:  I have personally reviewed the results from the time of this admission to 10/17/2023 17:33 EDT and agree with these findings:  []  Laboratory  []  Microbiology  []  Radiology  []  EKG/Telemetry   []  Cardiology/Vascular   []  Pathology  []  Old records  []  Other:      Assessment & Plan   Assessment / Plan     Assessment/Plan:   Left knee OA  Status post left knee replacement  Essential hypertension  HLD  Hypothyroidism  T2DM  Diabetic peripheral neuropathy  Chronic anxiety        Orthopedics following, POD #0 left knee replacement  Complete periprocedural IV cefazolin  Continue Tylenol 1000 mg every 6 hours  Stop Toradol  As needed p.o./IV narcotic regimen per orders  Continue bowel regimen  Restart gabapentin 800 mg q8 hours  BP stable.  Hold blood pressure medication for now  Continue LR 80 cc/h  Wean supplemental oxygen to maintain O2 saturation greater than 90%  encourage incentive spirometry  Restart home Synthroid  Restart Xanax 1 mg 3 times daily as needed uncontrolled anxiety  PT evaluation  Eliquis 2.5 mg every 12 hours for DVT prophylaxis  H/H, BMP in AM       DVT prophylaxis:  Medical and mechanical DVT prophylaxis orders are present.    CODE STATUS:    Code Status (Patient has no pulse and is not breathing): CPR (Attempt to Resuscitate)  Medical Interventions (Patient has pulse or is breathing): Full Support      Admission Status:  I believe this patient meets OUTPATIENT status.    Electronically signed by Sheldon Collier  DO, 10/17/23, 5:33 PM EDT.

## 2023-10-17 NOTE — ANESTHESIA PROCEDURE NOTES
Peripheral Block    Pre-sedation assessment completed: 10/17/2023 1:56 PM    Patient reassessed immediately prior to procedure    Patient location during procedure: pre-op  Start time: 10/17/2023 1:56 PM  Stop time: 10/17/2023 1:59 PM  Reason for block: at surgeon's request and post-op pain management  Performed by  Anesthesiologist: Shakir Cruz MD  Preanesthetic Checklist  Completed: patient identified, IV checked, site marked, risks and benefits discussed, surgical consent, monitors and equipment checked, pre-op evaluation and timeout performed  Prep:  Pt Position: supine  Sterile barriers:alcohol skin prep, partial drape, cap, washed/disinfected hands, mask and gloves  Prep: ChloraPrep  Patient monitoring: blood pressure monitoring, continuous pulse oximetry and EKG  Procedure    Sedation: yes  Performed under: local infiltration  Guidance:ultrasound guided and nerve stimulator    ULTRASOUND INTERPRETATION.  Using ultrasound guidance a 20 G gauge needle was placed in close proximity to the nerve, at which point, under ultrasound guidance anesthetic was injected in the area of the nerve and spread of the anesthesia was seen on ultrasound in close proximity thereto.  There were no abnormalities seen on ultrasound; a digital image was taken; and the patient tolerated the procedure with no complications. Images:still images obtained, printed/placed on chart    Laterality:right  Block Type:adductor canal block  Injection Technique:single-shot  Needle Type:echogenic  Needle Gauge:20 G (4in)  Resistance on Injection: none    Medications Used: bupivacaine-EPINEPHrine PF (MARCAINE w/EPI) 0.5% -[id number] injection - Injection, Adductor Canal   40 mL - 10/17/2023 1:59:00 PM  dexamethasone (DECADRON) injection 4 mg/mL - Injection, Adductor Canal   8 mg - 10/17/2023 1:59:00 PM      Medications  Comment:Precedex 50mcg added to above solution mixtrue    Post Assessment  Injection Assessment: negative aspiration for heme, no  paresthesia on injection and incremental injection  Patient Tolerance:comfortable throughout block  Complications:no  Additional Notes  The block or continuous infusion is requested by the referring physician for management of postoperative pain, or pain related to a procedure. Ultrasound guidance (deemed medically necessary). Painless injection, pt was awake and conversant during the procedure without complications. Needle and surrounding structures visualized throughout procedure. No adverse reactions or complications seen during this period. Post-procedure image showed no signs of complication, and anatomy was consistent with an uncomplicated nerve blockade.

## 2023-10-18 LAB
ANION GAP SERPL CALCULATED.3IONS-SCNC: 9.8 MMOL/L (ref 5–15)
BUN SERPL-MCNC: 12 MG/DL (ref 8–23)
BUN/CREAT SERPL: 11.5 (ref 7–25)
CALCIUM SPEC-SCNC: 9.2 MG/DL (ref 8.6–10.5)
CHLORIDE SERPL-SCNC: 103 MMOL/L (ref 98–107)
CO2 SERPL-SCNC: 21.2 MMOL/L (ref 22–29)
CREAT SERPL-MCNC: 1.04 MG/DL (ref 0.57–1)
DEPRECATED RDW RBC AUTO: 45.2 FL (ref 37–54)
EGFRCR SERPLBLD CKD-EPI 2021: 59.4 ML/MIN/1.73
ERYTHROCYTE [DISTWIDTH] IN BLOOD BY AUTOMATED COUNT: 12.6 % (ref 12.3–15.4)
GLUCOSE SERPL-MCNC: 184 MG/DL (ref 65–99)
HCT VFR BLD AUTO: 39.5 % (ref 34–46.6)
HGB BLD-MCNC: 12.6 G/DL (ref 12–15.9)
MAGNESIUM SERPL-MCNC: 2 MG/DL (ref 1.6–2.4)
MCH RBC QN AUTO: 31 PG (ref 26.6–33)
MCHC RBC AUTO-ENTMCNC: 31.9 G/DL (ref 31.5–35.7)
MCV RBC AUTO: 97.3 FL (ref 79–97)
PLATELET # BLD AUTO: 124 10*3/MM3 (ref 140–450)
PMV BLD AUTO: 12.4 FL (ref 6–12)
POTASSIUM SERPL-SCNC: 4.2 MMOL/L (ref 3.5–5.2)
RBC # BLD AUTO: 4.06 10*6/MM3 (ref 3.77–5.28)
SODIUM SERPL-SCNC: 134 MMOL/L (ref 136–145)
WBC NRBC COR # BLD: 12.13 10*3/MM3 (ref 3.4–10.8)

## 2023-10-18 PROCEDURE — 97110 THERAPEUTIC EXERCISES: CPT

## 2023-10-18 PROCEDURE — A9270 NON-COVERED ITEM OR SERVICE: HCPCS | Performed by: INTERNAL MEDICINE

## 2023-10-18 PROCEDURE — A9270 NON-COVERED ITEM OR SERVICE: HCPCS | Performed by: ORTHOPAEDIC SURGERY

## 2023-10-18 PROCEDURE — 80048 BASIC METABOLIC PNL TOTAL CA: CPT | Performed by: INTERNAL MEDICINE

## 2023-10-18 PROCEDURE — 63710000001 LEVOTHYROXINE 100 MCG TABLET: Performed by: INTERNAL MEDICINE

## 2023-10-18 PROCEDURE — 85027 COMPLETE CBC AUTOMATED: CPT | Performed by: ORTHOPAEDIC SURGERY

## 2023-10-18 PROCEDURE — 97535 SELF CARE MNGMENT TRAINING: CPT

## 2023-10-18 PROCEDURE — 83735 ASSAY OF MAGNESIUM: CPT | Performed by: INTERNAL MEDICINE

## 2023-10-18 PROCEDURE — 97530 THERAPEUTIC ACTIVITIES: CPT

## 2023-10-18 PROCEDURE — 63710000001 GABAPENTIN 400 MG CAPSULE: Performed by: INTERNAL MEDICINE

## 2023-10-18 PROCEDURE — 97116 GAIT TRAINING THERAPY: CPT

## 2023-10-18 PROCEDURE — 63710000001 TOPIRAMATE PER 25 MG: Performed by: INTERNAL MEDICINE

## 2023-10-18 PROCEDURE — 63710000001 APIXABAN 2.5 MG TABLET: Performed by: ORTHOPAEDIC SURGERY

## 2023-10-18 PROCEDURE — 99221 1ST HOSP IP/OBS SF/LOW 40: CPT | Performed by: INTERNAL MEDICINE

## 2023-10-18 PROCEDURE — 94761 N-INVAS EAR/PLS OXIMETRY MLT: CPT

## 2023-10-18 PROCEDURE — 94799 UNLISTED PULMONARY SVC/PX: CPT

## 2023-10-18 PROCEDURE — 63710000001 ACETAMINOPHEN EXTRA STRENGTH 500 MG TABLET: Performed by: ORTHOPAEDIC SURGERY

## 2023-10-18 PROCEDURE — 97161 PT EVAL LOW COMPLEX 20 MIN: CPT

## 2023-10-18 PROCEDURE — 63710000001 ROPINIROLE 0.25 MG TABLET: Performed by: INTERNAL MEDICINE

## 2023-10-18 PROCEDURE — 63710000001 HYDROCODONE-ACETAMINOPHEN 5-325 MG TABLET: Performed by: ORTHOPAEDIC SURGERY

## 2023-10-18 PROCEDURE — 25010000002 CEFAZOLIN IN DEXTROSE 2-4 GM/100ML-% SOLUTION: Performed by: ORTHOPAEDIC SURGERY

## 2023-10-18 PROCEDURE — 94660 CPAP INITIATION&MGMT: CPT

## 2023-10-18 PROCEDURE — 97165 OT EVAL LOW COMPLEX 30 MIN: CPT

## 2023-10-18 RX ADMIN — ACETAMINOPHEN 1000 MG: 500 TABLET ORAL at 06:04

## 2023-10-18 RX ADMIN — LEVOTHYROXINE SODIUM 100 MCG: 0.1 TABLET ORAL at 06:04

## 2023-10-18 RX ADMIN — ACETAMINOPHEN 1000 MG: 500 TABLET ORAL at 13:13

## 2023-10-18 RX ADMIN — Medication 10 ML: at 20:24

## 2023-10-18 RX ADMIN — Medication 10 ML: at 08:03

## 2023-10-18 RX ADMIN — GABAPENTIN 800 MG: 400 CAPSULE ORAL at 13:04

## 2023-10-18 RX ADMIN — APIXABAN 2.5 MG: 2.5 TABLET, FILM COATED ORAL at 20:24

## 2023-10-18 RX ADMIN — APIXABAN 2.5 MG: 2.5 TABLET, FILM COATED ORAL at 08:03

## 2023-10-18 RX ADMIN — CEFAZOLIN SODIUM 2 G: 2 INJECTION, SOLUTION INTRAVENOUS at 06:05

## 2023-10-18 RX ADMIN — HYDROCODONE BITARTRATE AND ACETAMINOPHEN 1 TABLET: 5; 325 TABLET ORAL at 17:32

## 2023-10-18 RX ADMIN — GABAPENTIN 800 MG: 400 CAPSULE ORAL at 06:04

## 2023-10-18 RX ADMIN — GABAPENTIN 800 MG: 400 CAPSULE ORAL at 21:15

## 2023-10-18 RX ADMIN — ROPINIROLE HYDROCHLORIDE 0.5 MG: 0.25 TABLET, FILM COATED ORAL at 20:24

## 2023-10-18 RX ADMIN — TOPIRAMATE 50 MG: 25 TABLET, FILM COATED ORAL at 20:23

## 2023-10-18 NOTE — CONSULTS
Cardiology Consult Note  [facility] 5TH FLOOR SURGICAL TELEMETRY UNIT          Patient Identification:  Marilin Martinez      [id number]  68 y.o.        female  1955           Reason for Consultation: Bradycardia    PCP: Reyes, Rosenberg Acosta, MD    History of Present Illness:     Patient is a 68-year-old female with type 2 diabetes, essential hypertension, dyslipidemia, and obstructive sleep apnea who underwent knee surgery yesterday afterwards was found to have some sinus bradycardia in the 40s with blood pressure also low normal side was transferred for monitoring.  The patient denies any complaints or problems no lightheadedness dizziness or syncope.  She has not had any exertion related dyspnea as outpatient as well recently.  She does normally use CPAP at night but was not on it yesterday.    Past History:  Past Medical History:   Diagnosis Date    Arthritis     KNEE PAIN    Back pain     Diabetes mellitus     Diarrhea     Hyperlipemia     Hypertension     managed by cardiologist    Knee pain, bilateral     Neuropathy     OAB (overactive bladder)     Seasonal allergies     Sleep apnea     CPAP    Stroke (cerebrum) 2017    no residual    Thyroid disorder      Past Surgical History:   Procedure Laterality Date    COLONOSCOPY      ENDOSCOPY N/A 11/08/2021    Procedure: ESOPHAGOGASTRODUODENOSCOPY WITH COLD BIOPSY;  Surgeon: Mansi Kyle MD;  Location: [facility] ENDOSCOPY;  Service: General;  Laterality: N/A;  PRE: SCREENING FOR BARIATRICS  POST: MODERATE SIZE HIATAL HERNIA    GALLBLADDER SURGERY  1997    GASTRIC SLEEVE LAPAROSCOPIC N/A 02/14/2022    Procedure: GASTRIC SLEEVE LAPAROSCOPIC With VENTRALHernia Repair AND PARAESOPHAGEAL HERNIA REPAIR AND LYSIS OF ADHESIONS PARTICAL OMENTECTOMY;  Surgeon: Quang Shipley Jr., MD;  Location: [facility] OR [facility];  Service: Bariatric;  Laterality: N/A;    INCISION AND DRAINAGE OF WOUND Right 3/21/2023    Procedure: IRRIGATION & DEBRIDEMENT of right knee  wound;  Surgeon: Murray Calvillo MD;  Location: [facility] MAIN OR;  Service: Orthopedics;  Laterality: Right;    TONSILLECTOMY      TOTAL KNEE ARTHROPLASTY Right 2023    Procedure: RIGHT TOTAL KNEE ARTHROPLASTY WITH DANITA ROBOT AND SHIREEN;  Surgeon: Murray Calvillo MD;  Location: [facility] MAIN OR;  Service: Robotics - Ortho;  Laterality: Right;    TOTAL KNEE ARTHROPLASTY Left 10/17/2023    Procedure: LEFT TOTAL KNEE ARTHROPLASTY WITH JUAN LUIS NAVIGATION.;  Surgeon: Murray Calvillo MD;  Location: [facility] MAIN OR;  Service: Robotics - Ortho;  Laterality: Left;    TUBAL ABDOMINAL LIGATION       Allergies   Allergen Reactions    Adhesive Tape Rash     SKIN BREAKDOWN     Latex Rash     blisters     Social History     Socioeconomic History    Marital status:    Tobacco Use    Smoking status: Former     Packs/day: 1.00     Years: 20.00     Additional pack years: 0.00     Total pack years: 20.00     Types: Cigarettes     Quit date: 1997     Years since quittin.1    Smokeless tobacco: Never   Vaping Use    Vaping Use: Never used   Substance and Sexual Activity    Alcohol use: Not Currently    Drug use: No    Sexual activity: Defer     Family History   Problem Relation Age of Onset    Obesity Mother     Hypertension Mother     Cancer Mother     Arthritis Mother     Diabetes Mother     Obesity Sister     Hypertension Sister     Cancer Sister     Arthritis Sister     Diabetes Father     Diabetes Daughter     Malig Hyperthermia Neg Hx        Medications:  Prior to Admission medications    Medication Sig Start Date End Date Taking? Authorizing Provider   Accu-Chek Isamar Plus test strip  23  Yes Melanie Sebastian MD   ALPRAZolam (XANAX) 1 MG tablet Take 1 tablet by mouth 3 (Three) Times a Day As Needed for Anxiety.   Yes ProviderMelanie MD   amLODIPine-benazepril (LOTREL) 10-40 MG per capsule Take 1 capsule by mouth Daily. 23 Yes Quang Love MD   bumetanide (BUMEX) 1 MG tablet Take 1  tablet by mouth Daily As Needed (for lower extremity swelling). 1/10/23  Yes Yamel Torres APRN   dapagliflozin Propanediol (Farxiga) 10 MG tablet Take 10 mg by mouth Daily.   Yes Melanie Sebastian MD   Finerenone (Kerendia) 10 MG tablet Take 1 tablet by mouth Daily.   Yes Melanie Sebastian MD   fluticasone (FLONASE) 50 MCG/ACT nasal spray 1 spray into the nostril(s) as directed by provider As Needed. 1/25/23  Yes Melanie Sebastian MD   gabapentin (NEURONTIN) 800 MG tablet Take 1 tablet by mouth 3 (Three) Times a Day. 7/26/21  Yes Melanie Sebastian MD   HYDROcodone-acetaminophen (NORCO)  MG per tablet Take 1 tablet by mouth Every 8 (Eight) Hours As Needed for Moderate Pain. 2/6/23  Yes Antonio Morejon MD   levothyroxine (SYNTHROID, LEVOTHROID) 100 MCG tablet Take 1 tablet by mouth.   Yes Melanie Sebastian MD   Myrbetriq 25 MG tablet sustained-release 24 hour 24 hr tablet Take 1 tablet by mouth Every Night. 7/30/21  Yes ProviderMelanie MD   nystatin (MYCOSTATIN) 228475 UNIT/GM powder Apply  topically to the appropriate area as directed 3 (Three) Times a Day.  Patient taking differently: Apply 1 application  topically to the appropriate area as directed 3 (Three) Times a Day. 9/1/23  Yes Xenia Simon APRN   Pitavastatin Calcium 4 MG tablet Take 1 tablet by mouth Every 3 (Three) Days.  Patient taking differently: Take 1 tablet by mouth. Takes three times a week 1/10/23  Yes Yamel Torres APRN   potassium chloride (K-DUR,KLOR-CON) 20 MEQ CR tablet Take 1 tablet by mouth Daily. 6/28/23  Yes Melanie Sebastian MD   rOPINIRole (REQUIP) 0.5 MG tablet Take 1 tablet by mouth Every Night. 9/4/21  Yes Melanie Sebastian MD   topiramate (TOPAMAX) 25 MG tablet Take 1 tablet by mouth Every Night for 14 days, THEN 2 tablets Every Night for 76 days. 9/1/23 11/29/23 Yes Xenia Simon APRN   Vyzulta 0.024 % solution Administer 1 drop to both eyes Every Night. 10/3/22  " Yes Provider, MD Melanie      Current medications:  acetaminophen, 1,000 mg, Oral, Q6H  apixaban, 2.5 mg, Oral, Q12H  gabapentin, 800 mg, Oral, Q8H  levothyroxine, 100 mcg, Oral, Once per day on Mon Wed Fri  rOPINIRole, 0.5 mg, Oral, Nightly  sodium chloride, 10 mL, Intravenous, Q12H  topiramate, 50 mg, Oral, Nightly      Current IV drips:       Review of Systems   Constitutional: Negative for chills, fever and weight loss.   HENT:  Negative for congestion and nosebleeds.    Cardiovascular:  Negative for orthopnea and paroxysmal nocturnal dyspnea.   Respiratory:  Negative for cough and shortness of breath.    Endocrine: Negative for cold intolerance and heat intolerance.   Skin:  Negative for rash.   Musculoskeletal:  Negative for back pain and muscle weakness.   Gastrointestinal:  Negative for abdominal pain, nausea and vomiting.   Genitourinary:  Negative for dysuria and nocturia.   Neurological:  Negative for dizziness and light-headedness.   Psychiatric/Behavioral:  Negative for altered mental status and hallucinations.          Physical Exam    /47   Pulse (!) 48   Temp 97.5 °F (36.4 °C)   Resp 14   Ht 160 cm (63\")   Wt 98.3 kg (216 lb 11.4 oz)   SpO2 100%   BMI 38.39 kg/m²  Body mass index is 38.39 kg/m².   Oxygen saturation   @FLOWAN(10::1)@ SpO2  Min: 91 %  Max: 100 %    General Appearance:   no acute distress  Alert and oriented x3  HENT:   lips not cyanotic  Atraumatic  Neck:  thyroid not enlarged  supple  Respiratory:  no respiratory distress  normal breath sounds  no rales  Cardiovascular:  no jugular venous distention  regular rhythm  apical impulse normal  S1 normal, S2 normal  no S3, no S4   no murmur  no rub, no thrill  no carotid bruit  pedal pulses normal  lower extremity edema: none    Gastrointestinal:   bowel sounds normal  non-tender  no hepatomegaly, no splenomegaly  Musculoskeletal:  no clubbing of fingers.   normocephalic, head atraumatic  Skin:   warm, dry  No " amanda  Neuro/Psychiatric:  normal mood and affect  judgement and insight appropriate      Cardiographics:     ECG  (personally reviewed)    Telemetry:  (personally reviewed) sinus bradycardia   Results for orders placed in visit on 08/17/21    Adult Transthoracic Echo Complete W/ Cont if Necessary Per Protocol    Interpretation Summary  · Estimated left ventricular EF = 55% Left ventricular systolic function is normal.  · Left atrial enlargement mild  · Trace pericardial effusion  · Estimated right ventricular systolic pressure from tricuspid regurgitation is mildly elevated (35-45 mmHg).     Results for orders placed during the hospital encounter of 09/01/21    Stress Test With Myocardial Perfusion Two Day    Interpretation Summary  · Left ventricular ejection fraction is normal. (Calculated EF = 50%).  · Findings consistent with a normal ECG stress test.  · Myocardial perfusion imaging indicates a small-sized infarct located in the apex with no significant ischemia noted.  · Impressions are consistent with a low risk study.      No results found for this or any previous visit.      Cardiolite (Tc-99m Sestamibi) stress test   Lab Review:       CBC          2/6/2023    03:56 10/10/2023    10:33 10/18/2023    00:28   CBC   WBC 6.70  5.88  12.13    RBC 3.00  4.08  4.06    Hemoglobin 9.9  12.6  12.6    Hematocrit 29.8  39.7  39.5    MCV 99.3  97.3  97.3    MCH 33.0  30.9  31.0    MCHC 33.2  31.7  31.9    RDW 12.8  12.7  12.6    Platelets 142  168  124        CMP          2/5/2023    04:35 2/6/2023    03:56 10/10/2023    10:33   CMP   Glucose 86  86  83    BUN 14  17  16    Creatinine 0.66  0.67  0.90    EGFR 96.9  96.5  70.7    Sodium 137  138  137    Potassium 4.3  4.2  4.1    Chloride 102  103  103    Calcium 8.7  8.7  9.4    Total Protein   7.3    Albumin   4.0    Globulin   3.3    Total Bilirubin   0.5    Alkaline Phosphatase   69    AST (SGOT)   16    ALT (SGPT)   7    Albumin/Globulin Ratio   1.2   "  BUN/Creatinine Ratio 21.2  25.4  17.8    Anion Gap 7.5  7.8  8.6         CARDIAC LABS:       No results found for: \"DIGOXIN\"   Lab Results   Component Value Date    TSH 4.850 (H) 11/08/2021           Invalid input(s): \"LDLCALC\"  No results found for: \"POCTROP\"  No components found for: \"DDIMERQUAN\"  Lab Results   Component Value Date    MG 2.1 02/06/2023             CARDIAC LABS:         Assessment:    Primary osteoarthritis of left knee    Osteoarthrosis, localized, primary, knee, left    Osteoarthritis of left knee      Sinus bradycardia patient is asymptomatic would not recommend further treatment or evaluation did encourage patient to use her CPAP at nightIs well as avoiding rate control medications in the future    Plan:  1.  No further work-up needed patient can be discharged from a cardiac standpoint today would recommend use of CPAP at night during hospital stay.       Thank you for allowing us to share in [facility].            Quang Love MD   10/18/2023    08:54 EDT    "

## 2023-10-18 NOTE — PROGRESS NOTES
[facility]   Hospitalist Progress Note  Date: 10/18/2023  Patient Name: Marilin Martinez  : 1955  MRN: [id number]  Date of admission: 10/17/2023      Subjective   Subjective     Chief Complaint: Follow up for left knee pain    Summary: 68 y.o. female with HTN, HLD, T2DM, hypothyroidism, left knee OA who underwent elective left knee replacement on 10/17/2023.     Interval Followup:   Overnight, HR dropped into the 40s, /60s. HR improved into 60s upon awakening. No hypoxia  Feeling fine this morning, doing ROM exercises in bed  Pain controlled.  Tolerating oral intake.    Review of Systems  Respiratory:  No Cough, No Dyspnea  Gastrointestinal:  No Nausea, No Vomiting      Objective   Objective     Vitals:   Temp:  [94.3 °F (34.6 °C)-98.4 °F (36.9 °C)] 97.5 °F (36.4 °C)  Heart Rate:  [48-78] 48  Resp:  [13-18] 14  BP: ()/(40-65) 133/47  Flow (L/min):  [6] 6  FiO2 (%):  [41 %-56 %] 41 %  Physical Exam    Constitutional: Conversant, NAD   Eyes: Pupils equal and reactive, no conjunctival injections   HENT: NCAT, nares patent, MMM   Respiratory: Clear to auscultation bilaterally, nonlabored respirations    Cardiovascular: RRR, no murmurs, no edema   Gastrointestinal: Positive bowel sounds, soft, nontender, nondistended   Neurologic: Alert, Cranial Nerves grossly intact, speech clear   Skin: Extremities warm, no rashes or wounds    Result Review    Result Review:  I have personally reviewed the following over the last 24 hours (07:00 to 07:00) and agree with the following findings  [x]  Laboratory  CBC          2023    03:56 10/10/2023    10:33 10/18/2023    00:28   CBC   WBC 6.70  5.88  12.13    RBC 3.00  4.08  4.06    Hemoglobin 9.9  12.6  12.6    Hematocrit 29.8  39.7  39.5    MCV 99.3  97.3  97.3    MCH 33.0  30.9  31.0    MCHC 33.2  31.7  31.9    RDW 12.8  12.7  12.6    Platelets 142  168  124      BMP          2023    03:56 10/10/2023    10:33 10/18/2023    09:16   BMP   BUN 17  16   12    Creatinine 0.67  0.90  1.04    Sodium 138  137  134    Potassium 4.2  4.1  4.2    Chloride 103  103  103    CO2 27.2  25.4  21.2    Calcium 8.7  9.4  9.2      []  Microbiology  []  Radiology   [x]  EKG/Telemetry monitor personally reviewed: NSR/ sinus bracycardia HR lows 40s overnight while sleeping  []  Cardiology/Vascular   []  Pathology  []  Old records  [x]  Other:    Intake/Output Summary (Last 24 hours) at 10/18/2023 1025  Last data filed at 10/18/2023 0605  Gross per 24 hour   Intake 3154 ml   Output 650 ml   Net 2504 ml         Assessment & Plan   Assessment / Plan     Assessment/Plan:  Left knee OA  Status post left knee replacement  Sinus bradycardia, asymptomatic  1st degree AV block  Essential hypertension  HLD  Hypothyroidism  T2DM  Diabetic peripheral neuropathy  Chronic anxiety          Remains HDS. Is not on any rate control agents. Electrolytes WNL. Continue to monitor on telemetry  BP controlled -> continue to hold antihypertensives agents  Continue Tylenol 1000 mg every 6 hours   Continue as needed p.o./IV narcotic regimen per orders  Continue bowel regimen  Continue Gabapentin 800 mg q8 hours   Continue Synthroid  Continue PT/OT  Continue Eliquis 2.5 mg every 12 hours for DVT prophylaxis   H/H, BMP in AM    Dispo: Pending PT recommendation     Discussed plan with RN.    DVT prophylaxis:  Medical and mechanical DVT prophylaxis orders are present.    CODE STATUS:   Code Status (Patient has no pulse and is not breathing): CPR (Attempt to Resuscitate)  Medical Interventions (Patient has pulse or is breathing): Full Support        Electronically signed by Sheldon Collier DO, 10/18/23, 10:24 AM EDT.

## 2023-10-18 NOTE — NURSING NOTE
This RN notified hospitalist PA in regards to pt. Low HR/EKG results.  BP soft at times, otherwise pt. Asymptomatic. Orders received to hold tonight's dose of gabapentin and requip. Hospitalist PA notifed Dr. Love for cardiology consult. Orders later received to transfer pt. to room 509.

## 2023-10-18 NOTE — THERAPY TREATMENT NOTE
Acute Care - Physical Therapy Treatment Note   Radha     Patient Name: Marilin Martinez  : 1955  MRN: [id number]  Today's Date: 10/18/2023      Visit Dx:     ICD-10-CM ICD-9-CM   1. Difficulty walking  R26.2 719.7   2. Osteoarthritis of left knee, unspecified osteoarthritis type  M17.12 715.96   3. Decreased activities of daily living (ADL)  Z78.9 V49.89     Patient Active Problem List   Diagnosis    Environmental and seasonal allergies    Essential hypertension    Sleep apnea    Multiple joint pain    Hypothyroid    Diabetes mellitus type 2 in obese    Dyslipidemia    S/P laparoscopic sleeve gastrectomy    Primary osteoarthritis of right knee    Primary osteoarthritis of left knee    Candidal intertrigo    Excessive and redundant skin and subcutaneous tissue    Obesity, Class II, BMI 35-39.9    Osteoarthrosis, localized, primary, knee, right    Open knee wound    Wound infection after surgery     I&D of right knee wound 3/21/23    Osteoarthrosis, localized, primary, knee, left    Osteoarthritis of left knee     Past Medical History:   Diagnosis Date    Arthritis     KNEE PAIN    Back pain     Diabetes mellitus     Diarrhea     Hyperlipemia     Hypertension     managed by cardiologist    Knee pain, bilateral     Neuropathy     OAB (overactive bladder)     Seasonal allergies     Sleep apnea     CPAP    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    Thyroid disorder      Past Surgical History:   Procedure Laterality Date    COLONOSCOPY      ENDOSCOPY N/A 2021    Procedure: ESOPHAGOGASTRODUODENOSCOPY WITH COLD BIOPSY;  Surgeon: Mansi Kyle MD;  Location: [facility];  Service: General;  Laterality: N/A;  PRE: SCREENING FOR BARIATRICS  POST: MODERATE SIZE HIATAL HERNIA    GALLBLADDER SURGERY      GASTRIC SLEEVE LAPAROSCOPIC N/A 2022    Procedure: GASTRIC SLEEVE LAPAROSCOPIC With VENTRALHernia Repair AND PARAESOPHAGEAL HERNIA REPAIR AND LYSIS OF ADHESIONS PARTICAL OMENTECTOMY;  Surgeon:  Quang Shipley Jr., MD;  Location: [facility];  Service: Bariatric;  Laterality: N/A;    INCISION AND DRAINAGE OF WOUND Right 3/21/2023    Procedure: IRRIGATION & DEBRIDEMENT of right knee wound;  Surgeon: Murray Calvillo MD;  Location: [facility];  Service: Orthopedics;  Laterality: Right;    TONSILLECTOMY      TOTAL KNEE ARTHROPLASTY Right 1/31/2023    Procedure: RIGHT TOTAL KNEE ARTHROPLASTY WITH DANITA ROBOT AND SHIREEN;  Surgeon: Murray Calvillo MD;  Location: [facility];  Service: Robotics - Ortho;  Laterality: Right;    TOTAL KNEE ARTHROPLASTY Left 10/17/2023    Procedure: LEFT TOTAL KNEE ARTHROPLASTY WITH JUAN LUIS NAVIGATION.;  Surgeon: Murray Calvillo MD;  Location: [facility];  Service: Robotics - Ortho;  Laterality: Left;    TUBAL ABDOMINAL LIGATION       PT Assessment (last 12 hours)       PT Evaluation and Treatment       Row Name 10/18/23 1400 10/18/23 1100       Physical Therapy Time and Intention    Subjective Information no complaints  -DP no complaints  -DP    Document Type therapy note (daily note)  -DP evaluation  -DP    Mode of Treatment individual therapy;physical therapy  -DP individual therapy;physical therapy  -DP    Patient Effort good  -DP excellent  -DP    Symptoms Noted During/After Treatment -- fatigue  -DP      Row Name 10/18/23 1100          General Information    Patient Profile Reviewed yes  -DP     Patient Observations alert;cooperative;agree to therapy  -DP     Prior Level of Function independent:;gait;transfer;bed mobility;ADL's;home management  -DP     Existing Precautions/Restrictions other (see comments)  Weight bearing as tolerated  -DP     Equipment Issued to Patient walker, front wheeled  -DP     Barriers to Rehab none identified  -DP       Row Name 10/18/23 1100          Living Environment    Current Living Arrangements home  -DP     Home Accessibility wheelchair accessible  -DP     People in Home alone  -DP     Primary Care Provided by self  -DP        Row Name 10/18/23 1100          Cognition    Orientation Status (Cognition) oriented x 4  -DP       Row Name 10/18/23 1100          Range of Motion Comprehensive    General Range of Motion bilateral lower extremity ROM WFL  -DP     Comment, General Range of Motion L knee AROM 0-80  -DP       Row Name 10/18/23 1100          Strength (Manual Muscle Testing)    Strength (Manual Muscle Testing) bilateral lower extremities  Strength grossly 4/5 and greater, except L dorsiflexion, knee flexion, and knee extension 4-/5.  -DP       Row Name 10/18/23 1100          Mobility    Extremity Weight-bearing Status left lower extremity  -DP     Left Lower Extremity (Weight-bearing Status) weight-bearing as tolerated (WBAT)  -DP       Row Name 10/18/23 1100          Bed Mobility    Bed Mobility supine-sit  -DP     Supine-Sit [place] (Bed Mobility) minimum assist (75% patient effort)  -DP       Row Name 10/18/23 1100          Transfers    Transfers sit-stand transfer;stand-sit transfer  -DP       Row Name 10/18/23 1400 10/18/23 1100       Sit-Stand Transfer    Sit-Stand [place] (Transfers) contact guard  -DP contact guard  -DP    Assistive Device (Sit-Stand Transfers) walker, front-wheeled  -DP walker, front-wheeled  -DP      Row Name 10/18/23 1100          Stand-Sit Transfer    Stand-Sit [place] (Transfers) contact guard  -DP     Assistive Device (Stand-Sit Transfers) walker, front-wheeled  -DP       Row Name 10/18/23 1400 10/18/23 1100       Gait/Stairs (Locomotion)    Gait/Stairs Locomotion gait/ambulation assistive device  -DP gait/ambulation assistive device  -DP    [place] Level (Gait) contact guard  -DP contact guard  -DP    Assistive Device (Gait) walker, front-wheeled  -DP walker, front-wheeled  -DP    Patient was able to Ambulate -- yes  -DP    Distance in Feet (Gait) 60  -DP 70  -DP    Pattern (Gait) step-through  -DP step-through  -DP      Row Name 10/18/23 1100          Safety Issues, Functional  Mobility    Impairments Affecting Function (Mobility) endurance/activity tolerance;range of motion (ROM);strength;pain  -DP       Row Name 10/18/23 1100          Balance    Balance Assessment standing dynamic balance  -DP     Dynamic Standing Balance contact guard  -DP     Position/Device Used, Standing Balance walker, rolling  -DP       Row Name             Wound 10/17/23 1447 Left anterior knee Incision    Wound - Properties Group Placement Date: 10/17/23  -BW Placement Time: 1447  -BW Present on Original Admission: N  -BW Side: Left  -BW Orientation: anterior  -BW Location: knee  -BW Primary Wound Type: Incision  -BW    Retired Wound - Properties Group Placement Date: 10/17/23  -BW Placement Time: 1447  -BW Present on Original Admission: N  -BW Side: Left  -BW Orientation: anterior  -BW Location: knee  -BW Primary Wound Type: Incision  -BW    Retired Wound - Properties Group Date first assessed: 10/17/23  -BW Time first assessed: 1447  -BW Present on Original Admission: N  -BW Side: Left  -BW Location: knee  -BW Primary Wound Type: Incision  -BW      Row Name 10/18/23 1100          Plan of Care Review    Plan of Care Reviewed With patient  -DP     Outcome Evaluation Pt presents with impairments in strength and ROM affecting mobility secondary to L TKA. She will benefit from skilled inpatient PT services. We recommend discharge home with home health.  -DP       Row Name 10/18/23 1100          Therapy Assessment/Plan (PT)    Rehab Potential (PT) good, to achieve stated therapy goals  -DP     Criteria for Skilled Interventions Met (PT) yes;meets criteria;skilled treatment is necessary  -DP     Therapy Frequency (PT) 2 times/day  -DP     Predicted Duration of Therapy Intervention (PT) 10 days  -DP     Problem List (PT) mobility;range of motion (ROM);strength;pain;balance  -DP     Activity Limitations Related to Problem List (PT) unable to ambulate safely;unable to transfer safely;home management activity not  performed adequately or safely  -DP       Row Name 10/18/23 1100          PT Evaluation Complexity    History, PT Evaluation Complexity 3 or more personal factors and/or comorbidities  -DP     Examination of Body Systems (PT Eval Complexity) 1-2 elements  -DP     Clinical Presentation (PT Evaluation Complexity) stable  -DP     Clinical Decision Making (PT Evaluation Complexity) low complexity  -DP     Overall Complexity (PT Evaluation Complexity) low complexity  -DP       Row Name 10/18/23 1400          Progress Summary (PT)    Progress Toward Functional Goals (PT) progress toward functional goals is good  -DP       Row Name 10/18/23 1100          Physical Therapy Goals    Bed Mobility Goal Selection (PT) bed mobility, PT goal 1  -DP     Transfer Goal Selection (PT) transfer, PT goal 1  -DP     Gait Training Goal Selection (PT) gait training, PT goal 1  -DP       Row Name 10/18/23 1100          Bed Mobility Goal 1 (PT)    Activity/Assistive Device (Bed Mobility Goal 1, PT) supine to sit  -DP     [place] Level/Cues Needed (Bed Mobility Goal 1, PT) standby assist  -DP     Time Frame (Bed Mobility Goal 1, PT) 10 days  -DP       Row Name 10/18/23 1100          Transfer Goal 1 (PT)    Activity/Assistive Device (Transfer Goal 1, PT) sit-to-stand/stand-to-sit  -DP     [place] Level/Cues Needed (Transfer Goal 1, PT) standby assist  -DP     Time Frame (Transfer Goal 1, PT) 10 days  -DP       Row Name 10/18/23 1100          Gait Training Goal 1 (PT)    Activity/Assistive Device (Gait Training Goal 1, PT) assistive device use;increase endurance/gait distance;walker, rolling  -DP     [place] Level (Gait Training Goal 1, PT) independent  -DP     Distance (Gait Training Goal 1, PT) 300  -DP     Time Frame (Gait Training Goal 1, PT) 10 days  -DP               User Key  (r) = Recorded By, (t) = Taken By, (c) = Cosigned By      Initials Name Provider Type    Thaddeus Mera, RN Registered Nurse    AUREA Foote  Brissa PASTRANA PT Physical Therapist                  Bilateral Knee Ther-ex   Exercise  Reps  Sets    Long arc Quads   10 2        Heel Slides  10 2                             PT Recommendation and Plan  Anticipated Discharge Disposition (PT): home with home health  Planned Therapy Interventions (PT): bed mobility training, gait training, balance training, strengthening, transfer training, ROM (range of motion)  Therapy Frequency (PT): 2 times/day  Progress Summary (PT)  Progress Toward Functional Goals (PT): progress toward functional goals is good  Plan of Care Reviewed With: patient  Outcome Evaluation: Pt presents with impairments in strength and ROM affecting mobility secondary to L TKA. She will benefit from skilled inpatient PT services. We recommend discharge home with home health.   Outcome Measures       Row Name 10/18/23 1100             How much help from another person do you currently need...    Turning from your back to your side while in flat bed without using bedrails? 4  -DP      Moving from lying on back to sitting on the side of a flat bed without bedrails? 3  -DP      Moving to and from a bed to a chair (including a wheelchair)? 3  -DP      Standing up from a chair using your arms (e.g., wheelchair, bedside chair)? 3  -DP      Climbing 3-5 steps with a railing? 3  -DP      To walk in hospital room? 3  -DP      AM-PAC 6 Clicks Score (PT) 19  -DP      Highest level of mobility 6 --> Walked 10 steps or more  -DP                User Key  (r) = Recorded By, (t) = Taken By, (c) = Cosigned By      Initials Name Provider Type    Brissa Gambino, PT Physical Therapist                     Time Calculation:    PT Charges       Row Name 10/18/23 9306 10/18/23 1148          Time Calculation    PT Received On 10/18/23  -DP 10/18/23  -DP     PT Goal Re-Cert Due Date -- 10/27/23  -DP        Timed Charges    99793 - PT Therapeutic Exercise Minutes 12  -DP 30  -DP     49671 - Gait Training Minutes  8  -DP 10  -DP         Untimed Charges    PT Eval/Re-eval Minutes -- 25  -DP        Total Minutes    Timed Charges Total Minutes 20  -DP 40  -DP     Untimed Charges Total Minutes -- 25  -DP      Total Minutes 20  -DP 65  -DP               User Key  (r) = Recorded By, (t) = Taken By, (c) = Cosigned By      Initials Name Provider Type    DP Brissa Foote, PT Physical Therapist                  Therapy Charges for Today       Code Description Service Date Service Provider Modifiers Qty    95479817610 HC PT EVAL LOW COMPLEXITY 2 10/18/2023 Brissa Foote, PT GP 1    12431704064 HC PT THER PROC EA 15 MIN 10/18/2023 Brissa Foote, PT GP 2    75237649999 HC GAIT TRAINING EA 15 MIN 10/18/2023 Brissa Foote, PT GP 1            PT G-Codes  Outcome Measure Options: AM-PAC 6 Clicks Daily Activity (OT), Optimal Instrument  AM-PAC 6 Clicks Score (PT): 19  AM-PAC 6 Clicks Score (OT): 18    Brissa Foote PT  10/18/2023

## 2023-10-18 NOTE — THERAPY EVALUATION
Patient Name: Marilin Martinez  : 1955    MRN: [id number]                              Today's Date: 10/18/2023       Admit Date: 10/17/2023    Visit Dx:     ICD-10-CM ICD-9-CM   1. Difficulty walking  R26.2 719.7   2. Osteoarthritis of left knee, unspecified osteoarthritis type  M17.12 715.96   3. Decreased activities of daily living (ADL)  Z78.9 V49.89     Patient Active Problem List   Diagnosis    Environmental and seasonal allergies    Essential hypertension    Sleep apnea    Multiple joint pain    Hypothyroid    Diabetes mellitus type 2 in obese    Dyslipidemia    S/P laparoscopic sleeve gastrectomy    Primary osteoarthritis of right knee    Primary osteoarthritis of left knee    Candidal intertrigo    Excessive and redundant skin and subcutaneous tissue    Obesity, Class II, BMI 35-39.9    Osteoarthrosis, localized, primary, knee, right    Open knee wound    Wound infection after surgery     I&D of right knee wound 3/21/23    Osteoarthrosis, localized, primary, knee, left    Osteoarthritis of left knee     Past Medical History:   Diagnosis Date    Arthritis     KNEE PAIN    Back pain     Diabetes mellitus     Diarrhea     Hyperlipemia     Hypertension     managed by cardiologist    Knee pain, bilateral     Neuropathy     OAB (overactive bladder)     Seasonal allergies     Sleep apnea     CPAP    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    Thyroid disorder      Past Surgical History:   Procedure Laterality Date    COLONOSCOPY      ENDOSCOPY N/A 2021    Procedure: ESOPHAGOGASTRODUODENOSCOPY WITH COLD BIOPSY;  Surgeon: Mansi Kyle MD;  Location: [facility];  Service: General;  Laterality: N/A;  PRE: SCREENING FOR BARIATRICS  POST: MODERATE SIZE HIATAL HERNIA    GALLBLADDER SURGERY      GASTRIC SLEEVE LAPAROSCOPIC N/A 2022    Procedure: GASTRIC SLEEVE LAPAROSCOPIC With VENTRALHernia Repair AND PARAESOPHAGEAL HERNIA REPAIR AND LYSIS OF ADHESIONS PARTICAL OMENTECTOMY;  Surgeon:  Quang Shipley Jr., MD;  Location: [facility];  Service: Bariatric;  Laterality: N/A;    INCISION AND DRAINAGE OF WOUND Right 3/21/2023    Procedure: IRRIGATION & DEBRIDEMENT of right knee wound;  Surgeon: Murray Calvillo MD;  Location: [facility];  Service: Orthopedics;  Laterality: Right;    TONSILLECTOMY      TOTAL KNEE ARTHROPLASTY Right 1/31/2023    Procedure: RIGHT TOTAL KNEE ARTHROPLASTY WITH DANITA ROBOT AND SHIREEN;  Surgeon: Murray Calvillo MD;  Location: [facility] OR;  Service: Robotics - Ortho;  Laterality: Right;    TOTAL KNEE ARTHROPLASTY Left 10/17/2023    Procedure: LEFT TOTAL KNEE ARTHROPLASTY WITH JUAN LUIS NAVIGATION.;  Surgeon: Murray Calvillo MD;  Location: [facility];  Service: Robotics - Ortho;  Laterality: Left;    TUBAL ABDOMINAL LIGATION        General Information       Row Name 10/18/23 1290 10/18/23 7454       OT Time and Intention    Document Type therapy note (daily note)  - evaluation  -    Mode of Treatment individual therapy;occupational therapy  - individual therapy;occupational therapy  -      Row Name 10/18/23 1418          General Information    Patient Profile Reviewed yes  -     Prior Level of Function --  (I) with ADLs, ambulated with a quad cane but has RW to use at d/c, showers at her gym which has a walk-in shower with shower chair, elevated commode, stands to groom, drives, and no home O2.  -     Existing Precautions/Restrictions fall;weight bearing  WBAT LLE  -     Barriers to Rehab none identified  -       Row Name 10/18/23 2498          Occupational Profile    Reason for Services/Referral (Occupational Profile) Patient is a 68 year old female who is currently status post on left total knee replacement on October 17th, 2023. Occupational therapy consulted due to recent decline in ADLs/functional transfers. No previous occupational therapy services for current condition.  -       Row Name 10/18/23 2717          Living Environment     People in Home alone  Daughter is able to assist at d/c  -LF       Row Name 10/18/23 1350          Home Main Entrance    Number of Stairs, Main Entrance none  Ramp  -LF       Row Name 10/18/23 1350          Cognition    Orientation Status (Cognition) oriented x 4  -LF       Row Name 10/18/23 [facility]0          Safety Issues, Functional Mobility    Impairments Affecting Function (Mobility) balance;endurance/activity tolerance;pain  -LF               User Key  (r) = Recorded By, (t) = Taken By, (c) = Cosigned By      Initials Name Provider Type    LF Court Jarquin OT Occupational Therapist                     Mobility/ADL's       Row Name 10/18/23 [facility]8 10/18/23 1353       Bed Mobility    Comment, (Bed Mobility) Patient upright and seated in recliner upon therapist arrival.  -LF Patient upright and seated in recliner upon therapist arrival.  -LF      Row Name 10/18/23 [facility] 10/18/23 1353       Transfers    Transfers sit-stand transfer;stand-sit transfer  -LF sit-stand transfer;stand-sit transfer  -LF      Row Name 10/18/23 [facility]8 10/18/23 [facility]3       Sit-Stand Transfer    Sit-Stand [place] (Transfers) contact guard  -LF contact guard  -LF    Assistive Device (Sit-Stand Transfers) walker, front-wheeled  -LF walker, front-wheeled  -LF      Row Name 10/18/23 [facility] 10/18/23 1353       Stand-Sit Transfer    Stand-Sit [place] (Transfers) contact guard  -LF contact guard  -LF    Assistive Device (Stand-Sit Transfers) walker, front-wheeled  -LF walker, front-wheeled  -LF      Row Name 10/18/23 [facility] 10/18/23 1353       Functional Mobility    Functional Mobility- Ind. Level contact guard assist  -LF contact guard assist  -LF    Functional Mobility- Device walker, front-wheeled  -LF walker, front-wheeled  -LF    Functional Mobility- Comment Patient completed functional mobility to the sink in preparation for grooming and back with CGA using RW.  -LF Patient completed functional mobility to the sink in preparation for grooming  and back with CGA using RW.  -      Row Name 10/18/23 [facility]8 10/18/23 1353       Activities of Daily Living    BADL Assessment/Intervention upper body dressing;lower body dressing  - bathing;upper body dressing;lower body dressing;grooming;feeding;toileting  -      Row Name 10/18/23 [facility]8 10/18/23 1353       Mobility    Extremity Weight-bearing Status left lower extremity  -LF left lower extremity  -LF    Left Lower Extremity (Weight-bearing Status) weight-bearing as tolerated (WBAT)  -LF weight-bearing as tolerated (WBAT)  -      Row Name 10/18/23 [facility] 10/18/23 [facility]3       Lower Body Dressing Assessment/Training    [place] Level (Lower Body Dressing) -- lower body dressing skills;moderate assist (50% patient effort)  -    Comment, (Lower Body Dressing) Educated patient on lower body adaptive dressing technique, verified learning via teachback.  -LF --      Row Name 10/18/23 [facility] 10/18/23 [facility]3       Upper Body Dressing Assessment/Training    [place] Level (Upper Body Dressing) upper body dressing skills;don;pull-over garment;set up  -LF upper body dressing skills;set up  -LF    Position (Upper Body Dressing) unsupported sitting  - --      Row Name 10/18/23 1353          Bathing Assessment/Intervention    [place] Level (Bathing) bathing skills;upper body;set up;lower body;moderate assist (50% patient effort)  -       Row Name 10/18/23 1353          Self-Feeding Assessment/Training    [place] Level (Feeding) feeding skills;set up  -       Row Name 10/18/23 [facility] 10/18/23 [facility]3       Grooming Assessment/Training    [place] Level (Grooming) grooming skills;oral care regimen;set up  -LF grooming skills;set up  -LF    Position (Grooming) supported standing;sink side  - --      Row Name 10/18/23 1353          Toileting Assessment/Training    [place] Level (Toileting) toileting skills;moderate assist (50% patient effort)  -               User Key  (r) = Recorded By, (t) = Taken  By, (c) = Cosigned By      Initials Name Provider Type     Court Jarquin OT Occupational Therapist                   Obj/Interventions       Row Name 10/18/23 1354          Sensory Assessment (Somatosensory)    Sensory Assessment (Somatosensory) UE sensation intact  -LF       Row Name 10/18/23 1354          Vision Assessment/Intervention    Visual Impairment/Limitations WFL  -LF       Row Name 10/18/23 1354          Range of Motion Comprehensive    General Range of Motion bilateral upper extremity ROM WFL  -LF       Row Name 10/18/23 1354          Strength Comprehensive (MMT)    Comment, General Manual Muscle Testing (MMT) Assessment 4/5 BUEs  -LF       Row Name 10/18/23 1354          Motor Skills    Motor Skills coordination;functional endurance  -LF     Coordination bilateral;upper extremity;WFL  -LF     Functional Endurance Fair/fair+  -LF       Row Name 10/18/23 1359 10/18/23 1354       Balance    Balance Assessment sitting dynamic balance;standing dynamic balance  -LF sitting dynamic balance;standing dynamic balance  -LF    Dynamic Sitting Balance independent  -LF independent  -LF    Position, Sitting Balance unsupported;sitting in chair  -LF unsupported;sitting in chair  -LF    Dynamic Standing Balance contact guard  -LF contact guard  -LF    Position/Device Used, Standing Balance supported;walker, front-wheeled  -LF supported;walker, front-wheeled  -LF    Balance Interventions sitting;standing;sit to stand;supported;dynamic;occupation based/functional task;weight shifting activity  -LF --              User Key  (r) = Recorded By, (t) = Taken By, (c) = Cosigned By      Initials Name Provider Type     Court Jarquin OT Occupational Therapist                   Goals/Plan       Row Name 10/18/23 1350          Bed Mobility Goal 1 (OT)    Activity/Assistive Device (Bed Mobility Goal 1, OT) bed mobility activities, all  -LF     [place] Level/Cues Needed (Bed Mobility Goal 1, OT) modified independence   -LF     Time Frame (Bed Mobility Goal 1, OT) long term goal (LTG);10 days  -LF       Row Name 10/18/23 [facility]6          Transfer Goal 1 (OT)    Activity/Assistive Device (Transfer Goal 1, OT) transfers, all  -LF     [place] Level/Cues Needed (Transfer Goal 1, OT) modified independence  -LF     Time Frame (Transfer Goal 1, OT) long term goal (LTG);10 days  -LF       Row Name 10/18/23 [facility]          Bathing Goal 1 (OT)    Activity/Device (Bathing Goal 1, OT) bathing skills, all  -LF     [place] Level/Cues Needed (Bathing Goal 1, OT) modified independence  -LF     Time Frame (Bathing Goal 1, OT) long term goal (LTG);10 days  -LF       Row Name 10/18/23 [facility]6          Dressing Goal 1 (OT)    Activity/Device (Dressing Goal 1, OT) dressing skills, all  -LF     [place]/Cues Needed (Dressing Goal 1, OT) modified independence  -LF     Time Frame (Dressing Goal 1, OT) long term goal (LTG);10 days  -LF       Row Name 10/18/23 [facility]          Toileting Goal 1 (OT)    Activity/Device (Toileting Goal 1, OT) toileting skills, all  -LF     [place] Level/Cues Needed (Toileting Goal 1, OT) modified independence  -LF     Time Frame (Toileting Goal 1, OT) long term goal (LTG);10 days  -LF       Row Name 10/18/23 [facility]          Problem Specific Goal 1 (OT)    Problem Specific Goal 1 (OT) Patient will demonstrate good endurance to support ADLs/functional transfers.  -LF     Time Frame (Problem Specific Goal 1, OT) long term goal (LTG);10 days  -LF       Row Name 10/18/23 [facility]          Therapy Assessment/Plan (OT)    Planned Therapy Interventions (OT) activity tolerance training;BADL retraining;functional balance retraining;occupation/activity based interventions;patient/caregiver education/training;strengthening exercise;transfer/mobility retraining  -LF               User Key  (r) = Recorded By, (t) = Taken By, (c) = Cosigned By      Initials Name Provider Type    Court Prater, OT Occupational Therapist                    Clinical Impression       Row Name 10/18/23 1358          Pain Assessment    Additional Documentation Pain Scale: FACES Pre/Post-Treatment (Group)  -LF       Row Name 10/18/23 5667          Pain Scale: FACES Pre/Post-Treatment    Pain: FACES Scale, Pretreatment 2-->hurts little bit  -LF     Posttreatment Pain Rating 2-->hurts little bit  -LF     Pain Location - Side/Orientation Left  -LF     Pain Location - knee  -LF       Row Name 10/18/23 1359          Plan of Care Review    Plan of Care Reviewed With patient  -LF     Outcome Evaluation Patient presents with limitations in self-care, functional transfers, balance, and endurance. She would benefit from continued skilled occupational therapy services to maximize independence with ADLs/functional transfers.  -       Row Name 10/18/23 8437          Therapy Assessment/Plan (OT)    Patient/Family Therapy Goal Statement (OT) To walk with less pain.  -     Rehab Potential (OT) good, to achieve stated therapy goals  -     Criteria for Skilled Therapeutic Interventions Met (OT) yes;meets criteria;skilled treatment is necessary  -     Therapy Frequency (OT) 5 times/wk  -       Row Name 10/18/23 0616          Therapy Plan Review/Discharge Plan (OT)    Anticipated Discharge Disposition (OT) home with outpatient therapy services;home with assist  -       Row Name 10/18/23 2263          Vital Signs    O2 Delivery Pre Treatment room air  -LF     O2 Delivery Intra Treatment room air  -LF     O2 Delivery Post Treatment room air  -LF               User Key  (r) = Recorded By, (t) = Taken By, (c) = Cosigned By      Initials Name Provider Type     Court Jarquin, OT Occupational Therapist                   Outcome Measures       Row Name 10/18/23 0218          How much help from another is currently needed...    Putting on and taking off regular lower body clothing? 2  -LF     Bathing (including washing, rinsing, and drying) 2  -LF     Toileting (which includes  using toilet bed pan or urinal) 2  -LF     Putting on and taking off regular upper body clothing 4  -LF     Taking care of personal grooming (such as brushing teeth) 4  -LF     Eating meals 4  -LF     AM-PAC 6 Clicks Score (OT) 18  -LF       Row Name 10/18/23 1100 10/18/23 0800       How much help from another person do you currently need...    Turning from your back to your side while in flat bed without using bedrails? 4  -DP 3  -JR    Moving from lying on back to sitting on the side of a flat bed without bedrails? 3  -DP 3  -JR    Moving to and from a bed to a chair (including a wheelchair)? 3  -DP 3  -JR    Standing up from a chair using your arms (e.g., wheelchair, bedside chair)? 3  -DP 3  -JR    Climbing 3-5 steps with a railing? 3  -DP 3  -JR    To walk in hospital room? 3  -DP 3  -JR    AM-PAC 6 Clicks Score (PT) 19  -DP 18  -JR    Highest level of mobility 6 --> Walked 10 steps or more  -DP 6 --> Walked 10 steps or more  -JR      Row Name 10/18/23 1356          Functional Assessment    Outcome Measure Options AM-PAC 6 Clicks Daily Activity (OT);Optimal Instrument  -LF       Row Name 10/18/23 1356          Optimal Instrument    Optimal Instrument Optimal - 3  -LF     Bending/Stooping 3  -LF     Standing 2  -LF     Reaching 1  -LF     From the list, choose the 3 activities you would most like to be able to do without any difficulty Bending/stooping;Standing;Reaching  -LF     Total Score Optimal - 3 6  -LF               User Key  (r) = Recorded By, (t) = Taken By, (c) = Cosigned By      Initials Name Provider Type    LF Court Jarquin, OT Occupational Therapist    Brissa Gambino, PT Physical Therapist    JR Lashay Valerio, RN Registered Nurse                    Occupational Therapy Education       Title: PT OT SLP Therapies (Done)       Topic: Occupational Therapy (Done)       Point: ADL training (Done)       Description:   Instruct learner(s) on proper safety adaptation and remediation techniques  during self care or transfers.   Instruct in proper use of assistive devices.                  Learning Progress Summary             Patient Acceptance, E,TB, VU by  at 10/18/2023 1357                         Point: Precautions (Done)       Description:   Instruct learner(s) on prescribed precautions during self-care and functional transfers.                  Learning Progress Summary             Patient Acceptance, E,TB, VU by  at 10/18/2023 1357                         Point: Body mechanics (Done)       Description:   Instruct learner(s) on proper positioning and spine alignment during self-care, functional mobility activities and/or exercises.                  Learning Progress Summary             Patient Acceptance, E,TB, VU by  at 10/18/2023 1357                                         User Key       Initials Effective Dates Name Provider Type Discipline     06/16/21 -  Court Jarquin OT Occupational Therapist OT                  OT Recommendation and Plan  Planned Therapy Interventions (OT): activity tolerance training, BADL retraining, functional balance retraining, occupation/activity based interventions, patient/caregiver education/training, strengthening exercise, transfer/mobility retraining  Therapy Frequency (OT): 5 times/wk  Plan of Care Review  Plan of Care Reviewed With: patient  Outcome Evaluation: Patient presents with limitations in self-care, functional transfers, balance, and endurance. She would benefit from continued skilled occupational therapy services to maximize independence with ADLs/functional transfers.     Time Calculation:   Evaluation Complexity (OT)  Review Occupational Profile/Medical/Therapy History Complexity: brief/low complexity  Assessment, Occupational Performance/Identification of Deficit Complexity: 1-3 performance deficits  Clinical Decision Making Complexity (OT): problem focused assessment/low complexity  Overall Complexity of Evaluation (OT): low  complexity     Time Calculation- OT       Row Name 10/18/23 1357             Time Calculation- OT    OT Received On 10/18/23  -LF      OT Goal Re-Cert Due Date 10/27/23  -LF         Timed Charges    41896 - OT Therapeutic Activity Minutes 10  -LF      53437 - OT Self Care/Mgmt Minutes 20  -LF         Untimed Charges    OT Eval/Re-eval Minutes 20  -LF         Total Minutes    Timed Charges Total Minutes 30  -LF      Untimed Charges Total Minutes 20  -LF       Total Minutes 50  -LF                User Key  (r) = Recorded By, (t) = Taken By, (c) = Cosigned By      Initials Name Provider Type    LF Court Jarquin OT Occupational Therapist                  Therapy Charges for Today       Code Description Service Date Service Provider Modifiers Qty    41397231833 HC OT THERAPEUTIC ACT EA 15 MIN 10/18/2023 Court Jarquin OT GO 1    61243943945 HC OT SELF CARE/MGMT/TRAIN EA 15 MIN 10/18/2023 Court Jarquin OT GO 1    34731589548 HC OT EVAL LOW COMPLEXITY 2 10/18/2023 Court Jarquin OT GO 1                 Court Jarquin OT  10/18/2023

## 2023-10-18 NOTE — PLAN OF CARE
Problem: Adult Inpatient Plan of Care  Goal: Plan of Care Review  Outcome: Ongoing, Progressing  Flowsheets (Taken 10/18/2023 1607)  Plan of Care Reviewed With: patient  Outcome Evaluation: Pt ambulating and up in chair this shift. Pain controlled throughout shift with scheduled medication. Family at bedside. Pt tolerating diet well. No immedaite concerns at this time. Lashay Valerio, RN.  Goal: Patient-Specific Goal (Individualized)  Outcome: Ongoing, Progressing  Goal: Absence of Hospital-Acquired Illness or Injury  Outcome: Ongoing, Progressing  Intervention: Identify and Manage Fall Risk  Description: Perform standard risk assessment on admission using a validated tool or comprehensive approach appropriate to the patient; reassess fall risk frequently, with change in status or transfer to another level of care.  Communicate fall injury risk to interprofessional healthcare team.  Determine need for increased observation, equipment and environmental modification, such as low bed, signage and supportive, nonskid footwear.  Adjust safety measures to individual developmental age, stage and identified risk factors.  Reinforce the importance of safety and physical activity with patient and family.  Perform regular intentional rounding to assess need for position change, pain assessment and personal needs, including assistance with toileting.  Recent Flowsheet Documentation  Taken 10/18/2023 0800 by Lashay Valerio, RN  Safety Promotion/Fall Prevention: safety round/check completed  Intervention: Prevent Skin Injury  Description: Perform a screening for skin injury risk, such as pressure or moisture associated skin damage on admission and at regular intervals throughout hospital stay.  Keep all areas of skin (especially folds) clean and dry.  Maintain adequate skin hydration.  Relieve and redistribute pressure and protect bony prominences; implement measures based on patient-specific risk factors.  Match turning and  repositioning schedule to clinical condition.  Encourage weight shift frequently; assist with reposition if unable to complete independently.  Float heels off bed; avoid pressure on the Achilles tendon.  Keep skin free from extended contact with medical devices.  Encourage functional activity and mobility, as early as tolerated.  Use aids (e.g., slide boards, mechanical lift) during transfer.  Recent Flowsheet Documentation  Taken 10/18/2023 0800 by Lashay Valerio RN  Body Position: position changed independently  Intervention: Prevent and Manage VTE (Venous Thromboembolism) Risk  Description: Assess for VTE (venous thromboembolism) risk.  Encourage and assist with early ambulation.  Initiate and maintain compression or other therapy, as indicated, based on identified risk in accordance with organizational protocol and provider order.  Encourage both active and passive leg exercises while in bed, if unable to ambulate.  Recent Flowsheet Documentation  Taken 10/18/2023 0800 by Lashay Valerio RN  VTE Prevention/Management: compression stockings on  Range of Motion:   ROM (range of motion) performed   active ROM (range of motion) encouraged  Goal: Optimal Comfort and Wellbeing  Outcome: Ongoing, Progressing  Intervention: Provide Person-Centered Care  Description: Use a family-focused approach to care.  Develop trust and rapport by proactively providing information, encouraging questions, addressing concerns and offering reassurance.  Acknowledge emotional response to hospitalization.  Recognize and utilize personal coping strategies.  Honor spiritual and cultural preferences.  Recent Flowsheet Documentation  Taken 10/18/2023 0800 by Lashay Valerio RN  Trust Relationship/Rapport:   care explained   choices provided   emotional support provided   empathic listening provided   questions answered   questions encouraged   reassurance provided   thoughts/feelings acknowledged  Goal: Readiness for Transition of  Care  Outcome: Ongoing, Progressing   Goal Outcome Evaluation:  Plan of Care Reviewed With: patient           Outcome Evaluation: Pt ambulating and up in chair this shift. Pain controlled throughout shift with scheduled medication. Family at bedside. Pt tolerating diet well. No immedaite concerns at this time. Lashay Valerio RN.

## 2023-10-18 NOTE — PLAN OF CARE
Goal Outcome Evaluation:  Plan of Care Reviewed With: patient           Outcome Evaluation: Patient presents with limitations in self-care, functional transfers, balance, and endurance. She would benefit from continued skilled occupational therapy services to maximize independence with ADLs/functional transfers.      Anticipated Discharge Disposition (OT): home with outpatient therapy services, home with assist

## 2023-10-18 NOTE — THERAPY EVALUATION
Acute Care - Physical Therapy Initial Evaluation   Garcia     Patient Name: Marilin Martinez  : 1955  MRN: [id number]  Today's Date: 10/18/2023      Visit Dx:     ICD-10-CM ICD-9-CM   1. Difficulty walking  R26.2 719.7   2. Osteoarthritis of left knee, unspecified osteoarthritis type  M17.12 715.96   3. Decreased activities of daily living (ADL)  Z78.9 V49.89     Patient Active Problem List   Diagnosis    Environmental and seasonal allergies    Essential hypertension    Sleep apnea    Multiple joint pain    Hypothyroid    Diabetes mellitus type 2 in obese    Dyslipidemia    S/P laparoscopic sleeve gastrectomy    Primary osteoarthritis of right knee    Primary osteoarthritis of left knee    Candidal intertrigo    Excessive and redundant skin and subcutaneous tissue    Obesity, Class II, BMI 35-39.9    Osteoarthrosis, localized, primary, knee, right    Open knee wound    Wound infection after surgery     I&D of right knee wound 3/21/23    Osteoarthrosis, localized, primary, knee, left    Osteoarthritis of left knee     Past Medical History:   Diagnosis Date    Arthritis     KNEE PAIN    Back pain     Diabetes mellitus     Diarrhea     Hyperlipemia     Hypertension     managed by cardiologist    Knee pain, bilateral     Neuropathy     OAB (overactive bladder)     Seasonal allergies     Sleep apnea     CPAP    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    Thyroid disorder      Past Surgical History:   Procedure Laterality Date    COLONOSCOPY      ENDOSCOPY N/A 2021    Procedure: ESOPHAGOGASTRODUODENOSCOPY WITH COLD BIOPSY;  Surgeon: Mansi Kyle MD;  Location: [facility];  Service: General;  Laterality: N/A;  PRE: SCREENING FOR BARIATRICS  POST: MODERATE SIZE HIATAL HERNIA    GALLBLADDER SURGERY      GASTRIC SLEEVE LAPAROSCOPIC N/A 2022    Procedure: GASTRIC SLEEVE LAPAROSCOPIC With VENTRALHernia Repair AND PARAESOPHAGEAL HERNIA REPAIR AND LYSIS OF ADHESIONS PARTICAL OMENTECTOMY;  Surgeon:  Quang Shipley Jr., MD;  Location: [facility];  Service: Bariatric;  Laterality: N/A;    INCISION AND DRAINAGE OF WOUND Right 3/21/2023    Procedure: IRRIGATION & DEBRIDEMENT of right knee wound;  Surgeon: Murray Calvillo MD;  Location: [facility];  Service: Orthopedics;  Laterality: Right;    TONSILLECTOMY      TOTAL KNEE ARTHROPLASTY Right 1/31/2023    Procedure: RIGHT TOTAL KNEE ARTHROPLASTY WITH DANITA ROBOT AND SHIREEN;  Surgeon: Murray Calvillo MD;  Location: [facility] OR;  Service: Robotics - Ortho;  Laterality: Right;    TOTAL KNEE ARTHROPLASTY Left 10/17/2023    Procedure: LEFT TOTAL KNEE ARTHROPLASTY WITH JUAN LUIS NAVIGATION.;  Surgeon: Murray Calvillo MD;  Location: [facility];  Service: Robotics - Ortho;  Laterality: Left;    TUBAL ABDOMINAL LIGATION       PT Assessment (last 12 hours)       PT Evaluation and Treatment       Row Name 10/18/23 1100          Physical Therapy Time and Intention    Subjective Information no complaints  -DP     Document Type evaluation  -DP     Mode of Treatment individual therapy;physical therapy  -DP     Patient Effort excellent  -DP     Symptoms Noted During/After Treatment fatigue  -DP       Row Name 10/18/23 1100          General Information    Patient Profile Reviewed yes  -DP     Patient Observations alert;cooperative;agree to therapy  -DP     Prior Level of Function independent:;gait;transfer;bed mobility;ADL's;home management  -DP     Existing Precautions/Restrictions other (see comments)  Weight bearing as tolerated  -DP     Equipment Issued to Patient walker, front wheeled  -DP     Barriers to Rehab none identified  -DP       Row Name 10/18/23 1100          Living Environment    Current Living Arrangements home  -DP     Home Accessibility wheelchair accessible  -DP     People in Home alone  -DP     Primary Care Provided by self  -DP       Row Name 10/18/23 1100          Cognition    Orientation Status (Cognition) oriented x 4  -DP       Row Name  10/18/23 1100          Range of Motion Comprehensive    General Range of Motion bilateral lower extremity ROM WFL  -DP     Comment, General Range of Motion L knee AROM 0-80  -DP       Row Name 10/18/23 1100          Strength (Manual Muscle Testing)    Strength (Manual Muscle Testing) bilateral lower extremities  Strength grossly 4/5 and greater, except L dorsiflexion, knee flexion, and knee extension 4-/5.  -DP       Row Name 10/18/23 1100          Mobility    Extremity Weight-bearing Status left lower extremity  -DP     Left Lower Extremity (Weight-bearing Status) weight-bearing as tolerated (WBAT)  -DP       Row Name 10/18/23 1100          Bed Mobility    Bed Mobility supine-sit  -DP     Supine-Sit Ramsey (Bed Mobility) minimum assist (75% patient effort)  -DP       Row Name 10/18/23 1100          Transfers    Transfers sit-stand transfer;stand-sit transfer  -DP       Row Name 10/18/23 1100          Sit-Stand Transfer    Sit-Stand Ramsey (Transfers) contact guard  -DP     Assistive Device (Sit-Stand Transfers) walker, front-wheeled  -DP       Row Name 10/18/23 1100          Stand-Sit Transfer    Stand-Sit Ramsey (Transfers) contact guard  -DP     Assistive Device (Stand-Sit Transfers) walker, front-wheeled  -DP       Row Name 10/18/23 1100          Gait/Stairs (Locomotion)    Gait/Stairs Locomotion gait/ambulation assistive device  -DP     Ramsey Level (Gait) contact guard  -DP     Assistive Device (Gait) walker, front-wheeled  -DP     Patient was able to Ambulate yes  -DP     Distance in Feet (Gait) 70  -DP     Pattern (Gait) step-through  -DP       Row Name 10/18/23 1100          Safety Issues, Functional Mobility    Impairments Affecting Function (Mobility) endurance/activity tolerance;range of motion (ROM);strength;pain  -DP       Row Name 10/18/23 1100          Balance    Balance Assessment standing dynamic balance  -DP     Dynamic Standing Balance contact guard  -DP      Position/Device Used, Standing Balance walker, rolling  -DP       Row Name             Wound 10/17/23 1447 Left anterior knee Incision    Wound - Properties Group Placement Date: 10/17/23  -BW Placement Time: 1447  -BW Present on Original Admission: N  -BW Side: Left  -BW Orientation: anterior  -BW Location: knee  -BW Primary Wound Type: Incision  -BW    Retired Wound - Properties Group Placement Date: 10/17/23  -BW Placement Time: 1447  -BW Present on Original Admission: N  -BW Side: Left  -BW Orientation: anterior  -BW Location: knee  -BW Primary Wound Type: Incision  -BW    Retired Wound - Properties Group Date first assessed: 10/17/23  -BW Time first assessed: 1447  -BW Present on Original Admission: N  -BW Side: Left  -BW Location: knee  -BW Primary Wound Type: Incision  -BW      Row Name 10/18/23 1100          Plan of Care Review    Plan of Care Reviewed With patient  -DP     Outcome Evaluation Pt presents with impairments in strength and ROM affecting mobility secondary to L TKA. She will benefit from skilled inpatient PT services. We recommend discharge home with home health.  -DP       Row Name 10/18/23 1100          Therapy Assessment/Plan (PT)    Rehab Potential (PT) good, to achieve stated therapy goals  -DP     Criteria for Skilled Interventions Met (PT) yes;meets criteria;skilled treatment is necessary  -DP     Therapy Frequency (PT) 2 times/day  -DP     Predicted Duration of Therapy Intervention (PT) 10 days  -DP     Problem List (PT) mobility;range of motion (ROM);strength;pain;balance  -DP     Activity Limitations Related to Problem List (PT) unable to ambulate safely;unable to transfer safely;home management activity not performed adequately or safely  -DP       Row Name 10/18/23 1100          PT Evaluation Complexity    History, PT Evaluation Complexity 3 or more personal factors and/or comorbidities  -DP     Examination of Body Systems (PT Eval Complexity) 1-2 elements  -DP     Clinical  Presentation (PT Evaluation Complexity) stable  -DP     Clinical Decision Making (PT Evaluation Complexity) low complexity  -DP     Overall Complexity (PT Evaluation Complexity) low complexity  -DP       Row Name 10/18/23 1100          Physical Therapy Goals    Bed Mobility Goal Selection (PT) bed mobility, PT goal 1  -DP     Transfer Goal Selection (PT) transfer, PT goal 1  -DP     Gait Training Goal Selection (PT) gait training, PT goal 1  -DP       Row Name 10/18/23 1100          Bed Mobility Goal 1 (PT)    Activity/Assistive Device (Bed Mobility Goal 1, PT) supine to sit  -DP     Lassen Level/Cues Needed (Bed Mobility Goal 1, PT) standby assist  -DP     Time Frame (Bed Mobility Goal 1, PT) 10 days  -DP       Row Name 10/18/23 1100          Transfer Goal 1 (PT)    Activity/Assistive Device (Transfer Goal 1, PT) sit-to-stand/stand-to-sit  -DP     Lassen Level/Cues Needed (Transfer Goal 1, PT) standby assist  -DP     Time Frame (Transfer Goal 1, PT) 10 days  -DP       Row Name 10/18/23 1100          Gait Training Goal 1 (PT)    Activity/Assistive Device (Gait Training Goal 1, PT) assistive device use;increase endurance/gait distance;walker, rolling  -DP     Lassen Level (Gait Training Goal 1, PT) independent  -DP     Distance (Gait Training Goal 1, PT) 300  -DP     Time Frame (Gait Training Goal 1, PT) 10 days  -DP               User Key  (r) = Recorded By, (t) = Taken By, (c) = Cosigned By      Initials Name Provider Type    Thaddeus Mera RN Registered Nurse    Brissa Gambino, PT Physical Therapist                Left Knee Ther-ex   Exercise  Reps  Sets    Long arc Quads   10 2   Short arc Quads  10 2   Heel Slides  10 2   Ankle Pumps  10 2   Quad sets  10 2   Glut sets  10 2   Straight leg raise  10 2            PT Recommendation and Plan  Anticipated Discharge Disposition (PT): home with home health  Planned Therapy Interventions (PT): bed mobility training, gait training, balance  training, strengthening, transfer training, ROM (range of motion)  Therapy Frequency (PT): 2 times/day  Plan of Care Reviewed With: patient  Outcome Evaluation: Pt presents with impairments in strength and ROM affecting mobility secondary to L TKA. She will benefit from skilled inpatient PT services. We recommend discharge home with home health.   Outcome Measures       Row Name 10/18/23 1100             How much help from another person do you currently need...    Turning from your back to your side while in flat bed without using bedrails? 4  -DP      Moving from lying on back to sitting on the side of a flat bed without bedrails? 3  -DP      Moving to and from a bed to a chair (including a wheelchair)? 3  -DP      Standing up from a chair using your arms (e.g., wheelchair, bedside chair)? 3  -DP      Climbing 3-5 steps with a railing? 3  -DP      To walk in hospital room? 3  -DP      AM-PAC 6 Clicks Score (PT) 19  -DP      Highest level of mobility 6 --> Walked 10 steps or more  -DP                User Key  (r) = Recorded By, (t) = Taken By, (c) = Cosigned By      Initials Name Provider Type    Brissa Gambino, PT Physical Therapist                     Time Calculation:    PT Charges       Row Name 10/18/23 1148             Time Calculation    PT Received On 10/18/23  -DP      PT Goal Re-Cert Due Date 10/27/23  -DP         Timed Charges    51120 - PT Therapeutic Exercise Minutes 30  -DP      42325 - Gait Training Minutes  10  -DP         Untimed Charges    PT Eval/Re-eval Minutes 25  -DP         Total Minutes    Timed Charges Total Minutes 40  -DP      Untimed Charges Total Minutes 25  -DP       Total Minutes 65  -DP                User Key  (r) = Recorded By, (t) = Taken By, (c) = Cosigned By      Initials Name Provider Type    Brissa Gambino, PT Physical Therapist                      PT G-Codes  Outcome Measure Options: AM-PAC 6 Clicks Daily Activity (OT), Optimal Instrument  AM-PAC 6 Clicks Score (PT):  19  AM-PAC 6 Clicks Score (OT): 18    Brissa Foote, PT  10/18/2023

## 2023-10-18 NOTE — PLAN OF CARE
Goal Outcome Evaluation:  Plan of Care Reviewed With: patient           Outcome Evaluation: Pt presents with impairments in strength and ROM affecting mobility secondary to L TKA. She will benefit from skilled inpatient PT services. We recommend discharge home with home health.      Anticipated Discharge Disposition (PT): home with home health

## 2023-10-19 VITALS
DIASTOLIC BLOOD PRESSURE: 83 MMHG | WEIGHT: 216.71 LBS | HEIGHT: 63 IN | SYSTOLIC BLOOD PRESSURE: 154 MMHG | HEART RATE: 79 BPM | TEMPERATURE: 98.2 F | BODY MASS INDEX: 38.4 KG/M2 | RESPIRATION RATE: 16 BRPM | OXYGEN SATURATION: 100 %

## 2023-10-19 LAB
ANION GAP SERPL CALCULATED.3IONS-SCNC: 9.6 MMOL/L (ref 5–15)
BUN SERPL-MCNC: 14 MG/DL (ref 8–23)
BUN/CREAT SERPL: 15.6 (ref 7–25)
CALCIUM SPEC-SCNC: 8.8 MG/DL (ref 8.6–10.5)
CHLORIDE SERPL-SCNC: 106 MMOL/L (ref 98–107)
CO2 SERPL-SCNC: 22.4 MMOL/L (ref 22–29)
CREAT SERPL-MCNC: 0.9 MG/DL (ref 0.57–1)
EGFRCR SERPLBLD CKD-EPI 2021: 70.7 ML/MIN/1.73
GLUCOSE SERPL-MCNC: 90 MG/DL (ref 65–99)
HCT VFR BLD AUTO: 33.9 % (ref 34–46.6)
HGB BLD-MCNC: 11.1 G/DL (ref 12–15.9)
POTASSIUM SERPL-SCNC: 3.8 MMOL/L (ref 3.5–5.2)
SODIUM SERPL-SCNC: 138 MMOL/L (ref 136–145)

## 2023-10-19 PROCEDURE — 63710000001 ACETAMINOPHEN EXTRA STRENGTH 500 MG TABLET: Performed by: ORTHOPAEDIC SURGERY

## 2023-10-19 PROCEDURE — A9270 NON-COVERED ITEM OR SERVICE: HCPCS | Performed by: ORTHOPAEDIC SURGERY

## 2023-10-19 PROCEDURE — 80048 BASIC METABOLIC PNL TOTAL CA: CPT | Performed by: INTERNAL MEDICINE

## 2023-10-19 PROCEDURE — 94799 UNLISTED PULMONARY SVC/PX: CPT

## 2023-10-19 PROCEDURE — 97116 GAIT TRAINING THERAPY: CPT

## 2023-10-19 PROCEDURE — 97110 THERAPEUTIC EXERCISES: CPT

## 2023-10-19 PROCEDURE — 63710000001 APIXABAN 2.5 MG TABLET: Performed by: ORTHOPAEDIC SURGERY

## 2023-10-19 PROCEDURE — 63710000001 GABAPENTIN 400 MG CAPSULE: Performed by: INTERNAL MEDICINE

## 2023-10-19 PROCEDURE — 63710000001 HYDROCODONE-ACETAMINOPHEN 5-325 MG TABLET: Performed by: ORTHOPAEDIC SURGERY

## 2023-10-19 PROCEDURE — 97530 THERAPEUTIC ACTIVITIES: CPT

## 2023-10-19 PROCEDURE — 85018 HEMOGLOBIN: CPT | Performed by: INTERNAL MEDICINE

## 2023-10-19 PROCEDURE — 94660 CPAP INITIATION&MGMT: CPT

## 2023-10-19 PROCEDURE — 85014 HEMATOCRIT: CPT | Performed by: INTERNAL MEDICINE

## 2023-10-19 PROCEDURE — A9270 NON-COVERED ITEM OR SERVICE: HCPCS | Performed by: INTERNAL MEDICINE

## 2023-10-19 PROCEDURE — 97535 SELF CARE MNGMENT TRAINING: CPT

## 2023-10-19 RX ORDER — OXYCODONE AND ACETAMINOPHEN 7.5; 325 MG/1; MG/1
1 TABLET ORAL EVERY 4 HOURS PRN
Qty: 45 TABLET | Refills: 0 | Status: SHIPPED | OUTPATIENT
Start: 2023-10-19

## 2023-10-19 RX ORDER — ASPIRIN 325 MG
325 TABLET ORAL DAILY
Qty: 21 TABLET | Refills: 1 | Status: SHIPPED | OUTPATIENT
Start: 2023-10-19

## 2023-10-19 RX ADMIN — ACETAMINOPHEN 1000 MG: 500 TABLET ORAL at 05:21

## 2023-10-19 RX ADMIN — APIXABAN 2.5 MG: 2.5 TABLET, FILM COATED ORAL at 08:59

## 2023-10-19 RX ADMIN — GABAPENTIN 800 MG: 400 CAPSULE ORAL at 13:25

## 2023-10-19 RX ADMIN — HYDROCODONE BITARTRATE AND ACETAMINOPHEN 1 TABLET: 5; 325 TABLET ORAL at 09:11

## 2023-10-19 RX ADMIN — HYDROCODONE BITARTRATE AND ACETAMINOPHEN 1 TABLET: 5; 325 TABLET ORAL at 16:17

## 2023-10-19 RX ADMIN — GABAPENTIN 800 MG: 400 CAPSULE ORAL at 05:21

## 2023-10-19 NOTE — PLAN OF CARE
Goal Outcome Evaluation:      Patient wor bipap last night. Patient currently on room air. Tolerating well.

## 2023-10-19 NOTE — DISCHARGE SUMMARY
[facility]         HOSPITALIST  DISCHARGE SUMMARY    Patient Name: Marilin Martinez  : 1955  MRN: [id number]    Date of Admission: 10/17/2023  Date of Discharge:  10/19/23  Primary Care Physician: Reyes, Rosenberg Acosta, MD    Consults       Date and Time Order Name Status Description    10/17/2023  8:53 PM Inpatient Cardiology Consult      10/17/2023  4:34 PM Inpatient Hospitalist Consult              Active and Resolved Hospital Problems:  Left knee OA  Status post left knee replacement  Sinus bradycardia, asymptomatic  1st degree AV block  Essential hypertension  HLD  Hypothyroidism  T2DM  Diabetic peripheral neuropathy  Chronic anxiety       Hospital Course     Hospital Course:  Marilin Martinez is a 68 y.o. female with osteoarthritis of the left who underwent elective left knee replacement without intraoperative complication.   Had sinus bradycardia overnight, asymptomatic.  EKG first-degree AV block otherwise unremarkable.  Heart rate improved when she was awake.  Evaluated by cardiology, no work-up needed.  Likely related to PATRICK as once she used CPAP heart rate improved.  Cleared from orthopedic standpoint, Narcotic prescription provided. Will be discharged on low-dose Eliquis twice daily followed by full dose Aspirin for DVT prophylaxis.  At home care, new medications and follow-up was discussed.  Discharged home in stable condition with home health.  Follow-up with orthopedic surgery scheduled for 10/30/2023    Day of Discharge     Vital Signs:  Temp:  [98.1 °F (36.7 °C)-98.6 °F (37 °C)] 98.5 °F (36.9 °C)  Heart Rate:  [64-83] 67  Resp:  [16-18] 18  BP: (110-162)/(55-69) 154/57  Flow (L/min):  [2] 2  Physical Exam:   GENERAL: The patient is conversant and nontoxic.  HEENT: PERRLA, Oropharynx clear. Moist mucous membranes.   HEART: Regular rate and rhythm. No edema  LUNGS: Equal air entry, clear to auscultation bilaterally.  ABDOMEN: Soft, positive bowel sounds, nontender,  nondistended  SKIN: No rash or open wounds   NEUROLOGIC: Alert, cranial nerves grossly intact, speech clear    Discharge Details        Discharge Medications        New Medications        Instructions Start Date   apixaban 2.5 MG tablet tablet  Commonly known as: ELIQUIS   2.5 mg, Oral, Every 12 Hours Scheduled      aspirin 325 MG tablet  Commonly known as: Karen Aspirin   325 mg, Oral, Daily      oxyCODONE-acetaminophen 7.5-325 MG per tablet  Commonly known as: PERCOCET   1 tablet, Oral, Every 4 Hours PRN             Continue These Medications        Instructions Start Date   Accu-Chek Isamar Plus test strip  Generic drug: glucose blood   No dose, route, or frequency recorded.      ALPRAZolam 1 MG tablet  Commonly known as: XANAX   1 mg, Oral, 3 Times Daily PRN      amLODIPine-benazepril 10-40 MG per capsule  Commonly known as: LOTREL   1 capsule, Oral, Daily      bumetanide 1 MG tablet  Commonly known as: BUMEX   1 mg, Oral, Daily PRN      Farxiga 10 MG tablet  Generic drug: dapagliflozin Propanediol   10 mg, Oral, Daily      fluticasone 50 MCG/ACT nasal spray  Commonly known as: FLONASE   1 spray, Nasal, As Needed      gabapentin 800 MG tablet  Commonly known as: NEURONTIN   800 mg, Oral, 3 Times Daily      Kerendia 10 MG tablet  Generic drug: Finerenone   10 mg, Oral, Daily      levothyroxine 100 MCG tablet  Commonly known as: SYNTHROID, LEVOTHROID   100 mcg, Oral      Myrbetriq 25 MG tablet sustained-release 24 hour 24 hr tablet  Generic drug: Mirabegron ER   25 mg, Oral, Nightly      nystatin 538904 UNIT/GM powder  Commonly known as: MYCOSTATIN   Topical, 3 Times Daily      Pitavastatin Calcium 4 MG tablet   4 mg, Oral, Every 3 Days      potassium chloride 20 MEQ CR tablet  Commonly known as: K-DUR,KLOR-CON   20 mEq, Oral, Daily      rOPINIRole 0.5 MG tablet  Commonly known as: REQUIP   0.5 mg, Oral, Nightly      topiramate 25 MG tablet  Commonly known as: TOPAMAX   Take 1 tablet by mouth Every Night for 14  days, THEN 2 tablets Every Night for 76 days.   Start Date: September 1, 2023     Vyzulta 0.024 % solution  Generic drug: Latanoprostene Bunod   1 drop, Both Eyes, Nightly             Stop These Medications      HYDROcodone-acetaminophen  MG per tablet  Commonly known as: NORCO              Allergies   Allergen Reactions    Adhesive Tape Rash     SKIN BREAKDOWN     Latex Rash     blisters       Discharge Disposition:  Home or Self Care    Diet:  Hospital:  Diet Order   Procedures    Diet: Regular/House Diet; Texture: Regular Texture (IDDSI 7); Fluid Consistency: Thin (IDDSI 0)       Discharge Activity:       CODE STATUS:  Code Status and Medical Interventions:   Ordered at: 10/17/23 1743     Code Status (Patient has no pulse and is not breathing):    CPR (Attempt to Resuscitate)     Medical Interventions (Patient has pulse or is breathing):    Full Support         Future Appointments   Date Time Provider Department Center   10/30/2023  2:15 PM Nydia Baker PA-C [facility] R[facility]   12/1/2023 11:00 AM Xenia Simon APRN [facility] None   1/3/2024 10:30 AM Nydia Baker PA-C [facility] ORS WOOD [facility]   1/24/2024 11:00 AM [facility]           Pertinent  and/or Most Recent Results     PROCEDURES:   LEFT TOTAL KNEE ARTHROPLASTY WITH JUAN LUIS NAVIGATION.     LAB RESULTS:      Lab 10/19/23  0521 10/18/23  0028   WBC  --  12.13*   HEMOGLOBIN 11.1* 12.6   HEMATOCRIT 33.9* 39.5   PLATELETS  --  124*   MCV  --  97.3*         Lab 10/19/23  0521 10/18/23  0916   SODIUM 138 134*   POTASSIUM 3.8 4.2   CHLORIDE 106 103   CO2 22.4 21.2*   ANION GAP 9.6 9.8   BUN 14 12   CREATININE 0.90 1.04*   EGFR 70.7 59.4*   GLUCOSE 90 184*   CALCIUM 8.8 9.2   MAGNESIUM  --  2.0                         Brief Urine Lab Results       None          Microbiology Results (last 10 days)       ** No results found for the last 240 hours. **            XR Knee 1 or 2 View Left    Result Date: 10/17/2023    1. New left  total knee prosthesis without evidence of hardware complication.      ARLIN PALACIOS MD       Electronically Signed and Approved By: ARLIN PALACIOS MD on 10/17/2023 at 16:21                       Results for orders placed in visit on 08/17/21    Adult Transthoracic Echo Complete W/ Cont if Necessary Per Protocol    Interpretation Summary  · Estimated left ventricular EF = 55% Left ventricular systolic function is normal.  · Left atrial enlargement mild  · Trace pericardial effusion  · Estimated right ventricular systolic pressure from tricuspid regurgitation is mildly elevated (35-45 mmHg).      Labs Pending at Discharge:      Time spent on Discharge including face to face service:  <30 minutes    Electronically signed by Sheldon Collier DO, 10/19/23, 10:44 AM EDT.

## 2023-10-19 NOTE — THERAPY TREATMENT NOTE
Patient Name: Marilin Martinez  : 1955    MRN: [id number]                              Today's Date: 10/19/2023       Admit Date: 10/17/2023    Visit Dx:     ICD-10-CM ICD-9-CM   1. Difficulty walking  R26.2 719.7   2. Osteoarthritis of left knee, unspecified osteoarthritis type  M17.12 715.96   3. Decreased activities of daily living (ADL)  Z78.9 V49.89   4. Primary osteoarthritis of left knee  M17.12 715.16     Patient Active Problem List   Diagnosis    Environmental and seasonal allergies    Essential hypertension    Sleep apnea    Multiple joint pain    Hypothyroid    Diabetes mellitus type 2 in obese    Dyslipidemia    S/P laparoscopic sleeve gastrectomy    Primary osteoarthritis of right knee    Primary osteoarthritis of left knee    Candidal intertrigo    Excessive and redundant skin and subcutaneous tissue    Obesity, Class II, BMI 35-39.9    Osteoarthrosis, localized, primary, knee, right    Open knee wound    Wound infection after surgery     I&D of right knee wound 3/21/23    Osteoarthrosis, localized, primary, knee, left    Osteoarthritis of left knee     Past Medical History:   Diagnosis Date    Arthritis     KNEE PAIN    Back pain     Diabetes mellitus     Diarrhea     Hyperlipemia     Hypertension     managed by cardiologist    Knee pain, bilateral     Neuropathy     OAB (overactive bladder)     Seasonal allergies     Sleep apnea     CPAP    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    Thyroid disorder      Past Surgical History:   Procedure Laterality Date    COLONOSCOPY      ENDOSCOPY N/A 2021    Procedure: ESOPHAGOGASTRODUODENOSCOPY WITH COLD BIOPSY;  Surgeon: Mansi Kyle MD;  Location: [facility] ENDOSCOPY;  Service: General;  Laterality: N/A;  PRE: SCREENING FOR BARIATRICS  POST: MODERATE SIZE HIATAL HERNIA    GALLBLADDER SURGERY      GASTRIC SLEEVE LAPAROSCOPIC N/A 2022    Procedure: GASTRIC SLEEVE LAPAROSCOPIC With VENTRALHernia Repair AND PARAESOPHAGEAL HERNIA REPAIR AND  LYSIS OF ADHESIONS PARTICAL OMENTECTOMY;  Surgeon: Quang Shipley Jr., MD;  Location: [facility];  Service: Bariatric;  Laterality: N/A;    INCISION AND DRAINAGE OF WOUND Right 3/21/2023    Procedure: IRRIGATION & DEBRIDEMENT of right knee wound;  Surgeon: Murray Calvillo MD;  Location: [facility] OR;  Service: Orthopedics;  Laterality: Right;    TONSILLECTOMY      TOTAL KNEE ARTHROPLASTY Right 1/31/2023    Procedure: RIGHT TOTAL KNEE ARTHROPLASTY WITH DANITA ROBOT AND SHIREEN;  Surgeon: Murray Calvillo MD;  Location: [facility] OR;  Service: Robotics - Ortho;  Laterality: Right;    TOTAL KNEE ARTHROPLASTY Left 10/17/2023    Procedure: LEFT TOTAL KNEE ARTHROPLASTY WITH JUAN LUIS NAVIGATION.;  Surgeon: Murray Calvillo MD;  Location: [facility];  Service: Robotics - Ortho;  Laterality: Left;    TUBAL ABDOMINAL LIGATION        General Information       Row Name 10/19/23 1136          OT Time and Intention    Document Type therapy note (daily note)  -     Mode of Treatment individual therapy;occupational therapy  -               User Key  (r) = Recorded By, (t) = Taken By, (c) = Cosigned By      Initials Name Provider Type     Court Jarquin OT Occupational Therapist                     Mobility/ADL's       Row Name 10/19/23 1136          Bed Mobility    Comment, (Bed Mobility) Patient upright and seated in recliner upon therapist arrival.  -LF       Row Name 10/19/23 1136          Transfers    Transfers sit-stand transfer;stand-sit transfer  -LF       Row Name 10/19/23 1136          Sit-Stand Transfer    Sit-Stand [place] (Transfers) contact guard  -LF     Assistive Device (Sit-Stand Transfers) walker, front-wheeled  -LF       Row Name 10/19/23 1136          Stand-Sit Transfer    Stand-Sit [place] (Transfers) contact guard  -LF     Assistive Device (Stand-Sit Transfers) walker, front-wheeled  -LF       Row Name 10/19/23 1136          Functional Mobility    Functional Mobility- Ind. Level  contact guard assist  -LF     Functional Mobility- Device walker, front-wheeled  -LF     Functional Mobility- Comment Patient completed functional mobility to the sink in preparation for grooming and back with CGA using RW.  -       Row Name 10/19/23 1136          Activities of Daily Living    BADL Assessment/Intervention upper body dressing;lower body dressing;grooming  -       Row Name 10/19/23 1136          Mobility    Extremity Weight-bearing Status left lower extremity  -LF     Left Lower Extremity (Weight-bearing Status) weight-bearing as tolerated (WBAT)  -       Row Name 10/19/23 1136          Lower Body Dressing Assessment/Training    [place] Level (Lower Body Dressing) lower body dressing skills;don;pants/bottoms;moderate assist (50% patient effort)  -LF     Position (Lower Body Dressing) unsupported sitting;supported standing  -     Comment, (Lower Body Dressing) Educated patient on lower body adaptive dressing technique, verified learning via teachback.  -       Row Name 10/19/23 1136          Upper Body Dressing Assessment/Training    [place] Level (Upper Body Dressing) upper body dressing skills;don;bra/undergarment;pull-over garment;set up  -LF     Position (Upper Body Dressing) unsupported sitting  -       Row Name 10/19/23 1136          Grooming Assessment/Training    [place] Level (Grooming) grooming skills;oral care regimen;set up  -LF     Position (Grooming) supported standing;sink side  -LF               User Key  (r) = Recorded By, (t) = Taken By, (c) = Cosigned By      Initials Name Provider Type     Court Jarquin OT Occupational Therapist                   Obj/Interventions       Row Name 10/19/23 1137          Balance    Balance Assessment sitting dynamic balance;standing dynamic balance  -LF     Dynamic Sitting Balance independent  -LF     Position, Sitting Balance unsupported;sitting in chair  -LF     Dynamic Standing Balance contact guard  -LF      Position/Device Used, Standing Balance supported;walker, front-wheeled  -LF     Balance Interventions sitting;standing;sit to stand;supported;dynamic;occupation based/functional task;weight shifting activity  -               User Key  (r) = Recorded By, (t) = Taken By, (c) = Cosigned By      Initials Name Provider Type    Court Parter OT Occupational Therapist                   Goals/Plan    No documentation.                  Clinical Impression       Row Name 10/19/23 1138          Pain Scale: FACES Pre/Post-Treatment    Pain: FACES Scale, Pretreatment 2-->hurts little bit  -LF     Posttreatment Pain Rating 2-->hurts little bit  -LF     Pain Location - Side/Orientation Left  -LF     Pain Location - knee  -LF       Row Name 10/19/23 1138          Plan of Care Review    Plan of Care Reviewed With patient  -LF     Progress improving  -     Outcome Evaluation Patient completed all functional transfers with CGA using RW and LB dressing tasks with mod assist. Education provided on adaptive LB dressing technique, but patient would benefit from use of reacher at d/c to promote independence with tasks. She would benefit from continued OT services to maximize independnece.  -       Row Name 10/19/23 1138          Vital Signs    O2 Delivery Pre Treatment room air  -LF     O2 Delivery Intra Treatment room air  -LF     O2 Delivery Post Treatment room air  -LF               User Key  (r) = Recorded By, (t) = Taken By, (c) = Cosigned By      Initials Name Provider Type    Court Prater OT Occupational Therapist                   Outcome Measures       Row Name 10/19/23 1139          How much help from another is currently needed...    Putting on and taking off regular lower body clothing? 2  -LF     Bathing (including washing, rinsing, and drying) 2  -LF     Toileting (which includes using toilet bed pan or urinal) 2  -LF     Putting on and taking off regular upper body clothing 4  -LF     Taking care of  personal grooming (such as brushing teeth) 4  -LF     Eating meals 4  -LF     AM-PAC 6 Clicks Score (OT) 18  -LF       Row Name 10/19/23 0851          How much help from another person do you currently need...    Turning from your back to your side while in flat bed without using bedrails? 4  -MG     Moving from lying on back to sitting on the side of a flat bed without bedrails? 4  -MG     Moving to and from a bed to a chair (including a wheelchair)? 3  -MG     Standing up from a chair using your arms (e.g., wheelchair, bedside chair)? 3  -MG     Climbing 3-5 steps with a railing? 3  -MG     To walk in hospital room? 3  -MG     AM-PAC 6 Clicks Score (PT) 20  -MG     Highest level of mobility 6 --> Walked 10 steps or more  -MG       Row Name 10/19/23 1139          Functional Assessment    Outcome Measure Options AM-PAC 6 Clicks Daily Activity (OT);Optimal Instrument  -LF       Row Name 10/19/23 1139          Optimal Instrument    Optimal Instrument Optimal - 3  -LF     Bending/Stooping 3  -LF     Standing 2  -LF     Reaching 1  -LF     From the list, choose the 3 activities you would most like to be able to do without any difficulty Bending/stooping;Standing;Reaching  -LF     Total Score Optimal - 3 6  -LF               User Key  (r) = Recorded By, (t) = Taken By, (c) = Cosigned By      Initials Name Provider Type     Court Jarquin OT Occupational Therapist    Shira Hutchinson, RN Registered Nurse                    Occupational Therapy Education       Title: PT OT SLP Therapies (Resolved)       Topic: Occupational Therapy (Resolved)       Point: ADL training (Resolved)       Description:   Instruct learner(s) on proper safety adaptation and remediation techniques during self care or transfers.   Instruct in proper use of assistive devices.                  Learning Progress Summary             Patient Acceptance, E,TB, VU by  at 10/18/2023 9133                         Point: Precautions (Resolved)        Description:   Instruct learner(s) on prescribed precautions during self-care and functional transfers.                  Learning Progress Summary             Patient Acceptance, E,TB, VU by  at 10/18/2023 1357                         Point: Body mechanics (Resolved)       Description:   Instruct learner(s) on proper positioning and spine alignment during self-care, functional mobility activities and/or exercises.                  Learning Progress Summary             Patient Acceptance, E,TB, VU by  at 10/18/2023 1357                                         User Key       Initials Effective Dates Name Provider Type Discipline     06/16/21 -  Court Jarquin OT Occupational Therapist OT                  OT Recommendation and Plan  Planned Therapy Interventions (OT): activity tolerance training, BADL retraining, functional balance retraining, occupation/activity based interventions, patient/caregiver education/training, strengthening exercise, transfer/mobility retraining  Therapy Frequency (OT): 5 times/wk  Plan of Care Review  Plan of Care Reviewed With: patient  Progress: improving  Outcome Evaluation: Patient completed all functional transfers with CGA using RW and LB dressing tasks with mod assist. Education provided on adaptive LB dressing technique, but patient would benefit from use of reacher at d/c to promote independence with tasks. She would benefit from continued OT services to maximize independnece.     Time Calculation:   Evaluation Complexity (OT)  Review Occupational Profile/Medical/Therapy History Complexity: brief/low complexity  Assessment, Occupational Performance/Identification of Deficit Complexity: 1-3 performance deficits  Clinical Decision Making Complexity (OT): problem focused assessment/low complexity  Overall Complexity of Evaluation (OT): low complexity     Time Calculation- OT       Row Name 10/19/23 1139             Time Calculation- OT    OT Received On 10/19/23  -      OT  Goal Re-Cert Due Date 10/27/23  -LF         Timed Charges    95127 - OT Therapeutic Activity Minutes 10  -LF      18350 - OT Self Care/Mgmt Minutes 15  -LF         Total Minutes    Timed Charges Total Minutes 25  -LF       Total Minutes 25  -LF                User Key  (r) = Recorded By, (t) = Taken By, (c) = Cosigned By      Initials Name Provider Type     Court Jarquin, OT Occupational Therapist                  Therapy Charges for Today       Code Description Service Date Service Provider Modifiers Qty    32320289535 HC OT THERAPEUTIC ACT EA 15 MIN 10/18/2023 Court Jarquin OT GO 1    03606756637 HC OT SELF CARE/MGMT/TRAIN EA 15 MIN 10/18/2023 Court Jarquin OT GO 1    33751105298 HC OT EVAL LOW COMPLEXITY 2 10/18/2023 Court Jarquin OT GO 1    00393259109 HC OT THERAPEUTIC ACT EA 15 MIN 10/19/2023 Court Jarquin OT GO 1    41855069117 HC OT SELF CARE/MGMT/TRAIN EA 15 MIN 10/19/2023 Court Jarquin OT GO 1                 Court Jarquin OT  10/19/2023

## 2023-10-19 NOTE — PLAN OF CARE
Goal Outcome Evaluation:      VSS. Pain controlled per MD order, see MAR. Cpap worn throughout shift while patient sleeping. NSR on monitor. Pt ambulated in room with assist and walker.

## 2023-10-19 NOTE — THERAPY TREATMENT NOTE
Acute Care - Physical Therapy Treatment Note   Radha     Patient Name: Marilin Martinez  : 1955  MRN: [id number]  Today's Date: 10/19/2023      Visit Dx:     ICD-10-CM ICD-9-CM   1. Difficulty walking  R26.2 719.7   2. Osteoarthritis of left knee, unspecified osteoarthritis type  M17.12 715.96   3. Decreased activities of daily living (ADL)  Z78.9 V49.89   4. Primary osteoarthritis of left knee  M17.12 715.16     Patient Active Problem List   Diagnosis    Environmental and seasonal allergies    Essential hypertension    Sleep apnea    Multiple joint pain    Hypothyroid    Diabetes mellitus type 2 in obese    Dyslipidemia    S/P laparoscopic sleeve gastrectomy    Primary osteoarthritis of right knee    Primary osteoarthritis of left knee    Candidal intertrigo    Excessive and redundant skin and subcutaneous tissue    Obesity, Class II, BMI 35-39.9    Osteoarthrosis, localized, primary, knee, right    Open knee wound    Wound infection after surgery     I&D of right knee wound 3/21/23    Osteoarthrosis, localized, primary, knee, left    Osteoarthritis of left knee     Past Medical History:   Diagnosis Date    Arthritis     KNEE PAIN    Back pain     Diabetes mellitus     Diarrhea     Hyperlipemia     Hypertension     managed by cardiologist    Knee pain, bilateral     Neuropathy     OAB (overactive bladder)     Seasonal allergies     Sleep apnea     CPAP    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    Thyroid disorder      Past Surgical History:   Procedure Laterality Date    COLONOSCOPY      ENDOSCOPY N/A 2021    Procedure: ESOPHAGOGASTRODUODENOSCOPY WITH COLD BIOPSY;  Surgeon: Mansi Kyle MD;  Location: [facility] ENDOSCOPY;  Service: General;  Laterality: N/A;  PRE: SCREENING FOR BARIATRICS  POST: MODERATE SIZE HIATAL HERNIA    GALLBLADDER SURGERY      GASTRIC SLEEVE LAPAROSCOPIC N/A 2022    Procedure: GASTRIC SLEEVE LAPAROSCOPIC With VENTRALHernia Repair AND PARAESOPHAGEAL HERNIA REPAIR  AND LYSIS OF ADHESIONS PARTICAL OMENTECTOMY;  Surgeon: Quang Shipley Jr., MD;  Location: [facility];  Service: Bariatric;  Laterality: N/A;    INCISION AND DRAINAGE OF WOUND Right 3/21/2023    Procedure: IRRIGATION & DEBRIDEMENT of right knee wound;  Surgeon: Murray Calvillo MD;  Location: [facility];  Service: Orthopedics;  Laterality: Right;    TONSILLECTOMY      TOTAL KNEE ARTHROPLASTY Right 1/31/2023    Procedure: RIGHT TOTAL KNEE ARTHROPLASTY WITH DANITA ROBOT AND SHIREEN;  Surgeon: Murray Calvillo MD;  Location: [facility] OR;  Service: Robotics - Ortho;  Laterality: Right;    TOTAL KNEE ARTHROPLASTY Left 10/17/2023    Procedure: LEFT TOTAL KNEE ARTHROPLASTY WITH JUAN LUIS NAVIGATION.;  Surgeon: Murray Calvillo MD;  Location: [facility];  Service: Robotics - Ortho;  Laterality: Left;    TUBAL ABDOMINAL LIGATION       PT Assessment (last 12 hours)       PT Evaluation and Treatment       Row Name 10/19/23 1500          Physical Therapy Time and Intention    Subjective Information complains of;pain (P)   -RH     Document Type therapy note (daily note) (P)   -RH     Mode of Treatment individual therapy;physical therapy;other (see comments) (P)   Patient performed two separate treatment sessions today. One at approximately 0900 and another at approximately 1400.  -     Patient Effort good (P)   -     Symptoms Noted During/After Treatment fatigue (P)   -       Row Name 10/19/23 1500          General Information    Patient Profile Reviewed yes (P)   -     Patient Observations alert;cooperative;agree to therapy (P)   -       Row Name 10/19/23 1500          Bed Mobility    Bed Mobility supine-sit (P)   -RH     Supine-Sit [place] (Bed Mobility) contact guard (P)   -RH     Bed Mobility, Safety Issues decreased use of legs for bridging/pushing (P)   -RH     Assistive Device (Bed Mobility) bed rails (P)   -       Row Name 10/19/23 1500          Transfers    Transfers sit-stand  transfer;stand-sit transfer (P)   -       Row Name 10/19/23 1500          Sit-Stand Transfer    Sit-Stand [place] (Transfers) contact guard (P)   -     Assistive Device (Sit-Stand Transfers) walker, front-wheeled (P)   -       Row Name 10/19/23 1500          Stand-Sit Transfer    Stand-Sit [place] (Transfers) contact guard (P)   -     Assistive Device (Stand-Sit Transfers) walker, front-wheeled (P)   -       Row Name 10/19/23 1500          Gait/Stairs (Locomotion)    Gait/Stairs Locomotion gait/ambulation assistive device (P)   -     [place] Level (Gait) contact guard (P)   -     Assistive Device (Gait) walker, front-wheeled (P)   -     Patient was able to Ambulate yes (P)   -     Distance in Feet (Gait) 200' x 2 (P)   -     Pattern (Gait) step-through (P)   -     Deviations/Abnormal Patterns (Gait) base of support, narrow;gait speed decreased;stride length decreased (P)   -RH     Bilateral Gait Deviations forward flexed posture (P)   -RH     Left Sided Gait Deviations decreased knee extension (P)   -       Row Name 10/19/23 1500          Balance    Balance Assessment standing dynamic balance (P)   -     Dynamic Standing Balance contact guard (P)   -     Position/Device Used, Standing Balance supported;walker, front-wheeled (P)   -       Row Name 10/19/23 1500          Motor Skills    Therapeutic Exercise hip;knee;ankle;other (see comments) (P)   Ankle pumps, quad sets, glute sets, long arc quads, straight leg raises. 2 sets of 10 reps performed from a seated position in chair.  -       Row Name             Wound 10/17/23 1447 Left anterior knee Incision    Wound - Properties Group Placement Date: 10/17/23  -BW Placement Time: 1447  -BW Present on Original Admission: N  -BW Side: Left  -BW Orientation: anterior  -BW Location: knee  -BW Primary Wound Type: Incision  -BW    Retired Wound - Properties Group Placement Date: 10/17/23  -BW Placement Time: 1447  -BW Present on  Original Admission: N  -BW Side: Left  -BW Orientation: anterior  -BW Location: knee  -BW Primary Wound Type: Incision  -BW    Retired Wound - Properties Group Date first assessed: 10/17/23  -BW Time first assessed: 1447  -BW Present on Original Admission: N  -BW Side: Left  -BW Location: knee  -BW Primary Wound Type: Incision  -BW      Row Name 10/19/23 1500          Progress Summary (PT)    Progress Toward Functional Goals (PT) progress toward functional goals is good (P)   -RH     Daily Progress Summary (PT) Patient tolerated all gait training and therapeutic exercise well today over two separate treatment sessions. She was able to ambulate 200' CGA with a rolling walker twice today with no complaints. (P)   -RH               User Key  (r) = Recorded By, (t) = Taken By, (c) = Cosigned By      Initials Name Provider Type    BW Thaddeus Wyatt, RN Registered Nurse    RH Jovanny Lozada, PT Student PT Student                      PT Recommendation and Plan     Progress Summary (PT)  Progress Toward Functional Goals (PT): (P) progress toward functional goals is good  Daily Progress Summary (PT): (P) Patient tolerated all gait training and therapeutic exercise well today over two separate treatment sessions. She was able to ambulate 200' CGA with a rolling walker twice today with no complaints.   Outcome Measures       Row Name 10/19/23 1500 10/18/23 1100          How much help from another person do you currently need...    Turning from your back to your side while in flat bed without using bedrails? 4 (P)   - 4  -DP     Moving from lying on back to sitting on the side of a flat bed without bedrails? 4 (P)   - 3  -DP     Moving to and from a bed to a chair (including a wheelchair)? 3 (P)   - 3  -DP     Standing up from a chair using your arms (e.g., wheelchair, bedside chair)? 3 (P)   - 3  -DP     Climbing 3-5 steps with a railing? 3 (P)   - 3  -DP     To walk in hospital room? 3 (P)   - 3  -DP      AM-PAC 6 Clicks Score (PT) 20 (P)   -RH 19  -DP     Highest level of mobility 6 --> Walked 10 steps or more (P)   -RH 6 --> Walked 10 steps or more  -DP               User Key  (r) = Recorded By, (t) = Taken By, (c) = Cosigned By      Initials Name Provider Type    DP Brissa Foote, PT Physical Therapist     Jovanny Lozada, PT Student PT Student                     Time Calculation:    PT Charges       Row Name 10/19/23 1520             Time Calculation    PT Received On 10/19/23 (P)   -RH         Timed Charges    85747 - PT Therapeutic Exercise Minutes 10 (P)   -RH      44529 - Gait Training Minutes  30 (P)   -         Total Minutes    Timed Charges Total Minutes 40 (P)   -RH       Total Minutes 40 (P)   -RH                User Key  (r) = Recorded By, (t) = Taken By, (c) = Cosigned By      Initials Name Provider Type     Jovanny Lozada, PT Student PT Student                      PT G-Codes  Outcome Measure Options: AM-PAC 6 Clicks Daily Activity (OT), Optimal Instrument  AM-PAC 6 Clicks Score (PT): (P) 20  AM-PAC 6 Clicks Score (OT): 18    Jovanny Lozada, PT Student  10/19/2023

## 2023-10-19 NOTE — PLAN OF CARE
Goal Outcome Evaluation:                      Patient went on hospital CPAP (10) with no issues. She stated she wears a home cpap and does well with it.

## 2023-10-20 LAB
QT INTERVAL: 516 MS
QTC INTERVAL: 457 MS

## 2023-10-30 ENCOUNTER — OFFICE VISIT (OUTPATIENT)
Dept: ORTHOPEDIC SURGERY | Facility: CLINIC | Age: 68
End: 2023-10-30
Payer: MEDICARE

## 2023-10-30 VITALS — WEIGHT: 216 LBS | BODY MASS INDEX: 38.27 KG/M2 | HEIGHT: 63 IN

## 2023-10-30 DIAGNOSIS — Z47.1 AFTERCARE FOLLOWING LEFT KNEE JOINT REPLACEMENT SURGERY: Primary | ICD-10-CM

## 2023-10-30 DIAGNOSIS — Z96.652 AFTERCARE FOLLOWING LEFT KNEE JOINT REPLACEMENT SURGERY: Primary | ICD-10-CM

## 2023-10-30 PROCEDURE — 99024 POSTOP FOLLOW-UP VISIT: CPT | Performed by: PHYSICIAN ASSISTANT

## 2023-10-30 NOTE — PROGRESS NOTES
"Chief Complaint  Pain and Follow-up of the Left Knee    Subjective      Marilin Martinez presents to [facility] for follow-up of left total knee arthroplasty with Leisa navigation performed on 10/17/2023 by Dr. Calvillo.  Patient presents today with use of walker.  She reports she has been discharged from home health physical therapy and has plans to start outpatient physical therapy next week.  Her pain is well controlled.    Objective   Allergies   Allergen Reactions    Adhesive Tape Rash     SKIN BREAKDOWN     Latex Rash     blisters       Vital Signs:   Ht 160 cm (63\")   Wt 98 kg (216 lb)   BMI 38.26 kg/m²       Physical Exam    Constitutional: Awake, alert. Well nourished appearance.    Integumentary: Warm, dry, intact. No obvious rashes.    HENT: Atraumatic, normocephalic.   Respiratory: Non labored respirations .   Cardiovascular: Intact peripheral pulses.    Psychiatric: Normal mood and affect. A&O X3    Ortho Exam  Left knee: Staples removed in office.  Well-healing surgical incision visualized.  No evidence of wound dehiscence, surrounding erythema, warmth, or drainage.  There is moderate edema.  Patella is well tracking and knee is stable to varus and valgus stress.  Full knee extension and flexion to 90 degrees.  Full plantarflexion and dorsiflexion of the ankle.  Sensation intact light touch.  Distal neurovascular intact.  Patient is ambulatory with assistance of a walker.    Imaging Results (Most Recent)       Procedure Component Value Units Date/Time    XR Knee 3 View Left [[id number]] Resulted: 10/30/23 1520     Updated: 10/30/23 1521    Narrative:      X-Ray Report:  Study: X-rays ordered, taken in the office, and reviewed today.   Site: Left knee Xray  Indication: TKA  View: AP/Lateral, Standing, and [place] view(s)  Findings: Intact left total knee arthroplasty. No evidence of hardware   malfunction. Small amount of residual cement to lateral aspect of knee. "   Prior studies available for comparison: yes                       Assessment and Plan   Problem List Items Addressed This Visit    None  Visit Diagnoses       Aftercare following left knee joint replacement surgery    -  Primary    Relevant Orders    XR Knee 3 View Left (Completed)    Ambulatory Referral to Physical Therapy POST OP (Completed)          Follow Up   Return in about 4 weeks (around 11/27/2023).    Tobacco Use: Medium Risk (10/30/2023)    Patient History     Smoking Tobacco Use: Former     Smokeless Tobacco Use: Never     Passive Exposure: Not on file     Patient is a former smoker.  Encouraged continued tobacco cessation.  Did not discuss options for smoking cessation.    Patient Instructions   X-rays taken and reviewed, showing intact hardware.    Staples removed in office and Steri-Strips applied.  Patient educated on incision care.  Please keep incision clean and dry.  Do not soak or submerge in water until incision is fully healed.  Do not apply creams or lotions over the incision.  Please allow Steri-Strips to fall off on their own within 7 to 10 days.    Continue icing and elevation of the knee as needed to help with pain and swelling.  Ice knee up to 3 or 4 times daily for no longer than 15 to 20 minutes at a time.    Continue PT to progress ROM, strength, and weightbearing status.    Follow-up in 4 weeks. Repeat x-rays not needed at this visit.  Please call with questions or concerns.    Patient was given instructions and counseling regarding her condition or for health maintenance advice. Please see specific information pulled into the AVS if appropriate.

## 2023-11-07 ENCOUNTER — TELEPHONE (OUTPATIENT)
Dept: ORTHOPEDIC SURGERY | Facility: CLINIC | Age: 68
End: 2023-11-07
Payer: MEDICARE

## 2023-11-07 NOTE — TELEPHONE ENCOUNTER
Relationship to Patient: [facility]  Phone Number: [phone]  Reason for Call: CALLING STATING THEY GOT THE PATIENTS REF BUT SHE IS STILL IN HOME HEALTH ASKING FOR SOMEONE TO RE-CONTACT THEM WHEN PATIENT IS DISCHARGED FROM HOME HEALTH

## 2023-11-07 NOTE — TELEPHONE ENCOUNTER
SPOKE WITH PATIENT, SHE STATES SHE WAS DISCHARGED FROM HOME HEALTH AS OF 11/02/23. ADVISED PATIENT TO REACH OUT TO [facility] TO SCHEDULE THERAPY IF SHE DOES NOT HEAR FROM THEM TODAY. SHE VOICED UNDERSTANDING.

## 2023-11-07 NOTE — TELEPHONE ENCOUNTER
LVM FOR PATIENT TO CALL OFFICE BACK TO LET US KNOW IF SHE HAS BEEN DISCHARGED FROM HOME HEALTH OR HER ANTICIPATED DC DATE, SO OUTPATIENT CAN BE SCHEDULED.

## 2023-11-08 RX ORDER — GABAPENTIN 300 MG/1
300 CAPSULE ORAL 3 TIMES DAILY
Qty: 90 CAPSULE | Refills: 0 | Status: SHIPPED | OUTPATIENT
Start: 2023-11-08

## 2023-11-08 NOTE — TELEPHONE ENCOUNTER
Patient called requesting 800mg Gabapentin three times a day stating she spoke with Rajinder over the weekend.  She just needs a 30 day supply to last until her normal doctor prescribing gets back to her.  I spoke with Dr. Calvillo and he said that he will not prescribe 800mg of Gabapentin but that he would do 300mg three times a day for a one time prescription.  I spoke with patient and explained that to her and she voiced understanding and asked if we could do that.

## 2023-11-13 ENCOUNTER — LAB (OUTPATIENT)
Dept: LAB | Facility: HOSPITAL | Age: 68
End: 2023-11-13
Payer: MEDICARE

## 2023-11-13 ENCOUNTER — OFFICE VISIT (OUTPATIENT)
Dept: FAMILY MEDICINE CLINIC | Facility: CLINIC | Age: 68
End: 2023-11-13
Payer: MEDICARE

## 2023-11-13 VITALS
SYSTOLIC BLOOD PRESSURE: 138 MMHG | DIASTOLIC BLOOD PRESSURE: 63 MMHG | HEART RATE: 64 BPM | WEIGHT: 220.2 LBS | OXYGEN SATURATION: 95 % | TEMPERATURE: 98 F | HEIGHT: 63 IN | BODY MASS INDEX: 39.02 KG/M2

## 2023-11-13 DIAGNOSIS — G89.29 OTHER CHRONIC PAIN: ICD-10-CM

## 2023-11-13 DIAGNOSIS — Z23 NEED FOR VACCINATION: ICD-10-CM

## 2023-11-13 DIAGNOSIS — E78.5 DYSLIPIDEMIA: ICD-10-CM

## 2023-11-13 DIAGNOSIS — E11.65 TYPE 2 DIABETES MELLITUS WITH HYPERGLYCEMIA, WITHOUT LONG-TERM CURRENT USE OF INSULIN: ICD-10-CM

## 2023-11-13 DIAGNOSIS — Z12.31 ENCOUNTER FOR SCREENING MAMMOGRAM FOR MALIGNANT NEOPLASM OF BREAST: ICD-10-CM

## 2023-11-13 DIAGNOSIS — E03.9 HYPOTHYROIDISM, UNSPECIFIED TYPE: ICD-10-CM

## 2023-11-13 DIAGNOSIS — I10 ESSENTIAL HYPERTENSION: ICD-10-CM

## 2023-11-13 DIAGNOSIS — Z76.89 ESTABLISHING CARE WITH NEW DOCTOR, ENCOUNTER FOR: ICD-10-CM

## 2023-11-13 DIAGNOSIS — Z11.59 NEED FOR HEPATITIS C SCREENING TEST: ICD-10-CM

## 2023-11-13 DIAGNOSIS — Z23 NEED FOR SHINGLES VACCINE: ICD-10-CM

## 2023-11-13 DIAGNOSIS — F41.1 GAD (GENERALIZED ANXIETY DISORDER): ICD-10-CM

## 2023-11-13 DIAGNOSIS — Z00.00 ENCOUNTER FOR ANNUAL WELLNESS EXAM IN MEDICARE PATIENT: Primary | ICD-10-CM

## 2023-11-13 DIAGNOSIS — Z78.0 POST-MENOPAUSE: ICD-10-CM

## 2023-11-13 DIAGNOSIS — E66.9 OBESITY, CLASS II, BMI 35-39.9: ICD-10-CM

## 2023-11-13 PROBLEM — M17.12 OSTEOARTHRITIS OF LEFT KNEE: Status: RESOLVED | Noted: 2023-09-27 | Resolved: 2023-11-13

## 2023-11-13 LAB
ALBUMIN UR-MCNC: <1.2 MG/DL
ANION GAP SERPL CALCULATED.3IONS-SCNC: 8.5 MMOL/L (ref 5–15)
BUN SERPL-MCNC: 11 MG/DL (ref 8–23)
BUN/CREAT SERPL: 11.1 (ref 7–25)
CALCIUM SPEC-SCNC: 9.6 MG/DL (ref 8.6–10.5)
CHLORIDE SERPL-SCNC: 102 MMOL/L (ref 98–107)
CHOLEST SERPL-MCNC: 136 MG/DL (ref 0–200)
CO2 SERPL-SCNC: 29.5 MMOL/L (ref 22–29)
CREAT SERPL-MCNC: 0.99 MG/DL (ref 0.57–1)
CREAT UR-MCNC: 43 MG/DL
EGFRCR SERPLBLD CKD-EPI 2021: 63 ML/MIN/1.73
GLUCOSE SERPL-MCNC: 79 MG/DL (ref 65–99)
HCV AB SER DONR QL: NORMAL
HDLC SERPL-MCNC: 47 MG/DL (ref 40–60)
LDLC SERPL CALC-MCNC: 73 MG/DL (ref 0–100)
LDLC/HDLC SERPL: 1.54 {RATIO}
MICROALBUMIN/CREAT UR: NORMAL MG/G{CREAT}
POTASSIUM SERPL-SCNC: 4 MMOL/L (ref 3.5–5.2)
SODIUM SERPL-SCNC: 140 MMOL/L (ref 136–145)
TRIGL SERPL-MCNC: 82 MG/DL (ref 0–150)
TSH SERPL DL<=0.05 MIU/L-ACNC: 6.66 UIU/ML (ref 0.27–4.2)
VLDLC SERPL-MCNC: 16 MG/DL (ref 5–40)

## 2023-11-13 PROCEDURE — 84443 ASSAY THYROID STIM HORMONE: CPT

## 2023-11-13 PROCEDURE — 80061 LIPID PANEL: CPT

## 2023-11-13 PROCEDURE — 82043 UR ALBUMIN QUANTITATIVE: CPT

## 2023-11-13 PROCEDURE — 82570 ASSAY OF URINE CREATININE: CPT

## 2023-11-13 PROCEDURE — 86803 HEPATITIS C AB TEST: CPT

## 2023-11-13 PROCEDURE — 36415 COLL VENOUS BLD VENIPUNCTURE: CPT

## 2023-11-13 PROCEDURE — 80048 BASIC METABOLIC PNL TOTAL CA: CPT

## 2023-11-13 RX ORDER — ROPINIROLE 2 MG/1
TABLET, FILM COATED ORAL
COMMUNITY
Start: 2023-11-08

## 2023-11-13 RX ORDER — HYDROCODONE BITARTRATE AND ACETAMINOPHEN 10; 325 MG/1; MG/1
TABLET ORAL
COMMUNITY
Start: 2023-11-02

## 2023-11-13 RX ORDER — ROSUVASTATIN CALCIUM 10 MG/1
TABLET, COATED ORAL
COMMUNITY
Start: 2023-11-07

## 2023-11-13 RX ORDER — FEXOFENADINE HCL 180 MG/1
180 TABLET ORAL DAILY
Qty: 30 TABLET | Refills: 5 | Status: SHIPPED | OUTPATIENT
Start: 2023-11-13

## 2023-11-13 NOTE — ASSESSMENT & PLAN NOTE
Diabetes is newly identified.   Continue current treatment regimen.  Regular aerobic exercise.  Diabetes will be reassessed in 3 months.

## 2023-11-13 NOTE — ASSESSMENT & PLAN NOTE
Psychological condition is newly identified.  Continue current treatment regimen.  Regular aerobic exercise.  Referral to psych. Advised we do not prescribe long-term benzodiazepine.   Psychological condition  will be reassessed at the next regular appointment.

## 2023-11-13 NOTE — ASSESSMENT & PLAN NOTE
Patient's (Body mass index is 39.01 kg/m².) indicates that they are morbidly/severely obese (BMI > 40 or > 35 with obesity - related health condition) with health conditions that include hypertension, diabetes mellitus, and dyslipidemias . Weight is newly identified. BMI  is above average; BMI management plan is completed. We discussed portion control and increasing exercise.

## 2023-11-13 NOTE — PROGRESS NOTES
The ABCs of the Annual Wellness Visit  Subsequent Medicare Wellness Visit    Subjective    Marilin Martinez is a 68 y.o. female who presents for a Subsequent Medicare Wellness Visit.    The following portions of the patient's history were reviewed and   updated as appropriate: allergies, current medications, past family history, past medical history, past social history, past surgical history, and problem list.    Compared to one year ago, the patient feels her physical   health is the same.    Compared to one year ago, the patient feels her mental   health is the same.    Recent Hospitalizations:  She was not admitted to the hospital during the last year.       Current Medical Providers:  Patient Care Team:  Nano Castillo APRN as PCP - General (Nurse Practitioner)    Outpatient Medications Prior to Visit   Medication Sig Dispense Refill    Accu-Chek Isamar Plus test strip       ALPRAZolam (XANAX) 1 MG tablet Take 1 tablet by mouth 3 (Three) Times a Day As Needed for Anxiety.      amLODIPine-benazepril (LOTREL) 10-40 MG per capsule Take 1 capsule by mouth Daily. 30 capsule 11    aspirin (Karen Aspirin) 325 MG tablet Take 1 tablet by mouth Daily (start after completing the eliquis) 21 tablet 1    bumetanide (BUMEX) 1 MG tablet Take 1 tablet by mouth Daily As Needed (for lower extremity swelling). 30 tablet 3    dapagliflozin Propanediol (Farxiga) 10 MG tablet Take 10 mg by mouth Daily.      Finerenone (Kerendia) 10 MG tablet Take 1 tablet by mouth Daily.      fluticasone (FLONASE) 50 MCG/ACT nasal spray 1 spray into the nostril(s) as directed by provider As Needed.      gabapentin (NEURONTIN) 800 MG tablet Take 1 tablet by mouth 3 (Three) Times a Day.      HYDROcodone-acetaminophen (NORCO)  MG per tablet       levothyroxine (SYNTHROID, LEVOTHROID) 100 MCG tablet Take 1 tablet by mouth.      nystatin (MYCOSTATIN) 830953 UNIT/GM powder Apply  topically to the appropriate area as directed 3 (Three)  Times a Day. (Patient taking differently: Apply 1 application  topically to the appropriate area as directed 3 (Three) Times a Day.) 60 g 2    rOPINIRole (REQUIP) 2 MG tablet       rosuvastatin (CRESTOR) 10 MG tablet       topiramate (TOPAMAX) 25 MG tablet Take 1 tablet by mouth Every Night for 14 days, THEN 2 tablets Every Night for 76 days. 166 tablet 0    Vyzulta 0.024 % solution Administer 1 drop to both eyes Every Night.      gabapentin (NEURONTIN) 300 MG capsule Take 1 capsule by mouth 3 (Three) Times a Day. (Patient not taking: Reported on 11/13/2023) 90 capsule 0    Myrbetriq 25 MG tablet sustained-release 24 hour 24 hr tablet Take 1 tablet by mouth Every Night.      apixaban (ELIQUIS) 2.5 MG tablet tablet Take 1 tablet by mouth Every 12 (Twelve) Hours. (Patient not taking: Reported on 11/13/2023) 14 tablet 0    oxyCODONE-acetaminophen (PERCOCET) 7.5-325 MG per tablet Take 1 tablet by mouth Every 4 (Four) Hours As Needed for Moderate Pain. (Patient not taking: Reported on 11/13/2023) 45 tablet 0    Pitavastatin Calcium 4 MG tablet Take 1 tablet by mouth Every 3 (Three) Days. (Patient not taking: Reported on 11/13/2023) 30 tablet 3    potassium chloride (K-DUR,KLOR-CON) 20 MEQ CR tablet Take 1 tablet by mouth Daily. (Patient not taking: Reported on 11/13/2023)      rOPINIRole (REQUIP) 0.5 MG tablet Take 1 tablet by mouth Every Night. (Patient not taking: Reported on 11/13/2023)       No facility-administered medications prior to visit.       Opioid medication/s are on active medication list.  and I have evaluated her active treatment plan and pain score trends (see table).  There were no vitals filed for this visit.  I have reviewed the chart for potential of high risk medication and harmful drug interactions in the elderly.          Aspirin is on active medication list. Aspirin use is indicated based on review of current medical condition/s. Pros and cons of this therapy have been discussed today. Benefits  "of this medication outweigh potential harm.  Patient has been encouraged to continue taking this medication.  .      Patient Active Problem List   Diagnosis    Environmental and seasonal allergies    Essential hypertension    Sleep apnea    Multiple joint pain    Hypothyroid    Type 2 diabetes mellitus with hyperglycemia, without long-term current use of insulin    Dyslipidemia    S/P laparoscopic sleeve gastrectomy    Primary osteoarthritis of right knee    Primary osteoarthritis of left knee    Candidal intertrigo    Excessive and redundant skin and subcutaneous tissue    Obesity, Class II, BMI 35-39.9    Osteoarthrosis, localized, primary, knee, right    Open knee wound    Wound infection after surgery     I&D of right knee wound 3/21/23    Encounter for annual wellness exam in Medicare patient    Osteoarthrosis, localized, primary, knee, left    LANE (generalized anxiety disorder)    Other chronic pain     Advance Care Planning   Advance Care Planning     Advance Directive is not on file.  ACP discussion was held with the patient during this visit. Patient does not have an advance directive, information provided.     Objective    Vitals:    23 0828   BP: 138/63   Pulse: 64   Temp: 98 °F (36.7 °C)   TempSrc: Temporal   SpO2: 95%   Weight: 99.9 kg (220 lb 3.2 oz)   Height: 160 cm (63\")     Estimated body mass index is 39.01 kg/m² as calculated from the following:    Height as of this encounter: 160 cm (63\").    Weight as of this encounter: 99.9 kg (220 lb 3.2 oz).           Does the patient have evidence of cognitive impairment? No    Lab Results   Component Value Date    HGBA1C 5.40 10/10/2023        HEALTH RISK ASSESSMENT    Smoking Status:  Social History     Tobacco Use   Smoking Status Former    Packs/day: 1.00    Years: 20.00    Additional pack years: 0.00    Total pack years: 20.00    Types: Cigarettes    Quit date: 1997    Years since quittin.1    Passive exposure: Past   Smokeless Tobacco " Never     Alcohol Consumption:  Social History     Substance and Sexual Activity   Alcohol Use Not Currently     Fall Risk Screen:    MICHADI Fall Risk Assessment was completed, and patient is at LOW risk for falls.Assessment completed on:2023    Depression Screenin/13/2023     8:21 AM   PHQ-2/PHQ-9 Depression Screening   Little Interest or Pleasure in Doing Things 0-->not at all   Feeling Down, Depressed or Hopeless 1-->several days   PHQ-9: Brief Depression Severity Measure Score 1       Health Habits and Functional and Cognitive Screenin/13/2023     8:00 AM   Functional & Cognitive Status   Do you have difficulty preparing food and eating? No   Do you have difficulty bathing yourself, getting dressed or grooming yourself? No   Do you have difficulty using the toilet? No   Do you have difficulty moving around from place to place? No   Do you have trouble with steps or getting out of a bed or a chair? No   Current Diet Well Balanced Diet   Dental Exam Up to date   Eye Exam Up to date   Exercise (times per week) 3 times per week   Current Exercises Include Aerobics   Do you need help using the phone?  No   Are you deaf or do you have serious difficulty hearing?  No   Do you need help to go to places out of walking distance? No   Do you need help shopping? No   Do you need help preparing meals?  No   Do you need help with housework?  No   Do you need help with laundry? No   Do you need help taking your medications? No   Do you need help managing money? No   Do you ever drive or ride in a car without wearing a seat belt? No   Have you felt unusual stress, anger or loneliness in the last month? No   Who do you live with? Alone   If you need help, do you have trouble finding someone available to you? No   Have you been bothered in the last four weeks by sexual problems? No   Do you have difficulty concentrating, remembering or making decisions? No       Age-appropriate Screening Schedule:  Refer  to the list below for future screening recommendations based on patient's age, sex and/or medical conditions. Orders for these recommended tests are listed in the plan section. The patient has been provided with a written plan.    Health Maintenance   Topic Date Due    MAMMOGRAM  Never done    URINE MICROALBUMIN  Never done    Pneumococcal Vaccine 65+ (1 - PCV) Never done    HEPATITIS C SCREENING  Never done    PAP SMEAR  Never done    DXA SCAN  08/12/2022    LIPID PANEL  11/08/2022    ZOSTER VACCINE (2 of 2) 03/06/2023    DIABETIC FOOT EXAM  04/18/2023    TDAP/TD VACCINES (1 - Tdap) 11/20/2023 (Originally 1/13/1974)    COLORECTAL CANCER SCREENING  11/20/2023 (Originally 1955)    COVID-19 Vaccine (1) 11/27/2023 (Originally 1955)    HEMOGLOBIN A1C  04/10/2024    DIABETIC EYE EXAM  10/01/2024    ANNUAL WELLNESS VISIT  11/13/2024    BMI FOLLOWUP  11/13/2024    INFLUENZA VACCINE  Completed                  CMS Preventative Services Quick Reference  Risk Factors Identified During Encounter  Polypharmacy: Medication List reviewed  The above risks/problems have been discussed with the patient.  Pertinent information has been shared with the patient in the After Visit Summary.  An After Visit Summary and PPPS were made available to the patient.    Follow Up:   Next Medicare Wellness visit to be scheduled in 1 year.       Additional E&M Note during same encounter follows:  Patient has multiple medical problems which are significant and separately identifiable that require additional work above and beyond the Medicare Wellness Visit.      Chief Complaint  Establish Care (Pt prev pcp was Dr Reyes in [place].pt currently sees ortho for her knees and cardiology.) and Medicare Wellness-subsequent    Subjective        New  patient/establish care:    Previous provider: Dr. Reyes, [facility]    Lives in: [place]    /single:    Employed: disabled     Nicotine/ETOH use: former smoker, denies ETOH  "use    Pt presents for AWV, labs, pneumonia vaccine.         Reviewed all recent labs and medications.      Marilin Martinez is also being seen today for New patient, labs.    Review of Systems   Constitutional:  Negative for chills and fever.   HENT:  Negative for congestion and sore throat.    Eyes:  Negative for visual disturbance.   Respiratory:  Negative for cough, chest tightness and shortness of breath.    Cardiovascular:  Negative for chest pain.   Gastrointestinal:  Negative for constipation, diarrhea, nausea and vomiting.   Genitourinary:  Negative for dysuria.   Skin:  Negative for rash.   Psychiatric/Behavioral:  Negative for dysphoric mood and suicidal ideas. The patient is not nervous/anxious.        Objective   Vital Signs:  /63   Pulse 64   Temp 98 °F (36.7 °C) (Temporal)   Ht 160 cm (63\")   Wt 99.9 kg (220 lb 3.2 oz)   SpO2 95%   BMI 39.01 kg/m²     Physical Exam  Vitals reviewed.   Constitutional:       General: She is not in acute distress.  HENT:      Head: Normocephalic.      Right Ear: Tympanic membrane normal.      Left Ear: Tympanic membrane normal.      Nose: Nose normal.      Mouth/Throat:      Pharynx: Oropharynx is clear. No posterior oropharyngeal erythema.   Eyes:      General: No scleral icterus.     Extraocular Movements: Extraocular movements intact.      Conjunctiva/sclera: Conjunctivae normal.      Pupils: Pupils are equal, round, and reactive to light.   Cardiovascular:      Rate and Rhythm: Normal rate and regular rhythm.      Pulses: Normal pulses.      Heart sounds: Normal heart sounds.   Pulmonary:      Effort: Pulmonary effort is normal.      Breath sounds: Normal breath sounds.   Abdominal:      General: Bowel sounds are normal.      Palpations: Abdomen is soft.   Musculoskeletal:         General: Normal range of motion.      Cervical back: Neck supple.   Skin:     General: Skin is warm and dry.   Neurological:      Mental Status: She is alert and oriented to " person, place, and time.   Psychiatric:         Mood and Affect: Mood normal.         Behavior: Behavior normal.         Thought Content: Thought content normal.         Judgment: Judgment normal.            Common labs          10/10/2023    10:33 10/18/2023    00:28 10/18/2023    09:16 10/19/2023    05:21   Common Labs   Glucose 83   184  90    BUN 16   12  14    Creatinine 0.90   1.04  0.90    Sodium 137   134  138    Potassium 4.1   4.2  3.8    Chloride 103   103  106    Calcium 9.4   9.2  8.8    Albumin 4.0       Total Bilirubin 0.5       Alkaline Phosphatase 69       AST (SGOT) 16       ALT (SGPT) 7       WBC 5.88  12.13      Hemoglobin 12.6  12.6   11.1    Hematocrit 39.7  39.5   33.9    Platelets 168  124      Hemoglobin A1C 5.40         Data reviewed : Consultant notes ortho, bariatric, cardiology           Assessment and Plan   Diagnoses and all orders for this visit:    1. Encounter for annual wellness exam in Medicare patient (Primary)  Assessment & Plan:  Discussed age appropriate preventative counseling including all recommended screenings and immunizations, sunscreen, and seatbelt use. Written information provided to patient. All questions answered. Pt verbalized understanding.         2. Encounter for screening mammogram for malignant neoplasm of breast  -     Mammo Screening Digital Tomosynthesis Bilateral With CAD; Future    3. Post-menopause  -     DEXA Bone Density Axial    4. Need for shingles vaccine  -     Zoster Vac Recomb Adjuvanted 50 MCG/0.5ML reconstituted suspension; Inject 0.5 mL into the appropriate muscle as directed by prescriber 1 (One) Time for 1 dose.  Dispense: 1 each; Refill: 0    5. Need for hepatitis C screening test  -     Hepatitis C antibody; Future    6. Type 2 diabetes mellitus with hyperglycemia, without long-term current use of insulin  Assessment & Plan:  Diabetes is newly identified.   Continue current treatment regimen.  Regular aerobic exercise.  Diabetes will be  reassessed in 3 months.    Orders:  -     Microalbumin / Creatinine Urine Ratio - Urine, Clean Catch; Future  -     Lipid panel; Future  -     TSH; Future  -     Cancel: Comprehensive metabolic panel; Future  -     Cancel: CBC & Differential; Future  -     Cancel: Hemoglobin A1c; Future    7. Establishing care with new doctor, encounter for    8. Obesity, Class II, BMI 35-39.9  Assessment & Plan:  Patient's (Body mass index is 39.01 kg/m².) indicates that they are morbidly/severely obese (BMI > 40 or > 35 with obesity - related health condition) with health conditions that include hypertension, diabetes mellitus, and dyslipidemias . Weight is newly identified. BMI  is above average; BMI management plan is completed. We discussed portion control and increasing exercise.       9. Essential hypertension  Assessment & Plan:  Hypertension is newly identified.  Continue current treatment regimen.  Dietary sodium restriction.  Weight loss.  Regular aerobic exercise.  Blood pressure will be reassessed at the next regular appointment.      10. Hypothyroidism, unspecified type  Assessment & Plan:  Newly identified   Recheck today       11. Dyslipidemia  Assessment & Plan:  Newly identified   Check lipid panel   Keep all Fu with cardiology       12. Need for vaccination  -     Pneumococcal Conjugate Vaccine 20-Valent (PCV20)    13. Other chronic pain  Assessment & Plan:  Referral to pain mgt   Geovanny reviewed and appropriate      Orders:  -     Ambulatory Referral to Pain Management    14. LANE (generalized anxiety disorder)  Assessment & Plan:  Psychological condition is newly identified.  Continue current treatment regimen.  Regular aerobic exercise.  Referral to psych. Advised we do not prescribe long-term benzodiazepine.   Psychological condition  will be reassessed at the next regular appointment.    Orders:  -     Ambulatory Referral to Psychiatry    Other orders  -     fexofenadine (Allegra Allergy) 180 MG tablet; Take 1  tablet by mouth Daily.  Dispense: 30 tablet; Refill: 5             Follow Up   Return if symptoms worsen or fail to improve.  Patient was given instructions and counseling regarding her condition or for health maintenance advice. Please see specific information pulled into the AVS if appropriate.

## 2023-11-14 ENCOUNTER — TELEPHONE (OUTPATIENT)
Dept: CARDIOLOGY | Facility: CLINIC | Age: 68
End: 2023-11-14
Payer: MEDICARE

## 2023-11-14 NOTE — TELEPHONE ENCOUNTER
----- Message from JORGE Carney sent at 11/13/2023 10:09 PM EST -----  Renal function, electrolytes, and lipid panel are good, continue current meds

## 2023-11-17 DIAGNOSIS — E03.9 HYPOTHYROIDISM, UNSPECIFIED TYPE: Primary | ICD-10-CM

## 2023-11-20 NOTE — PROGRESS NOTES
"Chief Complaint  Follow-up of the Right Knee     Subjective      Marilin Martinez presents to [facility] for follow up evaluation of the right knee. The patient is S/P Right Total Knee Arthroplasty with Gayatri Robot 23 and I&D of right knee wound 3/21/23. She is ambulating with a walker.     Allergies   Allergen Reactions   • Latex Rash     blisters        Social History     Socioeconomic History   • Marital status:    Tobacco Use   • Smoking status: Former     Packs/day: 1.00     Years: 20.00     Pack years: 20.00     Types: Cigarettes     Quit date: 1997     Years since quittin.5   • Smokeless tobacco: Never   Vaping Use   • Vaping Use: Never used   Substance and Sexual Activity   • Alcohol use: Not Currently   • Drug use: No   • Sexual activity: Defer        Review of Systems     Objective   Vital Signs:   Pulse 80   Ht 160 cm (63\")   Wt 97.5 kg (215 lb)   SpO2 98%   BMI 38.09 kg/m²       Physical Exam  Constitutional:       Appearance: Normal appearance. Patient is well-developed and normal weight.   HENT:      Head: Normocephalic.      Right Ear: Hearing and external ear normal.      Left Ear: Hearing and external ear normal.      Nose: Nose normal.   Eyes:      Conjunctiva/sclera: Conjunctivae normal.   Cardiovascular:      Rate and Rhythm: Normal rate.   Pulmonary:      Effort: Pulmonary effort is normal.      Breath sounds: No wheezing or rales.   Abdominal:      Palpations: Abdomen is soft.      Tenderness: There is no abdominal tenderness.   Musculoskeletal:      Cervical back: Normal range of motion.   Skin:     Findings: No rash.   Neurological:      Mental Status: Patient  is alert and oriented to person, place, and time.   Psychiatric:         Mood and Affect: Mood and affect normal.         Judgment: Judgment normal.       Ortho Exam      Right knee- Positive EHL, FHL, GS and TA. Sensation intact to all 5 nerves of the foot. Positive pulses. " Neurovascularly intact. 3 sutures removed. incision appearance improved.   Slight dehiscence at the proximal and distal pole. Appears to have healthy granulation tissue at the distal pole of the wound.       Procedures        Imaging Results (Most Recent)     None           Result Review :     No results found.           Assessment and Plan     Diagnoses and all orders for this visit:    1. Aftercare following right knee joint replacement surgery (Primary)    2.  I&D of right knee wound 3/21/23        Discussed the treatment plan with the patient. 3 sutures were removed.  Plan to continue wet to dry packing. Plan to continue wound vac. Prescription for Doxycyline given today. Our office contact [facility] to explain to directions as well.     Call or return if worsening symptoms.    Follow Up     Monday with remaining suture removal. No xray needed      Patient was given instructions and counseling regarding her condition or for health maintenance advice. Please see specific information pulled into the AVS if appropriate.     Scribed for Murray Calvillo MD by Tasha Borja.  03/29/23   14:41 EDT    I have personally performed the services described in this document as scribed by the above individual and it is both accurate and complete. Murray Calvillo MD 03/29/23      alert and willingly accepted oral trials

## 2023-11-29 ENCOUNTER — OFFICE VISIT (OUTPATIENT)
Dept: ORTHOPEDIC SURGERY | Facility: CLINIC | Age: 68
End: 2023-11-29
Payer: MEDICARE

## 2023-11-29 VITALS — HEIGHT: 63 IN | WEIGHT: 220 LBS | BODY MASS INDEX: 38.98 KG/M2

## 2023-11-29 DIAGNOSIS — Z47.1 AFTERCARE FOLLOWING LEFT KNEE JOINT REPLACEMENT SURGERY: Primary | ICD-10-CM

## 2023-11-29 DIAGNOSIS — Z96.652 AFTERCARE FOLLOWING LEFT KNEE JOINT REPLACEMENT SURGERY: Primary | ICD-10-CM

## 2023-11-29 NOTE — PROGRESS NOTES
"Chief Complaint  Follow-up and Pain of the Left Knee    Subjective      Marilin Martinez presents to [facility] for follow-up of left total knee arthroplasty with Leisa navigation performed on 10/17/2023 by Dr. Calvillo.  Patient presents today with use of cane, which she reports she has been using for long distances out of the house only.  She has remained in outpatient physical therapy through [facility] and reports this is progressing well.  She is pleased with her postoperative outcome.    Objective   Allergies   Allergen Reactions    Adhesive Tape Rash     SKIN BREAKDOWN     Latex Rash     blisters       Vital Signs:   Ht 160 cm (63\")   Wt 99.8 kg (220 lb)   BMI 38.97 kg/m²       Physical Exam    Constitutional: Awake, alert. Well nourished appearance.    Integumentary: Warm, dry, intact. No obvious rashes.    HENT: Atraumatic, normocephalic.   Respiratory: Non labored respirations .   Cardiovascular: Intact peripheral pulses.    Psychiatric: Normal mood and affect. A&O X3    Ortho Exam  Left knee: Skin is warm, dry, and intact.  Well-healed surgical scar noted.  Mild to moderate edema.  Patella is well tracking and knee is stable to varus and valgus stress.  Full knee extension and flexion to 110 degrees.  Full plantarflexion and dorsiflexion of the ankle.  Sensation intact light touch.  Distal neurovascular intact.  Smooth sit to stand transition.  Patient fully weightbearing with nonantalgic gait.    Imaging Results (Most Recent)       None                      Assessment and Plan   Problem List Items Addressed This Visit    None  Visit Diagnoses       Aftercare following left knee joint replacement surgery    -  Primary            Follow Up   Return in about 6 weeks (around 1/10/2024).    Tobacco Use: Medium Risk (11/29/2023)    Patient History     Smoking Tobacco Use: Former     Smokeless Tobacco Use: Never     Passive Exposure: Past     Patient is a former smoker.  Encouraged " continued tobacco cessation.  Did not discuss options for smoking cessation.    Patient Instructions   Patient is doing very well. Advised to continue PT to completion to progress strength and ROM. Continue home exercises.     Continue icing knee as needed up to 4 times daily for no longer than 15-20 mins at a time.     Follow-up in 6 weeks. Repeat x-rays needed. Call with changes or concerns.     Patient was given instructions and counseling regarding her condition or for health maintenance advice. Please see specific information pulled into the AVS if appropriate.

## 2024-01-03 ENCOUNTER — OFFICE VISIT (OUTPATIENT)
Dept: ORTHOPEDIC SURGERY | Facility: CLINIC | Age: 69
End: 2024-01-03
Payer: MEDICARE

## 2024-01-03 VITALS
DIASTOLIC BLOOD PRESSURE: 68 MMHG | HEIGHT: 63 IN | OXYGEN SATURATION: 96 % | HEART RATE: 62 BPM | WEIGHT: 220.02 LBS | SYSTOLIC BLOOD PRESSURE: 139 MMHG | BODY MASS INDEX: 38.98 KG/M2

## 2024-01-03 DIAGNOSIS — M25.561 RIGHT KNEE PAIN, UNSPECIFIED CHRONICITY: Primary | ICD-10-CM

## 2024-01-03 DIAGNOSIS — Z96.651 HISTORY OF TOTAL KNEE ARTHROPLASTY, RIGHT: ICD-10-CM

## 2024-01-03 NOTE — PATIENT INSTRUCTIONS
X-rays reviewed, showing hardware intact. Continue home exercise program to progress strength and ROM.    Continue with lifelong antibiotic prophylaxis with dental procedures following total joint replacement.    Follow-up in 1 year. Call sooner or return to care, if needed with any changes or concerns. Repeat x-rays.

## 2024-01-03 NOTE — PROGRESS NOTES
"Chief Complaint  Follow-up of the Right Knee    Subjective      Marilin Martinez presents to [facility] for follow-up of right knee.  She has a history of right total knee arthroplasty with Gayatri robot performed on 1/31/2023 followed by subsequent irrigation and debridement of right knee wound on 3/21/2023 by Dr. Calvillo.  She presents today with use of cane, which she reports she uses out of the house only.  She states she has no complaints with her right knee.  Reports right knee has fully healed.  She states \"I am happy with it\".  She has been able to return to baseline activities/ADLs without difficulties.    Objective   Allergies   Allergen Reactions    Adhesive Tape Rash     SKIN BREAKDOWN     Latex Rash     blisters       Vital Signs:   /68   Pulse 62   Ht 160 cm (63\")   Wt 99.8 kg (220 lb 0.3 oz)   SpO2 96%   BMI 38.97 kg/m²       Physical Exam    Constitutional: Awake, alert. Well nourished appearance.    Integumentary: Warm, dry, intact. No obvious rashes.    HENT: Atraumatic, normocephalic.   Respiratory: Non labored respirations .   Cardiovascular: Intact peripheral pulses.    Psychiatric: Normal mood and affect. A&O X3    Ortho Exam  Right knee: Skin is warm, dry, and intact.  Well-healed surgical scar noted.  Mild edema.  Patella is well tracking and knee is stable to varus and valgus stress.  Full knee extension and flexion to 115 degrees.  Full plantarflexion and dorsiflexion of the ankle.  Sensation and distal neurovascular intact.    Imaging Results (Most Recent)       Procedure Component Value Units Date/Time    XR Knee 3 View Right [[id number]] Resulted: 01/03/24 1115     Updated: 01/03/24 1116    Narrative:      X-Ray Report:  Study: X-rays ordered, taken in the office, and reviewed today.   Site: Right knee Xray  Indication: TKA  View: AP/Lateral, Standing, and Sigourney view(s)  Findings: Intact right total knee arthroplasty. No evidence of hardware "   malfunction.   Prior studies available for comparison: yes                       Assessment and Plan   Problem List Items Addressed This Visit    None  Visit Diagnoses       Right knee pain, unspecified chronicity    -  Primary    Relevant Orders    XR Knee 3 View Right (Completed)    History of total knee arthroplasty, right                Follow Up   Return in about 1 year (around 1/3/2025).    Tobacco Use: Medium Risk (1/3/2024)    Patient History     Smoking Tobacco Use: Former     Smokeless Tobacco Use: Never     Passive Exposure: Past     Patient is a former smoker.  Encouraged continued tobacco cessation.  Did not discuss options for smoking cessation.    Patient Instructions   X-rays reviewed, showing hardware intact. Continue home exercise program to progress strength and ROM.    Continue with lifelong antibiotic prophylaxis with dental procedures following total joint replacement.    Follow-up in 1 year. Call sooner or return to care, if needed with any changes or concerns. Repeat x-rays.     Patient was given instructions and counseling regarding her condition or for health maintenance advice. Please see specific information pulled into the AVS if appropriate.

## 2024-01-04 ENCOUNTER — PATIENT MESSAGE (OUTPATIENT)
Dept: ORTHOPEDIC SURGERY | Facility: CLINIC | Age: 69
End: 2024-01-04
Payer: MEDICARE

## 2024-01-12 ENCOUNTER — TELEMEDICINE (OUTPATIENT)
Dept: BARIATRICS/WEIGHT MGMT | Facility: CLINIC | Age: 69
End: 2024-01-12
Payer: MEDICARE

## 2024-01-12 DIAGNOSIS — E66.9 OBESITY, CLASS II, BMI 35-39.9: Primary | ICD-10-CM

## 2024-01-12 DIAGNOSIS — Z98.84 S/P LAPAROSCOPIC SLEEVE GASTRECTOMY: ICD-10-CM

## 2024-01-12 DIAGNOSIS — E11.65 TYPE 2 DIABETES MELLITUS WITH HYPERGLYCEMIA, WITHOUT LONG-TERM CURRENT USE OF INSULIN: ICD-10-CM

## 2024-01-12 RX ORDER — ONDANSETRON 4 MG/1
TABLET, FILM COATED ORAL
COMMUNITY
Start: 2024-01-10

## 2024-01-12 RX ORDER — AMOXICILLIN AND CLAVULANATE POTASSIUM 875; 125 MG/1; MG/1
TABLET, FILM COATED ORAL
COMMUNITY
Start: 2024-01-10

## 2024-01-12 RX ORDER — FLUCONAZOLE 150 MG/1
TABLET ORAL
COMMUNITY
Start: 2024-01-10

## 2024-01-12 RX ORDER — NYSTATIN 100000 [USP'U]/G
POWDER TOPICAL 3 TIMES DAILY
Qty: 60 G | Refills: 2 | Status: SHIPPED | OUTPATIENT
Start: 2024-01-12

## 2024-01-12 NOTE — PROGRESS NOTES
[facility] SURGERY  [address] [facility] [address]  [place] [id number] [phone]  [address] [facility] [address]  [place] [id number] [phone]  Dept: [phone]  1/12/2024      Marilin Martinez.  [id number]  [id number]  1955  female    Mode of Visit: Video  Location of patient: home  You have chosen to receive care through a telehealth visit.  Does the patient consent to use a video/audio connection for your medical care today? Yes  The visit included audio and video interaction. No technical issues occurred during this visit.     CC: follow-up sleeve 2 yr      BH Post-Op Bariatric Surgery:   Marilin Martinez is status post Laparoscopic Sleeve with PEHR procedure, performed on 02/14/2022     HPI:   Today's weight is 216  pounds, ,  she  has maintained her weight since the last visit and   her   weight loss since surgery is 60 pounds. The patient reports a decreased portion size and loss of appetite.      Marilin Martinez denies n/v/c/regurg/reflux and reports recent diarrhea, however thinks this is a GI bug.     Diet and Exercise: Diet history reviewed and discussed with the patient. Weight loss/gains to date discussed with the patient. The patient states they are eating unknown grams of protein per day. She reports eating 2 meals per day, a typical portion size of 1 cup, eating 1-2 snacks per day, drinking 8 or more 8-oz. glasses of water per day. Pt reports eating small portions. She reports drinking 1 regular 20oz coke over 3-4 days. Pt voiced she does not always focus on eating protein first with meals.    Supplements: celebrate multi.     Review of Systems   Constitutional:  Positive for fatigue.   Gastrointestinal:  Positive for diarrhea.   All other systems reviewed and are negative.      Patient Active Problem List   Diagnosis    Environmental and seasonal allergies    Essential hypertension    Sleep apnea    Multiple joint pain     Hypothyroid    Type 2 diabetes mellitus with hyperglycemia, without long-term current use of insulin    Dyslipidemia    S/P laparoscopic sleeve gastrectomy    Primary osteoarthritis of right knee    Primary osteoarthritis of left knee    Candidal intertrigo    Excessive and redundant skin and subcutaneous tissue    Obesity, Class II, BMI 35-39.9    Osteoarthrosis, localized, primary, knee, right    Open knee wound    Wound infection after surgery     I&D of right knee wound 3/21/23    Encounter for annual wellness exam in Medicare patient    Osteoarthrosis, localized, primary, knee, left    LANE (generalized anxiety disorder)    Other chronic pain       Past Medical History:   Diagnosis Date    Arthritis     KNEE PAIN    Back pain     Diabetes mellitus     Diarrhea     Hyperlipemia     Hypertension     managed by cardiologist    Knee pain, bilateral     Neuropathy     OAB (overactive bladder)     Scoliosis     Seasonal allergies     Sleep apnea     CPAP    Stroke (cerebrum) 2017    no residual    Thyroid disorder        The following portions of the patient's history were reviewed and updated as appropriate: allergies, current medications, past medical history, past surgical history, and problem list.    There were no vitals filed for this visit.    Physical Exam  Vitals reviewed.   Constitutional:       General: She is not in acute distress.     Appearance: Normal appearance. She is morbidly obese.   HENT:      Head: Normocephalic and atraumatic.   Pulmonary:      Effort: Pulmonary effort is normal. No respiratory distress.   Musculoskeletal:         General: Normal range of motion.      Cervical back: Normal range of motion and neck supple.   Neurological:      General: No focal deficit present.      Mental Status: She is alert and oriented to person, place, and time.   Psychiatric:         Mood and Affect: Mood normal.         Behavior: Behavior normal.         Assessment:   Post-op, the patient doing well.      Encounter Diagnoses   Name Primary?    Obesity, Class II, BMI 35-39.9 Yes    S/P laparoscopic sleeve gastrectomy     Type 2 diabetes mellitus with hyperglycemia, without long-term current use of insulin        Plan:   Advised pt to focus on protein first with meals.  Will refill nystatin powder for skin irritation.  Will order annual labs.  Will hold off on refill of topamax as pt not taking regularly.    Encouraged patient to be sure to get plenty of lean protein per day through small frequent meals all with a protein source.   Activity restrictions: none.   Recommended patient be sure to get at least 70 grams of protein per day by eating small, frequent meals all with high lean protein choices. Be sure to limit/cut back on daily carbohydrate intake. Discussed with the patient the recommended amount of water per day to intake- half of body weight in ounces. Reviewed vitamin requirements. Be sure to do routine exercise, 150 minutes per week minimum, including both cardio and strength training.     Instructions / Recommendations: dietary counseling recommended, recommended a daily protein intake of  grams, vitamin supplement(s) recommended, recommended exercising at least 150 minutes per week, behavior modifications recommended and instructed to call the office for concerns, questions, or problems.     The patient was instructed to follow up in 6 months .     The patient was counseled regarding. Total time spent during this encounter was 20 minutes.

## 2024-01-23 PROBLEM — T81.49XA WOUND INFECTION AFTER SURGERY: Status: RESOLVED | Noted: 2023-03-20 | Resolved: 2024-01-23

## 2024-01-23 PROBLEM — M25.50 MULTIPLE JOINT PAIN: Status: RESOLVED | Noted: 2021-05-05 | Resolved: 2024-01-23

## 2024-01-23 PROBLEM — M17.12 OSTEOARTHROSIS, LOCALIZED, PRIMARY, KNEE, LEFT: Status: RESOLVED | Noted: 2023-09-27 | Resolved: 2024-01-23

## 2024-01-23 PROBLEM — M17.11 OSTEOARTHROSIS, LOCALIZED, PRIMARY, KNEE, RIGHT: Status: RESOLVED | Noted: 2023-01-31 | Resolved: 2024-01-23

## 2024-01-23 PROBLEM — Z00.00 ENCOUNTER FOR ANNUAL WELLNESS EXAM IN MEDICARE PATIENT: Status: RESOLVED | Noted: 2023-03-29 | Resolved: 2024-01-23

## 2024-01-23 NOTE — PROGRESS NOTES
Chief Complaint  Follow-up    Subjective            History of Present Illness  Marilin Martinez is a 69-year-old female patient who presents to the office today for follow-up.  She has hypertension and hyperlipidemia.  She is compliant with medication.  She reports increased swelling in right ankle over the last few days. She does not limit her fluid or sodium intake. She has bumex prescribed but admits to not taking any in the last few months. She denies any chest pain, shortness of breath, lightheadedness/dizziness, or palpitations.    PMH  Past Medical History:   Diagnosis Date    Arthritis     KNEE PAIN    Diabetes mellitus     Hyperlipemia     Hypertension     managed by cardiologist    Neuropathy     OAB (overactive bladder)     Scoliosis     Seasonal allergies     Sleep apnea     CPAP    Stroke (cerebrum) 2017    no residual    Thyroid disorder          ALLERGY  Allergies   Allergen Reactions    Adhesive Tape Rash     SKIN BREAKDOWN     Latex Rash     blisters          SURGICALHX  Past Surgical History:   Procedure Laterality Date    COLONOSCOPY      ENDOSCOPY N/A 11/08/2021    Procedure: ESOPHAGOGASTRODUODENOSCOPY WITH COLD BIOPSY;  Surgeon: Mansi Kyle MD;  Location: [facility] ENDOSCOPY;  Service: General;  Laterality: N/A;  PRE: SCREENING FOR BARIATRICS  POST: MODERATE SIZE HIATAL HERNIA    GALLBLADDER SURGERY  1997    GASTRIC SLEEVE LAPAROSCOPIC N/A 02/14/2022    Procedure: GASTRIC SLEEVE LAPAROSCOPIC With VENTRALHernia Repair AND PARAESOPHAGEAL HERNIA REPAIR AND LYSIS OF ADHESIONS PARTICAL OMENTECTOMY;  Surgeon: Quang Shipley Jr., MD;  Location: [facility] OR [facility];  Service: Bariatric;  Laterality: N/A;    INCISION AND DRAINAGE OF WOUND Right 3/21/2023    Procedure: IRRIGATION & DEBRIDEMENT of right knee wound;  Surgeon: Murray Calvillo MD;  Location: [facility];  Service: Orthopedics;  Laterality: Right;    TONSILLECTOMY      TOTAL KNEE ARTHROPLASTY Right 1/31/2023    Procedure: RIGHT TOTAL  KNEE ARTHROPLASTY WITH DANITA ROBOT AND SHIREEN;  Surgeon: Murray Calvillo MD;  Location: [facility] MAIN OR;  Service: Robotics - Ortho;  Laterality: Right;    TOTAL KNEE ARTHROPLASTY Left 10/17/2023    Procedure: LEFT TOTAL KNEE ARTHROPLASTY WITH JUAN LUIS NAVIGATION.;  Surgeon: Murray Calvillo MD;  Location: [facility] MAIN OR;  Service: Orthopedics;  Laterality: Left;    TUBAL ABDOMINAL LIGATION            SOC  Social History     Socioeconomic History    Marital status:    Tobacco Use    Smoking status: Former     Packs/day: 1.00     Years: 20.00     Additional pack years: 0.00     Total pack years: 20.00     Types: Cigarettes     Quit date: 1997     Years since quittin.3     Passive exposure: Past    Smokeless tobacco: Never   Vaping Use    Vaping Use: Never used   Substance and Sexual Activity    Alcohol use: Not Currently    Drug use: No    Sexual activity: Defer         FAMHX  Family History   Problem Relation Age of Onset    Obesity Mother     Hypertension Mother     Cancer Mother     Arthritis Mother     Diabetes Mother     Obesity Sister     Hypertension Sister     Cancer Sister     Arthritis Sister     Diabetes Father     Diabetes Daughter     Malig Hyperthermia Neg Hx           MEDSIGONLY  Current Outpatient Medications on File Prior to Visit   Medication Sig    Accu-Chek Isamar Plus test strip     ALPRAZolam (XANAX) 1 MG tablet Take 1 tablet by mouth 3 (Three) Times a Day As Needed for Anxiety.    amLODIPine-benazepril (LOTREL) 10-40 MG per capsule Take 1 capsule by mouth Daily.    amoxicillin-clavulanate (AUGMENTIN) 875-125 MG per tablet     bumetanide (BUMEX) 1 MG tablet Take 1 tablet by mouth Daily As Needed (for lower extremity swelling).    dapagliflozin Propanediol (Farxiga) 10 MG tablet Take 10 mg by mouth Daily.    fexofenadine (Allegra Allergy) 180 MG tablet Take 1 tablet by mouth Daily.    Finerenone (Kerendia) 10 MG tablet Take 1 tablet by mouth Daily.    fluconazole (DIFLUCAN) 150 MG  "tablet     fluticasone (FLONASE) 50 MCG/ACT nasal spray 1 spray into the nostril(s) as directed by provider As Needed.    gabapentin (NEURONTIN) 300 MG capsule Take 1 capsule by mouth 3 (Three) Times a Day.    gabapentin (NEURONTIN) 800 MG tablet Take 1 tablet by mouth 3 (Three) Times a Day.    HYDROcodone-acetaminophen (NORCO)  MG per tablet     levothyroxine (SYNTHROID, LEVOTHROID) 100 MCG tablet Take 1 tablet by mouth.    Myrbetriq 25 MG tablet sustained-release 24 hour 24 hr tablet Take 1 tablet by mouth Every Night.    nystatin (MYCOSTATIN) 945646 UNIT/GM powder Apply  topically to the appropriate area as directed 3 (Three) Times a Day.    ondansetron (ZOFRAN) 4 MG tablet     rOPINIRole (REQUIP) 2 MG tablet     rosuvastatin (CRESTOR) 10 MG tablet     topiramate (TOPAMAX) 25 MG tablet Take 1 tablet by mouth Every Night for 14 days, THEN 2 tablets Every Night for 76 days.    Vyzulta 0.024 % solution Administer 1 drop to both eyes Every Night.     No current facility-administered medications on file prior to visit.         Objective   /61   Pulse 55   Ht 160 cm (63\")   Wt 94.3 kg (208 lb)   BMI 36.85 kg/m²       Physical Exam  Constitutional:       Appearance: She is obese.   HENT:      Head: Normocephalic.   Neck:      Vascular: No carotid bruit.   Cardiovascular:      Rate and Rhythm: Normal rate and regular rhythm.      Pulses: Normal pulses.      Heart sounds: Normal heart sounds. No murmur heard.  Pulmonary:      Effort: Pulmonary effort is normal.      Breath sounds: Normal breath sounds.   Musculoskeletal:      Cervical back: Neck supple.      Right lower le+ Pitting      Left lower le+ Pitting   Skin:     General: Skin is dry.   Neurological:      Mental Status: She is alert and oriented to person, place, and time.   Psychiatric:         Behavior: Behavior normal.         Result Review :   The following data was reviewed by: JORGE Girard on 2024:  No results found " "for: \"PROBNP\"  CMP          11/13/2023    09:30   CMP   Glucose 79    BUN 11    Creatinine 0.99    EGFR 63.0    Sodium 140    Potassium 4.0    Chloride 102    Calcium 9.6    BUN/Creatinine Ratio 11.1    Anion Gap 8.5      CBC w/diff          10/18/2023    00:28 10/19/2023    05:21   CBC w/Diff   WBC 12.13     RBC 4.06     Hemoglobin 12.6  11.1    Hematocrit 39.5  33.9    MCV 97.3     MCH 31.0     MCHC 31.9     RDW 12.6     Platelets 124     Neutrophil Rel %     Immature Granulocyte Rel %     Lymphocyte Rel %     Monocyte Rel %     Eosinophil Rel %     Basophil Rel %        Lab Results   Component Value Date    TSH 6.660 (H) 11/13/2023      No results found for: \"FREET4\"   No results found for: \"DDIMERQUANT\"  Magnesium   Date Value Ref Range Status   10/18/2023 2.0 1.6 - 2.4 mg/dL Final      No results found for: \"DIGOXIN\"   No results found for: \"TROPONINT\"        Lipid Panel          11/13/2023    09:30   Lipid Panel   Total Cholesterol 136    Triglycerides 82    HDL Cholesterol 47    VLDL Cholesterol 16    LDL Cholesterol  73    LDL/HDL Ratio 1.54        Results for orders placed in visit on 08/17/21    Adult Transthoracic Echo Complete W/ Cont if Necessary Per Protocol    Interpretation Summary  · Estimated left ventricular EF = 55% Left ventricular systolic function is normal.  · Left atrial enlargement mild  · Trace pericardial effusion  · Estimated right ventricular systolic pressure from tricuspid regurgitation is mildly elevated (35-45 mmHg).         Assessment and Plan    Diagnoses and all orders for this visit:    1. Essential hypertension (Primary)  Currently controlled and without adverse effects from medication, continue amlodipine-benazepril 10-40 mg daily.    2. Dyslipidemia  Last lipid panel was 11/13/2023 with LDL 73 which is within goal range, continue rosuvastatin 10 mg nightly.    3. Bilateral lower extremity edema  We talked about restricting fluid intake to no more than 68 ounces per day and " sodium intake to no more than 2000 mg/day.  She will take Bumex daily for the next 5 days and will notify office if edema has not improved.          Follow Up   Return in about 6 months (around 7/24/2024) for Follow up with Dr Love.    Patient was given instructions and counseling regarding her condition or for health maintenance advice. Please see specific information pulled into the AVS if appropriate.     Marilin IBANEZ Michelle  reports that she quit smoking about 26 years ago. Her smoking use included cigarettes. She has a 20.00 pack-year smoking history. She has been exposed to tobacco smoke. She has never used smokeless tobacco.           Yamel Torres, APRN  01/24/24  16:24 EST    Dictated Utilizing Dragon Dictation

## 2024-01-24 ENCOUNTER — OFFICE VISIT (OUTPATIENT)
Dept: CARDIOLOGY | Facility: CLINIC | Age: 69
End: 2024-01-24
Payer: MEDICARE

## 2024-01-24 VITALS
HEART RATE: 55 BPM | BODY MASS INDEX: 36.86 KG/M2 | WEIGHT: 208 LBS | SYSTOLIC BLOOD PRESSURE: 138 MMHG | DIASTOLIC BLOOD PRESSURE: 61 MMHG | HEIGHT: 63 IN

## 2024-01-24 DIAGNOSIS — E78.5 DYSLIPIDEMIA: ICD-10-CM

## 2024-01-24 DIAGNOSIS — I10 ESSENTIAL HYPERTENSION: Primary | ICD-10-CM

## 2024-01-24 DIAGNOSIS — R60.0 BILATERAL LOWER EXTREMITY EDEMA: ICD-10-CM

## 2024-01-31 ENCOUNTER — OFFICE VISIT (OUTPATIENT)
Dept: ORTHOPEDIC SURGERY | Facility: CLINIC | Age: 69
End: 2024-01-31
Payer: MEDICARE

## 2024-01-31 VITALS
WEIGHT: 210.1 LBS | HEIGHT: 63 IN | DIASTOLIC BLOOD PRESSURE: 69 MMHG | BODY MASS INDEX: 37.23 KG/M2 | HEART RATE: 58 BPM | OXYGEN SATURATION: 98 % | SYSTOLIC BLOOD PRESSURE: 117 MMHG

## 2024-01-31 DIAGNOSIS — Z96.652 AFTERCARE FOLLOWING LEFT KNEE JOINT REPLACEMENT SURGERY: ICD-10-CM

## 2024-01-31 DIAGNOSIS — M25.562 LEFT KNEE PAIN, UNSPECIFIED CHRONICITY: Primary | ICD-10-CM

## 2024-01-31 DIAGNOSIS — Z47.1 AFTERCARE FOLLOWING LEFT KNEE JOINT REPLACEMENT SURGERY: ICD-10-CM

## 2024-01-31 NOTE — PROGRESS NOTES
"Chief Complaint  Pain and Follow-up of the Left Knee    Subjective      Marilin Martinez presents to [facility] for follow-up of left total knee arthroplasty with [place] navigation performed on 10/17/2023 by Dr. Calvillo.  She presents today with use of cane.  She reports that her left knee is doing very well.  She has been discharged from outpatient physical therapy and reports that she has continued participation in senior exercise program 3 days/week at [employer].    Objective   Allergies   Allergen Reactions    Adhesive Tape Rash     SKIN BREAKDOWN     Latex Rash     blisters       Vital Signs:   /69   Pulse 58   Ht 160 cm (63\")   Wt 95.3 kg (210 lb 1.6 oz)   SpO2 98%   BMI 37.22 kg/m²       Physical Exam    Constitutional: Awake, alert. Well nourished appearance.    Integumentary: Warm, dry, intact. No obvious rashes.    HENT: Atraumatic, normocephalic.   Respiratory: Non labored respirations .   Cardiovascular: Intact peripheral pulses.    Psychiatric: Normal mood and affect. A&O X3    Ortho Exam  Left knee: Skin is warm, dry, and intact.  Well-healed surgical scar noted.  Full knee extension and flexion to 115 degrees.  Full plantarflexion and dorsiflexion of the ankle.  Sensation and distal neurovascular intact.  Smooth sit to stand transition.  Patient ambulatory with assistance of a cane.    Imaging Results (Most Recent)       Procedure Component Value Units Date/Time    XR Knee 3 View Left [[id number]] Resulted: 01/31/24 1513     Updated: 01/31/24 1513    Narrative:      X-Ray Report:  Study: X-rays ordered, taken in the office, and reviewed today.   Site: Left knee Xray  Indication: TKA  View: AP/Lateral, Standing, and [place] view(s)  Findings: Intact left total knee arthroplasty. No evidence of hardware   malfunction.   Prior studies available for comparison: yes                       Assessment and Plan   Problem List Items Addressed This Visit  "   None  Visit Diagnoses       Left knee pain, unspecified chronicity    -  Primary    Relevant Orders    XR Knee 3 View Left (Completed)    Aftercare following left knee joint replacement surgery                Follow Up   Return in about 9 months (around 10/31/2024).    Tobacco Use: Medium Risk (1/31/2024)    Patient History     Smoking Tobacco Use: Former     Smokeless Tobacco Use: Never     Passive Exposure: Past     Patient is a former smoker.  Encouraged continued tobacco cessation.  Did not discuss options for smoking cessation.    Patient Instructions   X-rays reviewed, showing hardware intact. Continue home exercise program to progress strength and ROM. Continue icing knee as needed up to 3-4 times daily for no longer than 15 to 20 minutes at a time.    Continue with lifelong antibiotic prophylaxis with dental procedures following total joint replacement.    Follow-up in 9 months. Call sooner, if needed with any changes or concerns. Repeat x-rays.     Patient was given instructions and counseling regarding her condition or for health maintenance advice. Please see specific information pulled into the AVS if appropriate.

## 2024-02-09 ENCOUNTER — TRANSCRIBE ORDERS (OUTPATIENT)
Dept: ADMINISTRATIVE | Facility: HOSPITAL | Age: 69
End: 2024-02-09
Payer: MEDICARE

## 2024-02-09 ENCOUNTER — LAB (OUTPATIENT)
Dept: LAB | Facility: HOSPITAL | Age: 69
End: 2024-02-09
Payer: MEDICARE

## 2024-02-09 ENCOUNTER — APPOINTMENT (OUTPATIENT)
Dept: LAB | Facility: HOSPITAL | Age: 69
End: 2024-02-09
Payer: MEDICARE

## 2024-02-09 DIAGNOSIS — Z00.00 ROUTINE GENERAL MEDICAL EXAMINATION AT A HEALTH CARE FACILITY: ICD-10-CM

## 2024-02-09 DIAGNOSIS — E78.2 MIXED HYPERLIPIDEMIA: ICD-10-CM

## 2024-02-09 DIAGNOSIS — E11.65 TYPE 2 DIABETES MELLITUS WITH HYPERGLYCEMIA, WITHOUT LONG-TERM CURRENT USE OF INSULIN: ICD-10-CM

## 2024-02-09 DIAGNOSIS — R60.9 EDEMA, UNSPECIFIED TYPE: ICD-10-CM

## 2024-02-09 DIAGNOSIS — I10 ESSENTIAL HYPERTENSION, MALIGNANT: ICD-10-CM

## 2024-02-09 DIAGNOSIS — Z00.00 ROUTINE GENERAL MEDICAL EXAMINATION AT A HEALTH CARE FACILITY: Primary | ICD-10-CM

## 2024-02-09 DIAGNOSIS — Z98.84 S/P LAPAROSCOPIC SLEEVE GASTRECTOMY: ICD-10-CM

## 2024-02-09 DIAGNOSIS — E66.9 OBESITY, CLASS II, BMI 35-39.9: ICD-10-CM

## 2024-02-09 DIAGNOSIS — E78.2 MIXED HYPERLIPIDEMIA: Primary | ICD-10-CM

## 2024-02-09 DIAGNOSIS — E03.9 HYPOTHYROIDISM, UNSPECIFIED TYPE: ICD-10-CM

## 2024-02-09 LAB
25(OH)D3 SERPL-MCNC: 39.6 NG/ML (ref 30–100)
ALBUMIN SERPL-MCNC: 4.3 G/DL (ref 3.5–5.2)
ALBUMIN/GLOB SERPL: 1.7 G/DL
ALP SERPL-CCNC: 66 U/L (ref 39–117)
ALT SERPL W P-5'-P-CCNC: 12 U/L (ref 1–33)
ANION GAP SERPL CALCULATED.3IONS-SCNC: 10 MMOL/L (ref 5–15)
AST SERPL-CCNC: 20 U/L (ref 1–32)
BASOPHILS # BLD AUTO: 0.04 10*3/MM3 (ref 0–0.2)
BASOPHILS NFR BLD AUTO: 0.7 % (ref 0–1.5)
BILIRUB SERPL-MCNC: 0.5 MG/DL (ref 0–1.2)
BUN SERPL-MCNC: 10 MG/DL (ref 8–23)
BUN/CREAT SERPL: 11.6 (ref 7–25)
CALCIUM SPEC-SCNC: 9.1 MG/DL (ref 8.6–10.5)
CHLORIDE SERPL-SCNC: 104 MMOL/L (ref 98–107)
CHOLEST SERPL-MCNC: 128 MG/DL (ref 0–200)
CO2 SERPL-SCNC: 26 MMOL/L (ref 22–29)
CREAT SERPL-MCNC: 0.86 MG/DL (ref 0.57–1)
DEPRECATED RDW RBC AUTO: 40.1 FL (ref 37–54)
EGFRCR SERPLBLD CKD-EPI 2021: 74.2 ML/MIN/1.73
EOSINOPHIL # BLD AUTO: 0.17 10*3/MM3 (ref 0–0.4)
EOSINOPHIL NFR BLD AUTO: 2.8 % (ref 0.3–6.2)
ERYTHROCYTE [DISTWIDTH] IN BLOOD BY AUTOMATED COUNT: 12.4 % (ref 12.3–15.4)
ERYTHROCYTE [SEDIMENTATION RATE] IN BLOOD: 22 MM/HR (ref 0–30)
FERRITIN SERPL-MCNC: 74.6 NG/ML (ref 13–150)
FOLATE SERPL-MCNC: 11.3 NG/ML (ref 4.78–24.2)
GLOBULIN UR ELPH-MCNC: 2.6 GM/DL
GLUCOSE SERPL-MCNC: 79 MG/DL (ref 65–99)
HBA1C MFR BLD: 5.3 % (ref 4.8–5.6)
HCT VFR BLD AUTO: 35.8 % (ref 34–46.6)
HDLC SERPL-MCNC: 42 MG/DL (ref 40–60)
HGB BLD-MCNC: 12.1 G/DL (ref 12–15.9)
IRON 24H UR-MRATE: 99 MCG/DL (ref 37–145)
LDLC SERPL CALC-MCNC: 71 MG/DL (ref 0–100)
LDLC/HDLC SERPL: 1.7 {RATIO}
LYMPHOCYTES # BLD AUTO: 2.2 10*3/MM3 (ref 0.7–3.1)
LYMPHOCYTES NFR BLD AUTO: 36.7 % (ref 19.6–45.3)
MCH RBC QN AUTO: 30.2 PG (ref 26.6–33)
MCHC RBC AUTO-ENTMCNC: 33.8 G/DL (ref 31.5–35.7)
MCV RBC AUTO: 89.3 FL (ref 79–97)
MONOCYTES # BLD AUTO: 0.55 10*3/MM3 (ref 0.1–0.9)
MONOCYTES NFR BLD AUTO: 9.2 % (ref 5–12)
NEUTROPHILS NFR BLD AUTO: 3.02 10*3/MM3 (ref 1.7–7)
NEUTROPHILS NFR BLD AUTO: 50.3 % (ref 42.7–76)
PLATELET # BLD AUTO: 143 10*3/MM3 (ref 140–450)
PMV BLD AUTO: 13.2 FL (ref 6–12)
POTASSIUM SERPL-SCNC: 3.4 MMOL/L (ref 3.5–5.2)
PREALB SERPL-MCNC: 16.3 MG/DL (ref 20–40)
PROT SERPL-MCNC: 6.9 G/DL (ref 6–8.5)
RBC # BLD AUTO: 4.01 10*6/MM3 (ref 3.77–5.28)
SODIUM SERPL-SCNC: 140 MMOL/L (ref 136–145)
T3FREE SERPL-MCNC: 2.79 PG/ML (ref 2–4.4)
T4 FREE SERPL-MCNC: 1.09 NG/DL (ref 0.93–1.7)
TRIGL SERPL-MCNC: 73 MG/DL (ref 0–150)
TSH SERPL DL<=0.05 MIU/L-ACNC: 2.64 UIU/ML (ref 0.27–4.2)
VLDLC SERPL-MCNC: 15 MG/DL (ref 5–40)
WBC NRBC COR # BLD AUTO: 6 10*3/MM3 (ref 3.4–10.8)

## 2024-02-09 PROCEDURE — 85025 COMPLETE CBC W/AUTO DIFF WBC: CPT

## 2024-02-09 PROCEDURE — 83540 ASSAY OF IRON: CPT

## 2024-02-09 PROCEDURE — 83921 ORGANIC ACID SINGLE QUANT: CPT

## 2024-02-09 PROCEDURE — 86376 MICROSOMAL ANTIBODY EACH: CPT

## 2024-02-09 PROCEDURE — 84481 FREE ASSAY (FT-3): CPT

## 2024-02-09 PROCEDURE — 84134 ASSAY OF PREALBUMIN: CPT

## 2024-02-09 PROCEDURE — 80053 COMPREHEN METABOLIC PANEL: CPT

## 2024-02-09 PROCEDURE — 82746 ASSAY OF FOLIC ACID SERUM: CPT

## 2024-02-09 PROCEDURE — 36415 COLL VENOUS BLD VENIPUNCTURE: CPT

## 2024-02-09 PROCEDURE — 82728 ASSAY OF FERRITIN: CPT

## 2024-02-09 PROCEDURE — 83525 ASSAY OF INSULIN: CPT

## 2024-02-09 PROCEDURE — 85652 RBC SED RATE AUTOMATED: CPT

## 2024-02-09 PROCEDURE — 84443 ASSAY THYROID STIM HORMONE: CPT

## 2024-02-09 PROCEDURE — 82306 VITAMIN D 25 HYDROXY: CPT

## 2024-02-09 PROCEDURE — 83036 HEMOGLOBIN GLYCOSYLATED A1C: CPT

## 2024-02-09 PROCEDURE — 84681 ASSAY OF C-PEPTIDE: CPT

## 2024-02-09 PROCEDURE — 80061 LIPID PANEL: CPT

## 2024-02-09 PROCEDURE — 84425 ASSAY OF VITAMIN B-1: CPT

## 2024-02-09 PROCEDURE — 84439 ASSAY OF FREE THYROXINE: CPT

## 2024-02-09 PROCEDURE — 84432 ASSAY OF THYROGLOBULIN: CPT

## 2024-02-10 LAB
C PEPTIDE SERPL-MCNC: 3.1 NG/ML (ref 1.1–4.4)
INSULIN SERPL-ACNC: 4.9 UIU/ML (ref 2.6–24.9)
THYROPEROXIDASE AB SERPL-ACNC: 41 IU/ML (ref 0–34)

## 2024-02-13 LAB
METHYLMALONATE SERPL-SCNC: 167 NMOL/L (ref 0–378)
VIT B1 BLD-SCNC: 120.7 NMOL/L (ref 66.5–200)

## 2024-02-21 LAB — THYROGLOB SERPL-MCNC: 20.4 NG/ML (ref 1.5–38.5)

## 2024-04-05 ENCOUNTER — TELEPHONE (OUTPATIENT)
Dept: ORTHOPEDIC SURGERY | Facility: CLINIC | Age: 69
End: 2024-04-05
Payer: MEDICARE

## 2024-04-05 NOTE — TELEPHONE ENCOUNTER
NEO LEAVING OFFICE LATE JUNE. LVM FOR PT TO CALL BACK AND RESCHEDULE. OK FOR HUB TO SCHEDULE WITH [facility] OR BEEN ON A MON,WED,OR FRIDAY AFTERNOON.

## 2024-06-18 NOTE — ADDENDUM NOTE
Addended by: RYAN JUAN on: 2/22/2023 01:14 PM     Modules accepted: Level of Service     18-Jun-2024 16:00

## 2024-07-12 ENCOUNTER — OFFICE VISIT (OUTPATIENT)
Dept: BARIATRICS/WEIGHT MGMT | Facility: CLINIC | Age: 69
End: 2024-07-12
Payer: MEDICARE

## 2024-07-12 VITALS
SYSTOLIC BLOOD PRESSURE: 142 MMHG | WEIGHT: 210 LBS | HEART RATE: 60 BPM | TEMPERATURE: 98 F | DIASTOLIC BLOOD PRESSURE: 73 MMHG | BODY MASS INDEX: 37.21 KG/M2 | HEIGHT: 63 IN

## 2024-07-12 DIAGNOSIS — R23.8 SKIN IRRITATION: ICD-10-CM

## 2024-07-12 DIAGNOSIS — Z98.84 S/P LAPAROSCOPIC SLEEVE GASTRECTOMY: ICD-10-CM

## 2024-07-12 DIAGNOSIS — E66.9 OBESITY, CLASS II, BMI 35-39.9: Primary | ICD-10-CM

## 2024-07-12 DIAGNOSIS — I10 ESSENTIAL HYPERTENSION: ICD-10-CM

## 2024-07-12 RX ORDER — MONTELUKAST SODIUM 10 MG/1
TABLET ORAL
COMMUNITY
Start: 2024-06-12

## 2024-07-12 RX ORDER — POTASSIUM CHLORIDE 20 MEQ/1
TABLET, EXTENDED RELEASE ORAL
COMMUNITY
Start: 2024-07-09

## 2024-07-12 RX ORDER — ALPRAZOLAM 0.5 MG/1
TABLET ORAL
COMMUNITY
Start: 2024-06-12

## 2024-07-12 NOTE — PROGRESS NOTES
[facility]  [address]  [place] [id number] [phone]  [address]  [place] [id number] [phone]  Dept: [phone]  7/15/2024      Marilin Martinez.  [id number]  [id number]  1955  female      Chief Complaint   Patient presents with    Follow-up     Sleeve follow up        BH Post-Op Bariatric Surgery:   Marilin Martinez is status post Laparoscopic Sleeve with PEHR procedure, performed on 02/14/2022     HPI:       Today's weight is 95.3 kg (210 lb) pounds, today's BMI is Body mass index is 37.21 kg/m²., she has a loss of 6 pounds since the last visit and her  weight loss since surgery is 66 pounds. The patient reports a decreased portion size and loss of appetite.    Marilin Martinez denies n/v/regurg/reflux and reports hair loss and intermittent constipation/diarrhea.    Pt also reports frequent skin irritation and infections to posterior legs more so R than L due to excess skin tissue to legs.     Diet and Exercise: Diet history reviewed and discussed with the patient. Weight loss/gains to date discussed with the patient. The patient states they are eating 30-40 grams of protein per day. She reports eating 2-3 meals per day, a typical portion size of 1 cup, eating 0-3 snacks per day, drinking 6 or more 8-oz. glasses of water per day, no carbonated beverage consumption and exercising regularly- senior citizen exercise 2x/week.    Supplements: bariatric multi.     Review of Systems   Constitutional:  Positive for appetite change. Negative for fatigue and unexpected weight change.   HENT: Negative.     Eyes: Negative.    Respiratory: Negative.     Cardiovascular: Negative.  Negative for leg swelling.   Gastrointestinal:  Positive for diarrhea. Negative for abdominal distention, abdominal pain, constipation, nausea and vomiting.   Genitourinary:  Negative for difficulty urinating, frequency and  urgency.   Musculoskeletal:  Negative for back pain.   Skin: Negative.         Irritation to R leg   Psychiatric/Behavioral: Negative.     All other systems reviewed and are negative.      Patient Active Problem List   Diagnosis    Environmental and seasonal allergies    Essential hypertension    Sleep apnea    Hypothyroid    Type 2 diabetes mellitus with hyperglycemia, without long-term current use of insulin    Dyslipidemia    S/P laparoscopic sleeve gastrectomy    Primary osteoarthritis of right knee    Primary osteoarthritis of left knee    Candidal intertrigo    Excessive and redundant skin and subcutaneous tissue    Obesity, Class II, BMI 35-39.9    Open knee wound     I&D of right knee wound 3/21/23    LANE (generalized anxiety disorder)    Other chronic pain    Skin irritation       Past Medical History:   Diagnosis Date    Arthritis     KNEE PAIN    Diabetes mellitus     Hyperlipemia     Hypertension     managed by cardiologist    Neuropathy     OAB (overactive bladder)     Scoliosis     Seasonal allergies     Sleep apnea     CPAP    Stroke (cerebrum) 2017    no residual    Thyroid disorder        The following portions of the patient's history were reviewed and updated as appropriate: allergies, current medications, past medical history, past surgical history, and problem list.    Vitals:    07/12/24 1141   BP: 142/73   Pulse: 60   Temp: 98 °F (36.7 °C)       Physical Exam  Vitals reviewed.   Constitutional:       Appearance: Normal appearance.   HENT:      Head: Normocephalic and atraumatic.   Eyes:      Pupils: Pupils are equal, round, and reactive to light.   Cardiovascular:      Rate and Rhythm: Normal rate.   Pulmonary:      Effort: Pulmonary effort is normal. No respiratory distress.   Musculoskeletal:         General: Normal range of motion.      Cervical back: Normal range of motion and neck supple.   Neurological:      General: No focal deficit present.      Mental Status: She is alert and oriented  to person, place, and time.   Psychiatric:         Mood and Affect: Mood normal.         Behavior: Behavior normal.         Assessment:   Post-op, the patient is doing well.  Pt would like referral to plastic surgery to discuss skin irritation/frequent infections to posterior legs.    Encounter Diagnoses   Name Primary?    Obesity, Class II, BMI 35-39.9 Yes    S/P laparoscopic sleeve gastrectomy     Essential hypertension     Skin irritation        Plan:   Encouraged pt to track protein with minimum goal of 70g/day.  Will refer to plastic surgery. Pt already using nystatin powder for skin irritation  Will get labs at next appt.    Encouraged patient to be sure to get plenty of lean protein per day through small frequent meals all with a protein source.   Activity restrictions: none.   Recommended patient be sure to get at least 70 grams of protein per day by eating small, frequent meals all with high lean protein choices. Be sure to limit/cut back on daily carbohydrate intake. Discussed with the patient the recommended amount of water per day to intake. Reviewed vitamin requirements. Be sure to do routine exercise consistently throughout the week, including both cardio and strength training.     Instructions / Recommendations: dietary counseling recommended, recommended a daily protein intake of  grams, vitamin supplement(s) recommended, recommended exercising consistently throughout the week, behavior modifications recommended and instructed to call the office for concerns, questions, or problems.     The patient was instructed to follow up in 6-7 months .     The patient was counseled regarding. Total time spent during this encounter today was 20 minutes.

## 2024-07-31 ENCOUNTER — OFFICE VISIT (OUTPATIENT)
Dept: CARDIOLOGY | Facility: CLINIC | Age: 69
End: 2024-07-31
Payer: MEDICARE

## 2024-07-31 VITALS
HEIGHT: 63 IN | HEART RATE: 57 BPM | WEIGHT: 217 LBS | BODY MASS INDEX: 38.45 KG/M2 | DIASTOLIC BLOOD PRESSURE: 46 MMHG | SYSTOLIC BLOOD PRESSURE: 135 MMHG

## 2024-07-31 DIAGNOSIS — E78.5 DYSLIPIDEMIA: ICD-10-CM

## 2024-07-31 DIAGNOSIS — R60.0 BILATERAL LEG EDEMA: Primary | ICD-10-CM

## 2024-07-31 DIAGNOSIS — R06.02 SOB (SHORTNESS OF BREATH): ICD-10-CM

## 2024-07-31 DIAGNOSIS — I10 ESSENTIAL HYPERTENSION: ICD-10-CM

## 2024-07-31 PROCEDURE — 3075F SYST BP GE 130 - 139MM HG: CPT | Performed by: INTERNAL MEDICINE

## 2024-07-31 PROCEDURE — 99214 OFFICE O/P EST MOD 30 MIN: CPT | Performed by: INTERNAL MEDICINE

## 2024-07-31 PROCEDURE — 3078F DIAST BP <80 MM HG: CPT | Performed by: INTERNAL MEDICINE

## 2024-07-31 RX ORDER — SPIRONOLACTONE 25 MG/1
25 TABLET ORAL DAILY
Qty: 90 TABLET | Refills: 3 | Status: SHIPPED | OUTPATIENT
Start: 2024-07-31

## 2024-07-31 RX ORDER — SITAGLIPTIN 100 MG/1
100 TABLET, FILM COATED ORAL DAILY
COMMUNITY
Start: 2024-07-19

## 2024-07-31 NOTE — ASSESSMENT & PLAN NOTE
Patient with bilateral edema in her legs and some changes on her previous echocardiogram suggestive of diastolic dysfunction in addition to this her blood pressures have been tightly controlled at home but this recommended addition of Aldactone 25 once a day repeating a BMP in 2 weeks

## 2024-07-31 NOTE — PROGRESS NOTES
Chief Complaint  Follow-up and Hypertension    Subjective    Patient is doing well not been experiencing any issues with worsening shortness of breath she does report lower extremity edema persistent more so on the left side.  Patient does report sleeping in recliner normally which has been longstanding issue  Past Medical History:   Diagnosis Date    Arthritis     KNEE PAIN    Diabetes mellitus     Hyperlipemia     Hypertension     managed by cardiologist    Neuropathy     OAB (overactive bladder)     Scoliosis     Seasonal allergies     Sleep apnea     CPAP    Stroke (cerebrum) 2017    no residual    Thyroid disorder          Current Outpatient Medications:     Accu-Chek Isamar Plus test strip, , Disp: , Rfl:     ALPRAZolam (XANAX) 0.5 MG tablet, , Disp: , Rfl:     bumetanide (BUMEX) 1 MG tablet, Take 1 tablet by mouth Daily As Needed (for lower extremity swelling)., Disp: 30 tablet, Rfl: 3    dapagliflozin Propanediol (Farxiga) 10 MG tablet, Take 10 mg by mouth Daily., Disp: , Rfl:     Diclofenac Sodium (VOLTAREN) 1 % gel gel, , Disp: , Rfl:     fexofenadine (Allegra Allergy) 180 MG tablet, Take 1 tablet by mouth Daily., Disp: 30 tablet, Rfl: 5    Finerenone (Kerendia) 10 MG tablet, Take 1 tablet by mouth Daily., Disp: , Rfl:     fluticasone (FLONASE) 50 MCG/ACT nasal spray, 1 spray into the nostril(s) as directed by provider As Needed., Disp: , Rfl:     gabapentin (NEURONTIN) 800 MG tablet, Take 1 tablet by mouth 3 (Three) Times a Day., Disp: , Rfl:     HYDROcodone-acetaminophen (NORCO)  MG per tablet, , Disp: , Rfl:     Januvia 100 MG tablet, Take 1 tablet by mouth Daily., Disp: , Rfl:     levothyroxine (SYNTHROID, LEVOTHROID) 100 MCG tablet, Take 1 tablet by mouth., Disp: , Rfl:     montelukast (SINGULAIR) 10 MG tablet, , Disp: , Rfl:     Myrbetriq 25 MG tablet sustained-release 24 hour 24 hr tablet, Take 1 tablet by mouth Every Night., Disp: , Rfl:     nystatin (MYCOSTATIN) 339041 UNIT/GM powder, Apply   "topically to the appropriate area as directed 3 (Three) Times a Day., Disp: 60 g, Rfl: 2    potassium chloride (KLOR-CON M20) 20 MEQ CR tablet, , Disp: , Rfl:     rOPINIRole (REQUIP) 2 MG tablet, , Disp: , Rfl:     rosuvastatin (CRESTOR) 10 MG tablet, , Disp: , Rfl:     Vyzulta 0.024 % solution, Administer 1 drop to both eyes Every Night., Disp: , Rfl:     spironolactone (ALDACTONE) 25 MG tablet, Take 1 tablet by mouth Daily., Disp: 90 tablet, Rfl: 3    There are no discontinued medications.  Allergies   Allergen Reactions    Adhesive Tape Rash     SKIN BREAKDOWN     Latex Rash     blisters        Social History     Tobacco Use    Smoking status: Former     Current packs/day: 0.00     Average packs/day: 1 pack/day for 20.0 years (20.0 ttl pk-yrs)     Types: Cigarettes     Start date: 1977     Quit date: 1997     Years since quittin.9     Passive exposure: Past    Smokeless tobacco: Never   Vaping Use    Vaping status: Never Used   Substance Use Topics    Alcohol use: Not Currently    Drug use: No       Family History   Problem Relation Age of Onset    Obesity Mother     Hypertension Mother     Cancer Mother     Arthritis Mother     Diabetes Mother     Obesity Sister     Hypertension Sister     Cancer Sister     Arthritis Sister     Diabetes Father     Diabetes Daughter     Malig Hyperthermia Neg Hx         Objective     /46   Pulse 57   Ht 160 cm (62.99\")   Wt 98.4 kg (217 lb)   BMI 38.45 kg/m²       Physical Exam    General Appearance:   no acute distress  Alert and oriented x3  HENT:   lips not cyanotic  Atraumatic  Neck:  No jvd   supple  Respiratory:  no respiratory distress  normal breath sounds  no rales  Cardiovascular:  Regular rate and rhythm  no S3, no S4   no murmur  no rub  Extremities  No cyanosis  lower extremity edema: +1-2 skin:   warm, dry  No rashes      Result Review :     No results found for: \"PROBNP\"  CMP          10/19/2023    05:21 2023    09:30 2024    12:15 " "  CMP   Glucose 90  79  79    BUN 14  11  10    Creatinine 0.90  0.99  0.86    EGFR 70.7  63.0  74.2    Sodium 138  140  140    Potassium 3.8  4.0  3.4    Chloride 106  102  104    Calcium 8.8  9.6  9.1    Total Protein   6.9    Albumin   4.3    Globulin   2.6    Total Bilirubin   0.5    Alkaline Phosphatase   66    AST (SGOT)   20    ALT (SGPT)   12    Albumin/Globulin Ratio   1.7    BUN/Creatinine Ratio 15.6  11.1  11.6    Anion Gap 9.6  8.5  10.0      CBC w/diff          10/18/2023    00:28 10/19/2023    05:21 2/9/2024    12:15   CBC w/Diff   WBC 12.13   6.00    RBC 4.06   4.01    Hemoglobin 12.6  11.1  12.1    Hematocrit 39.5  33.9  35.8    MCV 97.3   89.3    MCH 31.0   30.2    MCHC 31.9   33.8    RDW 12.6   12.4    Platelets 124   143    Neutrophil Rel %   50.3    Lymphocyte Rel %   36.7    Monocyte Rel %   9.2    Eosinophil Rel %   2.8    Basophil Rel %   0.7       Lab Results   Component Value Date    TSH 2.640 02/09/2024      Lab Results   Component Value Date    FREET4 1.09 02/09/2024      No results found for: \"DDIMERQUANT\"  Magnesium   Date Value Ref Range Status   10/18/2023 2.0 1.6 - 2.4 mg/dL Final      No results found for: \"DIGOXIN\"   No results found for: \"TROPONINT\"        Lipid Panel          11/13/2023    09:30 2/9/2024    12:15   Lipid Panel   Total Cholesterol 136  128    Triglycerides 82  73    HDL Cholesterol 47  42    VLDL Cholesterol 16  15    LDL Cholesterol  73  71    LDL/HDL Ratio 1.54  1.70      No results found for: \"POCTROP\"    Results for orders placed in visit on 08/17/21    Adult Transthoracic Echo Complete W/ Cont if Necessary Per Protocol    Interpretation Summary  · Estimated left ventricular EF = 55% Left ventricular systolic function is normal.  · Left atrial enlargement mild  · Trace pericardial effusion  · Estimated right ventricular systolic pressure from tricuspid regurgitation is mildly elevated (35-45 mmHg).                 Diagnoses and all orders for this visit:    1. " Bilateral leg edema (Primary)  Assessment & Plan:  Patient with bilateral edema in her legs and some changes on her previous echocardiogram suggestive of diastolic dysfunction in addition to this her blood pressures have been tightly controlled at home but this recommended addition of Aldactone 25 once a day repeating a BMP in 2 weeks      2. Essential hypertension  Assessment & Plan:  Adding Aldactone for blood pressure control 25.      Orders:  -     spironolactone (ALDACTONE) 25 MG tablet; Take 1 tablet by mouth Daily.  Dispense: 90 tablet; Refill: 3  -     Basic Metabolic Panel; Future    3. Dyslipidemia    4. SOB (shortness of breath)  -     proBNP; Future            Follow Up     No follow-ups on file.          Patient was given instructions and counseling regarding her condition or for health maintenance advice. Please see specific information pulled into the AVS if appropriate.

## 2024-08-08 ENCOUNTER — TELEPHONE (OUTPATIENT)
Dept: BARIATRICS/WEIGHT MGMT | Facility: CLINIC | Age: 69
End: 2024-08-08
Payer: MEDICARE

## 2024-08-29 ENCOUNTER — LAB (OUTPATIENT)
Dept: LAB | Facility: HOSPITAL | Age: 69
End: 2024-08-29
Payer: MEDICARE

## 2024-08-29 DIAGNOSIS — R06.02 SOB (SHORTNESS OF BREATH): ICD-10-CM

## 2024-08-29 DIAGNOSIS — I10 ESSENTIAL HYPERTENSION: ICD-10-CM

## 2024-08-29 PROCEDURE — 36415 COLL VENOUS BLD VENIPUNCTURE: CPT

## 2024-08-29 PROCEDURE — 83880 ASSAY OF NATRIURETIC PEPTIDE: CPT

## 2024-08-29 PROCEDURE — 80048 BASIC METABOLIC PNL TOTAL CA: CPT

## 2024-08-30 LAB
ANION GAP SERPL CALCULATED.3IONS-SCNC: 9 MMOL/L (ref 5–15)
BUN SERPL-MCNC: 16 MG/DL (ref 8–23)
BUN/CREAT SERPL: 15 (ref 7–25)
CALCIUM SPEC-SCNC: 9.3 MG/DL (ref 8.6–10.5)
CHLORIDE SERPL-SCNC: 105 MMOL/L (ref 98–107)
CO2 SERPL-SCNC: 25 MMOL/L (ref 22–29)
CREAT SERPL-MCNC: 1.07 MG/DL (ref 0.57–1)
EGFRCR SERPLBLD CKD-EPI 2021: 57 ML/MIN/1.73
GLUCOSE SERPL-MCNC: 72 MG/DL (ref 65–99)
NT-PROBNP SERPL-MCNC: 381 PG/ML (ref 0–900)
POTASSIUM SERPL-SCNC: 4.1 MMOL/L (ref 3.5–5.2)
SODIUM SERPL-SCNC: 139 MMOL/L (ref 136–145)

## 2024-09-13 RX ORDER — AMLODIPINE AND BENAZEPRIL HYDROCHLORIDE 10; 40 MG/1; MG/1
CAPSULE ORAL
Qty: 90 CAPSULE | Refills: 1 | Status: SHIPPED | OUTPATIENT
Start: 2024-09-13

## 2024-11-05 NOTE — PLAN OF CARE
Goal Outcome Evaluation:   Patient ambulated 50 feet with one person assistance. Pain controlled with scheduled tylenol. Straight cath ordered for urinary retention 350 mls out, patient states she feels like she has to pee but can't start. Patient anticipates discharge with inpatient rehab.        Progress: improving      Calcipotriene Pregnancy And Lactation Text: This medication has not been proven safe during pregnancy. It is unknown if this medication is excreted in breast milk.

## 2025-01-15 ENCOUNTER — HOSPITAL ENCOUNTER (OUTPATIENT)
Facility: HOSPITAL | Age: 70
Discharge: HOME | End: 2025-01-15
Payer: MEDICARE

## 2025-01-15 ENCOUNTER — OFFICE VISIT (OUTPATIENT)
Dept: ORTHOPEDIC SURGERY | Facility: CLINIC | Age: 70
End: 2025-01-15
Payer: MEDICARE

## 2025-01-15 VITALS
HEART RATE: 60 BPM | OXYGEN SATURATION: 98 % | BODY MASS INDEX: 38.45 KG/M2 | SYSTOLIC BLOOD PRESSURE: 155 MMHG | DIASTOLIC BLOOD PRESSURE: 84 MMHG | WEIGHT: 217 LBS | HEIGHT: 63 IN

## 2025-01-15 DIAGNOSIS — R60.0 LOCALIZED EDEMA: ICD-10-CM

## 2025-01-15 DIAGNOSIS — M25.561 RIGHT KNEE PAIN, UNSPECIFIED CHRONICITY: ICD-10-CM

## 2025-01-15 DIAGNOSIS — M25.562 LEFT KNEE PAIN, UNSPECIFIED CHRONICITY: ICD-10-CM

## 2025-01-15 DIAGNOSIS — Z96.652 AFTERCARE FOLLOWING LEFT KNEE JOINT REPLACEMENT SURGERY: Primary | ICD-10-CM

## 2025-01-15 DIAGNOSIS — Z96.651 HISTORY OF TOTAL KNEE ARTHROPLASTY, RIGHT: ICD-10-CM

## 2025-01-15 DIAGNOSIS — Z47.1 AFTERCARE FOLLOWING LEFT KNEE JOINT REPLACEMENT SURGERY: Primary | ICD-10-CM

## 2025-01-15 LAB
BH CV LOWER VASCULAR LEFT COMMON FEMORAL AUGMENT: NORMAL
BH CV LOWER VASCULAR LEFT COMMON FEMORAL COMPETENT: NORMAL
BH CV LOWER VASCULAR LEFT COMMON FEMORAL COMPRESS: NORMAL
BH CV LOWER VASCULAR LEFT COMMON FEMORAL PHASIC: NORMAL
BH CV LOWER VASCULAR LEFT COMMON FEMORAL SPONT: NORMAL
BH CV LOWER VASCULAR RIGHT COMMON FEMORAL AUGMENT: NORMAL
BH CV LOWER VASCULAR RIGHT COMMON FEMORAL COMPETENT: NORMAL
BH CV LOWER VASCULAR RIGHT COMMON FEMORAL COMPRESS: NORMAL
BH CV LOWER VASCULAR RIGHT COMMON FEMORAL PHASIC: NORMAL
BH CV LOWER VASCULAR RIGHT COMMON FEMORAL SPONT: NORMAL
BH CV LOWER VASCULAR RIGHT DISTAL FEMORAL COMPRESS: NORMAL
BH CV LOWER VASCULAR RIGHT GASTRONEMIUS COMPRESS: NORMAL
BH CV LOWER VASCULAR RIGHT GREATER SAPH AK COMPRESS: NORMAL
BH CV LOWER VASCULAR RIGHT GREATER SAPH BK COMPRESS: NORMAL
BH CV LOWER VASCULAR RIGHT MID FEMORAL AUGMENT: NORMAL
BH CV LOWER VASCULAR RIGHT MID FEMORAL COMPETENT: NORMAL
BH CV LOWER VASCULAR RIGHT MID FEMORAL COMPRESS: NORMAL
BH CV LOWER VASCULAR RIGHT MID FEMORAL PHASIC: NORMAL
BH CV LOWER VASCULAR RIGHT MID FEMORAL SPONT: NORMAL
BH CV LOWER VASCULAR RIGHT PERONEAL COMPRESS: NORMAL
BH CV LOWER VASCULAR RIGHT POPLITEAL AUGMENT: NORMAL
BH CV LOWER VASCULAR RIGHT POPLITEAL COMPETENT: NORMAL
BH CV LOWER VASCULAR RIGHT POPLITEAL COMPRESS: NORMAL
BH CV LOWER VASCULAR RIGHT POPLITEAL PHASIC: NORMAL
BH CV LOWER VASCULAR RIGHT POPLITEAL SPONT: NORMAL
BH CV LOWER VASCULAR RIGHT POSTERIOR TIBIAL COMPRESS: NORMAL
BH CV LOWER VASCULAR RIGHT PROXIMAL FEMORAL COMPRESS: NORMAL
BH CV LOWER VASCULAR RIGHT SAPHENOFEMORAL JUNCTION COMPRESS: NORMAL

## 2025-01-15 PROCEDURE — 93971 EXTREMITY STUDY: CPT

## 2025-01-15 PROCEDURE — 93971 EXTREMITY STUDY: CPT | Performed by: SURGERY

## 2025-01-15 ASSESSMENT — KOOS JR
KOOS JR SCORE: 73.34
KOOS JR SCORE: 5

## 2025-01-15 NOTE — PATIENT INSTRUCTIONS
X-rays taken and reviewed with patient.  Hardware is intact.    Advised patient to gradually resume normal activities as tolerated.  Advised on dental prophylaxis.  Advised on 2 to 3-year follow-up with repeat x-rays.    Order placed for stat venous Doppler study to rule out DVT in the right lower extremity.  Advised patient to follow-up with her PCP regarding continued swelling.    Patient may follow-up in 2 to 3 years with repeat x-rays.  Call with questions, concerns or worsening symptoms.

## 2025-01-15 NOTE — PROGRESS NOTES
"Chief Complaint  Follow-up of the Right Knee and Follow-up of the Left Knee    Subjective      Marilin Martinez presents to [facility] for annual recheck of left total knee arthroplasty with [place] navigation with Dr. Calvillo on 10/17/2023, and right total knee arthroplasty with [place] navigation with Dr. Calvillo on  1/31/2023.  Reports that she is getting back to normal activities and doing well.  She is ambulatory without use of assistive devices.  Reports that the right knee is more sore and stiff than the left but overall \"I am happy it is better than what I had before\".  She has 3+ pitting edema in the right lower extremity, 1+ in the left.    Objective   Allergies   Allergen Reactions    Adhesive Tape Rash     SKIN BREAKDOWN     Latex Rash     blisters       Vital Signs:   /84   Pulse 60   Ht 160 cm (62.99\")   Wt 98.4 kg (217 lb)   SpO2 98%   BMI 38.45 kg/m²       Physical Exam    Constitutional: Awake, alert. Well nourished appearance.    Integumentary: Warm, dry, intact. No obvious rashes.    HENT: Atraumatic, normocephalic.   Respiratory: Non labored respirations .   Cardiovascular: Intact peripheral pulses.    Psychiatric: Normal mood and affect. A&O X3    Ortho Exam  Bilateral knees: Incision is well scarred and healed.  There is no redness, drainage or tenderness.  No significant edema noted.  Stable to varus and valgus stress.  Stable to anterior posterior drawer test.  Neurovascular intact.  Sensation is intact.  Knee extensor mechanism is intact.  Range of motion 0 to 100 degrees.  3+ pitting edema in the right lower extremity, 1+ pitting edema in the left lower extremity.    Imaging Results (Most Recent)       Procedure Component Value Units Date/Time    XR Knee 3 View Left [[id number]] Resulted: 01/15/25 1056     Updated: 01/15/25 1056    Narrative:      X-Ray Report:  Study: X-rays ordered, taken in the office, and reviewed today.   Site: Left knee " xray  Indication: Left TKA  View: AP/Lateral, Standing, and [place] view(s)  Findings: Intact left total knee arthroplasty. No evidence of hardware   malfunction or periprosthetic fracture.  Patella centrally tracking.   Prior studies available for comparison: yes     XR Knee 3 View Right [[id number]] Resulted: 01/15/25 1054     Updated: 01/15/25 1054    Narrative:      X-Ray Report:  Study: X-rays ordered, taken in the office, and reviewed today.   Site: Right knee xray  Indication: Right TKA  View: AP/Lateral, Standing, and [place] view(s)  Findings: Intact right total knee arthroplasty. No evidence of hardware   malfunction.   Prior studies available for comparison: yes                       Assessment and Plan   Problem List Items Addressed This Visit    None  Visit Diagnoses       Aftercare following left knee joint replacement surgery    -  Primary    History of total knee arthroplasty, right        Right knee pain, unspecified chronicity        Relevant Orders    XR Knee 3 View Right (Completed)    Left knee pain, unspecified chronicity        Relevant Orders    XR Knee 3 View Left (Completed)    Localized edema        Relevant Orders    Duplex Venous Lower Extremity - Right CAR            Follow Up   Return if symptoms worsen or fail to improve.    Patient is a non-smoker, did not discuss options for smoking cessation.     Social History     Socioeconomic History    Marital status:    Tobacco Use    Smoking status: Former     Current packs/day: 0.00     Average packs/day: 1 pack/day for 20.0 years (20.0 ttl pk-yrs)     Types: Cigarettes     Start date: 1977     Quit date: 1997     Years since quittin.3     Passive exposure: Past    Smokeless tobacco: Never   Vaping Use    Vaping status: Never Used   Substance and Sexual Activity    Alcohol use: Not Currently    Drug use: No    Sexual activity: Defer       Patient Instructions   X-rays taken and reviewed with patient.  Hardware is  intact.    Advised patient to gradually resume normal activities as tolerated.  Advised on dental prophylaxis.  Advised on 2 to 3-year follow-up with repeat x-rays.    Order placed for stat venous Doppler study to rule out DVT in the right lower extremity.  Advised patient to follow-up with her PCP regarding continued swelling.    Patient may follow-up in 2 to 3 years with repeat x-rays.  Call with questions, concerns or worsening symptoms.   Patient was given instructions and counseling regarding her condition or for health maintenance advice. Please see specific information pulled into the AVS if appropriate.

## 2025-02-03 ENCOUNTER — OFFICE VISIT (OUTPATIENT)
Dept: CARDIOLOGY | Facility: CLINIC | Age: 70
End: 2025-02-03
Payer: MEDICARE

## 2025-02-03 VITALS
WEIGHT: 218.4 LBS | BODY MASS INDEX: 40.19 KG/M2 | HEART RATE: 68 BPM | SYSTOLIC BLOOD PRESSURE: 138 MMHG | HEIGHT: 62 IN | DIASTOLIC BLOOD PRESSURE: 60 MMHG

## 2025-02-03 DIAGNOSIS — I10 ESSENTIAL HYPERTENSION: Primary | ICD-10-CM

## 2025-02-03 DIAGNOSIS — R60.0 BILATERAL LEG EDEMA: ICD-10-CM

## 2025-02-03 DIAGNOSIS — E78.5 DYSLIPIDEMIA: ICD-10-CM

## 2025-02-03 PROCEDURE — 3078F DIAST BP <80 MM HG: CPT | Performed by: INTERNAL MEDICINE

## 2025-02-03 PROCEDURE — 99214 OFFICE O/P EST MOD 30 MIN: CPT | Performed by: INTERNAL MEDICINE

## 2025-02-03 PROCEDURE — 3075F SYST BP GE 130 - 139MM HG: CPT | Performed by: INTERNAL MEDICINE

## 2025-02-03 NOTE — PROGRESS NOTES
Chief Complaint  Follow-up    Subjective    ***  Past Medical History:   Diagnosis Date    Arthritis     KNEE PAIN    Diabetes mellitus     Hyperlipemia     Hypertension     managed by cardiologist    Neuropathy     OAB (overactive bladder)     Scoliosis     Seasonal allergies     Sleep apnea     CPAP    Stroke (cerebrum) 2017    no residual    Thyroid disorder          Current Outpatient Medications:     Accu-Chek Isamar Plus test strip, , Disp: , Rfl:     ALPRAZolam (XANAX) 0.5 MG tablet, , Disp: , Rfl:     amLODIPine-benazepril (LOTREL) 10-40 MG per capsule, TAKE 1 CAPSULE BY MOUTH ONCE DAILY FOR BLOOD PRESSURE, Disp: 90 capsule, Rfl: 1    dapagliflozin Propanediol (Farxiga) 10 MG tablet, Take 10 mg by mouth Daily., Disp: , Rfl:     Diclofenac Sodium (VOLTAREN) 1 % gel gel, , Disp: , Rfl:     Finerenone (Kerendia) 10 MG tablet, Take 1 tablet by mouth Daily., Disp: , Rfl:     fluticasone (FLONASE) 50 MCG/ACT nasal spray, Administer 1 spray into the nostril(s) as directed by provider As Needed., Disp: , Rfl:     gabapentin (NEURONTIN) 800 MG tablet, Take 1 tablet by mouth 3 (Three) Times a Day., Disp: , Rfl:     HYDROcodone-acetaminophen (NORCO)  MG per tablet, , Disp: , Rfl:     Januvia 100 MG tablet, Take 1 tablet by mouth Daily., Disp: , Rfl:     levothyroxine (SYNTHROID, LEVOTHROID) 100 MCG tablet, Take 1 tablet by mouth., Disp: , Rfl:     Myrbetriq 25 MG tablet sustained-release 24 hour 24 hr tablet, Take 1 tablet by mouth Every Night., Disp: , Rfl:     nystatin (MYCOSTATIN) 900890 UNIT/GM powder, Apply  topically to the appropriate area as directed 3 (Three) Times a Day., Disp: 60 g, Rfl: 2    rOPINIRole (REQUIP) 2 MG tablet, , Disp: , Rfl:     rosuvastatin (CRESTOR) 10 MG tablet, , Disp: , Rfl:     Vyzulta 0.024 % solution, Administer 1 drop to both eyes Every Night., Disp: , Rfl:     Medications Discontinued During This Encounter   Medication Reason    spironolactone (ALDACTONE) 25 MG tablet *Therapy  "completed    bumetanide (BUMEX) 1 MG tablet *Therapy completed    fexofenadine (Allegra Allergy) 180 MG tablet *Therapy completed    montelukast (SINGULAIR) 10 MG tablet *Therapy completed    potassium chloride (KLOR-CON M20) 20 MEQ CR tablet *Therapy completed     Allergies   Allergen Reactions    Adhesive Tape Rash     SKIN BREAKDOWN     Latex Rash     blisters        Social History     Tobacco Use    Smoking status: Former     Current packs/day: 0.00     Average packs/day: 1 pack/day for 20.0 years (20.0 ttl pk-yrs)     Types: Cigarettes     Start date: 1977     Quit date: 1997     Years since quittin.4     Passive exposure: Past    Smokeless tobacco: Never   Vaping Use    Vaping status: Never Used   Substance Use Topics    Alcohol use: Not Currently    Drug use: No       Family History   Problem Relation Age of Onset    Obesity Mother     Hypertension Mother     Cancer Mother     Arthritis Mother     Diabetes Mother     Obesity Sister     Hypertension Sister     Cancer Sister     Arthritis Sister     Diabetes Father     Diabetes Daughter     Malig Hyperthermia Neg Hx         Objective     /60 (BP Location: Right arm, Patient Position: Sitting, Cuff Size: Large Adult)   Pulse 68   Ht 157.5 cm (62\")   Wt 99.1 kg (218 lb 6.4 oz)   BMI 39.95 kg/m²       Physical Exam    General Appearance:   no acute distress  Alert and oriented x3  HENT:   lips not cyanotic  Atraumatic  Neck:  No jvd   supple  Respiratory:  no respiratory distress  normal breath sounds  no rales  Cardiovascular:  ***  no S3, no S4   no murmur  no rub  Extremities  No cyanosis  lower extremity edema: none    Skin:   warm, dry  No rashes      Result Review :{Labs  Result Review  Imaging  Med Tab  Media :23}   {The following data was reviewed by (Optional):[id number]}  proBNP   Date Value Ref Range Status   2024 381.0 0.0 - 900.0 pg/mL Final     CMP          2024    15:37   CMP   Glucose 72    BUN 16    Creatinine 1.07 " "   EGFR 57.0    Sodium 139    Potassium 4.1    Chloride 105    Calcium 9.3    BUN/Creatinine Ratio 15.0    Anion Gap 9.0         Lab Results   Component Value Date    TSH 2.640 02/09/2024      Lab Results   Component Value Date    FREET4 1.09 02/09/2024      No results found for: \"DDIMERQUANT\"  Magnesium   Date Value Ref Range Status   10/18/2023 2.0 1.6 - 2.4 mg/dL Final      No results found for: \"DIGOXIN\"   No results found for: \"TROPONINT\"          No results found for: \"POCTROP\"  Results for orders placed during the hospital encounter of 09/01/21    Stress Test With Myocardial Perfusion Two Day    Interpretation Summary  · Left ventricular ejection fraction is normal. (Calculated EF = 50%).  · Findings consistent with a normal ECG stress test.  · Myocardial perfusion imaging indicates a small-sized infarct located in the apex with no significant ischemia noted.  · Impressions are consistent with a low risk study.    Results for orders placed in visit on 08/17/21    Adult Transthoracic Echo Complete W/ Cont if Necessary Per Protocol    Interpretation Summary  · Estimated left ventricular EF = 55% Left ventricular systolic function is normal.  · Left atrial enlargement mild  · Trace pericardial effusion  · Estimated right ventricular systolic pressure from tricuspid regurgitation is mildly elevated (35-45 mmHg).                 There are no diagnoses linked to this encounter.        Follow Up {Instructions Charge Capture  Follow-up Communications :23}    No follow-ups on file.          Patient was given instructions and counseling regarding her condition or for health maintenance advice. Please see specific information pulled into the AVS if appropriate.       "

## 2025-02-03 NOTE — PROGRESS NOTES
Chief Complaint  Follow-up    Subjective    Patient is still with increased lower extremity edema but stable she reports blood pressure at home sometimes little higher than it is in the office today.  She has sinus congestion problems but no gross change in her breathing ability.  She is compliant with her CPAP at night  Past Medical History:   Diagnosis Date    Arthritis     KNEE PAIN    Diabetes mellitus     Hyperlipemia     Hypertension     managed by cardiologist    Neuropathy     OAB (overactive bladder)     Scoliosis     Seasonal allergies     Sleep apnea     CPAP    Stroke (cerebrum) 2017    no residual    Thyroid disorder          Current Outpatient Medications:     Accu-Chek Isamar Plus test strip, , Disp: , Rfl:     ALPRAZolam (XANAX) 0.5 MG tablet, , Disp: , Rfl:     amLODIPine-benazepril (LOTREL) 10-40 MG per capsule, TAKE 1 CAPSULE BY MOUTH ONCE DAILY FOR BLOOD PRESSURE, Disp: 90 capsule, Rfl: 1    dapagliflozin Propanediol (Farxiga) 10 MG tablet, Take 10 mg by mouth Daily., Disp: , Rfl:     Diclofenac Sodium (VOLTAREN) 1 % gel gel, , Disp: , Rfl:     Finerenone (Kerendia) 10 MG tablet, Take 1 tablet by mouth Daily., Disp: , Rfl:     fluticasone (FLONASE) 50 MCG/ACT nasal spray, Administer 1 spray into the nostril(s) as directed by provider As Needed., Disp: , Rfl:     gabapentin (NEURONTIN) 800 MG tablet, Take 1 tablet by mouth 3 (Three) Times a Day., Disp: , Rfl:     HYDROcodone-acetaminophen (NORCO)  MG per tablet, , Disp: , Rfl:     Januvia 100 MG tablet, Take 1 tablet by mouth Daily., Disp: , Rfl:     levothyroxine (SYNTHROID, LEVOTHROID) 100 MCG tablet, Take 1 tablet by mouth., Disp: , Rfl:     Myrbetriq 25 MG tablet sustained-release 24 hour 24 hr tablet, Take 1 tablet by mouth Every Night., Disp: , Rfl:     nystatin (MYCOSTATIN) 354132 UNIT/GM powder, Apply  topically to the appropriate area as directed 3 (Three) Times a Day., Disp: 60 g, Rfl: 2    rOPINIRole (REQUIP) 2 MG tablet, , Disp: ,  "Rfl:     rosuvastatin (CRESTOR) 10 MG tablet, , Disp: , Rfl:     Vyzulta 0.024 % solution, Administer 1 drop to both eyes Every Night., Disp: , Rfl:     Medications Discontinued During This Encounter   Medication Reason    spironolactone (ALDACTONE) 25 MG tablet *Therapy completed    bumetanide (BUMEX) 1 MG tablet *Therapy completed    fexofenadine (Allegra Allergy) 180 MG tablet *Therapy completed    montelukast (SINGULAIR) 10 MG tablet *Therapy completed    potassium chloride (KLOR-CON M20) 20 MEQ CR tablet *Therapy completed     Allergies   Allergen Reactions    Adhesive Tape Rash     SKIN BREAKDOWN     Latex Rash     blisters        Social History     Tobacco Use    Smoking status: Former     Current packs/day: 0.00     Average packs/day: 1 pack/day for 20.0 years (20.0 ttl pk-yrs)     Types: Cigarettes     Start date: 1977     Quit date: 1997     Years since quittin.4     Passive exposure: Past    Smokeless tobacco: Never   Vaping Use    Vaping status: Never Used   Substance Use Topics    Alcohol use: Not Currently    Drug use: No       Family History   Problem Relation Age of Onset    Obesity Mother     Hypertension Mother     Cancer Mother     Arthritis Mother     Diabetes Mother     Obesity Sister     Hypertension Sister     Cancer Sister     Arthritis Sister     Diabetes Father     Diabetes Daughter     Malig Hyperthermia Neg Hx         Objective     /60 (BP Location: Right arm, Patient Position: Sitting, Cuff Size: Large Adult)   Pulse 68   Ht 157.5 cm (62\")   Wt 99.1 kg (218 lb 6.4 oz)   BMI 39.95 kg/m²       Physical Exam    General Appearance:   no acute distress  Alert and oriented x3  HENT:   lips not cyanotic  Atraumatic  Neck:  No jvd   supple  Respiratory:  no respiratory distress  normal breath sounds  no rales  Cardiovascular:  Regular rate and rhythm  no S3, no S4   no murmur  no rub  Extremities  No cyanosis  lower extremity edema: +1 left greater than right  Skin: " "  warm, dry  No rashes      Result Review :     proBNP   Date Value Ref Range Status   08/29/2024 381.0 0.0 - 900.0 pg/mL Final     CMP          8/29/2024    15:37   CMP   Glucose 72    BUN 16    Creatinine 1.07    EGFR 57.0    Sodium 139    Potassium 4.1    Chloride 105    Calcium 9.3    BUN/Creatinine Ratio 15.0    Anion Gap 9.0         Lab Results   Component Value Date    TSH 2.640 02/09/2024      Lab Results   Component Value Date    FREET4 1.09 02/09/2024      No results found for: \"DDIMERQUANT\"  Magnesium   Date Value Ref Range Status   10/18/2023 2.0 1.6 - 2.4 mg/dL Final      No results found for: \"DIGOXIN\"   No results found for: \"TROPONINT\"          No results found for: \"POCTROP\"  Results for orders placed during the hospital encounter of 09/01/21    Stress Test With Myocardial Perfusion Two Day    Interpretation Summary  · Left ventricular ejection fraction is normal. (Calculated EF = 50%).  · Findings consistent with a normal ECG stress test.  · Myocardial perfusion imaging indicates a small-sized infarct located in the apex with no significant ischemia noted.  · Impressions are consistent with a low risk study.    Results for orders placed in visit on 08/17/21    Adult Transthoracic Echo Complete W/ Cont if Necessary Per Protocol    Interpretation Summary  · Estimated left ventricular EF = 55% Left ventricular systolic function is normal.  · Left atrial enlargement mild  · Trace pericardial effusion  · Estimated right ventricular systolic pressure from tricuspid regurgitation is mildly elevated (35-45 mmHg).                 Diagnoses and all orders for this visit:    1. Essential hypertension (Primary)  Assessment & Plan:  Well-controlled in the office today gave her 2-week blood pressure log to fill out to see if it is well-controlled at home continue with her current 10 mg daily and Lotrel 1040 mg daily dosing       2. Bilateral leg edema  Assessment & Plan:  Patient with stable edema issues and " mild normal proBNP level echo with no specific diastolic dysfunction issues at this point would hold off on further more intensive diuresis certainly patient needs blood pressure control with addition of HCTZ would be reasonable      3. Dyslipidemia            Follow Up     Return in about 1 year (around 2/3/2026) for Follow with Yamel Torres.          Patient was given instructions and counseling regarding her condition or for health maintenance advice. Please see specific information pulled into the AVS if appropriate.

## 2025-02-03 NOTE — ASSESSMENT & PLAN NOTE
Well-controlled in the office today gave her 2-week blood pressure log to fill out to see if it is well-controlled at home continue with her current 10 mg daily and Lotrel 1040 mg daily dosing

## 2025-02-03 NOTE — ASSESSMENT & PLAN NOTE
Patient with stable edema issues and mild normal proBNP level echo with no specific diastolic dysfunction issues at this point would hold off on further more intensive diuresis certainly patient needs blood pressure control with addition of HCTZ would be reasonable

## 2025-02-25 ENCOUNTER — TELEMEDICINE (OUTPATIENT)
Dept: BARIATRICS/WEIGHT MGMT | Facility: CLINIC | Age: 70
End: 2025-02-25
Payer: MEDICARE

## 2025-02-25 VITALS — WEIGHT: 214 LBS | HEIGHT: 63 IN | BODY MASS INDEX: 37.92 KG/M2

## 2025-02-25 DIAGNOSIS — Z98.84 S/P LAPAROSCOPIC SLEEVE GASTRECTOMY: ICD-10-CM

## 2025-02-25 DIAGNOSIS — I10 ESSENTIAL HYPERTENSION: ICD-10-CM

## 2025-02-25 DIAGNOSIS — E66.812 OBESITY, CLASS II, BMI 35-39.9: Primary | ICD-10-CM

## 2025-02-25 DIAGNOSIS — E11.65 TYPE 2 DIABETES MELLITUS WITH HYPERGLYCEMIA, WITHOUT LONG-TERM CURRENT USE OF INSULIN: ICD-10-CM

## 2025-02-25 RX ORDER — LATANOPROST 50 UG/ML
SOLUTION/ DROPS OPHTHALMIC
COMMUNITY

## 2025-02-25 RX ORDER — BUMETANIDE 1 MG/1
1 TABLET ORAL DAILY
COMMUNITY

## 2025-02-25 RX ORDER — LISINOPRIL AND HYDROCHLOROTHIAZIDE 12.5; 2 MG/1; MG/1
1 TABLET ORAL DAILY
COMMUNITY

## 2025-02-25 RX ORDER — CYCLOBENZAPRINE HCL 10 MG
TABLET ORAL EVERY 8 HOURS SCHEDULED
COMMUNITY

## 2025-02-25 NOTE — PROGRESS NOTES
[facility] SURGERY  [address]  [facility] [id number] [phone]  [address]  [facility] [id number] [phone]  Dept: [phone]  2/25/2025      Marilin Martinez.  [id number]  [id number]  1955  female    Mode of Visit: Video  Location of patient: home  Location of provider: [address]  [facility] [id number] [phone]   You have chosen to receive care through a telehealth visit.  Does the patient consent to use a video/audio connection for your medical care today? Yes  The visit included audio and video interaction. No technical issues occurred during this visit.     CC: 3 year follow up sleeve      [facility] Post-Op Bariatric Surgery:   Marilin Martinez is status post Laparoscopic Sleeve with PEHR procedure, performed on 02/14/2022     HPI:   Today's weight is 97.1 kg (214 lb) pounds, today's BMI is Body mass index is 37.92 kg/m².,  she  has a  gain of 4 pounds since the last visit and   her   weight loss since surgery is 62 pounds. The patient reports a decreased portion size and loss of appetite.      Marilin Martinez denies n/v/c/d/regurg/reflux and reports tolerating diet well. Pt does report excess skin to upper and lower extremities. Pt not sure that she wants to go to plastic surgery for consult if insurance would not cover.     Diet and Exercise: Diet history reviewed and discussed with the patient. Weight loss/gains to date discussed with the patient. The patient states they are eating unknown grams of protein per day. She reports eating 2 meals per day, a typical portion size of 1 cup, eating 0-3 snacks per day, drinking 4 or more 8-oz. glasses of water per day, no carbonated beverage consumption and exercising regularly.     2 meals- meat, veg, starch- mac and cheese/ apple and PB/ sandwich  Snacks- occ shake, sweets- cookie, candy  Drinks water    Blood sugar ranges  125-140    Supplements: occ. Takes multi.     Review of Systems   Constitutional:  Positive for appetite change. Negative for fatigue and unexpected weight change.   HENT: Negative.     Eyes: Negative.    Respiratory: Negative.     Cardiovascular: Negative.  Negative for leg swelling.   Gastrointestinal:  Negative for abdominal distention, abdominal pain, constipation, diarrhea, nausea and vomiting.   Genitourinary:  Negative for difficulty urinating, frequency and urgency.   Musculoskeletal:  Negative for back pain.   Skin: Negative.    Psychiatric/Behavioral: Negative.     All other systems reviewed and are negative.      Patient Active Problem List   Diagnosis    Environmental and seasonal allergies    Essential hypertension    Sleep apnea    Hypothyroid    Type 2 diabetes mellitus with hyperglycemia, without long-term current use of insulin    Dyslipidemia    S/P laparoscopic sleeve gastrectomy    Primary osteoarthritis of right knee    Primary osteoarthritis of left knee    Candidal intertrigo    Excessive and redundant skin and subcutaneous tissue    Obesity, Class II, BMI 35-39.9    Open knee wound     I&D of right knee wound 3/21/23    LANE (generalized anxiety disorder)    Other chronic pain    Skin irritation    Bilateral leg edema       Past Medical History:   Diagnosis Date    Arthritis     KNEE PAIN    Diabetes mellitus     Hyperlipemia     Hypertension     managed by cardiologist    Neuropathy     OAB (overactive bladder)     Scoliosis     Seasonal allergies     Sleep apnea     CPAP    Stroke (cerebrum) 2017    no residual    Thyroid disorder        The following portions of the patient's history were reviewed and updated as appropriate: allergies, current medications, past medical history, past surgical history, and problem list.    There were no vitals filed for this visit.    Physical Exam  Vitals reviewed.   Constitutional:       Appearance: Normal appearance.   HENT:      Head: Normocephalic and  atraumatic.   Eyes:      Pupils: Pupils are equal, round, and reactive to light.   Cardiovascular:      Rate and Rhythm: Normal rate.   Pulmonary:      Effort: Pulmonary effort is normal. No respiratory distress.   Musculoskeletal:         General: Normal range of motion.      Cervical back: Normal range of motion and neck supple.   Neurological:      General: No focal deficit present.      Mental Status: She is alert and oriented to person, place, and time.   Psychiatric:         Mood and Affect: Mood normal.         Behavior: Behavior normal.         Assessment:   Post-op, the patient is doing well. Pt is unsure current nutrition and protein intake. Offered to make appt with dietitian, pt to call and schedule.    Encounter Diagnoses   Name Primary?    Obesity, Class II, BMI 35-39.9 Yes    S/P laparoscopic sleeve gastrectomy     Type 2 diabetes mellitus with hyperglycemia, without long-term current use of insulin     Essential hypertension        Plan:   Diagnoses and all orders for this visit:    1. Obesity, Class II, BMI 35-39.9 (Primary)    2. S/P laparoscopic sleeve gastrectomy    3. Type 2 diabetes mellitus with hyperglycemia, without long-term current use of insulin    4. Essential hypertension    Encouraged pt to track protein.  Will get yearly labs.  Pt wants to hold off on plastic referral at this time due to cost.    Encouraged patient to be sure to get plenty of lean protein per day through small frequent meals all with a protein source.   Activity restrictions: none.   Recommended patient be sure to get at least 70 grams of protein per day by eating small, frequent meals all with high lean protein choices. Be sure to limit/cut back on daily carbohydrate intake. Discussed with the patient the recommended amount of water per day to intake- half of body weight in ounces. Reviewed vitamin requirements. Be sure to do routine exercise, 150 minutes per week minimum, including both cardio and strength training.      Instructions / Recommendations: dietary counseling recommended, recommended a daily protein intake of  grams, vitamin supplement(s) recommended, recommended exercising at least 150 minutes per week, behavior modifications recommended and instructed to call the office for concerns, questions, or problems.     The patient was instructed to follow up in 1 yr .     The patient was counseled regarding. Total time spent during this encounter was 20 minutes.

## 2025-02-26 ENCOUNTER — LAB (OUTPATIENT)
Dept: LAB | Facility: HOSPITAL | Age: 70
End: 2025-02-26
Payer: MEDICARE

## 2025-02-26 DIAGNOSIS — E11.65 TYPE 2 DIABETES MELLITUS WITH HYPERGLYCEMIA, WITHOUT LONG-TERM CURRENT USE OF INSULIN: ICD-10-CM

## 2025-02-26 DIAGNOSIS — I10 ESSENTIAL HYPERTENSION: ICD-10-CM

## 2025-02-26 DIAGNOSIS — E66.812 OBESITY, CLASS II, BMI 35-39.9: ICD-10-CM

## 2025-02-26 DIAGNOSIS — Z98.84 S/P LAPAROSCOPIC SLEEVE GASTRECTOMY: ICD-10-CM

## 2025-02-26 LAB
ALBUMIN SERPL-MCNC: 4.1 G/DL (ref 3.5–5.2)
ALBUMIN/GLOB SERPL: 1.2 G/DL
ALP SERPL-CCNC: 78 U/L (ref 39–117)
ALT SERPL W P-5'-P-CCNC: 11 U/L (ref 1–33)
ANION GAP SERPL CALCULATED.3IONS-SCNC: 10 MMOL/L (ref 5–15)
AST SERPL-CCNC: 17 U/L (ref 1–32)
BASOPHILS # BLD AUTO: 0.04 10*3/MM3 (ref 0–0.2)
BASOPHILS NFR BLD AUTO: 0.5 % (ref 0–1.5)
BILIRUB SERPL-MCNC: 0.6 MG/DL (ref 0–1.2)
BUN SERPL-MCNC: 13 MG/DL (ref 8–23)
BUN/CREAT SERPL: 12.7 (ref 7–25)
CALCIUM SPEC-SCNC: 9.5 MG/DL (ref 8.6–10.5)
CHLORIDE SERPL-SCNC: 103 MMOL/L (ref 98–107)
CHOLEST SERPL-MCNC: 203 MG/DL (ref 0–200)
CO2 SERPL-SCNC: 26 MMOL/L (ref 22–29)
CREAT SERPL-MCNC: 1.02 MG/DL (ref 0.57–1)
DEPRECATED RDW RBC AUTO: 44.7 FL (ref 37–54)
EGFRCR SERPLBLD CKD-EPI 2021: 60 ML/MIN/1.73
EOSINOPHIL # BLD AUTO: 0.15 10*3/MM3 (ref 0–0.4)
EOSINOPHIL NFR BLD AUTO: 1.8 % (ref 0.3–6.2)
ERYTHROCYTE [DISTWIDTH] IN BLOOD BY AUTOMATED COUNT: 12.4 % (ref 12.3–15.4)
FERRITIN SERPL-MCNC: 95.1 NG/ML (ref 13–150)
FOLATE SERPL-MCNC: 11 NG/ML (ref 4.78–24.2)
GLOBULIN UR ELPH-MCNC: 3.5 GM/DL
GLUCOSE SERPL-MCNC: 81 MG/DL (ref 65–99)
HBA1C MFR BLD: 5.5 % (ref 4.8–5.6)
HCT VFR BLD AUTO: 42.5 % (ref 34–46.6)
HDLC SERPL-MCNC: 40 MG/DL (ref 40–60)
HGB BLD-MCNC: 13.5 G/DL (ref 12–15.9)
IMM GRANULOCYTES # BLD AUTO: 0.01 10*3/MM3 (ref 0–0.05)
IMM GRANULOCYTES NFR BLD AUTO: 0.1 % (ref 0–0.5)
IRON 24H UR-MRATE: 142 MCG/DL (ref 37–145)
LDLC SERPL CALC-MCNC: 147 MG/DL (ref 0–100)
LDLC/HDLC SERPL: 3.63 {RATIO}
LYMPHOCYTES # BLD AUTO: 2.1 10*3/MM3 (ref 0.7–3.1)
LYMPHOCYTES NFR BLD AUTO: 25.9 % (ref 19.6–45.3)
MCH RBC QN AUTO: 30.3 PG (ref 26.6–33)
MCHC RBC AUTO-ENTMCNC: 31.8 G/DL (ref 31.5–35.7)
MCV RBC AUTO: 95.5 FL (ref 79–97)
MONOCYTES # BLD AUTO: 0.57 10*3/MM3 (ref 0.1–0.9)
MONOCYTES NFR BLD AUTO: 7 % (ref 5–12)
NEUTROPHILS NFR BLD AUTO: 5.24 10*3/MM3 (ref 1.7–7)
NEUTROPHILS NFR BLD AUTO: 64.7 % (ref 42.7–76)
PLATELET # BLD AUTO: 207 10*3/MM3 (ref 140–450)
PMV BLD AUTO: 11.3 FL (ref 6–12)
POTASSIUM SERPL-SCNC: 4.3 MMOL/L (ref 3.5–5.2)
PREALB SERPL-MCNC: 16.9 MG/DL (ref 20–40)
PROT SERPL-MCNC: 7.6 G/DL (ref 6–8.5)
RBC # BLD AUTO: 4.45 10*6/MM3 (ref 3.77–5.28)
SODIUM SERPL-SCNC: 139 MMOL/L (ref 136–145)
TRIGL SERPL-MCNC: 89 MG/DL (ref 0–150)
TSH SERPL DL<=0.05 MIU/L-ACNC: 1.33 UIU/ML (ref 0.27–4.2)
VLDLC SERPL-MCNC: 16 MG/DL (ref 5–40)
WBC NRBC COR # BLD AUTO: 8.11 10*3/MM3 (ref 3.4–10.8)

## 2025-02-26 PROCEDURE — 80061 LIPID PANEL: CPT

## 2025-02-26 PROCEDURE — 84443 ASSAY THYROID STIM HORMONE: CPT

## 2025-02-26 PROCEDURE — 36415 COLL VENOUS BLD VENIPUNCTURE: CPT

## 2025-02-26 PROCEDURE — 80053 COMPREHEN METABOLIC PANEL: CPT

## 2025-02-26 PROCEDURE — 82728 ASSAY OF FERRITIN: CPT

## 2025-02-26 PROCEDURE — 83921 ORGANIC ACID SINGLE QUANT: CPT

## 2025-02-26 PROCEDURE — 83036 HEMOGLOBIN GLYCOSYLATED A1C: CPT

## 2025-02-26 PROCEDURE — 83540 ASSAY OF IRON: CPT

## 2025-02-26 PROCEDURE — 84134 ASSAY OF PREALBUMIN: CPT

## 2025-02-26 PROCEDURE — 82746 ASSAY OF FOLIC ACID SERUM: CPT

## 2025-02-26 PROCEDURE — 85025 COMPLETE CBC W/AUTO DIFF WBC: CPT

## 2025-02-26 PROCEDURE — 84425 ASSAY OF VITAMIN B-1: CPT

## 2025-03-03 LAB — VIT B1 BLD-SCNC: 113.7 NMOL/L (ref 66.5–200)

## 2025-03-05 LAB — METHYLMALONATE SERPL-SCNC: 282 NMOL/L (ref 0–378)

## 2025-05-02 RX ORDER — NYSTATIN 100000 [USP'U]/G
POWDER TOPICAL
Qty: 60 G | Refills: 1 | OUTPATIENT
Start: 2025-05-02

## 2025-05-02 RX ORDER — MUPIROCIN 20 MG/G
OINTMENT TOPICAL
Qty: 22 G | Refills: 0 | OUTPATIENT
Start: 2025-05-02

## 2025-07-31 PROCEDURE — 87088 URINE BACTERIA CULTURE: CPT

## 2025-07-31 PROCEDURE — 87086 URINE CULTURE/COLONY COUNT: CPT

## 2025-07-31 PROCEDURE — 87186 SC STD MICRODIL/AGAR DIL: CPT

## 2025-08-13 DIAGNOSIS — I10 ESSENTIAL HYPERTENSION: ICD-10-CM

## 2025-08-13 RX ORDER — SPIRONOLACTONE 25 MG/1
25 TABLET ORAL DAILY
Qty: 90 TABLET | Refills: 2 | OUTPATIENT
Start: 2025-08-13

## (undated) DEVICE — GLV SURG SENSICARE SLT PF LF 8 STRL

## (undated) DEVICE — VAGINAL PREP TRAY: Brand: MEDLINE INDUSTRIES, INC.

## (undated) DEVICE — MAT FLR ABS W/BLU/LINER 56X72IN WHT

## (undated) DEVICE — INTENDED FOR TISSUE SEPARATION, AND OTHER PROCEDURES THAT REQUIRE A SHARP SURGICAL BLADE TO PUNCTURE OR CUT.: Brand: BARD-PARKER ® CARBON RIB-BACK BLADES

## (undated) DEVICE — MARKR SKIN W/RULR AND LBL

## (undated) DEVICE — GLV SURG SENSICARE W/ALOE PF LF 7 STRL

## (undated) DEVICE — NDL HYPO ECLPS SFTY 18G 1 1/2IN

## (undated) DEVICE — GLV SURG SENSICARE PI PF LF 8.5 GRN STRL

## (undated) DEVICE — ADAPT CLN BIOGUARD AIR/H2O DISP

## (undated) DEVICE — DISPOSABLE TOURNIQUET CUFF SINGLE BLADDER, SINGLE PORT AND QUICK CONNECT CONNECTOR: Brand: COLOR CUFF

## (undated) DEVICE — DRSNG SURESITE WNDW 4X4.5

## (undated) DEVICE — DRSNG PAD ABD 8X10IN STRL

## (undated) DEVICE — NO-SCRATCH ™ SMALL WHITNEY CURETTE ™ IS A SINGLE-USE, PLASTIC CURETTE FOR QUICKLY APPLYING, MANIPULATING AND REMOVING BONE CEMENT DURING HIP AND KNEE REPLACEMENT SURGERY. THE PLASTIC IS SOFTER THAN STEEL INSTRUMENTS, REDUCING THE RISK OF DAMAGING THE PROSTHESIS WITH METAL INSTRUMENTS.  THE CURETTE’S 6MM TIP REMOVES EXCESS CEMENT FROM REPLACEMENT HIPS AND KNEES. EASY-TO-MANEUVER, THE SMALL BLUE CURETTE LETS YOU REMOVE CEMENT FROM ALL EDGES OF THE PROSTHESIS.NO-SCRATCH WHITNEY SMALL CURETTE FEATURES:SAFER THAN STEEL- MADE OF PLASTIC - STURDY YET SOFTER THAN SURGICAL STEEL.HANDIER- EACH TOOL HAS A MOLDED-IN THUMB INDENTATION INSTANTLY ORIENTING THE TOOL.- EASIER TO MANEUVER IN HARD TO SEE PLACES.- COLOR-CODED FOR EASY IDENTIFICATION.FASTER- COMES INDIVIDUALLY PACKAGED IN STERILE, PEEL OPEN POUCH, READY TO GO.- APPLIES, MANIPULATES, OR REMOVES CEMENT WITH FINGERTIP PRECISION.ECONOMICAL- THE COST OF A SINGLE REVISION DWARFS THE COST OF A SINGLE-USE CURETTE. - DISPOSABLE – THERE’S NO NEED TO WASTE TIME REMOVING HARDENED CEMENT OR RE-STERILIZING TOOLS.- LESS EXPENSIVE TO BUY AND INVENTORY - ORDER ONLY THE TOOL YOU USE.- PACKAGED 25 INDIVIDUALLY WRAPPED TOOLS TO A CARTON FOR CONVENIENT SHELF STORAGE.: Brand: WHITNEY NO-SCRATCH CURETTE (SMALL)

## (undated) DEVICE — FAN SPRAY KIT: Brand: PULSAVAC®

## (undated) DEVICE — 450 ML BOTTLE OF 0.05% CHLORHEXIDINE GLUCONATE IN 99.95% STERILE WATER FOR IRRIGATION, USP AND APPLICATOR.: Brand: IRRISEPT ANTIMICROBIAL WOUND LAVAGE

## (undated) DEVICE — APPL CHLORAPREP HI/LITE 26ML ORNG

## (undated) DEVICE — SUT VIC 0 CT1 27IN DYED J340H

## (undated) DEVICE — GLV SURG SENSICARE POLYISPRN W/ALOE PF LF 6.5 GRN STRL

## (undated) DEVICE — SYS PUMP PREVENA125 INCSN MNGT PP/DRSNG 45ML 1P/U

## (undated) DEVICE — STRIP,CLOSURE,WOUND,MEDI-STRIP,1/2X4: Brand: MEDLINE

## (undated) DEVICE — DRP SURG U/DRP INVISISHIELD PA 48X52IN

## (undated) DEVICE — BNDG ELAS W/CLIP 6IN 10YD LF STRL

## (undated) DEVICE — ENCORE® LATEX ORTHO SIZE 8, STERILE LATEX POWDER-FREE SURGICAL GLOVE: Brand: ENCORE

## (undated) DEVICE — APL DUPLOSPRAYER MIS 40CM

## (undated) DEVICE — DRSNG GZ PETROLTM XEROFORM CURAD 1X8IN STRL

## (undated) DEVICE — FRCP BX RADJAW4 NDL 2.8 240CM LG OG BX40

## (undated) DEVICE — SUT VIC 0 CT1 36IN J946H

## (undated) DEVICE — SUT PROLENE PP 0 SH 30IN

## (undated) DEVICE — INSTRUMENT BATTERY

## (undated) DEVICE — ELECTRD BLD EDGE COAT 3IN

## (undated) DEVICE — CONTAINER,SPECIMEN,OR STERILE,4OZ: Brand: MEDLINE

## (undated) DEVICE — ADHS SKIN PREMIERPRO EXOFIN TOPICAL HI/VISC .5ML

## (undated) DEVICE — GLV SURG SENSICARE PI MIC PF SZ6 LF STRL

## (undated) DEVICE — UNDYED BRAIDED (POLYGLACTIN 910), SYNTHETIC ABSORBABLE SUTURE: Brand: COATED VICRYL

## (undated) DEVICE — LN SMPL CO2 SHTRM SD STREAM W/M LUER

## (undated) DEVICE — STRYKER PERFORMANCE SERIES SAGITTAL BLADE: Brand: STRYKER PERFORMANCE SERIES

## (undated) DEVICE — PULLOVER TOGA, 2X LARGE: Brand: FLYTE, SURGICOOL

## (undated) DEVICE — SYR LUERLOK 30CC

## (undated) DEVICE — PK OSC LAP SLV 40

## (undated) DEVICE — PEEL-AWAY TOGA, 2X LARGE: Brand: FLYTE

## (undated) DEVICE — GLV SURG ULTRAFREE MAX LTX PF 8

## (undated) DEVICE — SUT ETHIB 1 X538H ETX538H

## (undated) DEVICE — DRP ROBOTIC ROSA BX/20

## (undated) DEVICE — ANCHORING PIN 20MM/4MM

## (undated) DEVICE — TOWEL,OR,DSP,ST,BLUE,STD,4/PK,20PK/CS: Brand: MEDLINE

## (undated) DEVICE — BASIC SINGLE BASIN-LF: Brand: MEDLINE INDUSTRIES, INC.

## (undated) DEVICE — PAD GRND REM POLYHESIVE A/ DISP

## (undated) DEVICE — PENCL E/S SMOKEEVAC W/TELESCP CANN

## (undated) DEVICE — GLV SURG SENSICARE PI PF LF 7 GRN STRL

## (undated) DEVICE — UNDERCAST PADDING: Brand: DEROYAL

## (undated) DEVICE — SCRW HEX PERSONA FML 2.5X25MM PK/2
Type: IMPLANTABLE DEVICE | Site: KNEE | Status: NON-FUNCTIONAL
Removed: 2023-10-17

## (undated) DEVICE — PROXIMATE RH ROTATING HEAD SKIN STAPLERS (35 WIDE) CONTAINS 35 STAINLESS STEEL STAPLES: Brand: PROXIMATE

## (undated) DEVICE — STERILE POLYISOPRENE POWDER-FREE SURGICAL GLOVES: Brand: PROTEXIS

## (undated) DEVICE — GLV SURG SENSICARE PI MIC PF SZ8.5 LF STRL

## (undated) DEVICE — KT ORCA ORCAPOD DISP STRL

## (undated) DEVICE — ENSEAL LAPAROSCOPIC TISSUE SEALER G2 ARTICULATING CURVED JAW FOR USE WITH G2 GENERATOR 5MM DIAMETER 45CM SHAFT LENGTH: Brand: ENSEAL

## (undated) DEVICE — VISUALIZATION SYSTEM: Brand: CLEARIFY

## (undated) DEVICE — SOL IRR NACL 0.9PCT 3000ML

## (undated) DEVICE — 3 BONE CEMENT MIXER: Brand: MIXEVAC

## (undated) DEVICE — 500ML,PRESSURE INFUSER W/STOPCOCK: Brand: MEDLINE

## (undated) DEVICE — CONN TBG Y 5 IN 1 LF STRL

## (undated) DEVICE — LAPAROSCOPIC SMOKE FILTRATION SYSTEM: Brand: PALL LAPAROSHIELD® PLUS LAPAROSCOPIC SMOKE FILTRATION SYSTEM

## (undated) DEVICE — NDL HYPO PRECISIONGLIDE REG 20G 1 1/2

## (undated) DEVICE — Device: Brand: PULSAVAC®

## (undated) DEVICE — Device

## (undated) DEVICE — GLV SURG SENSICARE SLT PF LF 7 STRL

## (undated) DEVICE — TROCAR: Brand: KII OPTICAL ACCESS SYSTEM

## (undated) DEVICE — SLV SCD KN/LEN ADJ EXPRSS BLENDED MD 1P/U

## (undated) DEVICE — GAUZE,SPONGE,4"X4",16PLY,STRL,LF,10/TRAY: Brand: MEDLINE

## (undated) DEVICE — TOTAL KNEE-LF: Brand: MEDLINE INDUSTRIES, INC.

## (undated) DEVICE — BITEBLOCK OMNI BLOC

## (undated) DEVICE — SENSR O2 OXIMAX FNGR A/ 18IN NONSTR

## (undated) DEVICE — SUT SILK 0 SH 30IN K834H

## (undated) DEVICE — 3M™ IOBAN™ 2 ANTIMICROBIAL INCISE DRAPE 6650EZ: Brand: IOBAN™ 2

## (undated) DEVICE — PATIENT RETURN ELECTRODE, SINGLE-USE, CONTACT QUALITY MONITORING, ADULT, WITH 9FT CORD, FOR PATIENTS WEIGING OVER 33LBS. (15KG): Brand: MEGADYNE

## (undated) DEVICE — GLV SURG SENSICARE PI LF PF 8 GRN STRL

## (undated) DEVICE — Device: Brand: STANDARD BOUGIE, 38FR

## (undated) DEVICE — 3M™ STERI-DRAPE™ U-DRAPE 1015: Brand: STERI-DRAPE™

## (undated) DEVICE — LAPAROSCOPIC DISSECTOR: Brand: DEROYAL

## (undated) DEVICE — BNDG ELAS ECON W/CLIP 4IN 5YD LF STRL

## (undated) DEVICE — SUT VIC UD BR COAT 0 CP2 27IN

## (undated) DEVICE — CVR LEG BOOTLEG F/R NOSKID 33IN

## (undated) DEVICE — ZIPPERED TOGA, PEEL-AWAY 2X LARGE: Brand: FLYTE, SURGICOOL

## (undated) DEVICE — EXTREMITY-LF: Brand: MEDLINE INDUSTRIES, INC.

## (undated) DEVICE — STERILE PATIENT PROTECTIVE PAD FOR IMP® KNEE POSITIONERS & COHESIVE WRAP (10 / CASE): Brand: DE MAYO KNEE POSITIONER®

## (undated) DEVICE — TUBING, SUCTION, 1/4" X 10', STRAIGHT: Brand: MEDLINE

## (undated) DEVICE — Device: Brand: STABLECUT®

## (undated) DEVICE — MSK PROC CURAPLEX O2 2/ADAPT 7FT

## (undated) DEVICE — TROC STANDARDTROCAR FOR/TITANSGS 19MM DISP STRL